# Patient Record
Sex: FEMALE | Race: WHITE | NOT HISPANIC OR LATINO | Employment: FULL TIME | RURAL
[De-identification: names, ages, dates, MRNs, and addresses within clinical notes are randomized per-mention and may not be internally consistent; named-entity substitution may affect disease eponyms.]

---

## 2020-02-24 LAB — BCS RECOMMENDATION EXT: NORMAL

## 2020-10-09 ENCOUNTER — HISTORICAL (OUTPATIENT)
Dept: ADMINISTRATIVE | Facility: HOSPITAL | Age: 55
End: 2020-10-09

## 2020-10-09 LAB — SARS-COV+SARS-COV-2 AG RESP QL IA.RAPID: NEGATIVE

## 2020-10-12 ENCOUNTER — HISTORICAL (OUTPATIENT)
Dept: ADMINISTRATIVE | Facility: HOSPITAL | Age: 55
End: 2020-10-12

## 2020-10-12 LAB — SARS-COV+SARS-COV-2 AG RESP QL IA.RAPID: NEGATIVE

## 2021-03-31 ENCOUNTER — ANESTHESIA EVENT (OUTPATIENT)
Dept: SURGERY | Facility: HOSPITAL | Age: 56
DRG: 414 | End: 2021-03-31
Payer: COMMERCIAL

## 2021-03-31 ENCOUNTER — HOSPITAL ENCOUNTER (INPATIENT)
Facility: HOSPITAL | Age: 56
LOS: 5 days | Discharge: HOME | DRG: 414 | End: 2021-04-05
Attending: SURGERY | Admitting: SURGERY
Payer: COMMERCIAL

## 2021-03-31 ENCOUNTER — HISTORICAL (OUTPATIENT)
Dept: ADMINISTRATIVE | Facility: HOSPITAL | Age: 56
End: 2021-03-31

## 2021-03-31 ENCOUNTER — ANESTHESIA (OUTPATIENT)
Dept: SURGERY | Facility: HOSPITAL | Age: 56
DRG: 414 | End: 2021-03-31
Payer: COMMERCIAL

## 2021-03-31 DIAGNOSIS — K81.0 ACUTE CHOLECYSTITIS: ICD-10-CM

## 2021-03-31 DIAGNOSIS — K82.A1 GANGRENE OF GALLBLADDER IN CHOLECYSTITIS: Primary | ICD-10-CM

## 2021-03-31 PROCEDURE — 25000003 PHARM REV CODE 250: Performed by: SURGERY

## 2021-03-31 PROCEDURE — 36000708 HC OR TIME LEV III 1ST 15 MIN: Performed by: SURGERY

## 2021-03-31 PROCEDURE — 27202716 HC PROBE, NASAL TEMP MONITORING: Performed by: ANESTHESIOLOGY

## 2021-03-31 PROCEDURE — 36000709 HC OR TIME LEV III EA ADD 15 MIN: Performed by: SURGERY

## 2021-03-31 PROCEDURE — 27000165 HC TUBE, ETT CUFFED: Performed by: ANESTHESIOLOGY

## 2021-03-31 PROCEDURE — 11000001 HC ACUTE MED/SURG PRIVATE ROOM

## 2021-03-31 PROCEDURE — 87186 SC STD MICRODIL/AGAR DIL: CPT

## 2021-03-31 PROCEDURE — 87077 CULTURE AEROBIC IDENTIFY: CPT

## 2021-03-31 PROCEDURE — 87070 CULTURE OTHR SPECIMN AEROBIC: CPT

## 2021-03-31 PROCEDURE — D9220A PRA ANESTHESIA: ICD-10-PCS | Mod: ,,, | Performed by: ANESTHESIOLOGY

## 2021-03-31 PROCEDURE — 27201423 OPTIME MED/SURG SUP & DEVICES STERILE SUPPLY: Performed by: SURGERY

## 2021-03-31 PROCEDURE — S0030 INJECTION, METRONIDAZOLE: HCPCS | Performed by: NURSE ANESTHETIST, CERTIFIED REGISTERED

## 2021-03-31 PROCEDURE — 37000008 HC ANESTHESIA 1ST 15 MINUTES: Performed by: SURGERY

## 2021-03-31 PROCEDURE — 25000003 PHARM REV CODE 250: Performed by: NURSE ANESTHETIST, CERTIFIED REGISTERED

## 2021-03-31 PROCEDURE — 47600 CHOLECYSTECTOMY: CPT | Mod: ,,, | Performed by: SURGERY

## 2021-03-31 PROCEDURE — 88304 TISSUE EXAM BY PATHOLOGIST: CPT | Performed by: PATHOLOGY

## 2021-03-31 PROCEDURE — 88304 TISSUE EXAM BY PATHOLOGIST: CPT | Mod: 26,,, | Performed by: PATHOLOGY

## 2021-03-31 PROCEDURE — 47600 PR REMOVAL GALLBLADDER: ICD-10-PCS | Mod: ,,, | Performed by: SURGERY

## 2021-03-31 PROCEDURE — 87075 CULTR BACTERIA EXCEPT BLOOD: CPT

## 2021-03-31 PROCEDURE — C1729 CATH, DRAINAGE: HCPCS | Performed by: SURGERY

## 2021-03-31 PROCEDURE — D9220A PRA ANESTHESIA: Mod: ,,, | Performed by: ANESTHESIOLOGY

## 2021-03-31 PROCEDURE — 27200700 HC TUBE, ENDOTRACHEAL NASAL RAE: Performed by: ANESTHESIOLOGY

## 2021-03-31 PROCEDURE — 63600175 PHARM REV CODE 636 W HCPCS: Performed by: NURSE ANESTHETIST, CERTIFIED REGISTERED

## 2021-03-31 PROCEDURE — 37000009 HC ANESTHESIA EA ADD 15 MINS: Performed by: SURGERY

## 2021-03-31 PROCEDURE — 27000716 HC OXISENSOR PROBE, ANY SIZE: Performed by: ANESTHESIOLOGY

## 2021-03-31 PROCEDURE — 27000655: Performed by: ANESTHESIOLOGY

## 2021-03-31 PROCEDURE — 88304 PR  SURG PATH,LEVEL III: ICD-10-PCS | Mod: 26,,, | Performed by: PATHOLOGY

## 2021-03-31 PROCEDURE — 27000510 HC BLANKET BAIR HUGGER ANY SIZE: Performed by: ANESTHESIOLOGY

## 2021-03-31 PROCEDURE — 71000033 HC RECOVERY, INTIAL HOUR: Performed by: SURGERY

## 2021-03-31 RX ORDER — MIDAZOLAM HYDROCHLORIDE 1 MG/ML
INJECTION, SOLUTION INTRAMUSCULAR; INTRAVENOUS
Status: DISCONTINUED | OUTPATIENT
Start: 2021-03-31 | End: 2021-04-01

## 2021-03-31 RX ORDER — ONDANSETRON 2 MG/ML
INJECTION INTRAMUSCULAR; INTRAVENOUS
Status: DISCONTINUED | OUTPATIENT
Start: 2021-03-31 | End: 2021-04-01

## 2021-03-31 RX ORDER — METRONIDAZOLE 500 MG/100ML
INJECTION, SOLUTION INTRAVENOUS
Status: DISCONTINUED | OUTPATIENT
Start: 2021-03-31 | End: 2021-04-01

## 2021-03-31 RX ORDER — PROPOFOL 10 MG/ML
VIAL (ML) INTRAVENOUS
Status: DISCONTINUED | OUTPATIENT
Start: 2021-03-31 | End: 2021-04-01

## 2021-03-31 RX ORDER — MORPHINE SULFATE 4 MG/ML
4 INJECTION, SOLUTION INTRAMUSCULAR; INTRAVENOUS EVERY 4 HOURS PRN
Status: DISCONTINUED | OUTPATIENT
Start: 2021-03-31 | End: 2021-04-01

## 2021-03-31 RX ORDER — DEXAMETHASONE SODIUM PHOSPHATE 4 MG/ML
INJECTION, SOLUTION INTRA-ARTICULAR; INTRALESIONAL; INTRAMUSCULAR; INTRAVENOUS; SOFT TISSUE
Status: DISCONTINUED | OUTPATIENT
Start: 2021-03-31 | End: 2021-04-01

## 2021-03-31 RX ORDER — GABAPENTIN 300 MG/1
300 CAPSULE ORAL NIGHTLY PRN
Status: DISCONTINUED | OUTPATIENT
Start: 2021-03-31 | End: 2021-04-05 | Stop reason: HOSPADM

## 2021-03-31 RX ORDER — SODIUM CHLORIDE 0.9 % (FLUSH) 0.9 %
10 SYRINGE (ML) INJECTION
Status: DISCONTINUED | OUTPATIENT
Start: 2021-03-31 | End: 2021-04-05 | Stop reason: HOSPADM

## 2021-03-31 RX ORDER — ROCURONIUM BROMIDE 10 MG/ML
INJECTION, SOLUTION INTRAVENOUS
Status: DISCONTINUED | OUTPATIENT
Start: 2021-03-31 | End: 2021-04-01

## 2021-03-31 RX ORDER — GABAPENTIN 300 MG/1
300 CAPSULE ORAL NIGHTLY PRN
COMMUNITY
End: 2021-09-30 | Stop reason: SDUPTHER

## 2021-03-31 RX ORDER — HYDROCODONE BITARTRATE AND ACETAMINOPHEN 5; 325 MG/1; MG/1
1 TABLET ORAL EVERY 4 HOURS PRN
Status: DISCONTINUED | OUTPATIENT
Start: 2021-03-31 | End: 2021-04-05 | Stop reason: HOSPADM

## 2021-03-31 RX ORDER — LIDOCAINE HYDROCHLORIDE 20 MG/ML
INJECTION INTRAVENOUS
Status: DISCONTINUED | OUTPATIENT
Start: 2021-03-31 | End: 2021-04-01

## 2021-03-31 RX ORDER — ACETAMINOPHEN 325 MG/1
650 TABLET ORAL EVERY 8 HOURS PRN
Status: DISCONTINUED | OUTPATIENT
Start: 2021-03-31 | End: 2021-04-05 | Stop reason: HOSPADM

## 2021-03-31 RX ORDER — ONDANSETRON 2 MG/ML
4 INJECTION INTRAMUSCULAR; INTRAVENOUS EVERY 12 HOURS PRN
Status: DISCONTINUED | OUTPATIENT
Start: 2021-03-31 | End: 2021-04-05 | Stop reason: HOSPADM

## 2021-03-31 RX ORDER — SODIUM CHLORIDE 9 MG/ML
INJECTION, SOLUTION INTRAVENOUS CONTINUOUS
Status: DISCONTINUED | OUTPATIENT
Start: 2021-03-31 | End: 2021-04-02

## 2021-03-31 RX ORDER — LIDOCAINE HYDROCHLORIDE 10 MG/ML
1 INJECTION, SOLUTION EPIDURAL; INFILTRATION; INTRACAUDAL; PERINEURAL ONCE
Status: DISCONTINUED | OUTPATIENT
Start: 2021-03-31 | End: 2021-04-05 | Stop reason: HOSPADM

## 2021-03-31 RX ORDER — MELOXICAM 7.5 MG/1
15 TABLET ORAL DAILY
Status: DISCONTINUED | OUTPATIENT
Start: 2021-04-01 | End: 2021-04-05 | Stop reason: HOSPADM

## 2021-03-31 RX ORDER — MELOXICAM 15 MG/1
15 TABLET ORAL DAILY
COMMUNITY
End: 2021-06-16

## 2021-03-31 RX ORDER — ONDANSETRON 4 MG/1
8 TABLET, ORALLY DISINTEGRATING ORAL EVERY 8 HOURS PRN
Status: DISCONTINUED | OUTPATIENT
Start: 2021-03-31 | End: 2021-04-01

## 2021-03-31 RX ORDER — BUPIVACAINE HYDROCHLORIDE 2.5 MG/ML
INJECTION, SOLUTION EPIDURAL; INFILTRATION; INTRACAUDAL
Status: DISCONTINUED | OUTPATIENT
Start: 2021-03-31 | End: 2021-04-01 | Stop reason: HOSPADM

## 2021-03-31 RX ORDER — FENTANYL CITRATE 50 UG/ML
INJECTION, SOLUTION INTRAMUSCULAR; INTRAVENOUS
Status: DISCONTINUED | OUTPATIENT
Start: 2021-03-31 | End: 2021-04-01

## 2021-03-31 RX ORDER — TALC
6 POWDER (GRAM) TOPICAL NIGHTLY PRN
Status: DISCONTINUED | OUTPATIENT
Start: 2021-03-31 | End: 2021-04-05 | Stop reason: HOSPADM

## 2021-03-31 RX ADMIN — PROPOFOL 200 MG: 10 INJECTION, EMULSION INTRAVENOUS at 10:03

## 2021-03-31 RX ADMIN — FENTANYL CITRATE 100 MCG: 50 INJECTION INTRAMUSCULAR; INTRAVENOUS at 10:03

## 2021-03-31 RX ADMIN — MIDAZOLAM HYDROCHLORIDE 2 MG: 1 INJECTION, SOLUTION INTRAMUSCULAR; INTRAVENOUS at 10:03

## 2021-03-31 RX ADMIN — METRONIDAZOLE 500 MG: 500 INJECTION, SOLUTION INTRAVENOUS at 11:03

## 2021-03-31 RX ADMIN — DEXAMETHASONE SODIUM PHOSPHATE 20 MG: 4 INJECTION, SOLUTION INTRA-ARTICULAR; INTRALESIONAL; INTRAMUSCULAR; INTRAVENOUS; SOFT TISSUE at 10:03

## 2021-03-31 RX ADMIN — LIDOCAINE HYDROCHLORIDE 100 MG: 20 INJECTION, SOLUTION INTRAVENOUS at 10:03

## 2021-03-31 RX ADMIN — CEFAZOLIN 1 G: 1 INJECTION, POWDER, FOR SOLUTION INTRAMUSCULAR; INTRAVENOUS; PARENTERAL at 10:03

## 2021-03-31 RX ADMIN — ONDANSETRON 8 MG: 2 INJECTION INTRAMUSCULAR; INTRAVENOUS at 10:03

## 2021-03-31 RX ADMIN — ROCURONIUM BROMIDE 40 MG: 10 INJECTION INTRAVENOUS at 10:03

## 2021-04-01 PROBLEM — K82.A1 GANGRENE OF GALLBLADDER IN CHOLECYSTITIS: Status: ACTIVE | Noted: 2021-04-01

## 2021-04-01 LAB
ALBUMIN SERPL BCP-MCNC: 2.1 G/DL (ref 3.5–5)
ALBUMIN/GLOB SERPL: 0.4 {RATIO}
ALP SERPL-CCNC: 146 U/L (ref 41–108)
ALT SERPL W P-5'-P-CCNC: 90 U/L (ref 13–56)
ANION GAP SERPL CALCULATED.3IONS-SCNC: 14 MMOL/L
AST SERPL W P-5'-P-CCNC: 112 U/L (ref 15–37)
BASOPHILS # BLD AUTO: 0.03 X10E3/UL (ref 0–0.2)
BASOPHILS NFR BLD AUTO: 0.4 % (ref 0–1)
BILIRUB SERPL-MCNC: 0.4 MG/DL (ref 0–1.2)
BUN SERPL-MCNC: 15 MG/DL (ref 7–18)
BUN/CREAT SERPL: 14.3
CALCIUM SERPL-MCNC: 7.8 MG/DL (ref 8.5–10.1)
CHLORIDE SERPL-SCNC: 104 MMOL/L (ref 98–107)
CO2 SERPL-SCNC: 25 MMOL/L (ref 21–32)
CREAT SERPL-MCNC: 1.05 MG/DL (ref 0.55–1.02)
EOSINOPHIL # BLD AUTO: 0.01 X10E3/UL (ref 0–0.5)
EOSINOPHIL NFR BLD AUTO: 0.1 % (ref 1–4)
ERYTHROCYTE [DISTWIDTH] IN BLOOD BY AUTOMATED COUNT: 13.6 % (ref 11.5–14.5)
GLOBULIN SER-MCNC: 4.8 G/DL (ref 2–4)
GLUCOSE SERPL-MCNC: 128 MG/DL (ref 74–106)
HCT VFR BLD AUTO: 30.4 % (ref 38–47)
HGB BLD-MCNC: 9.8 G/DL (ref 12–16)
HYPOCHROMIA BLD QL SMEAR: SLIGHT
IMM GRANULOCYTES # BLD AUTO: 0.04 X10E3/UL (ref 0–0.04)
IMM GRANULOCYTES NFR BLD: 0.6 % (ref 0–0.4)
LYMPHOCYTES # BLD AUTO: 0.63 X10E3/UL (ref 1–4.8)
LYMPHOCYTES NFR BLD AUTO: 8.7 % (ref 27–41)
LYMPHOCYTES NFR BLD MANUAL: 9 % (ref 27–41)
MCH RBC QN AUTO: 28.1 PG (ref 27–31)
MCHC RBC AUTO-ENTMCNC: 32.2 G/DL (ref 32–36)
MCV RBC AUTO: 87.1 FL (ref 80–96)
MONOCYTES # BLD AUTO: 0.42 X10E3/UL (ref 0–0.8)
MONOCYTES NFR BLD AUTO: 5.8 % (ref 2–6)
MONOCYTES NFR BLD MANUAL: 4 % (ref 2–6)
MPC BLD CALC-MCNC: 10.1 FL (ref 9.4–12.4)
NEUTROPHILS # BLD AUTO: 6.1 X10E3/UL (ref 1.8–7.7)
NEUTROPHILS NFR BLD AUTO: 84.4 % (ref 53–65)
NEUTS BAND NFR BLD MANUAL: 17 % (ref 1–5)
NEUTS SEG NFR BLD MANUAL: 70 % (ref 50–62)
NRBC # BLD AUTO: 0 X10E3/UL (ref 0–0)
NRBC, AUTO (.00): 0 /100 (ref 0–0)
PLATELET # BLD AUTO: 250 X10E3/UL (ref 150–400)
PLATELET MORPHOLOGY: ABNORMAL
POTASSIUM SERPL-SCNC: 4 MMOL/L (ref 3.5–5.1)
PROT SERPL-MCNC: 6.9 G/DL (ref 6.4–8.2)
RBC # BLD AUTO: 3.49 X10E6/UL (ref 4.2–5.4)
SODIUM SERPL-SCNC: 139 MMOL/L (ref 136–145)
WBC # BLD AUTO: 7.23 X10E3/UL (ref 4.5–11)

## 2021-04-01 PROCEDURE — 25000003 PHARM REV CODE 250

## 2021-04-01 PROCEDURE — 80053 COMPREHEN METABOLIC PANEL: CPT

## 2021-04-01 PROCEDURE — 25000003 PHARM REV CODE 250: Performed by: SURGERY

## 2021-04-01 PROCEDURE — 63600175 PHARM REV CODE 636 W HCPCS: Performed by: SURGERY

## 2021-04-01 PROCEDURE — 94760 N-INVAS EAR/PLS OXIMETRY 1: CPT

## 2021-04-01 PROCEDURE — 63600175 PHARM REV CODE 636 W HCPCS: Performed by: ANESTHESIOLOGY

## 2021-04-01 PROCEDURE — 25000003 PHARM REV CODE 250: Performed by: NURSE PRACTITIONER

## 2021-04-01 PROCEDURE — 63600175 PHARM REV CODE 636 W HCPCS: Performed by: NURSE PRACTITIONER

## 2021-04-01 PROCEDURE — 85025 COMPLETE CBC W/AUTO DIFF WBC: CPT

## 2021-04-01 PROCEDURE — 94761 N-INVAS EAR/PLS OXIMETRY MLT: CPT

## 2021-04-01 PROCEDURE — 11000001 HC ACUTE MED/SURG PRIVATE ROOM

## 2021-04-01 PROCEDURE — 36415 COLL VENOUS BLD VENIPUNCTURE: CPT

## 2021-04-01 RX ORDER — SODIUM CHLORIDE, SODIUM LACTATE, POTASSIUM CHLORIDE, CALCIUM CHLORIDE 600; 310; 30; 20 MG/100ML; MG/100ML; MG/100ML; MG/100ML
INJECTION, SOLUTION INTRAVENOUS CONTINUOUS
Status: DISCONTINUED | OUTPATIENT
Start: 2021-04-01 | End: 2021-04-03

## 2021-04-01 RX ORDER — MORPHINE SULFATE 10 MG/ML
4 INJECTION INTRAMUSCULAR; INTRAVENOUS; SUBCUTANEOUS EVERY 5 MIN PRN
Status: DISCONTINUED | OUTPATIENT
Start: 2021-04-01 | End: 2021-04-01

## 2021-04-01 RX ORDER — NALOXONE HCL 0.4 MG/ML
0.02 VIAL (ML) INJECTION
Status: DISCONTINUED | OUTPATIENT
Start: 2021-04-01 | End: 2021-04-05 | Stop reason: HOSPADM

## 2021-04-01 RX ORDER — ENOXAPARIN SODIUM 100 MG/ML
40 INJECTION SUBCUTANEOUS EVERY 12 HOURS
Status: DISCONTINUED | OUTPATIENT
Start: 2021-04-01 | End: 2021-04-05 | Stop reason: HOSPADM

## 2021-04-01 RX ORDER — MEPERIDINE HYDROCHLORIDE 25 MG/ML
25 INJECTION INTRAMUSCULAR; INTRAVENOUS; SUBCUTANEOUS EVERY 10 MIN PRN
Status: DISCONTINUED | OUTPATIENT
Start: 2021-04-01 | End: 2021-04-01 | Stop reason: HOSPADM

## 2021-04-01 RX ORDER — DIPHENHYDRAMINE HYDROCHLORIDE 50 MG/ML
25 INJECTION INTRAMUSCULAR; INTRAVENOUS EVERY 6 HOURS PRN
Status: DISCONTINUED | OUTPATIENT
Start: 2021-04-01 | End: 2021-04-01 | Stop reason: HOSPADM

## 2021-04-01 RX ORDER — CLONIDINE HYDROCHLORIDE 0.1 MG/1
0.1 TABLET ORAL EVERY 6 HOURS PRN
Status: DISCONTINUED | OUTPATIENT
Start: 2021-04-01 | End: 2021-04-05 | Stop reason: HOSPADM

## 2021-04-01 RX ORDER — BISACODYL 10 MG
10 SUPPOSITORY, RECTAL RECTAL DAILY PRN
Status: DISCONTINUED | OUTPATIENT
Start: 2021-04-01 | End: 2021-04-05 | Stop reason: HOSPADM

## 2021-04-01 RX ORDER — MORPHINE SULFATE 1 MG/ML
INJECTION INTRAVENOUS CONTINUOUS
Status: DISCONTINUED | OUTPATIENT
Start: 2021-04-01 | End: 2021-04-02

## 2021-04-01 RX ORDER — SIMETHICONE 80 MG
1 TABLET,CHEWABLE ORAL 3 TIMES DAILY PRN
Status: DISCONTINUED | OUTPATIENT
Start: 2021-04-01 | End: 2021-04-05 | Stop reason: HOSPADM

## 2021-04-01 RX ORDER — DIPHENHYDRAMINE HYDROCHLORIDE 50 MG/ML
25 INJECTION INTRAMUSCULAR; INTRAVENOUS EVERY 4 HOURS PRN
Status: DISCONTINUED | OUTPATIENT
Start: 2021-04-01 | End: 2021-04-05 | Stop reason: HOSPADM

## 2021-04-01 RX ORDER — OXYCODONE HYDROCHLORIDE 5 MG/1
5 TABLET ORAL
Status: DISCONTINUED | OUTPATIENT
Start: 2021-04-01 | End: 2021-04-01 | Stop reason: HOSPADM

## 2021-04-01 RX ORDER — HYDROMORPHONE HYDROCHLORIDE 2 MG/ML
0.5 INJECTION, SOLUTION INTRAMUSCULAR; INTRAVENOUS; SUBCUTANEOUS EVERY 5 MIN PRN
Status: DISCONTINUED | OUTPATIENT
Start: 2021-04-01 | End: 2021-04-01 | Stop reason: HOSPADM

## 2021-04-01 RX ORDER — ONDANSETRON 2 MG/ML
4 INJECTION INTRAMUSCULAR; INTRAVENOUS DAILY PRN
Status: DISCONTINUED | OUTPATIENT
Start: 2021-04-01 | End: 2021-04-01 | Stop reason: HOSPADM

## 2021-04-01 RX ORDER — SODIUM CHLORIDE 9 MG/ML
INJECTION, SOLUTION INTRAVENOUS
Status: COMPLETED
Start: 2021-04-01 | End: 2021-04-01

## 2021-04-01 RX ORDER — AMOXICILLIN 250 MG
1 CAPSULE ORAL 2 TIMES DAILY
Status: DISCONTINUED | OUTPATIENT
Start: 2021-04-01 | End: 2021-04-05 | Stop reason: HOSPADM

## 2021-04-01 RX ADMIN — ENOXAPARIN SODIUM 40 MG: 40 INJECTION SUBCUTANEOUS at 09:04

## 2021-04-01 RX ADMIN — SODIUM CHLORIDE, POTASSIUM CHLORIDE, SODIUM LACTATE AND CALCIUM CHLORIDE: 600; 310; 30; 20 INJECTION, SOLUTION INTRAVENOUS at 12:04

## 2021-04-01 RX ADMIN — SENNOSIDES, DOCUSATE SODIUM 1 TABLET: 8.6; 5 TABLET ORAL at 12:04

## 2021-04-01 RX ADMIN — MORPHINE SULFATE 4 MG: 10 INJECTION INTRAVENOUS at 12:04

## 2021-04-01 RX ADMIN — MORPHINE SULFATE: 1 INJECTION INTRAVENOUS at 02:04

## 2021-04-01 RX ADMIN — MELOXICAM 15 MG: 7.5 TABLET ORAL at 09:04

## 2021-04-01 RX ADMIN — ENOXAPARIN SODIUM 40 MG: 40 INJECTION SUBCUTANEOUS at 12:04

## 2021-04-01 RX ADMIN — MORPHINE SULFATE 2 MG: 10 INJECTION INTRAVENOUS at 12:04

## 2021-04-01 RX ADMIN — SODIUM CHLORIDE 1000 ML: 9 INJECTION, SOLUTION INTRAVENOUS at 02:04

## 2021-04-01 RX ADMIN — SENNOSIDES, DOCUSATE SODIUM 1 TABLET: 8.6; 5 TABLET ORAL at 09:04

## 2021-04-01 RX ADMIN — ONDANSETRON 4 MG: 2 INJECTION INTRAMUSCULAR; INTRAVENOUS at 12:04

## 2021-04-01 RX ADMIN — MORPHINE SULFATE: 1 INJECTION INTRAVENOUS at 09:04

## 2021-04-01 RX ADMIN — ONDANSETRON 4 MG: 2 INJECTION INTRAMUSCULAR; INTRAVENOUS at 04:04

## 2021-04-02 LAB
ANION GAP SERPL CALCULATED.3IONS-SCNC: 12 MMOL/L
BASOPHILS # BLD AUTO: 0.04 X10E3/UL (ref 0–0.2)
BASOPHILS NFR BLD AUTO: 0.5 % (ref 0–1)
BUN SERPL-MCNC: 21 MG/DL (ref 7–18)
CALCIUM SERPL-MCNC: 8.5 MG/DL (ref 8.5–10.1)
CHLORIDE SERPL-SCNC: 101 MMOL/L (ref 98–107)
CO2 SERPL-SCNC: 28 MMOL/L (ref 21–32)
CREAT SERPL-MCNC: 0.92 MG/DL (ref 0.55–1.02)
EOSINOPHIL # BLD AUTO: 0.12 X10E3/UL (ref 0–0.5)
EOSINOPHIL NFR BLD AUTO: 1.6 % (ref 1–4)
ERYTHROCYTE [DISTWIDTH] IN BLOOD BY AUTOMATED COUNT: 13.7 % (ref 11.5–14.5)
GLUCOSE SERPL-MCNC: 124 MG/DL (ref 74–106)
HCT VFR BLD AUTO: 31.3 % (ref 38–47)
HGB BLD-MCNC: 9.6 G/DL (ref 12–16)
IMM GRANULOCYTES # BLD AUTO: 0.04 X10E3/UL (ref 0–0.04)
IMM GRANULOCYTES NFR BLD: 0.5 % (ref 0–0.4)
INSULIN SERPL-ACNC: NORMAL U[IU]/ML
LAB AP CLINICAL INFORMATION: NORMAL
LAB AP DIAGNOSIS - HISTORICAL: NORMAL
LAB AP GROSS PATHOLOGY - HISTORICAL: NORMAL
LAB AP SPECIMEN SUBMITTED - HISTORICAL: NORMAL
LYMPHOCYTES # BLD AUTO: 1.2 X10E3/UL (ref 1–4.8)
LYMPHOCYTES NFR BLD AUTO: 15.6 % (ref 27–41)
MCH RBC QN AUTO: 27.2 PG (ref 27–31)
MCHC RBC AUTO-ENTMCNC: 30.7 G/DL (ref 32–36)
MCV RBC AUTO: 88.7 FL (ref 80–96)
MONOCYTES # BLD AUTO: 0.89 X10E3/UL (ref 0–0.8)
MONOCYTES NFR BLD AUTO: 11.6 % (ref 2–6)
MPC BLD CALC-MCNC: 10.6 FL (ref 9.4–12.4)
NEUTROPHILS # BLD AUTO: 5.4 X10E3/UL (ref 1.8–7.7)
NEUTROPHILS NFR BLD AUTO: 70.2 % (ref 53–65)
NRBC # BLD AUTO: 0 X10E3/UL (ref 0–0)
NRBC, AUTO (.00): 0 /100 (ref 0–0)
PLATELET # BLD AUTO: 255 X10E3/UL (ref 150–400)
POTASSIUM SERPL-SCNC: 4.3 MMOL/L (ref 3.5–5.1)
RBC # BLD AUTO: 3.53 X10E6/UL (ref 4.2–5.4)
SODIUM SERPL-SCNC: 137 MMOL/L (ref 136–145)
WBC # BLD AUTO: 7.69 X10E3/UL (ref 4.5–11)

## 2021-04-02 PROCEDURE — 80048 BASIC METABOLIC PNL TOTAL CA: CPT

## 2021-04-02 PROCEDURE — 25000003 PHARM REV CODE 250: Performed by: SURGERY

## 2021-04-02 PROCEDURE — 99900035 HC TECH TIME PER 15 MIN (STAT)

## 2021-04-02 PROCEDURE — 97110 THERAPEUTIC EXERCISES: CPT | Mod: CQ

## 2021-04-02 PROCEDURE — 94761 N-INVAS EAR/PLS OXIMETRY MLT: CPT

## 2021-04-02 PROCEDURE — 85025 COMPLETE CBC W/AUTO DIFF WBC: CPT

## 2021-04-02 PROCEDURE — 11000001 HC ACUTE MED/SURG PRIVATE ROOM

## 2021-04-02 PROCEDURE — 27000221 HC OXYGEN, UP TO 24 HOURS

## 2021-04-02 PROCEDURE — 63600175 PHARM REV CODE 636 W HCPCS: Performed by: SURGERY

## 2021-04-02 PROCEDURE — 36415 COLL VENOUS BLD VENIPUNCTURE: CPT

## 2021-04-02 PROCEDURE — 94799 UNLISTED PULMONARY SVC/PX: CPT

## 2021-04-02 PROCEDURE — 97530 THERAPEUTIC ACTIVITIES: CPT | Mod: CQ

## 2021-04-02 RX ADMIN — ONDANSETRON 4 MG: 2 INJECTION INTRAMUSCULAR; INTRAVENOUS at 08:04

## 2021-04-02 RX ADMIN — DIPHENHYDRAMINE HYDROCHLORIDE 25 MG: 50 INJECTION INTRAMUSCULAR; INTRAVENOUS at 08:04

## 2021-04-02 RX ADMIN — HYDROCODONE BITARTRATE AND ACETAMINOPHEN 1 TABLET: 5; 325 TABLET ORAL at 12:04

## 2021-04-02 RX ADMIN — DIPHENHYDRAMINE HYDROCHLORIDE 25 MG: 50 INJECTION INTRAMUSCULAR; INTRAVENOUS at 03:04

## 2021-04-02 RX ADMIN — HYDROCODONE BITARTRATE AND ACETAMINOPHEN 1 TABLET: 5; 325 TABLET ORAL at 07:04

## 2021-04-02 RX ADMIN — ENOXAPARIN SODIUM 40 MG: 40 INJECTION SUBCUTANEOUS at 08:04

## 2021-04-02 RX ADMIN — GABAPENTIN 300 MG: 300 CAPSULE ORAL at 08:04

## 2021-04-02 RX ADMIN — CEFTRIAXONE SODIUM 2 G: 2 INJECTION, POWDER, FOR SOLUTION INTRAMUSCULAR; INTRAVENOUS at 08:04

## 2021-04-02 RX ADMIN — ENOXAPARIN SODIUM 40 MG: 40 INJECTION SUBCUTANEOUS at 09:04

## 2021-04-02 RX ADMIN — MELATONIN 6 MG: at 08:04

## 2021-04-02 RX ADMIN — MELOXICAM 15 MG: 7.5 TABLET ORAL at 09:04

## 2021-04-02 RX ADMIN — MORPHINE SULFATE: 1 INJECTION INTRAVENOUS at 07:04

## 2021-04-02 RX ADMIN — SODIUM CHLORIDE, POTASSIUM CHLORIDE, SODIUM LACTATE AND CALCIUM CHLORIDE: 600; 310; 30; 20 INJECTION, SOLUTION INTRAVENOUS at 03:04

## 2021-04-03 LAB — REPORT: NORMAL

## 2021-04-03 PROCEDURE — 25000003 PHARM REV CODE 250: Performed by: SURGERY

## 2021-04-03 PROCEDURE — 63600175 PHARM REV CODE 636 W HCPCS: Performed by: SURGERY

## 2021-04-03 PROCEDURE — 63600175 PHARM REV CODE 636 W HCPCS: Performed by: NURSE PRACTITIONER

## 2021-04-03 PROCEDURE — 94761 N-INVAS EAR/PLS OXIMETRY MLT: CPT

## 2021-04-03 PROCEDURE — 94799 UNLISTED PULMONARY SVC/PX: CPT

## 2021-04-03 PROCEDURE — 27000221 HC OXYGEN, UP TO 24 HOURS

## 2021-04-03 PROCEDURE — 11000001 HC ACUTE MED/SURG PRIVATE ROOM

## 2021-04-03 PROCEDURE — 99900035 HC TECH TIME PER 15 MIN (STAT)

## 2021-04-03 PROCEDURE — 25000003 PHARM REV CODE 250: Performed by: NURSE PRACTITIONER

## 2021-04-03 PROCEDURE — 25000003 PHARM REV CODE 250: Performed by: STUDENT IN AN ORGANIZED HEALTH CARE EDUCATION/TRAINING PROGRAM

## 2021-04-03 RX ORDER — SODIUM CHLORIDE, SODIUM LACTATE, POTASSIUM CHLORIDE, CALCIUM CHLORIDE 600; 310; 30; 20 MG/100ML; MG/100ML; MG/100ML; MG/100ML
INJECTION, SOLUTION INTRAVENOUS CONTINUOUS
Status: DISCONTINUED | OUTPATIENT
Start: 2021-04-03 | End: 2021-04-05

## 2021-04-03 RX ORDER — HYDROCODONE BITARTRATE AND ACETAMINOPHEN 10; 325 MG/1; MG/1
1 TABLET ORAL EVERY 6 HOURS PRN
Status: DISCONTINUED | OUTPATIENT
Start: 2021-04-03 | End: 2021-04-05 | Stop reason: HOSPADM

## 2021-04-03 RX ORDER — SUMATRIPTAN 50 MG/1
50 TABLET, FILM COATED ORAL EVERY 6 HOURS PRN
Status: DISCONTINUED | OUTPATIENT
Start: 2021-04-03 | End: 2021-04-05 | Stop reason: HOSPADM

## 2021-04-03 RX ORDER — HYDROMORPHONE HYDROCHLORIDE 2 MG/ML
0.5 INJECTION, SOLUTION INTRAMUSCULAR; INTRAVENOUS; SUBCUTANEOUS
Status: DISCONTINUED | OUTPATIENT
Start: 2021-04-03 | End: 2021-04-05 | Stop reason: HOSPADM

## 2021-04-03 RX ADMIN — HYDROCODONE BITARTRATE AND ACETAMINOPHEN 1 TABLET: 5; 325 TABLET ORAL at 05:04

## 2021-04-03 RX ADMIN — ENOXAPARIN SODIUM 40 MG: 40 INJECTION SUBCUTANEOUS at 08:04

## 2021-04-03 RX ADMIN — ONDANSETRON 4 MG: 2 INJECTION INTRAMUSCULAR; INTRAVENOUS at 03:04

## 2021-04-03 RX ADMIN — SENNOSIDES, DOCUSATE SODIUM 1 TABLET: 8.6; 5 TABLET ORAL at 08:04

## 2021-04-03 RX ADMIN — SENNOSIDES, DOCUSATE SODIUM 1 TABLET: 8.6; 5 TABLET ORAL at 09:04

## 2021-04-03 RX ADMIN — HYDROCODONE BITARTRATE AND ACETAMINOPHEN 1 TABLET: 10; 325 TABLET ORAL at 09:04

## 2021-04-03 RX ADMIN — CEFTRIAXONE SODIUM 2 G: 2 INJECTION, POWDER, FOR SOLUTION INTRAMUSCULAR; INTRAVENOUS at 06:04

## 2021-04-03 RX ADMIN — SODIUM CHLORIDE, POTASSIUM CHLORIDE, SODIUM LACTATE AND CALCIUM CHLORIDE: 600; 310; 30; 20 INJECTION, SOLUTION INTRAVENOUS at 09:04

## 2021-04-03 RX ADMIN — SODIUM CHLORIDE, POTASSIUM CHLORIDE, SODIUM LACTATE AND CALCIUM CHLORIDE: 600; 310; 30; 20 INJECTION, SOLUTION INTRAVENOUS at 12:04

## 2021-04-03 RX ADMIN — MELOXICAM 15 MG: 7.5 TABLET ORAL at 08:04

## 2021-04-03 RX ADMIN — ENOXAPARIN SODIUM 40 MG: 40 INJECTION SUBCUTANEOUS at 09:04

## 2021-04-03 RX ADMIN — SODIUM CHLORIDE, POTASSIUM CHLORIDE, SODIUM LACTATE AND CALCIUM CHLORIDE: 600; 310; 30; 20 INJECTION, SOLUTION INTRAVENOUS at 08:04

## 2021-04-03 RX ADMIN — ONDANSETRON 4 MG: 2 INJECTION INTRAMUSCULAR; INTRAVENOUS at 09:04

## 2021-04-03 RX ADMIN — HYDROCODONE BITARTRATE AND ACETAMINOPHEN 1 TABLET: 5; 325 TABLET ORAL at 12:04

## 2021-04-03 RX ADMIN — HYDROCODONE BITARTRATE AND ACETAMINOPHEN 1 TABLET: 10; 325 TABLET ORAL at 08:04

## 2021-04-03 RX ADMIN — SUMATRIPTAN SUCCINATE 50 MG: 50 TABLET ORAL at 03:04

## 2021-04-04 LAB — REPORT: NORMAL

## 2021-04-04 PROCEDURE — 25000003 PHARM REV CODE 250: Performed by: SURGERY

## 2021-04-04 PROCEDURE — 94761 N-INVAS EAR/PLS OXIMETRY MLT: CPT

## 2021-04-04 PROCEDURE — 94799 UNLISTED PULMONARY SVC/PX: CPT

## 2021-04-04 PROCEDURE — 11000001 HC ACUTE MED/SURG PRIVATE ROOM

## 2021-04-04 PROCEDURE — 63600175 PHARM REV CODE 636 W HCPCS: Performed by: NURSE PRACTITIONER

## 2021-04-04 PROCEDURE — 25000003 PHARM REV CODE 250: Performed by: NURSE PRACTITIONER

## 2021-04-04 PROCEDURE — 99900035 HC TECH TIME PER 15 MIN (STAT)

## 2021-04-04 PROCEDURE — 27000221 HC OXYGEN, UP TO 24 HOURS

## 2021-04-04 PROCEDURE — 63600175 PHARM REV CODE 636 W HCPCS: Performed by: SURGERY

## 2021-04-04 RX ADMIN — CEFTRIAXONE SODIUM 2 G: 2 INJECTION, POWDER, FOR SOLUTION INTRAMUSCULAR; INTRAVENOUS at 05:04

## 2021-04-04 RX ADMIN — SODIUM CHLORIDE, POTASSIUM CHLORIDE, SODIUM LACTATE AND CALCIUM CHLORIDE: 600; 310; 30; 20 INJECTION, SOLUTION INTRAVENOUS at 08:04

## 2021-04-04 RX ADMIN — SENNOSIDES, DOCUSATE SODIUM 1 TABLET: 8.6; 5 TABLET ORAL at 09:04

## 2021-04-04 RX ADMIN — ENOXAPARIN SODIUM 40 MG: 40 INJECTION SUBCUTANEOUS at 08:04

## 2021-04-04 RX ADMIN — GABAPENTIN 300 MG: 300 CAPSULE ORAL at 08:04

## 2021-04-04 RX ADMIN — HYDROCODONE BITARTRATE AND ACETAMINOPHEN 1 TABLET: 10; 325 TABLET ORAL at 10:04

## 2021-04-04 RX ADMIN — MELOXICAM 15 MG: 7.5 TABLET ORAL at 09:04

## 2021-04-04 RX ADMIN — ENOXAPARIN SODIUM 40 MG: 40 INJECTION SUBCUTANEOUS at 09:04

## 2021-04-04 RX ADMIN — HYDROCODONE BITARTRATE AND ACETAMINOPHEN 1 TABLET: 10; 325 TABLET ORAL at 01:04

## 2021-04-04 RX ADMIN — HYDROCODONE BITARTRATE AND ACETAMINOPHEN 1 TABLET: 10; 325 TABLET ORAL at 05:04

## 2021-04-04 RX ADMIN — SENNOSIDES, DOCUSATE SODIUM 1 TABLET: 8.6; 5 TABLET ORAL at 08:04

## 2021-04-05 VITALS
SYSTOLIC BLOOD PRESSURE: 109 MMHG | BODY MASS INDEX: 43.82 KG/M2 | RESPIRATION RATE: 18 BRPM | TEMPERATURE: 99 F | DIASTOLIC BLOOD PRESSURE: 59 MMHG | WEIGHT: 272.69 LBS | OXYGEN SATURATION: 94 % | HEIGHT: 66 IN | HEART RATE: 76 BPM

## 2021-04-05 PROCEDURE — 97110 THERAPEUTIC EXERCISES: CPT

## 2021-04-05 PROCEDURE — 25000003 PHARM REV CODE 250: Performed by: NURSE PRACTITIONER

## 2021-04-05 PROCEDURE — 99900035 HC TECH TIME PER 15 MIN (STAT)

## 2021-04-05 PROCEDURE — 63600175 PHARM REV CODE 636 W HCPCS: Performed by: SURGERY

## 2021-04-05 PROCEDURE — 27000221 HC OXYGEN, UP TO 24 HOURS

## 2021-04-05 PROCEDURE — 25000003 PHARM REV CODE 250: Performed by: SURGERY

## 2021-04-05 PROCEDURE — 97116 GAIT TRAINING THERAPY: CPT

## 2021-04-05 PROCEDURE — 94761 N-INVAS EAR/PLS OXIMETRY MLT: CPT

## 2021-04-05 RX ORDER — HYDROCODONE BITARTRATE AND ACETAMINOPHEN 5; 325 MG/1; MG/1
1 TABLET ORAL EVERY 4 HOURS PRN
Qty: 15 TABLET | Refills: 0 | Status: SHIPPED | OUTPATIENT
Start: 2021-04-05 | End: 2021-08-18

## 2021-04-05 RX ADMIN — HYDROCODONE BITARTRATE AND ACETAMINOPHEN 1 TABLET: 10; 325 TABLET ORAL at 06:04

## 2021-04-05 RX ADMIN — ENOXAPARIN SODIUM 40 MG: 40 INJECTION SUBCUTANEOUS at 09:04

## 2021-04-05 RX ADMIN — HYDROCODONE BITARTRATE AND ACETAMINOPHEN 1 TABLET: 10; 325 TABLET ORAL at 12:04

## 2021-04-05 RX ADMIN — MELOXICAM 15 MG: 7.5 TABLET ORAL at 09:04

## 2021-04-05 RX ADMIN — SENNOSIDES, DOCUSATE SODIUM 1 TABLET: 8.6; 5 TABLET ORAL at 09:04

## 2021-04-12 ENCOUNTER — OFFICE VISIT (OUTPATIENT)
Dept: SURGERY | Facility: CLINIC | Age: 56
End: 2021-04-12
Payer: COMMERCIAL

## 2021-04-12 VITALS — HEIGHT: 66 IN | OXYGEN SATURATION: 99 % | BODY MASS INDEX: 40.18 KG/M2 | WEIGHT: 250 LBS | HEART RATE: 97 BPM

## 2021-04-12 DIAGNOSIS — Z09 POSTOP CHECK: Primary | ICD-10-CM

## 2021-04-12 PROCEDURE — 99024 POSTOP FOLLOW-UP VISIT: CPT | Mod: ,,, | Performed by: SURGERY

## 2021-04-12 PROCEDURE — 99213 OFFICE O/P EST LOW 20 MIN: CPT | Mod: PBBFAC | Performed by: SURGERY

## 2021-04-12 PROCEDURE — 3008F BODY MASS INDEX DOCD: CPT | Mod: ,,, | Performed by: SURGERY

## 2021-04-12 PROCEDURE — 99999 PR PBB SHADOW E&M-EST. PATIENT-LVL III: ICD-10-PCS | Mod: PBBFAC,,, | Performed by: SURGERY

## 2021-04-12 PROCEDURE — 99999 PR PBB SHADOW E&M-EST. PATIENT-LVL III: CPT | Mod: PBBFAC,,, | Performed by: SURGERY

## 2021-04-12 PROCEDURE — 3008F PR BODY MASS INDEX (BMI) DOCUMENTED: ICD-10-PCS | Mod: ,,, | Performed by: SURGERY

## 2021-04-12 PROCEDURE — 99024 PR POST-OP FOLLOW-UP VISIT: ICD-10-PCS | Mod: ,,, | Performed by: SURGERY

## 2021-04-12 RX ORDER — METFORMIN HYDROCHLORIDE 500 MG/1
TABLET ORAL
COMMUNITY
Start: 2021-03-10 | End: 2021-06-22

## 2021-04-13 PROBLEM — K82.A1 GANGRENE OF GALLBLADDER IN CHOLECYSTITIS: Status: RESOLVED | Noted: 2021-04-01 | Resolved: 2021-04-13

## 2021-04-13 PROBLEM — K81.0 EMPYEMA OF GALLBLADDER: Status: RESOLVED | Noted: 2021-03-31 | Resolved: 2021-04-13

## 2021-05-08 ENCOUNTER — HOSPITAL ENCOUNTER (EMERGENCY)
Facility: HOSPITAL | Age: 56
Discharge: HOME | End: 2021-05-08
Attending: FAMILY MEDICINE
Payer: COMMERCIAL

## 2021-05-08 VITALS
SYSTOLIC BLOOD PRESSURE: 140 MMHG | DIASTOLIC BLOOD PRESSURE: 85 MMHG | TEMPERATURE: 99 F | RESPIRATION RATE: 20 BRPM | HEIGHT: 66 IN | BODY MASS INDEX: 41.06 KG/M2 | HEART RATE: 86 BPM | WEIGHT: 255.5 LBS | OXYGEN SATURATION: 97 %

## 2021-05-08 DIAGNOSIS — R60.9 SWELLING: ICD-10-CM

## 2021-05-08 DIAGNOSIS — I82.412 ACUTE DEEP VEIN THROMBOSIS (DVT) OF FEMORAL VEIN OF LEFT LOWER EXTREMITY: Primary | ICD-10-CM

## 2021-05-08 LAB
ALBUMIN SERPL BCP-MCNC: 3.5 G/DL (ref 3.5–5)
ALBUMIN/GLOB SERPL: 0.8 {RATIO}
ALP SERPL-CCNC: 144 U/L (ref 46–118)
ALT SERPL W P-5'-P-CCNC: 32 U/L (ref 13–56)
ANION GAP SERPL CALCULATED.3IONS-SCNC: 12 MMOL/L (ref 7–16)
AST SERPL W P-5'-P-CCNC: 21 U/L (ref 15–37)
BASOPHILS # BLD AUTO: 0.08 K/UL (ref 0–0.2)
BASOPHILS NFR BLD AUTO: 1.2 % (ref 0–1)
BILIRUB SERPL-MCNC: 0.4 MG/DL (ref 0–1.2)
BUN SERPL-MCNC: 23 MG/DL (ref 7–18)
BUN/CREAT SERPL: 20 (ref 6–20)
CALCIUM SERPL-MCNC: 8.9 MG/DL (ref 8.5–10.1)
CHLORIDE SERPL-SCNC: 107 MMOL/L (ref 98–107)
CO2 SERPL-SCNC: 28 MMOL/L (ref 21–32)
CREAT SERPL-MCNC: 1.15 MG/DL (ref 0.55–1.02)
DIFFERENTIAL METHOD BLD: ABNORMAL
EOSINOPHIL # BLD AUTO: 0.54 K/UL (ref 0–0.5)
EOSINOPHIL NFR BLD AUTO: 7.9 % (ref 1–4)
ERYTHROCYTE [DISTWIDTH] IN BLOOD BY AUTOMATED COUNT: 15.1 % (ref 11.5–14.5)
GLOBULIN SER-MCNC: 4.6 G/DL (ref 2–4)
GLUCOSE SERPL-MCNC: 104 MG/DL (ref 74–106)
HCT VFR BLD AUTO: 36.8 % (ref 38–47)
HGB BLD-MCNC: 11.5 G/DL (ref 12–16)
IMM GRANULOCYTES # BLD AUTO: 0.02 K/UL (ref 0–0.04)
IMM GRANULOCYTES NFR BLD: 0.3 % (ref 0–0.4)
INR BLD: 0.97 (ref 0.9–1.1)
LYMPHOCYTES # BLD AUTO: 2.37 K/UL (ref 1–4.8)
LYMPHOCYTES NFR BLD AUTO: 34.8 % (ref 27–41)
MCH RBC QN AUTO: 27.8 PG (ref 27–31)
MCHC RBC AUTO-ENTMCNC: 31.3 G/DL (ref 32–36)
MCV RBC AUTO: 89.1 FL (ref 80–96)
MONOCYTES # BLD AUTO: 0.46 K/UL (ref 0–0.8)
MONOCYTES NFR BLD AUTO: 6.7 % (ref 2–6)
MPC BLD CALC-MCNC: 10.2 FL (ref 9.4–12.4)
NEUTROPHILS # BLD AUTO: 3.35 K/UL (ref 1.8–7.7)
NEUTROPHILS NFR BLD AUTO: 49.1 % (ref 53–65)
NRBC # BLD AUTO: 0 X10E3/UL
NRBC, AUTO (.00): 0 %
PLATELET # BLD AUTO: 236 K/UL (ref 150–400)
POTASSIUM SERPL-SCNC: 3.9 MMOL/L (ref 3.5–5.1)
PROT SERPL-MCNC: 8.1 G/DL (ref 6.4–8.2)
PROTHROMBIN TIME: 12.4 SECONDS (ref 11.7–14.7)
RBC # BLD AUTO: 4.13 M/UL (ref 4.2–5.4)
SODIUM SERPL-SCNC: 143 MMOL/L (ref 136–145)
WBC # BLD AUTO: 6.82 K/UL (ref 4.5–11)

## 2021-05-08 PROCEDURE — 80053 COMPREHEN METABOLIC PANEL: CPT | Performed by: FAMILY MEDICINE

## 2021-05-08 PROCEDURE — 96372 THER/PROPH/DIAG INJ SC/IM: CPT

## 2021-05-08 PROCEDURE — 85025 COMPLETE CBC W/AUTO DIFF WBC: CPT | Performed by: FAMILY MEDICINE

## 2021-05-08 PROCEDURE — 85610 PROTHROMBIN TIME: CPT | Performed by: FAMILY MEDICINE

## 2021-05-08 PROCEDURE — 36415 COLL VENOUS BLD VENIPUNCTURE: CPT | Performed by: FAMILY MEDICINE

## 2021-05-08 PROCEDURE — 99283 EMERGENCY DEPT VISIT LOW MDM: CPT | Mod: ,,, | Performed by: FAMILY MEDICINE

## 2021-05-08 PROCEDURE — 63600175 PHARM REV CODE 636 W HCPCS: Performed by: FAMILY MEDICINE

## 2021-05-08 PROCEDURE — 99284 EMERGENCY DEPT VISIT MOD MDM: CPT | Mod: 25

## 2021-05-08 PROCEDURE — 99283 PR EMERGENCY DEPT VISIT,LEVEL III: ICD-10-PCS | Mod: ,,, | Performed by: FAMILY MEDICINE

## 2021-05-08 RX ORDER — HYDROXYZINE HYDROCHLORIDE 25 MG/1
25 TABLET, FILM COATED ORAL DAILY
COMMUNITY
End: 2021-06-22

## 2021-05-08 RX ORDER — ENOXAPARIN SODIUM 100 MG/ML
120 INJECTION SUBCUTANEOUS
Status: COMPLETED | OUTPATIENT
Start: 2021-05-08 | End: 2021-05-08

## 2021-05-08 RX ADMIN — ENOXAPARIN SODIUM 100 MG: 100 INJECTION SUBCUTANEOUS at 09:05

## 2021-05-26 DIAGNOSIS — I82.412 DEEP VEIN THROMBOSIS (DVT) OF FEMORAL VEIN OF LEFT LOWER EXTREMITY, UNSPECIFIED CHRONICITY: Primary | ICD-10-CM

## 2021-05-27 ENCOUNTER — TELEPHONE (OUTPATIENT)
Dept: FAMILY MEDICINE | Facility: CLINIC | Age: 56
End: 2021-05-27

## 2021-06-01 ENCOUNTER — TELEPHONE (OUTPATIENT)
Dept: FAMILY MEDICINE | Facility: CLINIC | Age: 56
End: 2021-06-01

## 2021-06-08 ENCOUNTER — TELEPHONE (OUTPATIENT)
Dept: FAMILY MEDICINE | Facility: CLINIC | Age: 56
End: 2021-06-08

## 2021-06-08 DIAGNOSIS — D72.19 EOSINOPHILIC LEUKOCYTOSIS, UNSPECIFIED TYPE: Primary | ICD-10-CM

## 2021-06-16 RX ORDER — MELOXICAM 15 MG/1
TABLET ORAL
Qty: 30 TABLET | Refills: 0 | Status: SHIPPED | OUTPATIENT
Start: 2021-06-16 | End: 2021-09-10

## 2021-06-22 ENCOUNTER — OFFICE VISIT (OUTPATIENT)
Dept: FAMILY MEDICINE | Facility: CLINIC | Age: 56
End: 2021-06-22
Payer: COMMERCIAL

## 2021-06-22 VITALS
SYSTOLIC BLOOD PRESSURE: 132 MMHG | HEART RATE: 106 BPM | OXYGEN SATURATION: 95 % | BODY MASS INDEX: 41.68 KG/M2 | HEIGHT: 66 IN | TEMPERATURE: 98 F | DIASTOLIC BLOOD PRESSURE: 84 MMHG | WEIGHT: 259.38 LBS | RESPIRATION RATE: 20 BRPM

## 2021-06-22 DIAGNOSIS — J30.1 ALLERGIC RHINITIS DUE TO POLLEN, UNSPECIFIED SEASONALITY: Primary | ICD-10-CM

## 2021-06-22 PROCEDURE — 99213 PR OFFICE/OUTPT VISIT, EST, LEVL III, 20-29 MIN: ICD-10-PCS | Mod: 25,,, | Performed by: FAMILY MEDICINE

## 2021-06-22 PROCEDURE — 3008F BODY MASS INDEX DOCD: CPT | Mod: ,,, | Performed by: FAMILY MEDICINE

## 2021-06-22 PROCEDURE — 1126F AMNT PAIN NOTED NONE PRSNT: CPT | Mod: ,,, | Performed by: FAMILY MEDICINE

## 2021-06-22 PROCEDURE — 3008F PR BODY MASS INDEX (BMI) DOCUMENTED: ICD-10-PCS | Mod: ,,, | Performed by: FAMILY MEDICINE

## 2021-06-22 PROCEDURE — 99213 OFFICE O/P EST LOW 20 MIN: CPT | Mod: 25,,, | Performed by: FAMILY MEDICINE

## 2021-06-22 PROCEDURE — 1126F PR PAIN SEVERITY QUANTIFIED, NO PAIN PRESENT: ICD-10-PCS | Mod: ,,, | Performed by: FAMILY MEDICINE

## 2021-06-22 PROCEDURE — 96372 PR INJECTION,THERAP/PROPH/DIAG2ST, IM OR SUBCUT: ICD-10-PCS | Mod: ,,, | Performed by: FAMILY MEDICINE

## 2021-06-22 PROCEDURE — 96372 THER/PROPH/DIAG INJ SC/IM: CPT | Mod: ,,, | Performed by: FAMILY MEDICINE

## 2021-06-22 RX ORDER — LORATADINE 10 MG/1
10 TABLET ORAL DAILY
COMMUNITY
End: 2021-08-18

## 2021-06-22 RX ORDER — DEXAMETHASONE SODIUM PHOSPHATE 4 MG/ML
4 INJECTION, SOLUTION INTRA-ARTICULAR; INTRALESIONAL; INTRAMUSCULAR; INTRAVENOUS; SOFT TISSUE
Status: COMPLETED | OUTPATIENT
Start: 2021-06-22 | End: 2021-06-22

## 2021-06-22 RX ADMIN — DEXAMETHASONE SODIUM PHOSPHATE 4 MG: 4 INJECTION, SOLUTION INTRA-ARTICULAR; INTRALESIONAL; INTRAMUSCULAR; INTRAVENOUS; SOFT TISSUE at 05:06

## 2021-07-13 ENCOUNTER — TELEPHONE (OUTPATIENT)
Dept: FAMILY MEDICINE | Facility: CLINIC | Age: 56
End: 2021-07-13

## 2021-07-13 DIAGNOSIS — R93.3 ABNORMAL COLONOSCOPY: Primary | ICD-10-CM

## 2021-07-14 ENCOUNTER — TELEPHONE (OUTPATIENT)
Dept: FAMILY MEDICINE | Facility: CLINIC | Age: 56
End: 2021-07-14

## 2021-07-27 ENCOUNTER — OFFICE VISIT (OUTPATIENT)
Dept: GASTROENTEROLOGY | Facility: CLINIC | Age: 56
End: 2021-07-27
Payer: COMMERCIAL

## 2021-07-27 VITALS
SYSTOLIC BLOOD PRESSURE: 175 MMHG | RESPIRATION RATE: 16 BRPM | OXYGEN SATURATION: 18 % | DIASTOLIC BLOOD PRESSURE: 86 MMHG | HEART RATE: 72 BPM

## 2021-07-27 DIAGNOSIS — K52.9 IBD (INFLAMMATORY BOWEL DISEASE): Primary | ICD-10-CM

## 2021-07-27 DIAGNOSIS — R93.3 ABNORMAL COLONOSCOPY: ICD-10-CM

## 2021-07-27 DIAGNOSIS — A09 INFECTIOUS COLITIS: Primary | ICD-10-CM

## 2021-07-27 PROCEDURE — 83993 CALPROTECTIN, STOOL: ICD-10-PCS | Mod: 90,,, | Performed by: CLINICAL MEDICAL LABORATORY

## 2021-07-27 PROCEDURE — 99203 PR OFFICE/OUTPT VISIT, NEW, LEVL III, 30-44 MIN: ICD-10-PCS | Mod: ,,, | Performed by: NURSE PRACTITIONER

## 2021-07-27 PROCEDURE — 99203 OFFICE O/P NEW LOW 30 MIN: CPT | Mod: ,,, | Performed by: NURSE PRACTITIONER

## 2021-07-27 PROCEDURE — 83993 ASSAY FOR CALPROTECTIN FECAL: CPT | Mod: 90,,, | Performed by: CLINICAL MEDICAL LABORATORY

## 2021-07-27 RX ORDER — METRONIDAZOLE 500 MG/1
500 TABLET ORAL EVERY 8 HOURS
Qty: 21 TABLET | Refills: 0 | Status: SHIPPED | OUTPATIENT
Start: 2021-07-27 | End: 2021-08-03

## 2021-07-27 RX ORDER — CIPROFLOXACIN 500 MG/1
500 TABLET ORAL EVERY 12 HOURS
Qty: 14 TABLET | Refills: 0 | Status: SHIPPED | OUTPATIENT
Start: 2021-07-27 | End: 2021-08-03

## 2021-07-30 ENCOUNTER — PATIENT MESSAGE (OUTPATIENT)
Dept: GASTROENTEROLOGY | Facility: CLINIC | Age: 56
End: 2021-07-30

## 2021-07-30 LAB — CALPROTECTIN STL-MCNT: 580 MCG/G

## 2021-08-02 ENCOUNTER — PATIENT MESSAGE (OUTPATIENT)
Dept: SURGERY | Facility: CLINIC | Age: 56
End: 2021-08-02

## 2021-08-03 RX ORDER — MESALAMINE 4 G/60ML
4 SUSPENSION RECTAL NIGHTLY
Qty: 5400 ML | Refills: 3 | Status: SHIPPED | OUTPATIENT
Start: 2021-08-03 | End: 2022-01-12

## 2021-08-03 RX ORDER — MESALAMINE 1000 MG/1
500 SUPPOSITORY RECTAL NIGHTLY
Qty: 45 SUPPOSITORY | Refills: 3 | Status: SHIPPED | OUTPATIENT
Start: 2021-08-03 | End: 2022-01-12 | Stop reason: SDUPTHER

## 2021-08-05 ENCOUNTER — OFFICE VISIT (OUTPATIENT)
Dept: SURGERY | Facility: CLINIC | Age: 56
End: 2021-08-05
Attending: SURGERY
Payer: COMMERCIAL

## 2021-08-05 ENCOUNTER — PATIENT MESSAGE (OUTPATIENT)
Dept: SURGERY | Facility: CLINIC | Age: 56
End: 2021-08-05

## 2021-08-05 DIAGNOSIS — L02.91 ABSCESS: ICD-10-CM

## 2021-08-05 DIAGNOSIS — L02.211 ABDOMINAL WALL ABSCESS: Primary | ICD-10-CM

## 2021-08-05 PROCEDURE — 87077 CULTURE, WOUND: ICD-10-PCS | Mod: ,,, | Performed by: CLINICAL MEDICAL LABORATORY

## 2021-08-05 PROCEDURE — 10061 PR DRAIN SKIN ABSCESS COMPLIC: ICD-10-PCS | Mod: S$PBB,,, | Performed by: SURGERY

## 2021-08-05 PROCEDURE — 87077 CULTURE AEROBIC IDENTIFY: CPT | Mod: ,,, | Performed by: CLINICAL MEDICAL LABORATORY

## 2021-08-05 PROCEDURE — 10061 I&D ABSCESS COMP/MULTIPLE: CPT | Mod: S$PBB,,, | Performed by: SURGERY

## 2021-08-05 PROCEDURE — 10061 I&D ABSCESS COMP/MULTIPLE: CPT | Mod: PBBFAC | Performed by: SURGERY

## 2021-08-05 PROCEDURE — 10060 I&D ABSCESS SIMPLE/SINGLE: CPT | Mod: PBBFAC | Performed by: SURGERY

## 2021-08-05 PROCEDURE — 87653 STREP B DNA AMP PROBE: CPT | Mod: ,,, | Performed by: CLINICAL MEDICAL LABORATORY

## 2021-08-05 PROCEDURE — 99213 OFFICE O/P EST LOW 20 MIN: CPT | Mod: PBBFAC,25 | Performed by: SURGERY

## 2021-08-05 PROCEDURE — 87653 CULTURE, WOUND: ICD-10-PCS | Mod: ,,, | Performed by: CLINICAL MEDICAL LABORATORY

## 2021-08-05 PROCEDURE — 87186 SC STD MICRODIL/AGAR DIL: CPT | Mod: ,,, | Performed by: CLINICAL MEDICAL LABORATORY

## 2021-08-05 PROCEDURE — 87186 CULTURE, WOUND: ICD-10-PCS | Mod: ,,, | Performed by: CLINICAL MEDICAL LABORATORY

## 2021-08-05 RX ORDER — HYDROCODONE BITARTRATE AND ACETAMINOPHEN 7.5; 325 MG/1; MG/1
1 TABLET ORAL EVERY 6 HOURS PRN
Qty: 12 TABLET | Refills: 0 | Status: SHIPPED | OUTPATIENT
Start: 2021-08-05 | End: 2022-02-23

## 2021-08-05 RX ORDER — AMOXICILLIN AND CLAVULANATE POTASSIUM 875; 125 MG/1; MG/1
1 TABLET, FILM COATED ORAL EVERY 12 HOURS
Qty: 14 TABLET | Refills: 0 | Status: SHIPPED | OUTPATIENT
Start: 2021-08-05 | End: 2022-02-23

## 2021-08-07 LAB — MICROORGANISM SPEC CULT: ABNORMAL

## 2021-08-08 ENCOUNTER — PATIENT MESSAGE (OUTPATIENT)
Dept: GASTROENTEROLOGY | Facility: CLINIC | Age: 56
End: 2021-08-08

## 2021-08-09 PROBLEM — L02.91 ABSCESS: Status: ACTIVE | Noted: 2021-08-09

## 2021-08-13 ENCOUNTER — PATIENT MESSAGE (OUTPATIENT)
Dept: GASTROENTEROLOGY | Facility: CLINIC | Age: 56
End: 2021-08-13

## 2021-08-13 ENCOUNTER — OFFICE VISIT (OUTPATIENT)
Dept: SURGERY | Facility: CLINIC | Age: 56
End: 2021-08-13
Attending: SURGERY
Payer: COMMERCIAL

## 2021-08-13 DIAGNOSIS — R19.7 DIARRHEA, UNSPECIFIED TYPE: Primary | ICD-10-CM

## 2021-08-13 DIAGNOSIS — Z09 POSTOP CHECK: Primary | ICD-10-CM

## 2021-08-13 PROCEDURE — 1159F PR MEDICATION LIST DOCUMENTED IN MEDICAL RECORD: ICD-10-PCS | Mod: ,,, | Performed by: SURGERY

## 2021-08-13 PROCEDURE — 1160F RVW MEDS BY RX/DR IN RCRD: CPT | Mod: ,,, | Performed by: SURGERY

## 2021-08-13 PROCEDURE — 99212 OFFICE O/P EST SF 10 MIN: CPT | Mod: PBBFAC | Performed by: SURGERY

## 2021-08-13 PROCEDURE — 1159F MED LIST DOCD IN RCRD: CPT | Mod: ,,, | Performed by: SURGERY

## 2021-08-13 PROCEDURE — 1160F PR REVIEW ALL MEDS BY PRESCRIBER/CLIN PHARMACIST DOCUMENTED: ICD-10-PCS | Mod: ,,, | Performed by: SURGERY

## 2021-08-13 PROCEDURE — 99024 POSTOP FOLLOW-UP VISIT: CPT | Mod: ,,, | Performed by: SURGERY

## 2021-08-13 PROCEDURE — 99024 PR POST-OP FOLLOW-UP VISIT: ICD-10-PCS | Mod: ,,, | Performed by: SURGERY

## 2021-08-16 ENCOUNTER — TELEPHONE (OUTPATIENT)
Dept: GASTROENTEROLOGY | Facility: CLINIC | Age: 56
End: 2021-08-16

## 2021-09-07 ENCOUNTER — OFFICE VISIT (OUTPATIENT)
Dept: GASTROENTEROLOGY | Facility: CLINIC | Age: 56
End: 2021-09-07
Payer: COMMERCIAL

## 2021-09-07 VITALS
SYSTOLIC BLOOD PRESSURE: 159 MMHG | DIASTOLIC BLOOD PRESSURE: 100 MMHG | HEIGHT: 66 IN | BODY MASS INDEX: 41.14 KG/M2 | HEART RATE: 103 BPM | WEIGHT: 256 LBS | OXYGEN SATURATION: 100 % | RESPIRATION RATE: 103 BRPM

## 2021-09-07 DIAGNOSIS — K52.9 IBD (INFLAMMATORY BOWEL DISEASE): Primary | ICD-10-CM

## 2021-09-07 PROCEDURE — 3008F PR BODY MASS INDEX (BMI) DOCUMENTED: ICD-10-PCS | Mod: ,,, | Performed by: NURSE PRACTITIONER

## 2021-09-07 PROCEDURE — 3077F PR MOST RECENT SYSTOLIC BLOOD PRESSURE >= 140 MM HG: ICD-10-PCS | Mod: ,,, | Performed by: NURSE PRACTITIONER

## 2021-09-07 PROCEDURE — 3077F SYST BP >= 140 MM HG: CPT | Mod: ,,, | Performed by: NURSE PRACTITIONER

## 2021-09-07 PROCEDURE — 3080F PR MOST RECENT DIASTOLIC BLOOD PRESSURE >= 90 MM HG: ICD-10-PCS | Mod: ,,, | Performed by: NURSE PRACTITIONER

## 2021-09-07 PROCEDURE — 99213 PR OFFICE/OUTPT VISIT, EST, LEVL III, 20-29 MIN: ICD-10-PCS | Mod: ,,, | Performed by: NURSE PRACTITIONER

## 2021-09-07 PROCEDURE — 3008F BODY MASS INDEX DOCD: CPT | Mod: ,,, | Performed by: NURSE PRACTITIONER

## 2021-09-07 PROCEDURE — 3080F DIAST BP >= 90 MM HG: CPT | Mod: ,,, | Performed by: NURSE PRACTITIONER

## 2021-09-07 PROCEDURE — 99213 OFFICE O/P EST LOW 20 MIN: CPT | Mod: ,,, | Performed by: NURSE PRACTITIONER

## 2021-09-09 ENCOUNTER — PATIENT MESSAGE (OUTPATIENT)
Dept: SURGERY | Facility: CLINIC | Age: 56
End: 2021-09-09

## 2021-09-10 RX ORDER — MELOXICAM 15 MG/1
TABLET ORAL
Qty: 30 TABLET | Refills: 0 | Status: SHIPPED | OUTPATIENT
Start: 2021-09-10 | End: 2021-09-30 | Stop reason: SDUPTHER

## 2021-09-21 ENCOUNTER — PATIENT MESSAGE (OUTPATIENT)
Dept: SURGERY | Facility: CLINIC | Age: 56
End: 2021-09-21

## 2021-09-23 ENCOUNTER — LAB VISIT (OUTPATIENT)
Dept: LAB | Facility: CLINIC | Age: 56
End: 2021-09-23
Payer: COMMERCIAL

## 2021-09-23 DIAGNOSIS — R73.09 ELEVATED GLUCOSE: Primary | ICD-10-CM

## 2021-09-23 DIAGNOSIS — Z79.899 OTHER LONG TERM (CURRENT) DRUG THERAPY: ICD-10-CM

## 2021-09-23 DIAGNOSIS — Z13.1 SCREENING FOR DIABETES MELLITUS: ICD-10-CM

## 2021-09-23 DIAGNOSIS — Z13.220 SCREENING FOR LIPOID DISORDERS: ICD-10-CM

## 2021-09-23 LAB
ALBUMIN SERPL BCP-MCNC: 3.6 G/DL (ref 3.5–5)
ALBUMIN/GLOB SERPL: 0.8 {RATIO}
ALP SERPL-CCNC: 143 U/L (ref 46–118)
ALT SERPL W P-5'-P-CCNC: 33 U/L (ref 13–56)
ANION GAP SERPL CALCULATED.3IONS-SCNC: 10 MMOL/L (ref 7–16)
AST SERPL W P-5'-P-CCNC: 22 U/L (ref 15–37)
BILIRUB SERPL-MCNC: 0.4 MG/DL (ref 0–1.2)
BUN SERPL-MCNC: 18 MG/DL (ref 7–18)
BUN/CREAT SERPL: 21 (ref 6–20)
CALCIUM SERPL-MCNC: 9.4 MG/DL (ref 8.5–10.1)
CHLORIDE SERPL-SCNC: 110 MMOL/L (ref 98–107)
CHOLEST SERPL-MCNC: 172 MG/DL (ref 0–200)
CHOLEST/HDLC SERPL: 2.4 {RATIO}
CO2 SERPL-SCNC: 26 MMOL/L (ref 21–32)
CREAT SERPL-MCNC: 0.86 MG/DL (ref 0.55–1.02)
EST. AVERAGE GLUCOSE BLD GHB EST-MCNC: 107 MG/DL
GLOBULIN SER-MCNC: 4.3 G/DL (ref 2–4)
GLUCOSE SERPL-MCNC: 108 MG/DL (ref 74–106)
HBA1C MFR BLD HPLC: 5.8 % (ref 4.5–6.6)
HDLC SERPL-MCNC: 73 MG/DL (ref 40–60)
LDLC SERPL CALC-MCNC: 81 MG/DL
LDLC/HDLC SERPL: 1.1 {RATIO}
NONHDLC SERPL-MCNC: 99 MG/DL
POTASSIUM SERPL-SCNC: 3.8 MMOL/L (ref 3.5–5.1)
PROT SERPL-MCNC: 7.9 G/DL (ref 6.4–8.2)
SODIUM SERPL-SCNC: 142 MMOL/L (ref 136–145)
TRIGL SERPL-MCNC: 91 MG/DL (ref 35–150)
VLDLC SERPL-MCNC: 18 MG/DL

## 2021-09-23 PROCEDURE — 83036 HEMOGLOBIN GLYCOSYLATED A1C: CPT | Mod: ,,, | Performed by: CLINICAL MEDICAL LABORATORY

## 2021-09-23 PROCEDURE — 80053 COMPREHENSIVE METABOLIC PANEL: ICD-10-PCS | Mod: ,,, | Performed by: CLINICAL MEDICAL LABORATORY

## 2021-09-23 PROCEDURE — 83036 HEMOGLOBIN A1C: ICD-10-PCS | Mod: ,,, | Performed by: CLINICAL MEDICAL LABORATORY

## 2021-09-23 PROCEDURE — 80061 LIPID PANEL: CPT | Mod: ,,, | Performed by: CLINICAL MEDICAL LABORATORY

## 2021-09-23 PROCEDURE — 80053 COMPREHEN METABOLIC PANEL: CPT | Mod: ,,, | Performed by: CLINICAL MEDICAL LABORATORY

## 2021-09-23 PROCEDURE — 80061 LIPID PANEL: ICD-10-PCS | Mod: ,,, | Performed by: CLINICAL MEDICAL LABORATORY

## 2021-09-30 ENCOUNTER — OFFICE VISIT (OUTPATIENT)
Dept: FAMILY MEDICINE | Facility: CLINIC | Age: 56
End: 2021-09-30
Payer: COMMERCIAL

## 2021-09-30 VITALS
SYSTOLIC BLOOD PRESSURE: 120 MMHG | OXYGEN SATURATION: 96 % | BODY MASS INDEX: 42.59 KG/M2 | WEIGHT: 265 LBS | TEMPERATURE: 98 F | HEIGHT: 66 IN | RESPIRATION RATE: 18 BRPM | DIASTOLIC BLOOD PRESSURE: 88 MMHG | HEART RATE: 100 BPM

## 2021-09-30 DIAGNOSIS — R40.0 DAYTIME SLEEPINESS: Primary | ICD-10-CM

## 2021-09-30 DIAGNOSIS — Z00.00 WELL ADULT EXAM: ICD-10-CM

## 2021-09-30 DIAGNOSIS — D64.9 ANEMIA, UNSPECIFIED TYPE: ICD-10-CM

## 2021-09-30 DIAGNOSIS — G25.81 RESTLESS LEGS: ICD-10-CM

## 2021-09-30 PROBLEM — Z13.220 SCREENING FOR LIPOID DISORDERS: Status: ACTIVE | Noted: 2021-09-30

## 2021-09-30 PROBLEM — R73.09 ELEVATED GLUCOSE: Status: ACTIVE | Noted: 2021-09-30

## 2021-09-30 PROBLEM — Z13.1 SCREENING FOR DIABETES MELLITUS: Status: ACTIVE | Noted: 2021-09-30

## 2021-09-30 PROBLEM — Z79.899 OTHER LONG TERM (CURRENT) DRUG THERAPY: Status: ACTIVE | Noted: 2021-09-30

## 2021-09-30 PROCEDURE — 3044F HG A1C LEVEL LT 7.0%: CPT | Mod: ,,, | Performed by: FAMILY MEDICINE

## 2021-09-30 PROCEDURE — 3044F PR MOST RECENT HEMOGLOBIN A1C LEVEL <7.0%: ICD-10-PCS | Mod: ,,, | Performed by: FAMILY MEDICINE

## 2021-09-30 PROCEDURE — 1159F MED LIST DOCD IN RCRD: CPT | Mod: ,,, | Performed by: FAMILY MEDICINE

## 2021-09-30 PROCEDURE — 99396 PR PREVENTIVE VISIT,EST,40-64: ICD-10-PCS | Mod: ,,, | Performed by: FAMILY MEDICINE

## 2021-09-30 PROCEDURE — 3008F PR BODY MASS INDEX (BMI) DOCUMENTED: ICD-10-PCS | Mod: ,,, | Performed by: FAMILY MEDICINE

## 2021-09-30 PROCEDURE — 3079F DIAST BP 80-89 MM HG: CPT | Mod: ,,, | Performed by: FAMILY MEDICINE

## 2021-09-30 PROCEDURE — 3074F PR MOST RECENT SYSTOLIC BLOOD PRESSURE < 130 MM HG: ICD-10-PCS | Mod: ,,, | Performed by: FAMILY MEDICINE

## 2021-09-30 PROCEDURE — 3008F BODY MASS INDEX DOCD: CPT | Mod: ,,, | Performed by: FAMILY MEDICINE

## 2021-09-30 PROCEDURE — 1159F PR MEDICATION LIST DOCUMENTED IN MEDICAL RECORD: ICD-10-PCS | Mod: ,,, | Performed by: FAMILY MEDICINE

## 2021-09-30 PROCEDURE — 3074F SYST BP LT 130 MM HG: CPT | Mod: ,,, | Performed by: FAMILY MEDICINE

## 2021-09-30 PROCEDURE — 3079F PR MOST RECENT DIASTOLIC BLOOD PRESSURE 80-89 MM HG: ICD-10-PCS | Mod: ,,, | Performed by: FAMILY MEDICINE

## 2021-09-30 PROCEDURE — 99396 PREV VISIT EST AGE 40-64: CPT | Mod: ,,, | Performed by: FAMILY MEDICINE

## 2021-09-30 RX ORDER — MELOXICAM 15 MG/1
15 TABLET ORAL DAILY PRN
Qty: 90 TABLET | Refills: 0 | Status: SHIPPED | OUTPATIENT
Start: 2021-09-30 | End: 2022-01-25 | Stop reason: SDUPTHER

## 2021-09-30 RX ORDER — GABAPENTIN 300 MG/1
300 CAPSULE ORAL NIGHTLY PRN
Qty: 90 CAPSULE | Refills: 1 | Status: SHIPPED | OUTPATIENT
Start: 2021-09-30 | End: 2021-11-18

## 2021-09-30 RX ORDER — SEMAGLUTIDE 1.34 MG/ML
1 INJECTION, SOLUTION SUBCUTANEOUS
COMMUNITY
Start: 2021-09-29 | End: 2021-12-15 | Stop reason: SDUPTHER

## 2021-09-30 RX ORDER — FAMOTIDINE 40 MG/1
TABLET, FILM COATED ORAL
COMMUNITY
Start: 2021-07-27 | End: 2023-01-26 | Stop reason: SDUPTHER

## 2021-10-01 DIAGNOSIS — D64.9 ANEMIA, UNSPECIFIED TYPE: Primary | ICD-10-CM

## 2021-10-01 DIAGNOSIS — G25.81 RESTLESS LEGS: ICD-10-CM

## 2021-10-01 LAB
FERRITIN SERPL-MCNC: 11 NG/ML (ref 8–252)
IRON SATN MFR SERPL: 8 % (ref 14–50)
IRON SERPL-MCNC: 33 ΜG/DL (ref 50–170)
TIBC SERPL-MCNC: 429 ΜG/DL (ref 250–450)

## 2021-10-01 PROCEDURE — 82728 ASSAY OF FERRITIN: CPT | Mod: ,,, | Performed by: CLINICAL MEDICAL LABORATORY

## 2021-10-01 PROCEDURE — 82728 FERRITIN: ICD-10-PCS | Mod: ,,, | Performed by: CLINICAL MEDICAL LABORATORY

## 2021-10-01 PROCEDURE — 83540 ASSAY OF IRON: CPT | Mod: ,,, | Performed by: CLINICAL MEDICAL LABORATORY

## 2021-10-01 PROCEDURE — 83540 IRON AND TIBC: ICD-10-PCS | Mod: ,,, | Performed by: CLINICAL MEDICAL LABORATORY

## 2021-10-01 PROCEDURE — 83550 IRON BINDING TEST: CPT | Mod: ,,, | Performed by: CLINICAL MEDICAL LABORATORY

## 2021-10-01 PROCEDURE — 83550 IRON AND TIBC: ICD-10-PCS | Mod: ,,, | Performed by: CLINICAL MEDICAL LABORATORY

## 2021-10-05 DIAGNOSIS — E61.1 IRON DEFICIENCY: Primary | ICD-10-CM

## 2021-10-05 RX ORDER — LANOLIN ALCOHOL/MO/W.PET/CERES
1 CREAM (GRAM) TOPICAL
Qty: 30 TABLET | Refills: 2 | Status: SHIPPED | OUTPATIENT
Start: 2021-10-05 | End: 2021-11-04

## 2021-10-07 ENCOUNTER — PATIENT MESSAGE (OUTPATIENT)
Dept: FAMILY MEDICINE | Facility: CLINIC | Age: 56
End: 2021-10-07

## 2021-10-14 ENCOUNTER — OFFICE VISIT (OUTPATIENT)
Dept: FAMILY MEDICINE | Facility: CLINIC | Age: 56
End: 2021-10-14
Payer: COMMERCIAL

## 2021-10-14 VITALS
OXYGEN SATURATION: 97 % | HEIGHT: 66 IN | DIASTOLIC BLOOD PRESSURE: 82 MMHG | TEMPERATURE: 98 F | RESPIRATION RATE: 20 BRPM | BODY MASS INDEX: 42.81 KG/M2 | HEART RATE: 91 BPM | SYSTOLIC BLOOD PRESSURE: 128 MMHG | WEIGHT: 266.38 LBS

## 2021-10-14 DIAGNOSIS — M25.562 CHRONIC PAIN OF BOTH KNEES: Primary | ICD-10-CM

## 2021-10-14 DIAGNOSIS — M25.561 CHRONIC PAIN OF BOTH KNEES: Primary | ICD-10-CM

## 2021-10-14 DIAGNOSIS — M19.90 ARTHRITIS: ICD-10-CM

## 2021-10-14 DIAGNOSIS — G89.29 CHRONIC PAIN OF BOTH KNEES: Primary | ICD-10-CM

## 2021-10-14 DIAGNOSIS — I82.412 DEEP VEIN THROMBOSIS (DVT) OF FEMORAL VEIN OF LEFT LOWER EXTREMITY, UNSPECIFIED CHRONICITY: ICD-10-CM

## 2021-10-14 PROCEDURE — 99213 PR OFFICE/OUTPT VISIT, EST, LEVL III, 20-29 MIN: ICD-10-PCS | Mod: 25,,, | Performed by: FAMILY MEDICINE

## 2021-10-14 PROCEDURE — 20610 DRAIN/INJ JOINT/BURSA W/O US: CPT | Mod: ,,, | Performed by: FAMILY MEDICINE

## 2021-10-14 PROCEDURE — 3079F PR MOST RECENT DIASTOLIC BLOOD PRESSURE 80-89 MM HG: ICD-10-PCS | Mod: ,,, | Performed by: FAMILY MEDICINE

## 2021-10-14 PROCEDURE — 1159F MED LIST DOCD IN RCRD: CPT | Mod: ,,, | Performed by: FAMILY MEDICINE

## 2021-10-14 PROCEDURE — 3074F PR MOST RECENT SYSTOLIC BLOOD PRESSURE < 130 MM HG: ICD-10-PCS | Mod: ,,, | Performed by: FAMILY MEDICINE

## 2021-10-14 PROCEDURE — 3074F SYST BP LT 130 MM HG: CPT | Mod: ,,, | Performed by: FAMILY MEDICINE

## 2021-10-14 PROCEDURE — 99213 OFFICE O/P EST LOW 20 MIN: CPT | Mod: 25,,, | Performed by: FAMILY MEDICINE

## 2021-10-14 PROCEDURE — 1159F PR MEDICATION LIST DOCUMENTED IN MEDICAL RECORD: ICD-10-PCS | Mod: ,,, | Performed by: FAMILY MEDICINE

## 2021-10-14 PROCEDURE — 3079F DIAST BP 80-89 MM HG: CPT | Mod: ,,, | Performed by: FAMILY MEDICINE

## 2021-10-14 PROCEDURE — 3044F PR MOST RECENT HEMOGLOBIN A1C LEVEL <7.0%: ICD-10-PCS | Mod: ,,, | Performed by: FAMILY MEDICINE

## 2021-10-14 PROCEDURE — 20610 LARGE JOINT ASPIRATION/INJECTION: L KNEE: ICD-10-PCS | Mod: ,,, | Performed by: FAMILY MEDICINE

## 2021-10-14 PROCEDURE — 3044F HG A1C LEVEL LT 7.0%: CPT | Mod: ,,, | Performed by: FAMILY MEDICINE

## 2021-10-14 PROCEDURE — 3008F PR BODY MASS INDEX (BMI) DOCUMENTED: ICD-10-PCS | Mod: ,,, | Performed by: FAMILY MEDICINE

## 2021-10-14 PROCEDURE — 3008F BODY MASS INDEX DOCD: CPT | Mod: ,,, | Performed by: FAMILY MEDICINE

## 2021-10-14 RX ORDER — LIDOCAINE HYDROCHLORIDE AND EPINEPHRINE 10; 10 MG/ML; UG/ML
1 INJECTION, SOLUTION INFILTRATION; PERINEURAL
Status: DISCONTINUED | OUTPATIENT
Start: 2021-10-14 | End: 2021-10-20

## 2021-10-14 RX ORDER — CETIRIZINE HYDROCHLORIDE 10 MG/1
10 TABLET ORAL DAILY
COMMUNITY
End: 2021-12-01

## 2021-10-14 RX ORDER — METHYLPREDNISOLONE ACETATE 40 MG/ML
40 INJECTION, SUSPENSION INTRA-ARTICULAR; INTRALESIONAL; INTRAMUSCULAR; SOFT TISSUE
Status: COMPLETED | OUTPATIENT
Start: 2021-10-14 | End: 2021-10-14

## 2021-10-14 RX ADMIN — METHYLPREDNISOLONE ACETATE 40 MG: 40 INJECTION, SUSPENSION INTRA-ARTICULAR; INTRALESIONAL; INTRAMUSCULAR; SOFT TISSUE at 04:10

## 2021-10-14 RX ADMIN — LIDOCAINE HYDROCHLORIDE 10 MG: 10 INJECTION, SOLUTION EPIDURAL; INFILTRATION; INTRACAUDAL; PERINEURAL at 04:10

## 2021-10-20 RX ORDER — METHYLPREDNISOLONE ACETATE 40 MG/ML
40 INJECTION, SUSPENSION INTRA-ARTICULAR; INTRALESIONAL; INTRAMUSCULAR; SOFT TISSUE
Status: DISCONTINUED | OUTPATIENT
Start: 2021-10-14 | End: 2021-10-20 | Stop reason: HOSPADM

## 2021-10-20 RX ORDER — LIDOCAINE HYDROCHLORIDE 10 MG/ML
1 INJECTION, SOLUTION EPIDURAL; INFILTRATION; INTRACAUDAL; PERINEURAL
Status: COMPLETED | OUTPATIENT
Start: 2021-10-20 | End: 2021-10-14

## 2021-10-20 RX ORDER — LIDOCAINE HYDROCHLORIDE 10 MG/ML
1 INJECTION INFILTRATION; PERINEURAL
Status: DISCONTINUED | OUTPATIENT
Start: 2021-10-20 | End: 2021-10-20

## 2021-10-26 PROBLEM — Z00.00 WELL ADULT EXAM: Status: ACTIVE | Noted: 2021-10-26

## 2021-10-26 RX ORDER — HYDROXYZINE HYDROCHLORIDE 25 MG/1
TABLET, FILM COATED ORAL
Qty: 60 TABLET | Refills: 0 | Status: SHIPPED | OUTPATIENT
Start: 2021-10-26 | End: 2021-12-07

## 2021-11-08 PROBLEM — G25.81 RESTLESS LEGS: Chronic | Status: ACTIVE | Noted: 2021-09-30

## 2021-11-08 PROBLEM — R40.0 DAYTIME SLEEPINESS: Chronic | Status: ACTIVE | Noted: 2021-09-30

## 2021-11-09 ENCOUNTER — OFFICE VISIT (OUTPATIENT)
Dept: PAIN MEDICINE | Facility: CLINIC | Age: 56
End: 2021-11-09
Payer: COMMERCIAL

## 2021-11-09 VITALS
DIASTOLIC BLOOD PRESSURE: 73 MMHG | BODY MASS INDEX: 42.94 KG/M2 | SYSTOLIC BLOOD PRESSURE: 145 MMHG | HEIGHT: 66 IN | RESPIRATION RATE: 18 BRPM | WEIGHT: 267.19 LBS | HEART RATE: 96 BPM

## 2021-11-09 DIAGNOSIS — M25.561 CHRONIC PAIN OF BOTH KNEES: Primary | Chronic | ICD-10-CM

## 2021-11-09 DIAGNOSIS — Z79.899 ENCOUNTER FOR LONG-TERM (CURRENT) USE OF OTHER MEDICATIONS: ICD-10-CM

## 2021-11-09 DIAGNOSIS — Z11.59 SPECIAL SCREENING EXAMINATION FOR VIRAL DISEASE: ICD-10-CM

## 2021-11-09 DIAGNOSIS — M17.0 BILATERAL PRIMARY OSTEOARTHRITIS OF KNEE: Chronic | ICD-10-CM

## 2021-11-09 DIAGNOSIS — M25.562 CHRONIC PAIN OF BOTH KNEES: Primary | Chronic | ICD-10-CM

## 2021-11-09 DIAGNOSIS — G89.29 CHRONIC PAIN OF BOTH KNEES: Primary | Chronic | ICD-10-CM

## 2021-11-09 DIAGNOSIS — M47.817 LUMBOSACRAL SPONDYLOSIS WITHOUT MYELOPATHY: Chronic | ICD-10-CM

## 2021-11-09 LAB

## 2021-11-09 PROCEDURE — 80356 HEROIN METABOLITE: CPT | Mod: ,,, | Performed by: CLINICAL MEDICAL LABORATORY

## 2021-11-09 PROCEDURE — 80305 DRUG TEST PRSMV DIR OPT OBS: CPT | Mod: PBBFAC | Performed by: PHYSICIAN ASSISTANT

## 2021-11-09 PROCEDURE — 99214 PR OFFICE/OUTPT VISIT, EST, LEVL IV, 30-39 MIN: ICD-10-PCS | Mod: S$PBB,,, | Performed by: PHYSICIAN ASSISTANT

## 2021-11-09 PROCEDURE — 3008F BODY MASS INDEX DOCD: CPT | Mod: ,,, | Performed by: PHYSICIAN ASSISTANT

## 2021-11-09 PROCEDURE — 80356 DRUG SCREEN DEFINITIVE 14, URINE: ICD-10-PCS | Mod: ,,, | Performed by: CLINICAL MEDICAL LABORATORY

## 2021-11-09 PROCEDURE — 80373 DRUG SCREENING TRAMADOL: CPT | Mod: ,,, | Performed by: CLINICAL MEDICAL LABORATORY

## 2021-11-09 PROCEDURE — 80354 DRUG SCREENING FENTANYL: CPT | Mod: ,,, | Performed by: CLINICAL MEDICAL LABORATORY

## 2021-11-09 PROCEDURE — 80349 CANNABINOIDS NATURAL: CPT | Mod: ,,, | Performed by: CLINICAL MEDICAL LABORATORY

## 2021-11-09 PROCEDURE — 80358 DRUG SCREEN DEFINITIVE 14, URINE: ICD-10-PCS | Mod: ,,, | Performed by: CLINICAL MEDICAL LABORATORY

## 2021-11-09 PROCEDURE — 80373 DRUG SCREEN DEFINITIVE 14, URINE: ICD-10-PCS | Mod: ,,, | Performed by: CLINICAL MEDICAL LABORATORY

## 2021-11-09 PROCEDURE — 80324 DRUG SCREEN AMPHETAMINES 1/2: CPT | Mod: ,,, | Performed by: CLINICAL MEDICAL LABORATORY

## 2021-11-09 PROCEDURE — 80365 DRUG SCREENING OXYCODONE: CPT | Mod: ,,, | Performed by: CLINICAL MEDICAL LABORATORY

## 2021-11-09 PROCEDURE — 80372 DRUG SCREENING TAPENTADOL: CPT | Mod: ,,, | Performed by: CLINICAL MEDICAL LABORATORY

## 2021-11-09 PROCEDURE — 80372 DRUG SCREEN DEFINITIVE 14, URINE: ICD-10-PCS | Mod: ,,, | Performed by: CLINICAL MEDICAL LABORATORY

## 2021-11-09 PROCEDURE — 80361 DRUG SCREEN DEFINITIVE 14, URINE: ICD-10-PCS | Mod: ,,, | Performed by: CLINICAL MEDICAL LABORATORY

## 2021-11-09 PROCEDURE — 80349 DRUG SCREEN DEFINITIVE 14, URINE: ICD-10-PCS | Mod: ,,, | Performed by: CLINICAL MEDICAL LABORATORY

## 2021-11-09 PROCEDURE — 80346 BENZODIAZEPINES1-12: CPT | Mod: ,,, | Performed by: CLINICAL MEDICAL LABORATORY

## 2021-11-09 PROCEDURE — 1159F MED LIST DOCD IN RCRD: CPT | Mod: ,,, | Performed by: PHYSICIAN ASSISTANT

## 2021-11-09 PROCEDURE — 3077F PR MOST RECENT SYSTOLIC BLOOD PRESSURE >= 140 MM HG: ICD-10-PCS | Mod: ,,, | Performed by: PHYSICIAN ASSISTANT

## 2021-11-09 PROCEDURE — 80354 DRUG SCREEN DEFINITIVE 14, URINE: ICD-10-PCS | Mod: ,,, | Performed by: CLINICAL MEDICAL LABORATORY

## 2021-11-09 PROCEDURE — 80324 DRUG SCREEN DEFINITIVE 14, URINE: ICD-10-PCS | Mod: ,,, | Performed by: CLINICAL MEDICAL LABORATORY

## 2021-11-09 PROCEDURE — 80348 DRUG SCREENING BUPRENORPHINE: CPT | Mod: ,,, | Performed by: CLINICAL MEDICAL LABORATORY

## 2021-11-09 PROCEDURE — 1159F PR MEDICATION LIST DOCUMENTED IN MEDICAL RECORD: ICD-10-PCS | Mod: ,,, | Performed by: PHYSICIAN ASSISTANT

## 2021-11-09 PROCEDURE — 3078F PR MOST RECENT DIASTOLIC BLOOD PRESSURE < 80 MM HG: ICD-10-PCS | Mod: ,,, | Performed by: PHYSICIAN ASSISTANT

## 2021-11-09 PROCEDURE — 80358 DRUG SCREENING METHADONE: CPT | Mod: ,,, | Performed by: CLINICAL MEDICAL LABORATORY

## 2021-11-09 PROCEDURE — 3077F SYST BP >= 140 MM HG: CPT | Mod: ,,, | Performed by: PHYSICIAN ASSISTANT

## 2021-11-09 PROCEDURE — 80353 DRUG SCREENING COCAINE: CPT | Mod: ,,, | Performed by: CLINICAL MEDICAL LABORATORY

## 2021-11-09 PROCEDURE — 80346 DRUG SCREEN DEFINITIVE 14, URINE: ICD-10-PCS | Mod: ,,, | Performed by: CLINICAL MEDICAL LABORATORY

## 2021-11-09 PROCEDURE — 3044F HG A1C LEVEL LT 7.0%: CPT | Mod: ,,, | Performed by: PHYSICIAN ASSISTANT

## 2021-11-09 PROCEDURE — 99214 OFFICE O/P EST MOD 30 MIN: CPT | Mod: S$PBB,,, | Performed by: PHYSICIAN ASSISTANT

## 2021-11-09 PROCEDURE — 80353 DRUG SCREEN DEFINITIVE 14, URINE: ICD-10-PCS | Mod: ,,, | Performed by: CLINICAL MEDICAL LABORATORY

## 2021-11-09 PROCEDURE — 80361 OPIATES 1 OR MORE: CPT | Mod: ,,, | Performed by: CLINICAL MEDICAL LABORATORY

## 2021-11-09 PROCEDURE — 3078F DIAST BP <80 MM HG: CPT | Mod: ,,, | Performed by: PHYSICIAN ASSISTANT

## 2021-11-09 PROCEDURE — 80348 DRUG SCREEN DEFINITIVE 14, URINE: ICD-10-PCS | Mod: ,,, | Performed by: CLINICAL MEDICAL LABORATORY

## 2021-11-09 PROCEDURE — 3044F PR MOST RECENT HEMOGLOBIN A1C LEVEL <7.0%: ICD-10-PCS | Mod: ,,, | Performed by: PHYSICIAN ASSISTANT

## 2021-11-09 PROCEDURE — 3008F PR BODY MASS INDEX (BMI) DOCUMENTED: ICD-10-PCS | Mod: ,,, | Performed by: PHYSICIAN ASSISTANT

## 2021-11-09 PROCEDURE — 80365 DRUG SCREEN DEFINITIVE 14, URINE: ICD-10-PCS | Mod: ,,, | Performed by: CLINICAL MEDICAL LABORATORY

## 2021-11-09 PROCEDURE — 99215 OFFICE O/P EST HI 40 MIN: CPT | Mod: PBBFAC | Performed by: PHYSICIAN ASSISTANT

## 2021-11-09 RX ORDER — FERROUS SULFATE 325(65) MG
325 TABLET ORAL DAILY
COMMUNITY
End: 2022-04-13

## 2021-11-09 RX ORDER — TRAMADOL HYDROCHLORIDE 50 MG/1
50 TABLET ORAL EVERY 12 HOURS PRN
Qty: 60 TABLET | Refills: 0 | Status: SHIPPED | OUTPATIENT
Start: 2021-11-09 | End: 2021-12-15

## 2021-11-11 LAB
6-ACETYLMORPHINE, URINE (RUSH): NEGATIVE 10 NG/ML
7-AMINOCLONAZEPAM, URINE (RUSH): NEGATIVE 25 NG/ML
A-HYDROXYALPRAZOLAM, URINE (RUSH): NEGATIVE 25 NG/ML
ACETYL FENTANYL, URINE (RUSH): NEGATIVE 2.5 NG/ML
ACETYL NORFENTANYL OXALATE, URINE (RUSH): NEGATIVE 5 NG/ML
AMPHET UR QL SCN: NEGATIVE 100 NG/ML
BENZOYLECGONINE, URINE (RUSH): NEGATIVE 100 NG/ML
BUPRENORPHINE UR QL SCN: NEGATIVE 25 NG/ML
CODEINE, URINE (RUSH): NEGATIVE 25 NG/ML
CREAT UR-MCNC: 105 MG/DL (ref 28–219)
EDDP, URINE (RUSH): NEGATIVE 25 NG/ML
FENTANYL, URINE (RUSH): NEGATIVE 2.5 NG/ML
HYDROCODONE, URINE (RUSH): NEGATIVE 25 NG/ML
HYDROMORPHONE, URINE (RUSH): NEGATIVE 25 NG/ML
LORAZEPAM, URINE (RUSH): NEGATIVE 25 NG/ML
METHADONE UR QL SCN: NEGATIVE 25 NG/ML
METHAMPHET UR QL SCN: NEGATIVE 100 NG/ML
MORPHINE, URINE (RUSH): NEGATIVE 25 NG/ML
NORBUPRENORPHINE, URINE (RUSH): NEGATIVE 25 NG/ML
NORDIAZEPAM, URINE (RUSH): NEGATIVE 25 NG/ML
NORFENTANYL OXALATE, URINE (RUSH): NEGATIVE 5 NG/ML
NORHYDROCODONE, URINE (RUSH): NEGATIVE 50 NG/ML
NOROXYCODONE HCL, URINE (RUSH): NEGATIVE 50 NG/ML
OXAZEPAM, URINE (RUSH): NEGATIVE 25 NG/ML
OXYCODONE UR QL SCN: NEGATIVE 25 NG/ML
OXYMORPHONE, URINE (RUSH): NEGATIVE 25 NG/ML
PH UR STRIP: 6 PH UNITS
SP GR UR STRIP: 1.02
TAPENTADOL, URINE (RUSH): NEGATIVE 25 NG/ML
TEMAZEPAM, URINE (RUSH): NEGATIVE 25 NG/ML
THC-COOH, URINE (RUSH): NEGATIVE 25 NG/ML
TRAMADOL, URINE (RUSH): NEGATIVE 100 NG/ML

## 2021-11-18 ENCOUNTER — TELEPHONE (OUTPATIENT)
Dept: PAIN MEDICINE | Facility: CLINIC | Age: 56
End: 2021-11-18
Payer: COMMERCIAL

## 2021-11-18 RX ORDER — GABAPENTIN 300 MG/1
CAPSULE ORAL
Qty: 30 CAPSULE | Refills: 3 | Status: SHIPPED | OUTPATIENT
Start: 2021-11-18 | End: 2022-01-25 | Stop reason: SDUPTHER

## 2021-12-01 ENCOUNTER — OFFICE VISIT (OUTPATIENT)
Dept: SLEEP MEDICINE | Facility: CLINIC | Age: 56
End: 2021-12-01
Attending: FAMILY MEDICINE
Payer: COMMERCIAL

## 2021-12-01 VITALS
BODY MASS INDEX: 43.13 KG/M2 | HEART RATE: 86 BPM | DIASTOLIC BLOOD PRESSURE: 93 MMHG | OXYGEN SATURATION: 94 % | SYSTOLIC BLOOD PRESSURE: 138 MMHG | HEIGHT: 66 IN | WEIGHT: 268.38 LBS

## 2021-12-01 DIAGNOSIS — G47.8 OTHER SLEEP DISORDERS: ICD-10-CM

## 2021-12-01 DIAGNOSIS — G47.30 SLEEP APNEA, UNSPECIFIED TYPE: Primary | ICD-10-CM

## 2021-12-01 DIAGNOSIS — G25.81 RLS (RESTLESS LEGS SYNDROME): ICD-10-CM

## 2021-12-01 DIAGNOSIS — G47.61 PLMD (PERIODIC LIMB MOVEMENT DISORDER): ICD-10-CM

## 2021-12-01 DIAGNOSIS — G47.19 OTHER HYPERSOMNIA: ICD-10-CM

## 2021-12-01 PROCEDURE — 99203 PR OFFICE/OUTPT VISIT, NEW, LEVL III, 30-44 MIN: ICD-10-PCS | Mod: S$PBB,,, | Performed by: FAMILY MEDICINE

## 2021-12-01 PROCEDURE — 99213 OFFICE O/P EST LOW 20 MIN: CPT | Mod: PBBFAC,PN | Performed by: FAMILY MEDICINE

## 2021-12-01 PROCEDURE — 99203 OFFICE O/P NEW LOW 30 MIN: CPT | Mod: S$PBB,,, | Performed by: FAMILY MEDICINE

## 2021-12-07 RX ORDER — HYDROXYZINE HYDROCHLORIDE 25 MG/1
TABLET, FILM COATED ORAL
Qty: 60 TABLET | Refills: 0 | Status: SHIPPED | OUTPATIENT
Start: 2021-12-07 | End: 2022-04-13

## 2021-12-15 ENCOUNTER — OFFICE VISIT (OUTPATIENT)
Dept: PAIN MEDICINE | Facility: CLINIC | Age: 56
End: 2021-12-15
Payer: COMMERCIAL

## 2021-12-15 ENCOUNTER — PROCEDURE VISIT (OUTPATIENT)
Dept: SLEEP MEDICINE | Facility: HOSPITAL | Age: 56
End: 2021-12-15
Payer: COMMERCIAL

## 2021-12-15 VITALS
BODY MASS INDEX: 42.27 KG/M2 | RESPIRATION RATE: 18 BRPM | HEIGHT: 66 IN | WEIGHT: 263 LBS | HEART RATE: 78 BPM | DIASTOLIC BLOOD PRESSURE: 84 MMHG | SYSTOLIC BLOOD PRESSURE: 134 MMHG

## 2021-12-15 DIAGNOSIS — R11.0 NAUSEA: ICD-10-CM

## 2021-12-15 DIAGNOSIS — M25.562 CHRONIC PAIN OF BOTH KNEES: Primary | Chronic | ICD-10-CM

## 2021-12-15 DIAGNOSIS — G47.8 OTHER SLEEP DISORDERS: ICD-10-CM

## 2021-12-15 DIAGNOSIS — M47.817 LUMBOSACRAL SPONDYLOSIS WITHOUT MYELOPATHY: Chronic | ICD-10-CM

## 2021-12-15 DIAGNOSIS — G47.30 SLEEP APNEA, UNSPECIFIED TYPE: ICD-10-CM

## 2021-12-15 DIAGNOSIS — R11.0 NAUSEA: Primary | ICD-10-CM

## 2021-12-15 DIAGNOSIS — M25.561 CHRONIC PAIN OF BOTH KNEES: Primary | Chronic | ICD-10-CM

## 2021-12-15 DIAGNOSIS — G47.19 OTHER HYPERSOMNIA: ICD-10-CM

## 2021-12-15 DIAGNOSIS — G89.29 CHRONIC PAIN OF BOTH KNEES: Primary | Chronic | ICD-10-CM

## 2021-12-15 DIAGNOSIS — E66.9 OBESITY, UNSPECIFIED CLASSIFICATION, UNSPECIFIED OBESITY TYPE, UNSPECIFIED WHETHER SERIOUS COMORBIDITY PRESENT: Primary | ICD-10-CM

## 2021-12-15 PROCEDURE — 95806 PR SLEEP STUDY, UNATTENDED, SIMUL RECORD HR/O2 SAT/RESP FLOW/RESP EFFT: ICD-10-PCS | Mod: 26,,, | Performed by: FAMILY MEDICINE

## 2021-12-15 PROCEDURE — 99214 OFFICE O/P EST MOD 30 MIN: CPT | Mod: S$PBB,,, | Performed by: PHYSICIAN ASSISTANT

## 2021-12-15 PROCEDURE — 95806 SLEEP STUDY UNATT&RESP EFFT: CPT | Mod: 26,,, | Performed by: FAMILY MEDICINE

## 2021-12-15 PROCEDURE — 99215 OFFICE O/P EST HI 40 MIN: CPT | Mod: PBBFAC | Performed by: PHYSICIAN ASSISTANT

## 2021-12-15 PROCEDURE — G0399 HOME SLEEP TEST/TYPE 3 PORTA: HCPCS

## 2021-12-15 PROCEDURE — 99214 PR OFFICE/OUTPT VISIT, EST, LEVL IV, 30-39 MIN: ICD-10-PCS | Mod: S$PBB,,, | Performed by: PHYSICIAN ASSISTANT

## 2021-12-15 RX ORDER — ONDANSETRON 4 MG/1
4 TABLET, FILM COATED ORAL EVERY 8 HOURS PRN
Qty: 30 TABLET | Refills: 0 | Status: CANCELLED | OUTPATIENT
Start: 2021-12-15

## 2021-12-15 RX ORDER — ACETAMINOPHEN AND CODEINE PHOSPHATE 300; 30 MG/1; MG/1
1 TABLET ORAL EVERY 12 HOURS
Qty: 60 TABLET | Refills: 0 | Status: SHIPPED | OUTPATIENT
Start: 2021-12-15 | End: 2022-01-14

## 2021-12-15 RX ORDER — ONDANSETRON 4 MG/1
4 TABLET, FILM COATED ORAL EVERY 8 HOURS PRN
Qty: 9 TABLET | Refills: 0 | Status: SHIPPED | OUTPATIENT
Start: 2021-12-15 | End: 2021-12-18

## 2021-12-15 RX ORDER — ONDANSETRON 4 MG/1
4 TABLET, FILM COATED ORAL EVERY 8 HOURS PRN
COMMUNITY
End: 2021-12-15 | Stop reason: SDUPTHER

## 2021-12-15 RX ORDER — SEMAGLUTIDE 1.34 MG/ML
1 INJECTION, SOLUTION SUBCUTANEOUS
Qty: 3 PEN | Refills: 0 | Status: CANCELLED | OUTPATIENT
Start: 2021-12-15

## 2021-12-15 RX ORDER — SEMAGLUTIDE 1.34 MG/ML
1 INJECTION, SOLUTION SUBCUTANEOUS
Qty: 1 PEN | Refills: 6 | Status: SHIPPED | OUTPATIENT
Start: 2021-12-15 | End: 2022-01-14

## 2022-01-03 PROBLEM — Z13.220 SCREENING FOR LIPOID DISORDERS: Status: RESOLVED | Noted: 2021-09-30 | Resolved: 2022-01-03

## 2022-01-03 PROBLEM — Z13.1 SCREENING FOR DIABETES MELLITUS: Status: RESOLVED | Noted: 2021-09-30 | Resolved: 2022-01-03

## 2022-01-04 ENCOUNTER — PATIENT MESSAGE (OUTPATIENT)
Dept: GASTROENTEROLOGY | Facility: CLINIC | Age: 57
End: 2022-01-04
Payer: COMMERCIAL

## 2022-01-12 ENCOUNTER — OFFICE VISIT (OUTPATIENT)
Dept: PAIN MEDICINE | Facility: CLINIC | Age: 57
End: 2022-01-12
Payer: COMMERCIAL

## 2022-01-12 ENCOUNTER — OFFICE VISIT (OUTPATIENT)
Dept: GASTROENTEROLOGY | Facility: CLINIC | Age: 57
End: 2022-01-12
Payer: COMMERCIAL

## 2022-01-12 VITALS
HEART RATE: 72 BPM | DIASTOLIC BLOOD PRESSURE: 90 MMHG | SYSTOLIC BLOOD PRESSURE: 156 MMHG | RESPIRATION RATE: 14 BRPM | WEIGHT: 263 LBS | BODY MASS INDEX: 42.27 KG/M2 | OXYGEN SATURATION: 99 % | HEIGHT: 66 IN

## 2022-01-12 VITALS
HEART RATE: 96 BPM | SYSTOLIC BLOOD PRESSURE: 159 MMHG | DIASTOLIC BLOOD PRESSURE: 89 MMHG | RESPIRATION RATE: 18 BRPM | BODY MASS INDEX: 42.43 KG/M2 | WEIGHT: 264 LBS | HEIGHT: 66 IN

## 2022-01-12 DIAGNOSIS — M25.561 CHRONIC PAIN OF BOTH KNEES: ICD-10-CM

## 2022-01-12 DIAGNOSIS — M17.0 BILATERAL PRIMARY OSTEOARTHRITIS OF KNEE: Primary | ICD-10-CM

## 2022-01-12 DIAGNOSIS — G89.29 CHRONIC PAIN OF BOTH KNEES: ICD-10-CM

## 2022-01-12 DIAGNOSIS — M25.562 CHRONIC PAIN OF BOTH KNEES: ICD-10-CM

## 2022-01-12 DIAGNOSIS — K51.211 ULCERATIVE PROCTITIS WITH RECTAL BLEEDING: Primary | ICD-10-CM

## 2022-01-12 PROCEDURE — 3008F PR BODY MASS INDEX (BMI) DOCUMENTED: ICD-10-PCS | Mod: ,,, | Performed by: NURSE PRACTITIONER

## 2022-01-12 PROCEDURE — 3077F PR MOST RECENT SYSTOLIC BLOOD PRESSURE >= 140 MM HG: ICD-10-PCS | Mod: ,,, | Performed by: PAIN MEDICINE

## 2022-01-12 PROCEDURE — 3080F PR MOST RECENT DIASTOLIC BLOOD PRESSURE >= 90 MM HG: ICD-10-PCS | Mod: ,,, | Performed by: NURSE PRACTITIONER

## 2022-01-12 PROCEDURE — 3077F SYST BP >= 140 MM HG: CPT | Mod: ,,, | Performed by: PAIN MEDICINE

## 2022-01-12 PROCEDURE — 3008F BODY MASS INDEX DOCD: CPT | Mod: ,,, | Performed by: PAIN MEDICINE

## 2022-01-12 PROCEDURE — 3077F PR MOST RECENT SYSTOLIC BLOOD PRESSURE >= 140 MM HG: ICD-10-PCS | Mod: ,,, | Performed by: NURSE PRACTITIONER

## 2022-01-12 PROCEDURE — 3008F PR BODY MASS INDEX (BMI) DOCUMENTED: ICD-10-PCS | Mod: ,,, | Performed by: PAIN MEDICINE

## 2022-01-12 PROCEDURE — 99213 OFFICE O/P EST LOW 20 MIN: CPT | Mod: ,,, | Performed by: NURSE PRACTITIONER

## 2022-01-12 PROCEDURE — 3008F BODY MASS INDEX DOCD: CPT | Mod: ,,, | Performed by: NURSE PRACTITIONER

## 2022-01-12 PROCEDURE — 1159F MED LIST DOCD IN RCRD: CPT | Mod: ,,, | Performed by: PAIN MEDICINE

## 2022-01-12 PROCEDURE — 3079F PR MOST RECENT DIASTOLIC BLOOD PRESSURE 80-89 MM HG: ICD-10-PCS | Mod: ,,, | Performed by: PAIN MEDICINE

## 2022-01-12 PROCEDURE — 99214 OFFICE O/P EST MOD 30 MIN: CPT | Mod: PBBFAC | Performed by: PAIN MEDICINE

## 2022-01-12 PROCEDURE — 3079F DIAST BP 80-89 MM HG: CPT | Mod: ,,, | Performed by: PAIN MEDICINE

## 2022-01-12 PROCEDURE — 3077F SYST BP >= 140 MM HG: CPT | Mod: ,,, | Performed by: NURSE PRACTITIONER

## 2022-01-12 PROCEDURE — 3080F DIAST BP >= 90 MM HG: CPT | Mod: ,,, | Performed by: NURSE PRACTITIONER

## 2022-01-12 PROCEDURE — 1159F PR MEDICATION LIST DOCUMENTED IN MEDICAL RECORD: ICD-10-PCS | Mod: ,,, | Performed by: PAIN MEDICINE

## 2022-01-12 PROCEDURE — 99213 PR OFFICE/OUTPT VISIT, EST, LEVL III, 20-29 MIN: ICD-10-PCS | Mod: S$PBB,,, | Performed by: PAIN MEDICINE

## 2022-01-12 PROCEDURE — 99213 PR OFFICE/OUTPT VISIT, EST, LEVL III, 20-29 MIN: ICD-10-PCS | Mod: ,,, | Performed by: NURSE PRACTITIONER

## 2022-01-12 PROCEDURE — 99213 OFFICE O/P EST LOW 20 MIN: CPT | Mod: S$PBB,,, | Performed by: PAIN MEDICINE

## 2022-01-12 RX ORDER — ASPIRIN 81 MG/1
81 TABLET ORAL DAILY
COMMUNITY

## 2022-01-12 RX ORDER — MESALAMINE 1000 MG/1
500 SUPPOSITORY RECTAL NIGHTLY
Qty: 45 SUPPOSITORY | Refills: 3 | Status: SHIPPED | OUTPATIENT
Start: 2022-01-12 | End: 2022-02-23

## 2022-01-12 RX ORDER — HYDROCODONE BITARTRATE AND ACETAMINOPHEN 7.5; 325 MG/1; MG/1
1 TABLET ORAL
Qty: 30 TABLET | Refills: 0 | Status: SHIPPED | OUTPATIENT
Start: 2022-01-12 | End: 2022-02-11

## 2022-01-12 NOTE — LETTER
"Tia "Tia" Albert was seen and treated in our clinic today on 1/12/2022 by Dr Goetz  She may return to work on 1-  If you have any questions or concerns, please don't hesitate to call [phone]        "

## 2022-01-12 NOTE — PROGRESS NOTES
She Disclaimer: This note has been generated using voice-recognition software. There may be typographical errors that have been missed during proof-reading        Patient ID: Vandana Price is a 56 y.o. female.      Chief Complaint: Knee Pain (Bilateral)      56-year-old female presents for re-evaluation of bilateral knee pain.  She was evaluated by Dr. JESSICA Ricardo and prescribed Tylenol and tramadol without relief.  She was previously seen by Dr. Arias several years ago and  physical therapy and genicular nerve blocks were recommended.  She failed to receive  the procedures secondary to insurance coverage.  She is status post bilateral knee arthroscopies for osteoarthritis by Dr. Valladares in 2010 and 2011. JESSICA Ricardo scheduled patient for bilateral genicular knee procedures,  but she failed to show due to her inability to miss days from work..  She has not received physical therapy and returns today requesting a change in medications due to poorly controlled pain and difficulty with ambulation and standing.                Pain Assessment  Pain Assessment: 0-10  Pain Score:   8  Pain Location: Knee  Pain Orientation: Right,Left  Pain Descriptors: Burning,Constant,Stabbing,Throbbing,Aching  Pain Frequency: Constant/continuous  Aggravating Factors: Standing,Walking  Pain Intervention(s): Other (Comment) (no relief)      A's of Opioid Risk Assessment  Activity:Patient can partially perform ADL.   Analgesia:Patients pain is not controlled by current medication.   Adverse Effects: Patient denies constipation or sedation.  Aberrant Behavior:  reviewed with no aberrant drug seeking/taking behavior.      Patient denies any suicidal or homicidal ideations    Physical Therapy/Home Exercise: yes      US Lower Extremity Veins Left  Narrative: EXAMINATION:  US LOWER EXTREMITY VEINS LEFT    CLINICAL HISTORY:  Edema, unspecified    TECHNIQUE:  Duplex and color flow Doppler and dynamic compression was performed of the bilateral lower  extremity veins was performed.    COMPARISON:  None.    FINDINGS:  Nonocclusive thrombus seen in the left superficial femoral vein and popliteal vein.  No other evidence of echogenic, non-compressible thrombus seen in the remaining veins of the extremities.  Color Doppler venous waveform pattern is within normal limits.  Impression: Venous thrombosis as described above.    Ultrasound images stored and captured.    Electronically signed by: Naseem Lopez  Date:    2021  Time:    08:42      Review of Systems   Constitutional: Negative.    HENT: Negative.    Eyes: Negative.    Respiratory: Negative.    Cardiovascular: Negative.    Gastrointestinal: Negative.    Endocrine: Negative.    Genitourinary: Negative.    Musculoskeletal: Positive for arthralgias (bilateral knee).   Integumentary:  Negative.   Neurological: Negative.    Hematological: Negative.    Psychiatric/Behavioral: Negative.              Past Medical History:   Diagnosis Date    Allergy     Bilateral primary osteoarthritis of knee     Chronic pain of both knees 10/14/2021    Eosinophilia     Gangrene of gallbladder in cholecystitis 2021    Hematochezia     Hyperlipidemia     Hypertension      Past Surgical History:   Procedure Laterality Date    Bilateral IA knee injection Bilateral 2021    D Shows    bladder tact       SECTION      CHOLECYSTECTOMY      FRACTURE SURGERY      jaw 1972    knee scope Bilateral     KNEE SURGERY      LAPAROSCOPIC CHOLECYSTECTOMY N/A 3/31/2021    Procedure: LAPAROSCOPIC CHOLECYSTECTOMY CONVERTED TO OPEN;  Surgeon: Darnell Mcgraw MD;  Location: [facility];  Service: General;  Laterality: N/A;  CONVERTED TO OPEN    TUBAL LIGATION       Social History     Socioeconomic History    Marital status: Single   Occupational History    Occupation:  at [employer]   Tobacco Use    Smoking status: Former Smoker     Packs/day: 1.00     Years: 12.00     Pack years: 12.00     Types: Cigarettes     Smokeless tobacco: Never Used   Substance and Sexual Activity    Alcohol use: Never    Drug use: Never    Sexual activity: Not Currently     Family History   Problem Relation Age of Onset    Hypertension Father     Heart disease Father     Alcohol abuse Mother     Migraines Daughter     Lung cancer Maternal Aunt      Review of patient's allergies indicates:  No Known Allergies  has a current medication list which includes the following prescription(s): acetaminophen-codeine 300-30mg, aspirin, famotidine, ferrous sulfate, gabapentin, meloxicam, mesalamine, ozempic, amoxicillin-clavulanate 875-125mg, apixaban, hydrocodone-acetaminophen, and hydroxyzine hcl.      Objective:  Vitals:    01/12/22 1421   BP: (!) 159/89   Pulse: 96   Resp: 18        Physical Exam  Vitals and nursing note reviewed.   Constitutional:       General: She is not in acute distress.     Appearance: Normal appearance. She is not ill-appearing, toxic-appearing or diaphoretic.   HENT:      Head: Normocephalic and atraumatic.      Nose: Nose normal.      Mouth/Throat:      Mouth: Mucous membranes are moist.   Eyes:      Extraocular Movements: Extraocular movements intact.      Pupils: Pupils are equal, round, and reactive to light.   Cardiovascular:      Rate and Rhythm: Normal rate and regular rhythm.      Heart sounds: Normal heart sounds.   Pulmonary:      Effort: Pulmonary effort is normal. No respiratory distress.      Breath sounds: Normal breath sounds. No stridor. No wheezing or rhonchi.   Abdominal:      General: Bowel sounds are normal.      Palpations: Abdomen is soft.   Musculoskeletal:         General: No swelling or deformity.      Cervical back: Normal and normal range of motion. No spasms or tenderness. No pain with movement. Normal range of motion.      Thoracic back: Normal.      Lumbar back: No spasms, tenderness or bony tenderness. Normal range of motion. Negative right straight leg raise test and negative left straight  leg raise test. No scoliosis.      Right knee: Bony tenderness present. Decreased range of motion. Tenderness present.      Left knee: Bony tenderness present. Decreased range of motion.      Right lower leg: No edema.      Left lower leg: No edema.   Skin:     General: Skin is warm.   Neurological:      General: No focal deficit present.      Mental Status: She is alert and oriented to person, place, and time. Mental status is at baseline.      Cranial Nerves: Cranial nerves are intact. No cranial nerve deficit.      Sensory: Sensation is intact. No sensory deficit.      Motor: No weakness.      Coordination: Coordination normal.      Gait: Gait normal.      Deep Tendon Reflexes: Reflexes are normal and symmetric.   Psychiatric:         Mood and Affect: Mood normal.         Behavior: Behavior normal.           Assessment:      1. Bilateral primary osteoarthritis of knee    2. Chronic pain of both knees          Plan:  1. reviewed  2.Addiction, Dependency, Tolerance, Opioid abuse-misuse, Death, Diversion Discussed. Overdose reversal drug Naloxone discussed.  3.Refill/Continue medications for pain control and function       Requested Prescriptions     Pending Prescriptions Disp Refills    HYDROcodone-acetaminophen (NORCO) 7.5-325 mg per tablet 30 tablet 0     Sig: Take 1 tablet by mouth every 24 hours as needed for Pain.     4.Return in 1 months with  for re-evaluation and medication refill  5. Both physical therapy and genicular nerve blocks were again discussed and recommended to patient  6. Avoid aggressive escalation of opioids       report:  Reviewed and consistent with medication use as prescribed.      The total time spent for evaluation and management on 01/12/2022 including reviewing separately obtained history, performing a medically appropriate exam and evaluation, documenting clinical information in the health record, independently interpreting results and communicating them to the  patient/family/caregiver, and ordering medications/tests/procedures was between 15-29 minutes.    The above plan and management options were discussed at length with patient. Patient is in agreement with the above and verbalized understanding. It will be communicated with the referring physician via electronic record, fax, or mail.

## 2022-01-12 NOTE — PROGRESS NOTES
Pt here for f/u. Hx UC on c-scope 2021 by Dr. Rondon. Had inflammation localized to the rectum. We started mesalamine suppositories and tapering dose prednisone  - quit after 3 weeks due to having headaches. Pt has alternating constipation, diarrhea and scant BRBPR. Minimal lower abd pain.    Past Medical History:   Diagnosis Date    Allergy     Bilateral primary osteoarthritis of knee     Chronic pain of both knees 10/14/2021    Eosinophilia     Gangrene of gallbladder in cholecystitis 4/1/2021    Hematochezia     Hyperlipidemia     Hypertension      Review of Systems   Constitutional: Negative for chills and fever.   Respiratory: Negative for shortness of breath.    Cardiovascular: Negative for chest pain.   Gastrointestinal: Positive for abdominal pain, blood in stool, constipation and diarrhea. Negative for melena, nausea and vomiting.   Skin: Negative for rash.   Neurological: Negative for weakness.     Physical Exam  Vitals reviewed. Exam conducted with a chaperone present.   Constitutional:       General: She is not in acute distress.     Appearance: Normal appearance.   HENT:      Head: Normocephalic.   Eyes:      General: No scleral icterus.     Pupils: Pupils are equal, round, and reactive to light.   Cardiovascular:      Rate and Rhythm: Normal rate.   Pulmonary:      Effort: Pulmonary effort is normal.   Abdominal:      General: There is no distension.      Palpations: Abdomen is soft.      Tenderness: There is no abdominal tenderness. There is no guarding or rebound.   Skin:     General: Skin is warm and dry.   Neurological:      General: No focal deficit present.      Mental Status: She is alert and oriented to person, place, and time. Mental status is at baseline.   Psychiatric:         Mood and Affect: Mood normal.         Behavior: Behavior normal.         Thought Content: Thought content normal.         Judgment: Judgment normal.       Plan  -ESR, CRP, calprotectin today  -continue  mesalamine suppositories for now, will change if needed based on labs & pt symptoms  -RTC 3 months, sooner PRN

## 2022-01-14 PROCEDURE — 83993 CALPROTECTIN, STOOL: ICD-10-PCS | Mod: 90,,, | Performed by: CLINICAL MEDICAL LABORATORY

## 2022-01-14 PROCEDURE — 83993 ASSAY FOR CALPROTECTIN FECAL: CPT | Mod: 90,,, | Performed by: CLINICAL MEDICAL LABORATORY

## 2022-01-16 ENCOUNTER — PATIENT MESSAGE (OUTPATIENT)
Dept: GASTROENTEROLOGY | Facility: CLINIC | Age: 57
End: 2022-01-16
Payer: COMMERCIAL

## 2022-01-17 ENCOUNTER — PATIENT MESSAGE (OUTPATIENT)
Dept: GASTROENTEROLOGY | Facility: CLINIC | Age: 57
End: 2022-01-17
Payer: COMMERCIAL

## 2022-01-19 LAB — CALPROTECTIN STL-MCNT: >3000 MCG/G

## 2022-01-20 ENCOUNTER — TELEPHONE (OUTPATIENT)
Dept: GASTROENTEROLOGY | Facility: CLINIC | Age: 57
End: 2022-01-20
Payer: COMMERCIAL

## 2022-01-20 NOTE — TELEPHONE ENCOUNTER
Pt notified of calprotectin, states she is doing a whole mesalamine suppository every other day. Pt states she has mucous in her stool along with stomach cramping when eating certain foods but denies diarrhea. Pt states she would rather not have any scopes bc she can not handle the prep. Told pt we would talk more about it at follow up.

## 2022-01-20 NOTE — TELEPHONE ENCOUNTER
----- Message from KIMBERLEE Arthur sent at 1/20/2022  9:53 AM CST -----  Calprotectin is very high; Ask if shes using mesalamine suppositories and if she's better; encourage her to use the whole suppository not half; if no better at f/u, redo colonoscopies and also EGD; also recheck a c diff and enteric pathogen panel now if pt is having diarrhea still.

## 2022-01-25 DIAGNOSIS — M19.90 ARTHRITIS: ICD-10-CM

## 2022-01-25 DIAGNOSIS — Z79.899 OTHER LONG TERM (CURRENT) DRUG THERAPY: ICD-10-CM

## 2022-01-26 RX ORDER — MELOXICAM 15 MG/1
15 TABLET ORAL DAILY PRN
Qty: 90 TABLET | Refills: 0 | Status: SHIPPED | OUTPATIENT
Start: 2022-01-26 | End: 2022-04-13

## 2022-01-26 RX ORDER — GABAPENTIN 300 MG/1
300 CAPSULE ORAL NIGHTLY
Qty: 90 CAPSULE | Refills: 0 | Status: SHIPPED | OUTPATIENT
Start: 2022-01-26 | End: 2022-06-21 | Stop reason: SDUPTHER

## 2022-01-31 PROBLEM — Z00.00 WELL ADULT EXAM: Status: RESOLVED | Noted: 2021-10-26 | Resolved: 2022-01-31

## 2022-02-09 ENCOUNTER — HOSPITAL ENCOUNTER (OUTPATIENT)
Dept: RADIOLOGY | Facility: HOSPITAL | Age: 57
Discharge: HOME | End: 2022-02-09
Attending: PAIN MEDICINE
Payer: COMMERCIAL

## 2022-02-09 ENCOUNTER — OFFICE VISIT (OUTPATIENT)
Dept: PAIN MEDICINE | Facility: CLINIC | Age: 57
End: 2022-02-09
Payer: COMMERCIAL

## 2022-02-09 VITALS
SYSTOLIC BLOOD PRESSURE: 150 MMHG | HEIGHT: 66 IN | HEART RATE: 87 BPM | DIASTOLIC BLOOD PRESSURE: 85 MMHG | RESPIRATION RATE: 20 BRPM | BODY MASS INDEX: 42.91 KG/M2 | WEIGHT: 267 LBS

## 2022-02-09 DIAGNOSIS — M25.562 CHRONIC PAIN OF BOTH KNEES: Chronic | ICD-10-CM

## 2022-02-09 DIAGNOSIS — G89.29 CHRONIC PAIN OF BOTH KNEES: Chronic | ICD-10-CM

## 2022-02-09 DIAGNOSIS — M17.0 BILATERAL PRIMARY OSTEOARTHRITIS OF KNEE: ICD-10-CM

## 2022-02-09 DIAGNOSIS — M17.0 BILATERAL PRIMARY OSTEOARTHRITIS OF KNEE: Chronic | ICD-10-CM

## 2022-02-09 DIAGNOSIS — M79.671 INTRACTABLE RIGHT HEEL PAIN: Chronic | ICD-10-CM

## 2022-02-09 DIAGNOSIS — Z79.899 ENCOUNTER FOR LONG-TERM (CURRENT) USE OF OTHER MEDICATIONS: Primary | ICD-10-CM

## 2022-02-09 DIAGNOSIS — M25.561 CHRONIC PAIN OF BOTH KNEES: Chronic | ICD-10-CM

## 2022-02-09 LAB
CTP QC/QA: YES
POC (AMP) AMPHETAMINE: NEGATIVE
POC (BAR) BARBITURATES: NEGATIVE
POC (BUP) BUPRENORPHINE: NEGATIVE
POC (BZO) BENZODIAZEPINES: NEGATIVE
POC (COC) COCAINE: NEGATIVE
POC (MDMA) METHYLENEDIOXYMETHAMPHETAMINE 3,4: NEGATIVE
POC (MET) METHAMPHETAMINE: NEGATIVE
POC (MOP) OPIATES: ABNORMAL
POC (MTD) METHADONE: NEGATIVE
POC (OXY) OXYCODONE: NEGATIVE
POC (PCP) PHENCYCLIDINE: NEGATIVE
POC (TCA) TRICYCLIC ANTIDEPRESSANTS: NEGATIVE
POC TEMPERATURE (URINE): 94
POC THC: NEGATIVE

## 2022-02-09 PROCEDURE — 99214 PR OFFICE/OUTPT VISIT, EST, LEVL IV, 30-39 MIN: ICD-10-PCS | Mod: S$PBB,,, | Performed by: PAIN MEDICINE

## 2022-02-09 PROCEDURE — 73650 XR CALCANEUS 2 VIEW RIGHT: ICD-10-PCS | Mod: 26,RT,, | Performed by: RADIOLOGY

## 2022-02-09 PROCEDURE — 80305 DRUG TEST PRSMV DIR OPT OBS: CPT | Mod: PBBFAC | Performed by: PAIN MEDICINE

## 2022-02-09 PROCEDURE — 3077F SYST BP >= 140 MM HG: CPT | Mod: ,,, | Performed by: PAIN MEDICINE

## 2022-02-09 PROCEDURE — 3008F BODY MASS INDEX DOCD: CPT | Mod: ,,, | Performed by: PAIN MEDICINE

## 2022-02-09 PROCEDURE — 99214 OFFICE O/P EST MOD 30 MIN: CPT | Mod: S$PBB,,, | Performed by: PAIN MEDICINE

## 2022-02-09 PROCEDURE — 3079F DIAST BP 80-89 MM HG: CPT | Mod: ,,, | Performed by: PAIN MEDICINE

## 2022-02-09 PROCEDURE — 73650 X-RAY EXAM OF HEEL: CPT | Mod: 26,RT,, | Performed by: RADIOLOGY

## 2022-02-09 PROCEDURE — 3008F PR BODY MASS INDEX (BMI) DOCUMENTED: ICD-10-PCS | Mod: ,,, | Performed by: PAIN MEDICINE

## 2022-02-09 PROCEDURE — 99214 OFFICE O/P EST MOD 30 MIN: CPT | Mod: PBBFAC | Performed by: PAIN MEDICINE

## 2022-02-09 PROCEDURE — 1159F PR MEDICATION LIST DOCUMENTED IN MEDICAL RECORD: ICD-10-PCS | Mod: ,,, | Performed by: PAIN MEDICINE

## 2022-02-09 PROCEDURE — 3077F PR MOST RECENT SYSTOLIC BLOOD PRESSURE >= 140 MM HG: ICD-10-PCS | Mod: ,,, | Performed by: PAIN MEDICINE

## 2022-02-09 PROCEDURE — 73650 X-RAY EXAM OF HEEL: CPT | Mod: TC,RT

## 2022-02-09 PROCEDURE — 1159F MED LIST DOCD IN RCRD: CPT | Mod: ,,, | Performed by: PAIN MEDICINE

## 2022-02-09 PROCEDURE — 3079F PR MOST RECENT DIASTOLIC BLOOD PRESSURE 80-89 MM HG: ICD-10-PCS | Mod: ,,, | Performed by: PAIN MEDICINE

## 2022-02-09 RX ORDER — HYDROCODONE BITARTRATE AND ACETAMINOPHEN 10; 325 MG/1; MG/1
1 TABLET ORAL
Qty: 30 TABLET | Refills: 0 | Status: SHIPPED | OUTPATIENT
Start: 2022-02-09 | End: 2022-03-11

## 2022-02-09 RX ORDER — HYDROCODONE BITARTRATE AND ACETAMINOPHEN 10; 325 MG/1; MG/1
1 TABLET ORAL
Qty: 30 TABLET | Refills: 0 | Status: SHIPPED | OUTPATIENT
Start: 2022-03-11 | End: 2022-02-23

## 2022-02-09 NOTE — PROGRESS NOTES
She Disclaimer: This note has been generated using voice-recognition software. There may be typographical errors that have been missed during proof-reading        Patient ID: Vandana Price is a 56 y.o. female.      Chief Complaint: Knee Pain (Right heel pain)      56-year-old female returns today for re-evaluation of bilateral knee and right heel pain.  She failed to benefit from Tylenol No.  3 and tramadol.  Genicular nerve blocks were recommended by Dr. Arias,  but patient failed to schedule  procedures secondary to insurance.  She is status post knee arthroscopies, greater than 10 years,  ago by Dr. Valladares.  She has not received recent physical therapy and remains on Norco for pain control.  She returns today requesting an increase in opioid dosage.  She complains of a  1 week history of right heel pain,  but has not received x-rays for possible heel spur formation.  I cannot aggressively increased opiates based on symptoms alone and  without additional treatments or surgical re-evaluation.              Pain Assessment  Pain Assessment: 0-10  Pain Score:   8  Pain Location: Knee  Pain Orientation: Right,Left,Other (Comment) (right heel pain)  Pain Descriptors: Aching,Burning,Stabbing,Nagging,Constant  Pain Frequency: Continuous  Pain Onset: Awakened from sleep  Clinical Progression: Gradually worsening  Aggravating Factors: Bending,Straightening,Stretching,Exercise,Kneeling,Squatting,Standing,Walking,Stairs  Pain Intervention(s): Medication (See eMAR)      A's of Opioid Risk Assessment  Activity:Patient can perform ADL.   Analgesia:Patients pain is not controlled by current medication.   Adverse Effects: Patient denies constipation or sedation.  Aberrant Behavior:  reviewed with no aberrant drug seeking/taking behavior.      Patient denies any suicidal or homicidal ideations    Physical Therapy/Home Exercise: yes          Review of Systems   Constitutional: Negative.    HENT: Negative.    Eyes: Negative.     Respiratory: Negative.    Cardiovascular: Negative.    Gastrointestinal: Negative.    Endocrine: Negative.    Genitourinary: Negative.    Musculoskeletal: Positive for arthralgias (Bilateral knees and right heel).   Integumentary:  Negative.   Neurological: Negative.    Hematological: Negative.    Psychiatric/Behavioral: Negative.              Past Medical History:   Diagnosis Date    Allergy     Bilateral primary osteoarthritis of knee     Chronic pain of both knees 10/14/2021    Eosinophilia     Gangrene of gallbladder in cholecystitis 2021    Hematochezia     Hyperlipidemia     Hypertension      Past Surgical History:   Procedure Laterality Date    Bilateral IA knee injection Bilateral 2021    D Shows    bladder tact       SECTION      CHOLECYSTECTOMY      FRACTURE SURGERY      jaw 1972    knee scope Bilateral     KNEE SURGERY      LAPAROSCOPIC CHOLECYSTECTOMY N/A 3/31/2021    Procedure: LAPAROSCOPIC CHOLECYSTECTOMY CONVERTED TO OPEN;  Surgeon: Darnell Mcgraw MD;  Location: [facility];  Service: General;  Laterality: N/A;  CONVERTED TO OPEN    TUBAL LIGATION       Social History     Socioeconomic History    Marital status: Single   Occupational History    Occupation:  at [employer]   Tobacco Use    Smoking status: Former Smoker     Packs/day: 1.00     Years: 12.00     Pack years: 12.00     Types: Cigarettes    Smokeless tobacco: Never Used   Substance and Sexual Activity    Alcohol use: Never    Drug use: Never    Sexual activity: Not Currently     Family History   Problem Relation Age of Onset    Hypertension Father     Heart disease Father     Alcohol abuse Mother     Migraines Daughter     Lung cancer Maternal Aunt      Review of patient's allergies indicates:  No Known Allergies  has a current medication list which includes the following prescription(s): amoxicillin-clavulanate 875-125mg, apixaban, aspirin, famotidine, ferrous sulfate, gabapentin,  hydrocodone-acetaminophen, hydrocodone-acetaminophen, hydroxyzine hcl, meloxicam, mesalamine, hydrocodone-acetaminophen, and [START ON 3/11/2022] hydrocodone-acetaminophen.      Objective:  Vitals:    02/09/22 1420   BP: (!) 150/85   Pulse: 87   Resp: 20        Physical Exam  Vitals and nursing note reviewed.   Constitutional:       General: She is not in acute distress.     Appearance: Normal appearance. She is obese. She is not ill-appearing, toxic-appearing or diaphoretic.   HENT:      Head: Normocephalic and atraumatic.      Nose: Nose normal.      Mouth/Throat:      Mouth: Mucous membranes are moist.   Eyes:      Extraocular Movements: Extraocular movements intact.      Pupils: Pupils are equal, round, and reactive to light.   Cardiovascular:      Rate and Rhythm: Normal rate and regular rhythm.      Heart sounds: Normal heart sounds.   Pulmonary:      Effort: Pulmonary effort is normal. No respiratory distress.      Breath sounds: Normal breath sounds. No stridor. No wheezing or rhonchi.   Abdominal:      General: Bowel sounds are normal.      Palpations: Abdomen is soft.   Musculoskeletal:         General: No swelling or deformity.      Cervical back: Normal and normal range of motion. No spasms or tenderness. No pain with movement. Normal range of motion.      Thoracic back: Normal.      Lumbar back: No spasms, tenderness or bony tenderness. Normal range of motion. Negative right straight leg raise test and negative left straight leg raise test. No scoliosis.      Right knee: Bony tenderness present. Decreased range of motion. Tenderness present.      Left knee: Bony tenderness present. Decreased range of motion. Tenderness present.      Right lower leg: No edema.      Left lower leg: No edema.   Skin:     General: Skin is warm.   Neurological:      General: No focal deficit present.      Mental Status: She is alert and oriented to person, place, and time. Mental status is at baseline.      Cranial Nerves:  Cranial nerves are intact. No cranial nerve deficit.      Sensory: Sensation is intact. No sensory deficit.      Motor: No weakness.      Coordination: Coordination normal.      Gait: Gait normal.      Deep Tendon Reflexes: Reflexes are normal and symmetric.   Psychiatric:         Mood and Affect: Mood normal.         Behavior: Behavior normal.           Assessment:      1. Encounter for long-term (current) use of other medications    2. Intractable right heel pain    3. Bilateral primary osteoarthritis of knee    4. Chronic pain of both knees          Plan:  1. reviewed  2.Addiction, Dependency, Tolerance, Opioid abuse-misuse, Death, Diversion Discussed. Overdose reversal drug Naloxone discussed.  3.Refill/Continue medications for pain control and function.  Increase Norco to 10 mg a day for pain       Requested Prescriptions     Signed Prescriptions Disp Refills    HYDROcodone-acetaminophen (NORCO)  mg per tablet 30 tablet 0     Sig: Take 1 tablet by mouth every 24 hours as needed for Pain.    HYDROcodone-acetaminophen (NORCO)  mg per tablet 30 tablet 0     Sig: Take 1 tablet by mouth every 24 hours as needed for Pain.     4. Obtain x-ray of the right heel for right heel pain  5. Urine drug screen point of care obtained and consistent with prescribed medications and medication refill date    Orders Placed This Encounter   Procedures    X-Ray Calcaneus 2 View Right     Standing Status:   Future     Standing Expiration Date:   2/9/2023     Order Specific Question:   May the Radiologist modify the order per protocol to meet the clinical needs of the patient?     Answer:   Yes     Order Specific Question:   Release to patient     Answer:   Immediate    POCT Urine Drug Screen Presump     Interpretive Information:     Negative:  No drug detected at the cut off level.   Positive:  This result represents presumptive positive for the   tested drug, other substances may yield a positive response other    than the analyte of interest. This result should be utilized for   diagnostic purpose only. Confirmation testing will be performed upon physician request only.         6. Consider genicular nerve blocks for bilateral knee pain and osteoarthritis  7. Podiatry consult recommended for right hip pain pending x-ray results  8. Return in 2 months with me  for re-evaluation and medication refill       report:  Reviewed and consistent with medication use as prescribed.      The total time spent for evaluation and management on 02/09/2022 including reviewing separately obtained history, performing a medically appropriate exam and evaluation, documenting clinical information in the health record, independently interpreting results and communicating them to the patient/family/caregiver, and ordering medications/tests/procedures was between 15-29 minutes.    The above plan and management options were discussed at length with patient. Patient is in agreement with the above and verbalized understanding. It will be communicated with the referring physician via electronic record, fax, or mail.

## 2022-02-09 NOTE — LETTER
"Tia "Tia" Albert was seen and treated in our clinic today by Dr Goetz for followup visit on 2/9/2022.  She may return to work on 2-.  If you have any questions or concerns, please don't hesitate to call [phone].        "

## 2022-02-15 ENCOUNTER — PATIENT MESSAGE (OUTPATIENT)
Dept: GASTROENTEROLOGY | Facility: CLINIC | Age: 57
End: 2022-02-15
Payer: COMMERCIAL

## 2022-02-21 ENCOUNTER — TELEPHONE (OUTPATIENT)
Dept: PAIN MEDICINE | Facility: CLINIC | Age: 57
End: 2022-02-21
Payer: COMMERCIAL

## 2022-02-21 NOTE — TELEPHONE ENCOUNTER
----- Message from Naz Lazo sent at 2/21/2022  3:40 PM CST -----  Regarding: xray results  Patient would like results of xray, and what can be done for the pain in her foot.  Please call [phone].    Pt states she saw her results on portal and was asking for suggestions for her heel pain.  Pt thinks she may have plantar fasciitis being it hurts worse in am.  Pt states she walks barefoot all the time.  Pt informed of some exercises to do with her foot as well as encouraged to try to not go barefooted at anytime and to wear proper tennis shoes, no flip flops.  Pt also can see an podiatrist if she chooses, voiced understanding.

## 2022-02-23 ENCOUNTER — OFFICE VISIT (OUTPATIENT)
Dept: GASTROENTEROLOGY | Facility: CLINIC | Age: 57
End: 2022-02-23
Payer: COMMERCIAL

## 2022-02-23 VITALS
SYSTOLIC BLOOD PRESSURE: 117 MMHG | BODY MASS INDEX: 41.14 KG/M2 | WEIGHT: 256 LBS | OXYGEN SATURATION: 100 % | HEIGHT: 66 IN | RESPIRATION RATE: 14 BRPM | DIASTOLIC BLOOD PRESSURE: 100 MMHG | HEART RATE: 93 BPM

## 2022-02-23 DIAGNOSIS — K52.9 IBD (INFLAMMATORY BOWEL DISEASE): Primary | ICD-10-CM

## 2022-02-23 DIAGNOSIS — K62.89 PROCTITIS: ICD-10-CM

## 2022-02-23 PROCEDURE — 3080F PR MOST RECENT DIASTOLIC BLOOD PRESSURE >= 90 MM HG: ICD-10-PCS | Mod: ,,, | Performed by: NURSE PRACTITIONER

## 2022-02-23 PROCEDURE — 3074F PR MOST RECENT SYSTOLIC BLOOD PRESSURE < 130 MM HG: ICD-10-PCS | Mod: ,,, | Performed by: NURSE PRACTITIONER

## 2022-02-23 PROCEDURE — 99214 PR OFFICE/OUTPT VISIT, EST, LEVL IV, 30-39 MIN: ICD-10-PCS | Mod: ,,, | Performed by: NURSE PRACTITIONER

## 2022-02-23 PROCEDURE — 3008F PR BODY MASS INDEX (BMI) DOCUMENTED: ICD-10-PCS | Mod: ,,, | Performed by: NURSE PRACTITIONER

## 2022-02-23 PROCEDURE — 3008F BODY MASS INDEX DOCD: CPT | Mod: ,,, | Performed by: NURSE PRACTITIONER

## 2022-02-23 PROCEDURE — 99214 OFFICE O/P EST MOD 30 MIN: CPT | Mod: ,,, | Performed by: NURSE PRACTITIONER

## 2022-02-23 PROCEDURE — 3074F SYST BP LT 130 MM HG: CPT | Mod: ,,, | Performed by: NURSE PRACTITIONER

## 2022-02-23 PROCEDURE — 3080F DIAST BP >= 90 MM HG: CPT | Mod: ,,, | Performed by: NURSE PRACTITIONER

## 2022-02-23 RX ORDER — MESALAMINE 1.2 G/1
2.4 TABLET, DELAYED RELEASE ORAL
Qty: 60 TABLET | Refills: 11 | Status: SHIPPED | OUTPATIENT
Start: 2022-02-23 | End: 2022-06-17

## 2022-02-23 NOTE — PROGRESS NOTES
Pt here for UC f/u. On mesalamine suppositories 1000 mg daily/1 whole supp. Could not afford enemas due to insurance/cost. Was on high dose steroids in past which resolved symptoms but pt did not finish course 2/2 to h/a SE. C/o urgency - bothersome, sometimes cannot make it to bathroom - despite daily Canasa suppositories.. No abd pain, no significant diarrhea, mild bleeding/mucus. Pt wants to do biologics for symptoms now because she is miserable.  We discussed all tx options. Will try PO mesalamine 1st.    CRP 1, ESR 16, calprotectin >3000 last month    Past Medical History:   Diagnosis Date    Allergy     Bilateral primary osteoarthritis of knee     Chronic pain of both knees 10/14/2021    Eosinophilia     Gangrene of gallbladder in cholecystitis 4/1/2021    Hematochezia     Hyperlipidemia     Hypertension      Review of Systems   Constitutional: Negative for chills and fever.   Respiratory: Negative for shortness of breath.    Cardiovascular: Negative for chest pain.   Gastrointestinal: Negative for abdominal pain, blood in stool, constipation, diarrhea, melena, nausea and vomiting.   Skin: Negative for rash.   Neurological: Negative for weakness.     Physical Exam  Vitals reviewed. Exam conducted with a chaperone present.   Constitutional:       General: She is not in acute distress.     Appearance: Normal appearance.   HENT:      Head: Normocephalic.   Eyes:      General: No scleral icterus.     Pupils: Pupils are equal, round, and reactive to light.   Cardiovascular:      Rate and Rhythm: Normal rate.   Pulmonary:      Effort: Pulmonary effort is normal.   Abdominal:      General: There is no distension.      Palpations: Abdomen is soft.      Tenderness: There is no abdominal tenderness. There is no guarding or rebound.   Skin:     General: Skin is warm and dry.   Neurological:      General: No focal deficit present.      Mental Status: She is alert and oriented to person, place, and time. Mental  status is at baseline.   Psychiatric:         Mood and Affect: Mood normal.         Behavior: Behavior normal.         Thought Content: Thought content normal.         Judgment: Judgment normal.       Plan  -change mesalamine IN to PO  -d/w pt at length regarding biologics if needed if failed mesalamine - risk vs benefits including risks of infections, cancer, need for routine screens, close f/u and monitoring, and skin CA yearly screening  -would stop Mobic - NSAIDs can flare UC  -RTC 6 weeks, sooner PRN

## 2022-04-12 NOTE — PROGRESS NOTES
She Disclaimer: This note has been generated using voice-recognition software. There may be typographical errors that have been missed during proof-reading        Patient ID: Vandana Price is a 56 y.o. female.      Chief Complaint: Knee Pain      56-year-old female returns for re-evaluation of bilateral knee and right heel.  X-ray of the heel was negative for findings.  She was evaluated by a podiatrist and fitted with orthotic shoes but without significant improvement.  She defers genicular nerve blocks.  She will consider  surgery once she accumulates vacation days at her job. Medications are providing adequate pain relief and she denies any side effects. She returns today for medication refills.            Pain Assessment  Pain Score:   7  Pain Location: Knee  Pain Orientation: Right, Left  Pain Radiating Towards: radiates to both calfs  Pain Descriptors: Aching, Burning, Constant, Dull, Stabbing, Tingling  Pain Frequency: Continuous  Pain Onset: Awakened from sleep  Clinical Progression: Not changed  Aggravating Factors: Standing, Walking, Other (Comment) (lying and sitting)  Pain Intervention(s): Other (Comment) (no relief)      A's of Opioid Risk Assessment  Activity:Patient can perform ADL.   Analgesia:Patients pain is  controlled by current medication.   Adverse Effects: Patient denies constipation or sedation.  Aberrant Behavior:  reviewed with no aberrant drug seeking/taking behavior.      Patient denies any suicidal or homicidal ideations    Physical Therapy/Home Exercise: yes      X-Ray Calcaneus 2 View Right  Narrative: EXAMINATION:  XR CALCANEUS 2 VIEW RIGHT    CLINICAL HISTORY:  Bilateral primary osteoarthritis of knee    COMPARISON:  None available    FINDINGS:  No evidence of fracture seen.  The alignment of the joints appears normal.  No degenerative change is present.  No soft tissue abnormality is seen.  Impression: No evidence of abnormality demonstrated    Electronically signed by: Naseem  Keyshacheyenne  Date:    2022  Time:    15:45      Review of Systems   Constitutional: Negative.    HENT: Negative.    Eyes: Negative.    Respiratory: Negative.    Cardiovascular: Negative.    Gastrointestinal: Negative.    Endocrine: Negative.    Genitourinary: Negative.    Musculoskeletal: Positive for arthralgias (BIlateral Knees).   Integumentary:  Negative.   Neurological: Negative.    Hematological: Negative.    Psychiatric/Behavioral: Negative.              Past Medical History:   Diagnosis Date    Allergy     Bilateral primary osteoarthritis of knee     Chronic pain of both knees 10/14/2021    Eosinophilia     Gangrene of gallbladder in cholecystitis 2021    Hematochezia     Hyperlipidemia     Hypertension      Past Surgical History:   Procedure Laterality Date    Bilateral IA knee injection Bilateral 2021    D Shows    bladder tact       SECTION      CHOLECYSTECTOMY      FRACTURE SURGERY      jaw 1972    knee scope Bilateral     KNEE SURGERY      LAPAROSCOPIC CHOLECYSTECTOMY N/A 3/31/2021    Procedure: LAPAROSCOPIC CHOLECYSTECTOMY CONVERTED TO OPEN;  Surgeon: Darnell Mcgraw MD;  Location: [facility], [place];  Service: General;  Laterality: N/A;  CONVERTED TO OPEN    TUBAL LIGATION       Social History     Socioeconomic History    Marital status: Single   Occupational History    Occupation:  at [employer]   Tobacco Use    Smoking status: Former Smoker     Packs/day: 1.00     Years: 12.00     Pack years: 12.00     Types: Cigarettes    Smokeless tobacco: Never Used   Substance and Sexual Activity    Alcohol use: Never    Drug use: Never    Sexual activity: Not Currently     Family History   Problem Relation Age of Onset    Hypertension Father     Heart disease Father     Alcohol abuse Mother     Migraines Daughter     Lung cancer Maternal Aunt      Review of patient's allergies indicates:  No Known Allergies  has a current medication list which includes the following  prescription(s): aspirin, famotidine, gabapentin, hydrocodone-acetaminophen, mesalamine, hydrocodone-acetaminophen, [START ON 5/13/2022] hydrocodone-acetaminophen, and [START ON 6/13/2022] hydrocodone-acetaminophen.      Objective:  Vitals:    04/13/22 1106   BP: (!) 157/73   Pulse: 71        Physical Exam  Vitals and nursing note reviewed.   Constitutional:       General: She is not in acute distress.     Appearance: Normal appearance. She is obese. She is not ill-appearing, toxic-appearing or diaphoretic.   HENT:      Head: Normocephalic and atraumatic.      Nose: Nose normal.      Mouth/Throat:      Mouth: Mucous membranes are moist.   Eyes:      Extraocular Movements: Extraocular movements intact.      Pupils: Pupils are equal, round, and reactive to light.   Cardiovascular:      Rate and Rhythm: Normal rate and regular rhythm.      Heart sounds: Normal heart sounds.   Pulmonary:      Effort: Pulmonary effort is normal. No respiratory distress.      Breath sounds: Normal breath sounds. No stridor. No wheezing or rhonchi.   Abdominal:      General: Bowel sounds are normal.      Palpations: Abdomen is soft.   Musculoskeletal:         General: No swelling or deformity.      Cervical back: Normal and normal range of motion. No spasms or tenderness. No pain with movement. Normal range of motion.      Thoracic back: Normal.      Lumbar back: No spasms, tenderness or bony tenderness. Normal range of motion. Negative right straight leg raise test and negative left straight leg raise test. No scoliosis.      Right knee: Crepitus present. Decreased range of motion. Tenderness present over the medial joint line.      Left knee: Crepitus present. Decreased range of motion.      Right lower leg: No edema.      Left lower leg: No edema.   Skin:     General: Skin is warm.   Neurological:      General: No focal deficit present.      Mental Status: She is alert and oriented to person, place, and time. Mental status is at  baseline.      Cranial Nerves: Cranial nerves are intact. No cranial nerve deficit.      Sensory: Sensation is intact. No sensory deficit.      Motor: No weakness.      Coordination: Coordination normal.      Gait: Gait normal.      Deep Tendon Reflexes: Reflexes are normal and symmetric.   Psychiatric:         Mood and Affect: Mood normal.         Behavior: Behavior normal.           Assessment:      1. Intractable right heel pain    2. Bilateral primary osteoarthritis of knee    3. Chronic pain of both knees    4. Lumbosacral spondylosis without myelopathy          Plan:  1. reviewed  2.Addiction, Dependency, Tolerance, Opioid abuse-misuse, Death, Diversion Discussed. Overdose reversal drug Naloxone discussed.  3.Refill/Continue medications for pain control and function       Requested Prescriptions     Signed Prescriptions Disp Refills    HYDROcodone-acetaminophen (NORCO)  mg per tablet 30 tablet 0     Sig: Take 1 tablet by mouth every 24 hours as needed for Pain.    HYDROcodone-acetaminophen (NORCO)  mg per tablet 30 tablet 0     Sig: Take 1 tablet by mouth every 24 hours as needed for Pain.    HYDROcodone-acetaminophen (NORCO)  mg per tablet 30 tablet 0     Sig: Take 1 tablet by mouth every 24 hours as needed for Pain.     4.Patient defers nerve block injections, physical therapy or surgical consultation     5.Return in 3 months with me  for re-evaluation and medication refill       report:  Reviewed and consistent with medication use as prescribed.      The total time spent for evaluation and management on 04/13/2022 including reviewing separately obtained history, performing a medically appropriate exam and evaluation, documenting clinical information in the health record, independently interpreting results and communicating them to the patient/family/caregiver, and ordering medications/tests/procedures was between 15-29 minutes.    The above plan and management options were  discussed at length with patient. Patient is in agreement with the above and verbalized understanding. It will be communicated with the referring physician via electronic record, fax, or mail.

## 2022-04-13 ENCOUNTER — OFFICE VISIT (OUTPATIENT)
Dept: GASTROENTEROLOGY | Facility: CLINIC | Age: 57
End: 2022-04-13
Payer: COMMERCIAL

## 2022-04-13 ENCOUNTER — OFFICE VISIT (OUTPATIENT)
Dept: PAIN MEDICINE | Facility: CLINIC | Age: 57
End: 2022-04-13
Payer: COMMERCIAL

## 2022-04-13 VITALS
BODY MASS INDEX: 40.82 KG/M2 | RESPIRATION RATE: 14 BRPM | WEIGHT: 254 LBS | OXYGEN SATURATION: 99 % | SYSTOLIC BLOOD PRESSURE: 158 MMHG | HEART RATE: 87 BPM | DIASTOLIC BLOOD PRESSURE: 96 MMHG | HEIGHT: 66 IN

## 2022-04-13 VITALS
SYSTOLIC BLOOD PRESSURE: 157 MMHG | HEIGHT: 67 IN | DIASTOLIC BLOOD PRESSURE: 73 MMHG | BODY MASS INDEX: 40.65 KG/M2 | WEIGHT: 259 LBS | HEART RATE: 71 BPM

## 2022-04-13 DIAGNOSIS — M79.671 INTRACTABLE RIGHT HEEL PAIN: Primary | Chronic | ICD-10-CM

## 2022-04-13 DIAGNOSIS — G89.29 CHRONIC PAIN OF BOTH KNEES: Chronic | ICD-10-CM

## 2022-04-13 DIAGNOSIS — M47.817 LUMBOSACRAL SPONDYLOSIS WITHOUT MYELOPATHY: Chronic | ICD-10-CM

## 2022-04-13 DIAGNOSIS — M25.562 CHRONIC PAIN OF BOTH KNEES: Chronic | ICD-10-CM

## 2022-04-13 DIAGNOSIS — M25.561 CHRONIC PAIN OF BOTH KNEES: Chronic | ICD-10-CM

## 2022-04-13 DIAGNOSIS — M17.0 BILATERAL PRIMARY OSTEOARTHRITIS OF KNEE: Chronic | ICD-10-CM

## 2022-04-13 DIAGNOSIS — K52.9 INFLAMMATORY BOWEL DISEASE: Primary | ICD-10-CM

## 2022-04-13 PROCEDURE — 3008F BODY MASS INDEX DOCD: CPT | Mod: ,,, | Performed by: PAIN MEDICINE

## 2022-04-13 PROCEDURE — 3077F SYST BP >= 140 MM HG: CPT | Mod: ,,, | Performed by: PAIN MEDICINE

## 2022-04-13 PROCEDURE — 3080F DIAST BP >= 90 MM HG: CPT | Mod: ,,, | Performed by: NURSE PRACTITIONER

## 2022-04-13 PROCEDURE — 99213 PR OFFICE/OUTPT VISIT, EST, LEVL III, 20-29 MIN: ICD-10-PCS | Mod: ,,, | Performed by: NURSE PRACTITIONER

## 2022-04-13 PROCEDURE — 99214 OFFICE O/P EST MOD 30 MIN: CPT | Mod: PBBFAC | Performed by: PAIN MEDICINE

## 2022-04-13 PROCEDURE — 3008F PR BODY MASS INDEX (BMI) DOCUMENTED: ICD-10-PCS | Mod: ,,, | Performed by: NURSE PRACTITIONER

## 2022-04-13 PROCEDURE — 3008F PR BODY MASS INDEX (BMI) DOCUMENTED: ICD-10-PCS | Mod: ,,, | Performed by: PAIN MEDICINE

## 2022-04-13 PROCEDURE — 1159F PR MEDICATION LIST DOCUMENTED IN MEDICAL RECORD: ICD-10-PCS | Mod: ,,, | Performed by: PAIN MEDICINE

## 2022-04-13 PROCEDURE — 3077F SYST BP >= 140 MM HG: CPT | Mod: ,,, | Performed by: NURSE PRACTITIONER

## 2022-04-13 PROCEDURE — 3077F PR MOST RECENT SYSTOLIC BLOOD PRESSURE >= 140 MM HG: ICD-10-PCS | Mod: ,,, | Performed by: NURSE PRACTITIONER

## 2022-04-13 PROCEDURE — 99214 PR OFFICE/OUTPT VISIT, EST, LEVL IV, 30-39 MIN: ICD-10-PCS | Mod: S$PBB,,, | Performed by: PAIN MEDICINE

## 2022-04-13 PROCEDURE — 99213 OFFICE O/P EST LOW 20 MIN: CPT | Mod: ,,, | Performed by: NURSE PRACTITIONER

## 2022-04-13 PROCEDURE — 3077F PR MOST RECENT SYSTOLIC BLOOD PRESSURE >= 140 MM HG: ICD-10-PCS | Mod: ,,, | Performed by: PAIN MEDICINE

## 2022-04-13 PROCEDURE — 1159F MED LIST DOCD IN RCRD: CPT | Mod: ,,, | Performed by: PAIN MEDICINE

## 2022-04-13 PROCEDURE — 3078F DIAST BP <80 MM HG: CPT | Mod: ,,, | Performed by: PAIN MEDICINE

## 2022-04-13 PROCEDURE — 3078F PR MOST RECENT DIASTOLIC BLOOD PRESSURE < 80 MM HG: ICD-10-PCS | Mod: ,,, | Performed by: PAIN MEDICINE

## 2022-04-13 PROCEDURE — 3008F BODY MASS INDEX DOCD: CPT | Mod: ,,, | Performed by: NURSE PRACTITIONER

## 2022-04-13 PROCEDURE — 3080F PR MOST RECENT DIASTOLIC BLOOD PRESSURE >= 90 MM HG: ICD-10-PCS | Mod: ,,, | Performed by: NURSE PRACTITIONER

## 2022-04-13 PROCEDURE — 99214 OFFICE O/P EST MOD 30 MIN: CPT | Mod: S$PBB,,, | Performed by: PAIN MEDICINE

## 2022-04-13 RX ORDER — HYDROCODONE BITARTRATE AND ACETAMINOPHEN 10; 325 MG/1; MG/1
1 TABLET ORAL
Qty: 30 TABLET | Refills: 0 | Status: SHIPPED | OUTPATIENT
Start: 2022-05-13 | End: 2022-06-12

## 2022-04-13 RX ORDER — HYDROCODONE BITARTRATE AND ACETAMINOPHEN 10; 325 MG/1; MG/1
1 TABLET ORAL
Qty: 30 TABLET | Refills: 0 | Status: SHIPPED | OUTPATIENT
Start: 2022-04-13 | End: 2022-05-13

## 2022-04-13 RX ORDER — HYDROCODONE BITARTRATE AND ACETAMINOPHEN 10; 325 MG/1; MG/1
1 TABLET ORAL
Qty: 30 TABLET | Refills: 0 | Status: SHIPPED | OUTPATIENT
Start: 2022-06-13 | End: 2022-07-13

## 2022-04-13 RX ORDER — HYDROCODONE BITARTRATE AND ACETAMINOPHEN 10; 325 MG/1; MG/1
1 TABLET ORAL DAILY PRN
COMMUNITY
Start: 2022-03-11 | End: 2022-08-15

## 2022-04-13 NOTE — PROGRESS NOTES
"Pt is a 57 y/o WF here for IBD f/u. Doing well on mesalamine PO (switched last time from WA to PO). No diarrhea, rectal bleeding, or abd pain.  Last calprotectin 2 months ago >3000.  Has cut herself down on mesalamine to 1.2 g instead of prescribed 2.4 g because she "is doing so well and doesn't like taking medicine." D/w pt at length regarding need for correct dosage to control her symptoms long term. She verbalized understanding & states she will start 2.4 g back today.    Past Medical History:   Diagnosis Date    Allergy     Bilateral primary osteoarthritis of knee     Chronic pain of both knees 10/14/2021    Eosinophilia     Gangrene of gallbladder in cholecystitis 4/1/2021    Hematochezia     Hyperlipidemia     Hypertension      Review of Systems   Constitutional: Negative for chills and fever.   Respiratory: Negative for shortness of breath.    Cardiovascular: Negative for chest pain.   Gastrointestinal: Negative for abdominal pain, blood in stool, constipation, diarrhea, melena, nausea and vomiting.   Skin: Negative for rash.   Neurological: Negative for weakness.     Physical Exam  Vitals reviewed. Exam conducted with a chaperone present.   Constitutional:       General: She is not in acute distress.     Appearance: Normal appearance.   HENT:      Head: Normocephalic.   Eyes:      General: No scleral icterus.     Pupils: Pupils are equal, round, and reactive to light.   Cardiovascular:      Rate and Rhythm: Normal rate.   Pulmonary:      Effort: Pulmonary effort is normal.   Abdominal:      General: There is no distension.      Palpations: Abdomen is soft.      Tenderness: There is no abdominal tenderness. There is no guarding or rebound.   Skin:     General: Skin is warm and dry.   Neurological:      General: No focal deficit present.      Mental Status: She is alert and oriented to person, place, and time. Mental status is at baseline.   Psychiatric:         Mood and Affect: Mood normal.         " Behavior: Behavior normal.         Thought Content: Thought content normal.         Judgment: Judgment normal.       Plan  -cont mesalamine 2.4 g daily  -CBC, CMP for annual renal function monitoring bc pt on 5ASA, calprotectin to compare  -repeat colonoscopy 2024, sooner PRN  -RTC 6 months, sooner PRN

## 2022-04-21 PROCEDURE — 83993 CALPROTECTIN, STOOL: ICD-10-PCS | Mod: 90,,, | Performed by: CLINICAL MEDICAL LABORATORY

## 2022-04-21 PROCEDURE — 83993 ASSAY FOR CALPROTECTIN FECAL: CPT | Mod: 90,,, | Performed by: CLINICAL MEDICAL LABORATORY

## 2022-04-26 LAB — CALPROTECTIN STL-MCNT: 80.2 MCG/G

## 2022-05-16 RX ORDER — MELOXICAM 15 MG/1
TABLET ORAL
Qty: 90 TABLET | Refills: 0 | Status: SHIPPED | OUTPATIENT
Start: 2022-05-16 | End: 2022-06-21 | Stop reason: SDUPTHER

## 2022-06-08 ENCOUNTER — PATIENT MESSAGE (OUTPATIENT)
Dept: GASTROENTEROLOGY | Facility: CLINIC | Age: 57
End: 2022-06-08
Payer: COMMERCIAL

## 2022-06-09 ENCOUNTER — PATIENT MESSAGE (OUTPATIENT)
Dept: PAIN MEDICINE | Facility: CLINIC | Age: 57
End: 2022-06-09
Payer: COMMERCIAL

## 2022-06-13 ENCOUNTER — OFFICE VISIT (OUTPATIENT)
Dept: PAIN MEDICINE | Facility: CLINIC | Age: 57
End: 2022-06-13
Payer: COMMERCIAL

## 2022-06-13 VITALS
BODY MASS INDEX: 41.14 KG/M2 | RESPIRATION RATE: 20 BRPM | HEIGHT: 66 IN | HEART RATE: 100 BPM | SYSTOLIC BLOOD PRESSURE: 180 MMHG | DIASTOLIC BLOOD PRESSURE: 90 MMHG | WEIGHT: 256 LBS

## 2022-06-13 DIAGNOSIS — M25.562 CHRONIC PAIN OF BOTH KNEES: Chronic | ICD-10-CM

## 2022-06-13 DIAGNOSIS — M25.561 CHRONIC PAIN OF BOTH KNEES: Chronic | ICD-10-CM

## 2022-06-13 DIAGNOSIS — Z79.899 ENCOUNTER FOR LONG-TERM (CURRENT) USE OF OTHER MEDICATIONS: Primary | ICD-10-CM

## 2022-06-13 DIAGNOSIS — M17.0 BILATERAL PRIMARY OSTEOARTHRITIS OF KNEE: Chronic | ICD-10-CM

## 2022-06-13 DIAGNOSIS — G89.29 CHRONIC PAIN OF BOTH KNEES: Chronic | ICD-10-CM

## 2022-06-13 LAB
CTP QC/QA: YES
POC (AMP) AMPHETAMINE: NEGATIVE
POC (BAR) BARBITURATES: NEGATIVE
POC (BUP) BUPRENORPHINE: NEGATIVE
POC (BZO) BENZODIAZEPINES: NEGATIVE
POC (COC) COCAINE: NEGATIVE
POC (MDMA) METHYLENEDIOXYMETHAMPHETAMINE 3,4: NEGATIVE
POC (MET) METHAMPHETAMINE: NEGATIVE
POC (MOP) OPIATES: ABNORMAL
POC (MTD) METHADONE: NEGATIVE
POC (OXY) OXYCODONE: NEGATIVE
POC (PCP) PHENCYCLIDINE: NEGATIVE
POC (TCA) TRICYCLIC ANTIDEPRESSANTS: NEGATIVE
POC TEMPERATURE (URINE): 92
POC THC: NEGATIVE

## 2022-06-13 PROCEDURE — 3080F PR MOST RECENT DIASTOLIC BLOOD PRESSURE >= 90 MM HG: ICD-10-PCS | Mod: ,,, | Performed by: PAIN MEDICINE

## 2022-06-13 PROCEDURE — 3008F PR BODY MASS INDEX (BMI) DOCUMENTED: ICD-10-PCS | Mod: ,,, | Performed by: PAIN MEDICINE

## 2022-06-13 PROCEDURE — 3008F BODY MASS INDEX DOCD: CPT | Mod: ,,, | Performed by: PAIN MEDICINE

## 2022-06-13 PROCEDURE — 80305 DRUG TEST PRSMV DIR OPT OBS: CPT | Mod: PBBFAC | Performed by: PAIN MEDICINE

## 2022-06-13 PROCEDURE — 3077F SYST BP >= 140 MM HG: CPT | Mod: ,,, | Performed by: PAIN MEDICINE

## 2022-06-13 PROCEDURE — 1159F PR MEDICATION LIST DOCUMENTED IN MEDICAL RECORD: ICD-10-PCS | Mod: ,,, | Performed by: PAIN MEDICINE

## 2022-06-13 PROCEDURE — 99212 OFFICE O/P EST SF 10 MIN: CPT | Mod: S$PBB,,, | Performed by: PAIN MEDICINE

## 2022-06-13 PROCEDURE — 1159F MED LIST DOCD IN RCRD: CPT | Mod: ,,, | Performed by: PAIN MEDICINE

## 2022-06-13 PROCEDURE — 99212 PR OFFICE/OUTPT VISIT, EST, LEVL II, 10-19 MIN: ICD-10-PCS | Mod: S$PBB,,, | Performed by: PAIN MEDICINE

## 2022-06-13 PROCEDURE — 3080F DIAST BP >= 90 MM HG: CPT | Mod: ,,, | Performed by: PAIN MEDICINE

## 2022-06-13 PROCEDURE — 3077F PR MOST RECENT SYSTOLIC BLOOD PRESSURE >= 140 MM HG: ICD-10-PCS | Mod: ,,, | Performed by: PAIN MEDICINE

## 2022-06-13 PROCEDURE — 99215 OFFICE O/P EST HI 40 MIN: CPT | Mod: PBBFAC | Performed by: PAIN MEDICINE

## 2022-06-13 NOTE — LETTER
"Tia "Tia" Albert was seen and treated in our emergency department on 6/13/2022 with Dr Goetz for followup for knee pain.  She may return to work on 6-.  If you have any questions or concerns, please don't hesitate to call [phone].        "

## 2022-06-13 NOTE — PATIENT INSTRUCTIONS
BILATERAL GENICULAR NERVE BLOCK  6-         ALL PATIENTS TO HAVE COVID TESTING TO BE DONE 48-72 HOURS PRIOR TO PROCEDURE IF PT HAS NOT RECEIVED BOTH COVID VACCINATIONS OR HAS BEEN VACCINATED WITHIN THE LAST 2 WEEKS.     IF PATIENT HAD BOTH VACCINES AT GREATER THAN  TWO WEEKS PRIOR TO PROCEDURE , PT DOES NOT HAVE TO HAVE COVID TESTING, VACCINATION CARD MUST BE PROVIDED PT MUST HAVE COVID TESTING IN ORDER TO HAVE PROCEDURE.     IF YOUR  PROCEDURE IS ON A Tuesday, GET COVID TESTING ON THE  Friday BEFORE PROCEDURE.    IF YOUR PROCEDURE IS ON Thursday, HAVE COVID TESTING ON THE Monday OR Tuesday PRIOR TO PROCEDURE    COVID TESTING TO BE DONE AT [facility] IN THE LAB DEPARTMENT OR YOUR PRIMARY CARE DOCTOR MAY ORDER IT FOR YOU.    IF YOUR PRIMARY  CARE DOCTOR ORDERS YOUR COVID TESTING YOU MUST BRING YOUR RESULTS WITH YOU TO YOUR PROCEDURE.    Procedure Instructions:    Nothing to eat or drink for 8 hours or after midnight including gum, candy, mints, or tobacco products.  If you are scheduled for 1:30 or later nothing to eat or drink after 5 a.m. the morning of the procedure, including gum, candy, mints, or tobacco products.  Must have a  at least 18 yrs of age to stay present at all times  No Diabetic medications the morning of procedure, check blood sugar the morning of procedure, if it is greater than 200 call the office at [phone]  If you are started on antibiotics or have been prescribed antibiotics, have a fever, or have any other type of infection call to reschedule procedure.  If you take blood pressure medications you can take it at your regular scheduled time with a small sip of WATER!    HOLD ASPIRIN AND ASPIRIN PRODUCTS  (ASPIRIN, BC POWDER ETC. ) FOR 7 DAYS  PRIOR TO PROCEDURE  HOLD NSAIDS( ibuprofen, mobic, meloxicam, advil, diclofenac, naproxen, relafen, celebrex,  methotrexate, aleve etc....)  FOR 3 DAYS   PRIOR TO PROCEDURE

## 2022-06-14 NOTE — PROGRESS NOTES
She Disclaimer: This note has been generated using voice-recognition software. There may be typographical errors that have been missed during proof-reading        Patient ID: Vandana Price is a 56 y.o. female.      Chief Complaint: Knee Pain and Abdominal Pain        56-year-old female returns for re-evaluation of bilateral knee pain.  She has deferred surgical intervention due to lack of accumulation of vacation days.  She returns to discuss possible genicular nerve blocks for chronic pain and osteoarthritis.  She denies any changes since last office visit.  Norco is providing partial relief.                  Knee Pain     Abdominal Pain  Associated symptoms include arthralgias (BIlateral Knees).           Pain Assessment  Pain Assessment: 0-10  Pain Score:   7  Pain Location: Knee  Pain Orientation: Right, Left, Other (Comment) (abdomen pain, low back pain)  Pain Descriptors: Aching, Nagging, Burning, Stabbing, Throbbing, Constant, Sharp  Pain Frequency: Continuous  Pain Onset: Awakened from sleep  Clinical Progression: Gradually worsening  Aggravating Factors: Bending, Kneeling, Squatting, Standing, Walking, Stairs, Other (Comment) (prolonged sitting)  Pain Intervention(s): Medication (See eMAR), Cold applied (aspercreaam patches)      A's of Opioid Risk Assessment  Activity:Patient can perform ADL.   Analgesia:Patients pain is  controlled by current medication.   Adverse Effects: Patient denies constipation or sedation.  Aberrant Behavior:  reviewed with no aberrant drug seeking/taking behavior.      Patient denies any suicidal or homicidal ideations    Physical Therapy/Home Exercise: yes      X-Ray Calcaneus 2 View Right  Narrative: EXAMINATION:  XR CALCANEUS 2 VIEW RIGHT    CLINICAL HISTORY:  Bilateral primary osteoarthritis of knee    COMPARISON:  None available    FINDINGS:  No evidence of fracture seen.  The alignment of the joints appears normal.  No degenerative change is present.  No soft tissue  abnormality is seen.  Impression: No evidence of abnormality demonstrated    Electronically signed by: Naseem Lopez  Date:    2022  Time:    15:45      Review of Systems   Constitutional: Negative.    HENT: Negative.    Eyes: Negative.    Respiratory: Negative.    Cardiovascular: Negative.    Gastrointestinal: Positive for abdominal pain.   Endocrine: Negative.    Genitourinary: Negative.    Musculoskeletal: Positive for arthralgias (BIlateral Knees).   Integumentary:  Negative.   Neurological: Negative.    Hematological: Negative.    Psychiatric/Behavioral: Negative.              Past Medical History:   Diagnosis Date    Allergy     Bilateral primary osteoarthritis of knee     Chronic pain of both knees 10/14/2021    Eosinophilia     Gangrene of gallbladder in cholecystitis 2021    Hematochezia     Hyperlipidemia     Hypertension      Past Surgical History:   Procedure Laterality Date    Bilateral IA knee injection Bilateral 2021    D Shows    bladder tact       SECTION      CHOLECYSTECTOMY      FRACTURE SURGERY      jaw 1972    knee scope Bilateral     KNEE SURGERY      LAPAROSCOPIC CHOLECYSTECTOMY N/A 3/31/2021    Procedure: LAPAROSCOPIC CHOLECYSTECTOMY CONVERTED TO OPEN;  Surgeon: Darnell Mcgraw MD;  Location: [facility];  Service: General;  Laterality: N/A;  CONVERTED TO OPEN    TUBAL LIGATION       Social History     Socioeconomic History    Marital status: Single   Occupational History    Occupation:  at [employer]   Tobacco Use    Smoking status: Former Smoker     Packs/day: 1.00     Years: 12.00     Pack years: 12.00     Types: Cigarettes    Smokeless tobacco: Never Used   Substance and Sexual Activity    Alcohol use: Never    Drug use: Never    Sexual activity: Not Currently     Family History   Problem Relation Age of Onset    Hypertension Father     Heart disease Father     Alcohol abuse Mother     Migraines Daughter     Lung cancer Maternal Aunt       Review of patient's allergies indicates:  No Known Allergies  has a current medication list which includes the following prescription(s): aspirin, famotidine, gabapentin, hydrocodone-acetaminophen, hydrocodone-acetaminophen, meloxicam, and mesalamine.      Objective:  Vitals:    06/13/22 1348   BP: (!) 180/90   Pulse: 100   Resp: 20        Physical Exam  Vitals and nursing note reviewed.   Constitutional:       General: She is not in acute distress.     Appearance: Normal appearance. She is obese. She is not ill-appearing, toxic-appearing or diaphoretic.   HENT:      Head: Normocephalic and atraumatic.      Nose: Nose normal.      Mouth/Throat:      Mouth: Mucous membranes are moist.   Eyes:      Extraocular Movements: Extraocular movements intact.      Pupils: Pupils are equal, round, and reactive to light.   Cardiovascular:      Rate and Rhythm: Normal rate and regular rhythm.      Heart sounds: Normal heart sounds.   Pulmonary:      Effort: Pulmonary effort is normal. No respiratory distress.      Breath sounds: Normal breath sounds. No stridor. No wheezing or rhonchi.   Abdominal:      General: Bowel sounds are normal.      Palpations: Abdomen is soft.   Musculoskeletal:         General: No swelling or deformity.      Cervical back: Normal and normal range of motion. No spasms or tenderness. No pain with movement. Normal range of motion.      Thoracic back: Normal.      Lumbar back: No spasms, tenderness or bony tenderness. Normal range of motion. Negative right straight leg raise test and negative left straight leg raise test. No scoliosis.      Right knee: Crepitus present. Decreased range of motion. Tenderness present over the medial joint line.      Left knee: Crepitus present. Decreased range of motion.      Right lower leg: No edema.      Left lower leg: No edema.   Skin:     General: Skin is warm.   Neurological:      General: No focal deficit present.      Mental Status: She is alert and oriented to  person, place, and time. Mental status is at baseline.      Cranial Nerves: Cranial nerves are intact. No cranial nerve deficit.      Sensory: Sensation is intact. No sensory deficit.      Motor: No weakness.      Coordination: Coordination normal.      Gait: Gait normal.      Deep Tendon Reflexes: Reflexes are normal and symmetric.   Psychiatric:         Mood and Affect: Mood normal.         Behavior: Behavior normal.           Assessment:      1. Encounter for long-term (current) use of other medications    2. Bilateral primary osteoarthritis of knee    3. Chronic pain of both knees          Plan:  1. reviewed  2. Schedule bilateral genicular nerve blocks for bilateral knee pain and osteoarthritis  3. Continue with Norco as prescribed for chronic pain  4. Consider  total knee replacement in the future for chronic knee pain and osteoarthritis         report:  Reviewed and consistent with medication use as prescribed.      The total time spent for evaluation and management on 06/14/2022 including reviewing separately obtained history, performing a medically appropriate exam and evaluation, documenting clinical information in the health record, independently interpreting results and communicating them to the patient/family/caregiver, and ordering medications/tests/procedures was between 15-29 minutes.    The above plan and management options were discussed at length with patient. Patient is in agreement with the above and verbalized understanding. It will be communicated with the referring physician via electronic record, fax, or mail.

## 2022-06-17 RX ORDER — MESALAMINE 1.2 G/1
4.8 TABLET, DELAYED RELEASE ORAL
Qty: 360 TABLET | Refills: 3 | Status: SHIPPED | OUTPATIENT
Start: 2022-06-17 | End: 2022-09-26

## 2022-06-21 DIAGNOSIS — Z79.899 OTHER LONG TERM (CURRENT) DRUG THERAPY: ICD-10-CM

## 2022-06-21 DIAGNOSIS — M19.90 ARTHRITIS: Primary | ICD-10-CM

## 2022-06-21 RX ORDER — GABAPENTIN 300 MG/1
300 CAPSULE ORAL NIGHTLY
Qty: 90 CAPSULE | Refills: 1 | Status: SHIPPED | OUTPATIENT
Start: 2022-06-21 | End: 2023-01-26

## 2022-06-21 RX ORDER — MELOXICAM 15 MG/1
15 TABLET ORAL DAILY PRN
Qty: 90 TABLET | Refills: 1 | Status: SHIPPED | OUTPATIENT
Start: 2022-06-21 | End: 2022-11-02

## 2022-06-22 DIAGNOSIS — Z11.59 SCREENING FOR VIRAL DISEASE: Primary | ICD-10-CM

## 2022-06-28 ENCOUNTER — HOSPITAL ENCOUNTER (OUTPATIENT)
Facility: HOSPITAL | Age: 57
Discharge: HOME | End: 2022-06-28
Attending: PAIN MEDICINE | Admitting: PAIN MEDICINE
Payer: COMMERCIAL

## 2022-06-28 ENCOUNTER — ANESTHESIA (OUTPATIENT)
Dept: PAIN MEDICINE | Facility: HOSPITAL | Age: 57
End: 2022-06-28
Payer: COMMERCIAL

## 2022-06-28 ENCOUNTER — ANESTHESIA EVENT (OUTPATIENT)
Dept: PAIN MEDICINE | Facility: HOSPITAL | Age: 57
End: 2022-06-28
Payer: COMMERCIAL

## 2022-06-28 VITALS
HEIGHT: 66 IN | BODY MASS INDEX: 42.11 KG/M2 | RESPIRATION RATE: 12 BRPM | DIASTOLIC BLOOD PRESSURE: 85 MMHG | SYSTOLIC BLOOD PRESSURE: 141 MMHG | WEIGHT: 262 LBS | HEART RATE: 78 BPM | OXYGEN SATURATION: 99 % | TEMPERATURE: 97 F

## 2022-06-28 DIAGNOSIS — M25.562 KNEE PAIN, BILATERAL: ICD-10-CM

## 2022-06-28 DIAGNOSIS — M17.0 BILATERAL PRIMARY OSTEOARTHRITIS OF KNEE: Primary | Chronic | ICD-10-CM

## 2022-06-28 DIAGNOSIS — M25.561 KNEE PAIN, BILATERAL: ICD-10-CM

## 2022-06-28 PROCEDURE — 25000003 PHARM REV CODE 250: Performed by: PAIN MEDICINE

## 2022-06-28 PROCEDURE — D9220A PRA ANESTHESIA: ICD-10-PCS | Mod: ,,, | Performed by: NURSE ANESTHETIST, CERTIFIED REGISTERED

## 2022-06-28 PROCEDURE — 63600175 PHARM REV CODE 636 W HCPCS: Performed by: PAIN MEDICINE

## 2022-06-28 PROCEDURE — 64454 NJX AA&/STRD GNCLR NRV BRNCH: CPT | Mod: 50,,, | Performed by: PAIN MEDICINE

## 2022-06-28 PROCEDURE — 25000003 PHARM REV CODE 250: Performed by: NURSE ANESTHETIST, CERTIFIED REGISTERED

## 2022-06-28 PROCEDURE — 37000009 HC ANESTHESIA EA ADD 15 MINS: Performed by: PAIN MEDICINE

## 2022-06-28 PROCEDURE — 64454 PR NERVE BLOCK INJ, ANES/STEROID, GENICULAR NERVE, W/IMG: ICD-10-PCS | Mod: 50,,, | Performed by: PAIN MEDICINE

## 2022-06-28 PROCEDURE — 37000008 HC ANESTHESIA 1ST 15 MINUTES: Performed by: PAIN MEDICINE

## 2022-06-28 PROCEDURE — 64454 NJX AA&/STRD GNCLR NRV BRNCH: CPT | Mod: 50 | Performed by: PAIN MEDICINE

## 2022-06-28 PROCEDURE — D9220A PRA ANESTHESIA: Mod: ,,, | Performed by: NURSE ANESTHETIST, CERTIFIED REGISTERED

## 2022-06-28 PROCEDURE — 63600175 PHARM REV CODE 636 W HCPCS: Performed by: NURSE ANESTHETIST, CERTIFIED REGISTERED

## 2022-06-28 PROCEDURE — 27201423 OPTIME MED/SURG SUP & DEVICES STERILE SUPPLY: Performed by: PAIN MEDICINE

## 2022-06-28 RX ORDER — PROPOFOL 10 MG/ML
VIAL (ML) INTRAVENOUS
Status: DISCONTINUED | OUTPATIENT
Start: 2022-06-28 | End: 2022-06-28

## 2022-06-28 RX ORDER — LIDOCAINE HYDROCHLORIDE 20 MG/ML
INJECTION, SOLUTION EPIDURAL; INFILTRATION; INTRACAUDAL; PERINEURAL
Status: DISCONTINUED | OUTPATIENT
Start: 2022-06-28 | End: 2022-06-28

## 2022-06-28 RX ORDER — TRIAMCINOLONE ACETONIDE 40 MG/ML
INJECTION, SUSPENSION INTRA-ARTICULAR; INTRAMUSCULAR
Status: DISCONTINUED | OUTPATIENT
Start: 2022-06-28 | End: 2022-06-28 | Stop reason: HOSPADM

## 2022-06-28 RX ORDER — BUPIVACAINE HYDROCHLORIDE 2.5 MG/ML
INJECTION, SOLUTION EPIDURAL; INFILTRATION; INTRACAUDAL
Status: DISCONTINUED | OUTPATIENT
Start: 2022-06-28 | End: 2022-06-28 | Stop reason: HOSPADM

## 2022-06-28 RX ORDER — BUPIVACAINE HYDROCHLORIDE 2.5 MG/ML
INJECTION, SOLUTION INFILTRATION; PERINEURAL
Status: DISCONTINUED | OUTPATIENT
Start: 2022-06-28 | End: 2022-06-28 | Stop reason: HOSPADM

## 2022-06-28 RX ORDER — SODIUM CHLORIDE 9 MG/ML
INJECTION, SOLUTION INTRAVENOUS CONTINUOUS
Status: DISCONTINUED | OUTPATIENT
Start: 2022-06-28 | End: 2022-06-28 | Stop reason: HOSPADM

## 2022-06-28 RX ORDER — FENTANYL CITRATE 50 UG/ML
INJECTION, SOLUTION INTRAMUSCULAR; INTRAVENOUS
Status: DISCONTINUED | OUTPATIENT
Start: 2022-06-28 | End: 2022-06-28

## 2022-06-28 RX ADMIN — SODIUM CHLORIDE: 9 INJECTION, SOLUTION INTRAVENOUS at 03:06

## 2022-06-28 RX ADMIN — PROPOFOL 50 MG: 10 INJECTION, EMULSION INTRAVENOUS at 03:06

## 2022-06-28 RX ADMIN — LIDOCAINE HYDROCHLORIDE 100 MG: 20 INJECTION, SOLUTION INTRAVENOUS at 03:06

## 2022-06-28 RX ADMIN — FENTANYL CITRATE 100 MCG: 50 INJECTION INTRAMUSCULAR; INTRAVENOUS at 03:06

## 2022-06-28 NOTE — BRIEF OP NOTE
Discharge Note  Short Stay    Admit Date: 6/28/2022    Discharge Date: 6/28/2022    Attending Physician: Amanda Goetz     Discharge Provider: Amanda Goetz    Diagnoses:Bilateral knee pain and osteoarthritis    Discharged Condition: Good    Final Diagnoses: Bilateral primary osteoarthritis of knee [M17.0]    Disposition: Home or Self Care    Hospital Course: No complications, uneventful    Outcome of Hospitalization, Treatment, Procedure, or Surgery:  Patient was admitted for outpatient interventional pain management procedure. The patient tolerated the procedure well with no complications.    Follow up/Patient Instructions:  Follow up as scheduled in Pain Management office in 3-4 weeks.  Patient has received instructions and follow up date and time.    Medications:  Continue previous medications

## 2022-06-28 NOTE — TRANSFER OF CARE
"Anesthesia Transfer of Care Note    Patient: Vandana Price    Procedure(s) Performed: Procedure(s) (LRB):  GNB   BILATERAL (Bilateral)    Patient location: PACU    Anesthesia Type: general    Transport from OR: Transported from OR on room air with adequate spontaneous ventilation    Post pain: adequate analgesia    Post assessment: no apparent anesthetic complications    Post vital signs: stable    Level of consciousness: responds to stimulation    Nausea/Vomiting: no nausea/vomiting    Complications: none    Transfer of care protocol was followed      Last vitals:   Visit Vitals  /61 (BP Location: Right arm, Patient Position: Lying)   Pulse 93   Temp 36.1 °C (97 °F) (Oral)   Resp (!) 22   Ht 5' 6" (1.676 m)   Wt 118.8 kg (262 lb)   LMP 06/08/2020   SpO2 96%   BMI 42.29 kg/m²     "

## 2022-06-28 NOTE — DISCHARGE INSTRUCTIONS
REFER TO WRITTEN DOCUMENT AND RECOVERY INFORMATION.        INFORMED PATIENT IF UNABLE TO VOID IN 8 HOURS, GO TO ER. NOTIFY MD OF REDNESS OR DRAINAGE FROM INJECTION SITE OR FEVER OVER 3-4 DAY. REST AND DRINK PLENTY OF FLUIDS FOR THE REMAINDER OF THE DAY. NO LIFTING OVER 5 LBS FOR THE REMAINDER OF THE DAY. CONTINUE REGULAR MEDICATIONS AS PRESCRIBED. MAY TAKE PAIN MEDICATION AS PRESCRIBED.

## 2022-06-28 NOTE — ANESTHESIA PREPROCEDURE EVALUATION
06/28/2022  Vandana Price is a 56 y.o., female.      Pre-op Assessment    I have reviewed the Patient Summary Reports.     I have reviewed the Nursing Notes. I have reviewed the NPO Status.   I have reviewed the Medications.     Review of Systems  Anesthesia Hx:  No problems with previous Anesthesia    Social:  Former Smoker    Cardiovascular:   Hypertension    Musculoskeletal:   Arthritis     Endocrine:  Morbid Obesity / BMI > 40         Anesthesia Plan  Type of Anesthesia, risks & benefits discussed:    Anesthesia Type: Gen Natural Airway  Intra-op Monitoring Plan: Standard ASA Monitors  Post Op Pain Control Plan: IV/PO Opioids PRN  Induction:  IV  Informed Consent: Informed consent signed with the Patient and all parties understand the risks and agree with anesthesia plan.  All questions answered. Patient consented to blood products? Yes  ASA Score: 3  Day of Surgery Review of History & Physical: H&P Update referred to the surgeon/provider.I have interviewed and examined the patient. I have reviewed the patient's H&P dated: There are no significant changes.     Ready For Surgery From Anesthesia Perspective.     .

## 2022-06-28 NOTE — DISCHARGE SUMMARY
[facility] - Pain Management  Discharge Note  Short Stay    Procedure(s) (LRB):  GNB   BILATERAL (Bilateral)    OUTCOME: Patient tolerated treatment/procedure well without complication and is now ready for discharge.    DISPOSITION: Home or Self Care    FINAL DIAGNOSIS:  Bilateral knee pain and osteoarthritis    FOLLOWUP: In clinic    DISCHARGE INSTRUCTIONS:  See nurse's notes     TIME SPENT ON DISCHARGE: 5 minutes

## 2022-06-28 NOTE — OP NOTE
"Procedure Note    Pre-operative Diagnosis: Chronic Knee Pain  Post-operative Diagnosis: Chronic Knee Pain  Procedure Date: 06/28/2022  Procedure:  (1) Bilateral Peripheral Nerve Block at Three Locations    a) Superior medial genicular nerve   b) Inferior medial genicular nerve   c) Superior lateral genicular nerve  (2) Procedural Fluoroscopy    Indications: This is a diagnostic and therapeutic intervention to alleviate pain and suffering, and reduce functional impairment associated with chronic knee pain.      The patients history and physical exam were reviewed. The risks, benefits and alternatives to the procedure were discussed, and all questions were answered to the patients satisfaction. The patient agreed to proceed, and written informed consent was verified and signed.    Procedure in Detail: The patient was brought into the procedure room and placed in the supine position on the fluoroscopy table. The area of the knee was prepped with Chloraprep and draped in a sterile manner. In an A-P fluoroscopic view, the target locations were identified and the overlying skin and subcutaneous tissues anesthetized with 1% lidocaine using a 25G, 1.5" needle.  A 22G 3.5" spinal needle was introduced through the anesthetized skin and directed down to the junction of the femoral diaphysis and the medial femoral epicondyle; then another needle to the femoral diaphysis and the lateral femoral epicondyle; and the 3rd needle to the tibial diaphysis and medial tibial condyle.    Needle placement was optimized using lateral fluoroscopic views.  Stylettes were removed.  Following heme-negative aspiration, 3cc from a 20ml mixture of 0.25 % marcaine and 40mg kenalog was injected at each site with minimal resistance.  The needles were removed and a bandage applied to each puncture site.    Disposition: The patient tolerated the procedure well, and there were no apparent complications. Vital signs remained stable throughout the " procedure. The patient was taken to the recovery area where written discharge instructions for the procedure were given.     Follow-up: Patient is to report any changes in pain level of the knee to our office at which time a determination will be made regarding RFA of the genicular nerves.

## 2022-06-28 NOTE — PLAN OF CARE
REFER TO WRITTEN DOCUMENT AND RECOVERY INFORMATION.    D/CD PATIENT VIAA WHEELCHAIR AT 1605.    INFORMED PATIENT IF UNABLE TO VOID IN 8 HOURS, GO TO ER. NOTIFY MD OF REDNESS OR DRAINAGE FROM INJECTION SITE OR FEVER OVER 3-4 DAY. REST AND DRINK PLENTY OF FLUIDS FOR THE REMAINDER OF THE DAY. NO LIFTING OVER 5 LBS FOR THE REMAINDER OF THE DAY. CONTINUE REGULAR MEDICATIONS AS PRESCRIBED. MAY TAKE PAIN MEDICATION AS PRESCRIBED.     PAIN IMPROVED  100%

## 2022-06-28 NOTE — ANESTHESIA POSTPROCEDURE EVALUATION
Anesthesia Post Evaluation    Patient: Tia Albert    Procedure(s) Performed: Procedure(s) (LRB):  GNB   BILATERAL (Bilateral)    Final Anesthesia Type: general      Patient location during evaluation: PACU  Patient participation: Yes- Able to Participate  Level of consciousness: awake and alert  Post-procedure vital signs: reviewed and stable  Pain management: adequate  Airway patency: patent    PONV status at discharge: No PONV  Anesthetic complications: no      Cardiovascular status: blood pressure returned to baseline and hemodynamically stable  Respiratory status: spontaneous ventilation and unassisted  Hydration status: euvolemic  Follow-up not needed.          Vitals Value Taken Time   /61 06/28/22 1517   Temp 97f 06/28/22 1517   Pulse 95 06/28/22 1517   Resp 22 06/28/22 1517   SpO2 97 06/28/22 1517         No case tracking events are documented in the log.      Pain/Dinorah Score: No data recorded

## 2022-06-28 NOTE — INTERVAL H&P NOTE
The patient has been examined and the H&P has been reviewed:    I concur with the findings and no changes have occurred since H&P was written.    Surgery risks, benefits and alternative options discussed and understood by patient/family.              Office Visit    6/13/2022  [facility] - Pain Treatment   Amanda Goetz MD      Pain Medicine  Encounter for long-term (current) use of other medications +2 more      Dx  Knee Pain  Abdominal Pain      Reason for Visit       Progress Notes  Amanda Goetz MD (Physician)   Pain Medicine  Expand AllCollapse All    She Disclaimer: This note has been generated using voice-recognition software. There may be typographical errors that have been missed during proof-reading         Patient ID: Vandana Price is a 56 y.o. female.        Chief Complaint: Knee Pain and Abdominal Pain           56-year-old female returns for re-evaluation of bilateral knee pain.  She has deferred surgical intervention due to lack of accumulation of vacation days.  She returns to discuss possible genicular nerve blocks for chronic pain and osteoarthritis.  She denies any changes since last office visit.  Norco is providing partial relief.                          Knee Pain      Abdominal Pain  Associated symptoms include arthralgias (BIlateral Knees).              Pain Assessment  Pain Assessment: 0-10  Pain Score:   7  Pain Location: Knee  Pain Orientation: Right, Left, Other (Comment) (abdomen pain, low back pain)  Pain Descriptors: Aching, Nagging, Burning, Stabbing, Throbbing, Constant, Sharp  Pain Frequency: Continuous  Pain Onset: Awakened from sleep  Clinical Progression: Gradually worsening  Aggravating Factors: Bending, Kneeling, Squatting, Standing, Walking, Stairs, Other (Comment) (prolonged sitting)  Pain Intervention(s): Medication (See eMAR), Cold applied (aspercreaam patches)       A's of Opioid Risk Assessment  Activity:Patient can perform ADL.   Analgesia:Patients pain is  controlled  by current medication.   Adverse Effects: Patient denies constipation or sedation.  Aberrant Behavior:  reviewed with no aberrant drug seeking/taking behavior.        Patient denies any suicidal or homicidal ideations     Physical Therapy/Home Exercise: yes       X-Ray Calcaneus 2 View Right  Narrative: EXAMINATION:  XR CALCANEUS 2 VIEW RIGHT     CLINICAL HISTORY:  Bilateral primary osteoarthritis of knee     COMPARISON:  None available     FINDINGS:  No evidence of fracture seen.  The alignment of the joints appears normal.  No degenerative change is present.  No soft tissue abnormality is seen.  Impression: No evidence of abnormality demonstrated     Electronically signed by:         Naseem Lopez  Date:                                        2022  Time:                                       15:45        Review of Systems   Constitutional: Negative.    HENT: Negative.    Eyes: Negative.    Respiratory: Negative.    Cardiovascular: Negative.    Gastrointestinal: Positive for abdominal pain.   Endocrine: Negative.    Genitourinary: Negative.    Musculoskeletal: Positive for arthralgias (BIlateral Knees).   Integumentary:  Negative.   Neurological: Negative.    Hematological: Negative.    Psychiatric/Behavioral: Negative.                      Past Medical History:   Diagnosis Date    Allergy      Bilateral primary osteoarthritis of knee      Chronic pain of both knees 10/14/2021    Eosinophilia      Gangrene of gallbladder in cholecystitis 2021    Hematochezia      Hyperlipidemia      Hypertension              Past Surgical History:   Procedure Laterality Date    Bilateral IA knee injection Bilateral 2021     D Shows    bladder tact         SECTION        CHOLECYSTECTOMY        FRACTURE SURGERY         jaw 1972    knee scope Bilateral      KNEE SURGERY        LAPAROSCOPIC CHOLECYSTECTOMY N/A 3/31/2021     Procedure: LAPAROSCOPIC CHOLECYSTECTOMY CONVERTED TO OPEN;  Surgeon:  Darnell Mcgraw MD;  Location: [facility] OR;  Service: General;  Laterality: N/A;  CONVERTED TO OPEN    TUBAL LIGATION   2004      Social History               Socioeconomic History    Marital status: Single   Occupational History    Occupation:  at [employer]   Tobacco Use    Smoking status: Former Smoker       Packs/day: 1.00       Years: 12.00       Pack years: 12.00       Types: Cigarettes    Smokeless tobacco: Never Used   Substance and Sexual Activity    Alcohol use: Never    Drug use: Never    Sexual activity: Not Currently               Family History   Problem Relation Age of Onset    Hypertension Father      Heart disease Father      Alcohol abuse Mother      Migraines Daughter      Lung cancer Maternal Aunt        Review of patient's allergies indicates:  No Known Allergies  has a current medication list which includes the following prescription(s): aspirin, famotidine, gabapentin, hydrocodone-acetaminophen, hydrocodone-acetaminophen, meloxicam, and mesalamine.        Objective:      Vitals:     06/13/22 1348   BP: (!) 180/90   Pulse: 100   Resp: 20      Physical Exam  Vitals and nursing note reviewed.   Constitutional:       General: She is not in acute distress.     Appearance: Normal appearance. She is obese. She is not ill-appearing, toxic-appearing or diaphoretic.   HENT:      Head: Normocephalic and atraumatic.      Nose: Nose normal.      Mouth/Throat:      Mouth: Mucous membranes are moist.   Eyes:      Extraocular Movements: Extraocular movements intact.      Pupils: Pupils are equal, round, and reactive to light.   Cardiovascular:      Rate and Rhythm: Normal rate and regular rhythm.      Heart sounds: Normal heart sounds.   Pulmonary:      Effort: Pulmonary effort is normal. No respiratory distress.      Breath sounds: Normal breath sounds. No stridor. No wheezing or rhonchi.   Abdominal:      General: Bowel sounds are normal.      Palpations: Abdomen is soft.   Musculoskeletal:          General: No swelling or deformity.      Cervical back: Normal and normal range of motion. No spasms or tenderness. No pain with movement. Normal range of motion.      Thoracic back: Normal.      Lumbar back: No spasms, tenderness or bony tenderness. Normal range of motion. Negative right straight leg raise test and negative left straight leg raise test. No scoliosis.      Right knee: Crepitus present. Decreased range of motion. Tenderness present over the medial joint line.      Left knee: Crepitus present. Decreased range of motion.      Right lower leg: No edema.      Left lower leg: No edema.   Skin:     General: Skin is warm.   Neurological:      General: No focal deficit present.      Mental Status: She is alert and oriented to person, place, and time. Mental status is at baseline.      Cranial Nerves: Cranial nerves are intact. No cranial nerve deficit.      Sensory: Sensation is intact. No sensory deficit.      Motor: No weakness.      Coordination: Coordination normal.      Gait: Gait normal.      Deep Tendon Reflexes: Reflexes are normal and symmetric.   Psychiatric:         Mood and Affect: Mood normal.         Behavior: Behavior normal.             Assessment:      1. Encounter for long-term (current) use of other medications    2. Bilateral primary osteoarthritis of knee    3. Chronic pain of both knees           Plan:  1. reviewed  2. Schedule bilateral genicular nerve blocks for bilateral knee pain and osteoarthritis  3. Continue with Norco as prescribed for chronic pain  4. Consider  total knee replacement in the future for chronic knee pain and osteoarthritis            report:  Reviewed and consistent with medication use as prescribed.        The total time spent for evaluation and management on 06/14/2022 including reviewing separately obtained history, performing a medically appropriate exam and evaluation, documenting clinical information in the health record, independently interpreting  results and communicating them to the patient/family/caregiver, and ordering medications/tests/procedures was between 15-29 minutes.     The above plan and management options were discussed at length with patient. Patient is in agreement with the above and verbalized understanding. It will be communicated with the referring physician via electronic record, fax, or mail.               Progress Notes  Amanda Goetz MD (Physician)   Pain Medicine                     Other Notes     All notes        Instructions    BILATERAL GENICULAR NERVE BLOCK  6-            ALL PATIENTS TO HAVE COVID TESTING TO BE DONE 48-72 HOURS PRIOR TO PROCEDURE IF PT HAS NOT RECEIVED BOTH COVID VACCINATIONS OR HAS BEEN VACCINATED WITHIN THE LAST 2 WEEKS.      IF PATIENT HAD BOTH VACCINES AT GREATER THAN  TWO WEEKS PRIOR TO PROCEDURE , PT DOES NOT HAVE TO HAVE COVID TESTING, VACCINATION CARD MUST BE PROVIDED PT MUST HAVE COVID TESTING IN ORDER TO HAVE PROCEDURE.      IF YOUR  PROCEDURE IS ON A Tuesday, GET COVID TESTING ON THE  Friday BEFORE PROCEDURE.    IF YOUR PROCEDURE IS ON Thursday, HAVE COVID TESTING ON THE Monday OR Tuesday PRIOR TO PROCEDURE     COVID TESTING TO BE DONE AT [facility] IN THE LAB DEPARTMENT OR YOUR PRIMARY CARE DOCTOR MAY ORDER IT FOR YOU.     IF YOUR PRIMARY  CARE DOCTOR ORDERS YOUR COVID TESTING YOU MUST BRING YOUR RESULTS WITH YOU TO YOUR PROCEDURE.     Procedure Instructions:     Nothing to eat or drink for 8 hours or after midnight including gum, candy, mints, or tobacco products.  If you are scheduled for 1:30 or later nothing to eat or drink after 5 a.m. the morning of the procedure, including gum, candy, mints, or tobacco products.  Must have a  at least 18 yrs of age to stay present at all times  No Diabetic medications the morning of procedure, check blood sugar the morning of procedure, if it is greater than 200 call the office at [phone]  If you are started on antibiotics or  "have been prescribed antibiotics, have a fever, or have any other type of infection call to reschedule procedure.  If you take blood pressure medications you can take it at your regular scheduled time with a small sip of WATER!     HOLD ASPIRIN AND ASPIRIN PRODUCTS  (ASPIRIN, BC POWDER ETC. ) FOR 7 DAYS  PRIOR TO PROCEDURE  HOLD NSAIDS( ibuprofen, mobic, meloxicam, advil, diclofenac, naproxen, relafen, celebrex,  methotrexate, aleve etc....)  FOR 3 DAYS   PRIOR TO PROCEDURE                      After Visit Summary (Printed 6/13/2022)      Additional Documentation    Vitals:  /90 Important       Pulse 100     Resp 20     Ht 5' 6" (1.676 m)     Wt 116.1 kg (256 lb)     LMP 06/08/2020      BMI 41.32 kg/m²     BSA 2.33 m²     Pain Sc   7            More Vitals     Flowsheets:  Anthropometrics,     Assessment        SmartForms:   [facility] AMB - FALL RISK        Encounter Info:  Billing Info,     History,     Allergies,     Detailed Report          Communications         Letter        Not recorded      All Charges for This Encounter    Code Description Service Date Service Provider Modifiers Qty   81992 [place] OFFICE/OUTPT VISIT, EST, LEVL II, 10-19 MIN 6/13/2022 Amanda Goetz MD S$PBB 1   [id number] [facility] OFFICE/OUTPT VISIT, EST LEVL V, 40-54 MIN 6/13/2022 Amanda Goetz MD PBBFAC 1   09172 [place] DRUG TEST, PRESUMP,ANY NUMBER OF CLASS, DIRECT OPTICAL OBS 6/13/2022 Amanda Goetz MD PBBFAC 1   3080F W[place] MOST RECENT DIASTOLIC BLOOD PRESSURE >= 90 MM HG 6/13/2022 Amanda Goetz MD  1   3077F [place] MOST RECENT SYSTOLIC BLOOD PRESSURE >= 140 MM HG 6/13/2022 Amanda Goetz MD  1   3008F [place] BODY MASS INDEX (BMI) DOCUMENTED 6/13/2022 Amanda Goetz MD  1   1159F [place] MEDICATION LIST DOCUMENTED IN MEDICAL RECORD 6/13/2022 Amanda Goetz MD  1     Level of Service    Level of Service   [place] OFFICE/OUTPT VISIT, EST, LEVL II, 10-19 MIN [99397]   Log History   LOS History     BestPractice Advisories    Click to view BestPractice Advisory " history     AVS Reports    Date/Time Report Action User   6/13/2022  2:53 PM After Visit Summary Printed Abimbola Burch RN     Encounter-Level Documents on 06/13/2022:    After Visit Summary - Document on 6/13/2022 2:53 PM by Abimbola Burch RN: After Visit Summary      Orders Placed         POCT Urine Drug Screen Presump         Case Request Operating Room: GNB        Medication Changes         None       Medication List     Visit Diagnoses         Encounter for long-term (current) use of other medications         Bilateral primary osteoarthritis of knee         Chronic pain of both knees       Problem List

## 2022-07-12 ENCOUNTER — PATIENT MESSAGE (OUTPATIENT)
Dept: GASTROENTEROLOGY | Facility: CLINIC | Age: 57
End: 2022-07-12
Payer: COMMERCIAL

## 2022-07-12 DIAGNOSIS — K52.9 INFLAMMATORY BOWEL DISEASE: Primary | ICD-10-CM

## 2022-07-13 ENCOUNTER — OFFICE VISIT (OUTPATIENT)
Dept: PAIN MEDICINE | Facility: CLINIC | Age: 57
End: 2022-07-13
Payer: COMMERCIAL

## 2022-07-13 VITALS
SYSTOLIC BLOOD PRESSURE: 146 MMHG | WEIGHT: 264.38 LBS | HEART RATE: 82 BPM | HEIGHT: 67 IN | BODY MASS INDEX: 41.49 KG/M2 | DIASTOLIC BLOOD PRESSURE: 91 MMHG

## 2022-07-13 DIAGNOSIS — G89.29 CHRONIC PAIN OF BOTH KNEES: Chronic | ICD-10-CM

## 2022-07-13 DIAGNOSIS — M17.0 BILATERAL PRIMARY OSTEOARTHRITIS OF KNEE: Primary | Chronic | ICD-10-CM

## 2022-07-13 DIAGNOSIS — M25.562 CHRONIC PAIN OF BOTH KNEES: Chronic | ICD-10-CM

## 2022-07-13 DIAGNOSIS — M25.561 CHRONIC PAIN OF BOTH KNEES: Chronic | ICD-10-CM

## 2022-07-13 PROCEDURE — 1159F PR MEDICATION LIST DOCUMENTED IN MEDICAL RECORD: ICD-10-PCS | Mod: ,,, | Performed by: PAIN MEDICINE

## 2022-07-13 PROCEDURE — 3008F PR BODY MASS INDEX (BMI) DOCUMENTED: ICD-10-PCS | Mod: ,,, | Performed by: PAIN MEDICINE

## 2022-07-13 PROCEDURE — 3080F PR MOST RECENT DIASTOLIC BLOOD PRESSURE >= 90 MM HG: ICD-10-PCS | Mod: ,,, | Performed by: PAIN MEDICINE

## 2022-07-13 PROCEDURE — 99212 OFFICE O/P EST SF 10 MIN: CPT | Mod: S$PBB,,, | Performed by: PAIN MEDICINE

## 2022-07-13 PROCEDURE — 3077F SYST BP >= 140 MM HG: CPT | Mod: ,,, | Performed by: PAIN MEDICINE

## 2022-07-13 PROCEDURE — 3080F DIAST BP >= 90 MM HG: CPT | Mod: ,,, | Performed by: PAIN MEDICINE

## 2022-07-13 PROCEDURE — 99212 PR OFFICE/OUTPT VISIT, EST, LEVL II, 10-19 MIN: ICD-10-PCS | Mod: S$PBB,,, | Performed by: PAIN MEDICINE

## 2022-07-13 PROCEDURE — 3008F BODY MASS INDEX DOCD: CPT | Mod: ,,, | Performed by: PAIN MEDICINE

## 2022-07-13 PROCEDURE — 3077F PR MOST RECENT SYSTOLIC BLOOD PRESSURE >= 140 MM HG: ICD-10-PCS | Mod: ,,, | Performed by: PAIN MEDICINE

## 2022-07-13 PROCEDURE — 99215 OFFICE O/P EST HI 40 MIN: CPT | Mod: PBBFAC | Performed by: PAIN MEDICINE

## 2022-07-13 PROCEDURE — 1159F MED LIST DOCD IN RCRD: CPT | Mod: ,,, | Performed by: PAIN MEDICINE

## 2022-07-13 NOTE — PATIENT INSTRUCTIONS
BILATERAL GEN RFTC   BOTH SIDES SAME DAY  7-       ALL PATIENTS TO HAVE COVID TESTING TO BE DONE 48-72 HOURS PRIOR TO PROCEDURE IF PT HAS NOT RECEIVED BOTH COVID VACCINATIONS OR HAS BEEN VACCINATED WITHIN THE LAST 2 WEEKS.     IF PATIENT HAD BOTH VACCINES AT GREATER THAN  TWO WEEKS PRIOR TO PROCEDURE , PT DOES NOT HAVE TO HAVE COVID TESTING, VACCINATION CARD MUST BE PROVIDED PT MUST HAVE COVID TESTING IN ORDER TO HAVE PROCEDURE.     IF YOUR  PROCEDURE IS ON A Tuesday, GET COVID TESTING ON THE  Friday BEFORE PROCEDURE.    IF YOUR PROCEDURE IS ON Thursday, HAVE COVID TESTING ON THE Monday OR Tuesday PRIOR TO PROCEDURE    COVID TESTING TO BE DONE AT [facility] IN THE LAB DEPARTMENT OR YOUR PRIMARY CARE DOCTOR MAY ORDER IT FOR YOU.    IF YOUR PRIMARY  CARE DOCTOR ORDERS YOUR COVID TESTING YOU MUST BRING YOUR RESULTS WITH YOU TO YOUR PROCEDURE.    Procedure Instructions:    Nothing to eat or drink for 8 hours or after midnight including gum, candy, mints, or tobacco products.  If you are scheduled for 1:30 or later nothing to eat or drink after 5 a.m. the morning of the procedure, including gum, candy, mints, or tobacco products.  Must have a  at least 18 yrs of age to stay present at all times  No Diabetic medications the morning of procedure, check blood sugar the morning of procedure, if it is greater than 200 call the office at [phone]  If you are started on antibiotics or have been prescribed antibiotics, have a fever, or have any other type of infection call to reschedule procedure.  If you take blood pressure medications you can take it at your regular scheduled time with a small sip of WATER!    HOLD ASPIRIN AND ASPIRIN PRODUCTS  (ASPIRIN, BC POWDER ETC. ) FOR 7 DAYS  PRIOR TO PROCEDURE  HOLD NSAIDS( ibuprofen, mobic, meloxicam, advil, diclofenac, naproxen, relafen, celebrex,  methotrexate, aleve etc....)  FOR 3 DAYS   PRIOR TO PROCEDURE

## 2022-07-13 NOTE — H&P (VIEW-ONLY)
She Disclaimer: This note has been generated using voice-recognition software. There may be typographical errors that have been missed during proof-reading        Patient ID: Vandana Price is a 56 y.o. female.      Chief Complaint: Knee Pain      Patient returns post bilateral genicular nerve block 06/28/2022.  Patient reports 100% pain relief the duration of the  local anesthetic.  The right knee remains totally pain free today. She experiences about 75% pain relief of the left.  Her pain is only aggravated with prolonged walking and  use of her  exercise bike.  She desires to proceed with the radiofrequency of both knees.  She has decreased her opioid intake since the procedure.              Pain Assessment  Pain Score:   7  Pain Location: Knee  Pain Orientation: Left  Pain Descriptors: Aching, Burning, Dull, Stabbing  Pain Frequency: Intermittent  Pain Onset: Awakened from sleep  Clinical Progression: Rapidly improving  Aggravating Factors: Walking  Pain Intervention(s): Other (Comment) (no relief)      A's of Opioid Risk Assessment  Activity:Patient can partially perform ADL.   Analgesia:Patients pain is partially controlled by current medication.   Adverse Effects: Patient denies constipation or sedation.  Aberrant Behavior:  reviewed with no aberrant drug seeking/taking behavior.      Patient denies any suicidal or homicidal ideations    Physical Therapy/Home Exercise: yes      FL Fluoro for Pain Management  See OP Notes for results.     IMPRESSION: See OP Notes for results.     This procedure was auto-finalized by: Virtual Radiologist      Review of Systems   Constitutional: Negative.    HENT: Negative.    Eyes: Negative.    Respiratory: Negative.    Cardiovascular: Negative.    Gastrointestinal: Negative.    Endocrine: Negative.    Genitourinary: Negative.    Musculoskeletal: Positive for arthralgias (Bilateral knees).   Integumentary:  Negative.   Neurological: Negative.    Hematological: Negative.     Psychiatric/Behavioral: Negative.              Past Medical History:   Diagnosis Date    Allergy     Bilateral primary osteoarthritis of knee     Chronic pain of both knees 10/14/2021    Eosinophilia     Gangrene of gallbladder in cholecystitis 2021    Hematochezia     Hyperlipidemia     Hypertension      Past Surgical History:   Procedure Laterality Date    Bilateral IA knee injection Bilateral 2021    D Shows    bladder tact       SECTION      CHOLECYSTECTOMY      FRACTURE SURGERY      jaw 1972    INJECTION OF ANESTHETIC AGENT AROUND NERVE Bilateral 2022    Procedure: GNB   BILATERAL;  Surgeon: Amanda Goetz MD;  [facility];  Service: Pain Management;  Laterality: Bilateral;  PT HAS NOT HAD VAC   STATES WILL BE TESTED AT [facility] [place]    knee scope Bilateral     KNEE SURGERY      LAPAROSCOPIC CHOLECYSTECTOMY N/A 3/31/2021    Procedure: LAPAROSCOPIC CHOLECYSTECTOMY CONVERTED TO OPEN;  Surgeon: Darnell Mcgraw MD;  Location: [facility] OR;  Service: General;  Laterality: N/A;  CONVERTED TO OPEN    TUBAL LIGATION       Social History     Socioeconomic History    Marital status: Single   Occupational History    Occupation:  at [employer]   Tobacco Use    Smoking status: Former Smoker     Packs/day: 1.00     Years: 12.00     Pack years: 12.00     Types: Cigarettes    Smokeless tobacco: Never Used   Substance and Sexual Activity    Alcohol use: Never    Drug use: Never    Sexual activity: Not Currently     Family History   Problem Relation Age of Onset    Hypertension Father     Heart disease Father     Alcohol abuse Mother     Migraines Daughter     Lung cancer Maternal Aunt      Review of patient's allergies indicates:  No Known Allergies  has a current medication list which includes the following prescription(s): aspirin, famotidine, gabapentin, hydrocodone-acetaminophen, meloxicam, and mesalamine.      Objective:  Vitals:    22  0749   BP: (!) 146/91   Pulse: 82        Physical Exam  Vitals and nursing note reviewed.   Constitutional:       General: She is not in acute distress.     Appearance: Normal appearance. She is not ill-appearing, toxic-appearing or diaphoretic.   HENT:      Head: Normocephalic and atraumatic.      Nose: Nose normal.      Mouth/Throat:      Mouth: Mucous membranes are moist.   Eyes:      Extraocular Movements: Extraocular movements intact.      Pupils: Pupils are equal, round, and reactive to light.   Cardiovascular:      Rate and Rhythm: Normal rate and regular rhythm.      Heart sounds: Normal heart sounds.   Pulmonary:      Effort: Pulmonary effort is normal. No respiratory distress.      Breath sounds: Normal breath sounds. No stridor. No wheezing or rhonchi.   Abdominal:      General: Bowel sounds are normal.      Palpations: Abdomen is soft.   Musculoskeletal:         General: No swelling or deformity. Normal range of motion.      Cervical back: Normal and normal range of motion. No spasms or tenderness. No pain with movement. Normal range of motion.      Thoracic back: Normal.      Lumbar back: No spasms, tenderness or bony tenderness. Normal range of motion. Negative right straight leg raise test and negative left straight leg raise test. No scoliosis.      Right knee: Crepitus present. Tenderness present.      Left knee: Crepitus present. Tenderness present.      Right lower leg: No edema.      Left lower leg: No edema.   Skin:     General: Skin is warm.   Neurological:      General: No focal deficit present.      Mental Status: She is alert and oriented to person, place, and time. Mental status is at baseline.      Cranial Nerves: No cranial nerve deficit.      Sensory: Sensation is intact. No sensory deficit.      Motor: No weakness.      Coordination: Coordination normal.      Gait: Gait normal.      Deep Tendon Reflexes: Reflexes are normal and symmetric.   Psychiatric:         Mood and Affect: Mood  normal.         Behavior: Behavior normal.           Assessment:      1. Bilateral primary osteoarthritis of knee    2. Chronic pain of both knees          Plan:  1. reviewed  2.Addiction, Dependency, Tolerance, Opioid abuse-misuse, Death, Diversion Discussed. Overdose reversal drug Naloxone discussed.  3.Refill/Continue medications for pain control and function       Requested Prescriptions      No prescriptions requested or ordered in this encounter     4..  Patient received 100% pain relief following the bilateral genicular nerve block.  I will scheduled  a radiofrequency of the bilateral genicular nerves under fluoroscopy.  Patient has difficulty transportation, absence from work and financial difficulty,  therefore I will proceed directly  with radiofrequency procedure. She received 100% pain relief lasting for the entire duration of the local anesthetic and several weeks following the procedure.    Orders Placed This Encounter   Procedures    Case Request Operating Room: ABLATION OR CHEMODENERVATION, NERVE, GENICULAR     Order Specific Question:   Medical Necessity:     Answer:   Medically Non-Urgent [100]     Order Specific Question:   CPT Code:     Answer:   [place] DESTRUCT, NEUROLYTIC AGENT, GENICULAR NERVE, W/IMG [21946]     Order Specific Question:   Post-Procedure Disposition:     Answer:   PACU [1]     Order Specific Question:   Estimated Length of Stay:     Answer:   0 midnight     Order Specific Question:   Implant Required:     Answer:   No [1001]     Order Specific Question:   Is an on-site pathologist required for this procedure?     Answer:   N/A      5.Monitored Anesthesia Care medical necessity authorization request:    Monitor anesthesia request is medically indicated for the scheduled nerve block procedure due to:  - needle phobia and anxiety, placing  the patient at risk during the provided service.  -patient has a BMI greater than 40  -patient has an ASA class greater than 3 and requires  constant presence of an anesthesiologist during the procedure:  -patient has severe problems with muscles and muscle spasticity that makes it hard to lie still  -patient suffers from chronic pain and is unable to function due to  diminished ADLs    6.The planned medically necessary  surgical procedure is performed in a hospital outpatient department and not in an ambulatory surgical center due to:     -there is no geographically assessable ambulatory surgery center that has the  necessary equipment and fluoroscopy needed for the procedure     -there is no geographically assessable ambulatory surgical center available at which the physician has privileges     -an ASC's  specific  guideline regarding the individuals weight or health conditions that prevent the use of an ASC         report:  Reviewed and consistent with medication use as prescribed.      The total time spent for evaluation and management on 07/14/2022 including reviewing separately obtained history, performing a medically appropriate exam and evaluation, documenting clinical information in the health record, independently interpreting results and communicating them to the patient/family/caregiver, and ordering medications/tests/procedures was between 15-29 minutes.    The above plan and management options were discussed at length with patient. Patient is in agreement with the above and verbalized understanding. It will be communicated with the referring physician via electronic record, fax, or mail.

## 2022-07-13 NOTE — PROGRESS NOTES
She Disclaimer: This note has been generated using voice-recognition software. There may be typographical errors that have been missed during proof-reading        Patient ID: Vandana Price is a 56 y.o. female.      Chief Complaint: Knee Pain      Patient returns post bilateral genicular nerve block 06/28/2022.  Patient reports 100% pain relief the duration of the  local anesthetic.  The right knee remains totally pain free today. She experiences about 75% pain relief of the left.  Her pain is only aggravated with prolonged walking and  use of her  exercise bike.  She desires to proceed with the radiofrequency of both knees.  She has decreased her opioid intake since the procedure.              Pain Assessment  Pain Score:   7  Pain Location: Knee  Pain Orientation: Left  Pain Descriptors: Aching, Burning, Dull, Stabbing  Pain Frequency: Intermittent  Pain Onset: Awakened from sleep  Clinical Progression: Rapidly improving  Aggravating Factors: Walking  Pain Intervention(s): Other (Comment) (no relief)      A's of Opioid Risk Assessment  Activity:Patient can partially perform ADL.   Analgesia:Patients pain is partially controlled by current medication.   Adverse Effects: Patient denies constipation or sedation.  Aberrant Behavior:  reviewed with no aberrant drug seeking/taking behavior.      Patient denies any suicidal or homicidal ideations    Physical Therapy/Home Exercise: yes      FL Fluoro for Pain Management  See OP Notes for results.     IMPRESSION: See OP Notes for results.     This procedure was auto-finalized by: Virtual Radiologist      Review of Systems   Constitutional: Negative.    HENT: Negative.    Eyes: Negative.    Respiratory: Negative.    Cardiovascular: Negative.    Gastrointestinal: Negative.    Endocrine: Negative.    Genitourinary: Negative.    Musculoskeletal: Positive for arthralgias (Bilateral knees).   Integumentary:  Negative.   Neurological: Negative.    Hematological: Negative.     Psychiatric/Behavioral: Negative.              Past Medical History:   Diagnosis Date    Allergy     Bilateral primary osteoarthritis of knee     Chronic pain of both knees 10/14/2021    Eosinophilia     Gangrene of gallbladder in cholecystitis 2021    Hematochezia     Hyperlipidemia     Hypertension      Past Surgical History:   Procedure Laterality Date    Bilateral IA knee injection Bilateral 2021    D Shows    bladder tact       SECTION      CHOLECYSTECTOMY      FRACTURE SURGERY      jaw 1972    INJECTION OF ANESTHETIC AGENT AROUND NERVE Bilateral 2022    Procedure: GNB   BILATERAL;  Surgeon: Amanda Goetz MD;  [facility];  Service: Pain Management;  Laterality: Bilateral;  PT HAS NOT HAD VAC   STATES WILL BE TESTED AT [facility] [place]    knee scope Bilateral     KNEE SURGERY      LAPAROSCOPIC CHOLECYSTECTOMY N/A 3/31/2021    Procedure: LAPAROSCOPIC CHOLECYSTECTOMY CONVERTED TO OPEN;  Surgeon: Darnell Mcgraw MD;  Location: [facility] OR;  Service: General;  Laterality: N/A;  CONVERTED TO OPEN    TUBAL LIGATION       Social History     Socioeconomic History    Marital status: Single   Occupational History    Occupation:  at [employer]   Tobacco Use    Smoking status: Former Smoker     Packs/day: 1.00     Years: 12.00     Pack years: 12.00     Types: Cigarettes    Smokeless tobacco: Never Used   Substance and Sexual Activity    Alcohol use: Never    Drug use: Never    Sexual activity: Not Currently     Family History   Problem Relation Age of Onset    Hypertension Father     Heart disease Father     Alcohol abuse Mother     Migraines Daughter     Lung cancer Maternal Aunt      Review of patient's allergies indicates:  No Known Allergies  has a current medication list which includes the following prescription(s): aspirin, famotidine, gabapentin, hydrocodone-acetaminophen, meloxicam, and mesalamine.      Objective:  Vitals:    22  0749   BP: (!) 146/91   Pulse: 82        Physical Exam  Vitals and nursing note reviewed.   Constitutional:       General: She is not in acute distress.     Appearance: Normal appearance. She is not ill-appearing, toxic-appearing or diaphoretic.   HENT:      Head: Normocephalic and atraumatic.      Nose: Nose normal.      Mouth/Throat:      Mouth: Mucous membranes are moist.   Eyes:      Extraocular Movements: Extraocular movements intact.      Pupils: Pupils are equal, round, and reactive to light.   Cardiovascular:      Rate and Rhythm: Normal rate and regular rhythm.      Heart sounds: Normal heart sounds.   Pulmonary:      Effort: Pulmonary effort is normal. No respiratory distress.      Breath sounds: Normal breath sounds. No stridor. No wheezing or rhonchi.   Abdominal:      General: Bowel sounds are normal.      Palpations: Abdomen is soft.   Musculoskeletal:         General: No swelling or deformity. Normal range of motion.      Cervical back: Normal and normal range of motion. No spasms or tenderness. No pain with movement. Normal range of motion.      Thoracic back: Normal.      Lumbar back: No spasms, tenderness or bony tenderness. Normal range of motion. Negative right straight leg raise test and negative left straight leg raise test. No scoliosis.      Right knee: Crepitus present. Tenderness present.      Left knee: Crepitus present. Tenderness present.      Right lower leg: No edema.      Left lower leg: No edema.   Skin:     General: Skin is warm.   Neurological:      General: No focal deficit present.      Mental Status: She is alert and oriented to person, place, and time. Mental status is at baseline.      Cranial Nerves: No cranial nerve deficit.      Sensory: Sensation is intact. No sensory deficit.      Motor: No weakness.      Coordination: Coordination normal.      Gait: Gait normal.      Deep Tendon Reflexes: Reflexes are normal and symmetric.   Psychiatric:         Mood and Affect: Mood  normal.         Behavior: Behavior normal.           Assessment:      1. Bilateral primary osteoarthritis of knee    2. Chronic pain of both knees          Plan:  1. reviewed  2.Addiction, Dependency, Tolerance, Opioid abuse-misuse, Death, Diversion Discussed. Overdose reversal drug Naloxone discussed.  3.Refill/Continue medications for pain control and function       Requested Prescriptions      No prescriptions requested or ordered in this encounter     4..  Patient received 100% pain relief following the bilateral genicular nerve block.  I will scheduled  a radiofrequency of the bilateral genicular nerves under fluoroscopy.  Patient has difficulty transportation, absence from work and financial difficulty,  therefore I will proceed directly  with radiofrequency procedure. She received 100% pain relief lasting for the entire duration of the local anesthetic and several weeks following the procedure.    Orders Placed This Encounter   Procedures    Case Request Operating Room: ABLATION OR CHEMODENERVATION, NERVE, GENICULAR     Order Specific Question:   Medical Necessity:     Answer:   Medically Non-Urgent [100]     Order Specific Question:   CPT Code:     Answer:   [place] DESTRUCT, NEUROLYTIC AGENT, GENICULAR NERVE, W/IMG [50542]     Order Specific Question:   Post-Procedure Disposition:     Answer:   PACU [1]     Order Specific Question:   Estimated Length of Stay:     Answer:   0 midnight     Order Specific Question:   Implant Required:     Answer:   No [1001]     Order Specific Question:   Is an on-site pathologist required for this procedure?     Answer:   N/A      5.Monitored Anesthesia Care medical necessity authorization request:    Monitor anesthesia request is medically indicated for the scheduled nerve block procedure due to:  - needle phobia and anxiety, placing  the patient at risk during the provided service.  -patient has a BMI greater than 40  -patient has an ASA class greater than 3 and requires  constant presence of an anesthesiologist during the procedure:  -patient has severe problems with muscles and muscle spasticity that makes it hard to lie still  -patient suffers from chronic pain and is unable to function due to  diminished ADLs    6.The planned medically necessary  surgical procedure is performed in a hospital outpatient department and not in an ambulatory surgical center due to:     -there is no geographically assessable ambulatory surgery center that has the  necessary equipment and fluoroscopy needed for the procedure     -there is no geographically assessable ambulatory surgical center available at which the physician has privileges     -an ASC's  specific  guideline regarding the individuals weight or health conditions that prevent the use of an ASC         report:  Reviewed and consistent with medication use as prescribed.      The total time spent for evaluation and management on 07/14/2022 including reviewing separately obtained history, performing a medically appropriate exam and evaluation, documenting clinical information in the health record, independently interpreting results and communicating them to the patient/family/caregiver, and ordering medications/tests/procedures was between 15-29 minutes.    The above plan and management options were discussed at length with patient. Patient is in agreement with the above and verbalized understanding. It will be communicated with the referring physician via electronic record, fax, or mail.

## 2022-07-13 NOTE — LETTER
"Tia "Tia" Albert was seen and treated in our clinic with Dr. Goetz 7/13/2022 for follow up after procedure.  She may return to work on 7/13/2022.   If you have any questions or concerns, please don't hesitate to call.      "

## 2022-07-25 ENCOUNTER — TELEPHONE (OUTPATIENT)
Dept: PAIN MEDICINE | Facility: CLINIC | Age: 57
End: 2022-07-25
Payer: COMMERCIAL

## 2022-07-25 DIAGNOSIS — Z11.59 SCREENING FOR VIRAL DISEASE: Primary | ICD-10-CM

## 2022-07-25 NOTE — TELEPHONE ENCOUNTER
----- Message from Dinorah Miller sent at 7/25/2022  3:46 PM CDT -----  Regarding: Covid Test  Patient sched for procedure tomorrow. Needs Covid test ordered. Pt. States she is on her way to the lab at [facility] because other lab where she had Covid test made an error.

## 2022-07-26 ENCOUNTER — ANESTHESIA (OUTPATIENT)
Dept: PAIN MEDICINE | Facility: HOSPITAL | Age: 57
End: 2022-07-26
Payer: COMMERCIAL

## 2022-07-26 ENCOUNTER — ANESTHESIA EVENT (OUTPATIENT)
Dept: PAIN MEDICINE | Facility: HOSPITAL | Age: 57
End: 2022-07-26
Payer: COMMERCIAL

## 2022-07-26 ENCOUNTER — HOSPITAL ENCOUNTER (OUTPATIENT)
Facility: HOSPITAL | Age: 57
Discharge: HOME | End: 2022-07-26
Attending: PAIN MEDICINE | Admitting: PAIN MEDICINE
Payer: COMMERCIAL

## 2022-07-26 VITALS
DIASTOLIC BLOOD PRESSURE: 81 MMHG | BODY MASS INDEX: 42.27 KG/M2 | RESPIRATION RATE: 13 BRPM | HEIGHT: 66 IN | OXYGEN SATURATION: 98 % | TEMPERATURE: 98 F | HEART RATE: 73 BPM | WEIGHT: 263 LBS | SYSTOLIC BLOOD PRESSURE: 138 MMHG

## 2022-07-26 DIAGNOSIS — M25.562 KNEE PAIN, BILATERAL: ICD-10-CM

## 2022-07-26 DIAGNOSIS — M17.0 BILATERAL PRIMARY OSTEOARTHRITIS OF KNEE: Primary | Chronic | ICD-10-CM

## 2022-07-26 DIAGNOSIS — M25.561 KNEE PAIN, BILATERAL: ICD-10-CM

## 2022-07-26 PROCEDURE — D9220A PRA ANESTHESIA: ICD-10-PCS | Mod: ,,, | Performed by: NURSE ANESTHETIST, CERTIFIED REGISTERED

## 2022-07-26 PROCEDURE — 25000003 PHARM REV CODE 250: Performed by: NURSE ANESTHETIST, CERTIFIED REGISTERED

## 2022-07-26 PROCEDURE — D9220A PRA ANESTHESIA: Mod: ,,, | Performed by: NURSE ANESTHETIST, CERTIFIED REGISTERED

## 2022-07-26 PROCEDURE — 64624 DSTRJ NULYT AGT GNCLR NRV: CPT | Mod: ,,, | Performed by: PAIN MEDICINE

## 2022-07-26 PROCEDURE — 64624 PR DESTRUCT, NEUROLYTIC AGENT, GENICULAR NERVE, W/IMG: ICD-10-PCS | Mod: 50,,, | Performed by: PAIN MEDICINE

## 2022-07-26 PROCEDURE — 37000009 HC ANESTHESIA EA ADD 15 MINS: Performed by: PAIN MEDICINE

## 2022-07-26 PROCEDURE — 63600175 PHARM REV CODE 636 W HCPCS: Performed by: NURSE ANESTHETIST, CERTIFIED REGISTERED

## 2022-07-26 PROCEDURE — 27201423 OPTIME MED/SURG SUP & DEVICES STERILE SUPPLY: Performed by: PAIN MEDICINE

## 2022-07-26 PROCEDURE — 63600175 PHARM REV CODE 636 W HCPCS: Performed by: PAIN MEDICINE

## 2022-07-26 PROCEDURE — 64624 DSTRJ NULYT AGT GNCLR NRV: CPT | Mod: 50 | Performed by: PAIN MEDICINE

## 2022-07-26 PROCEDURE — 25000003 PHARM REV CODE 250: Performed by: PAIN MEDICINE

## 2022-07-26 PROCEDURE — 37000008 HC ANESTHESIA 1ST 15 MINUTES: Performed by: PAIN MEDICINE

## 2022-07-26 RX ORDER — PROPOFOL 10 MG/ML
VIAL (ML) INTRAVENOUS
Status: DISCONTINUED | OUTPATIENT
Start: 2022-07-26 | End: 2022-07-26

## 2022-07-26 RX ORDER — BUPIVACAINE HYDROCHLORIDE 2.5 MG/ML
INJECTION, SOLUTION INFILTRATION; PERINEURAL
Status: DISCONTINUED | OUTPATIENT
Start: 2022-07-26 | End: 2022-07-26 | Stop reason: HOSPADM

## 2022-07-26 RX ORDER — SODIUM CHLORIDE 9 MG/ML
INJECTION, SOLUTION INTRAVENOUS CONTINUOUS
Status: DISCONTINUED | OUTPATIENT
Start: 2022-07-26 | End: 2022-07-26 | Stop reason: HOSPADM

## 2022-07-26 RX ORDER — TRIAMCINOLONE ACETONIDE 40 MG/ML
INJECTION, SUSPENSION INTRA-ARTICULAR; INTRAMUSCULAR
Status: DISCONTINUED | OUTPATIENT
Start: 2022-07-26 | End: 2022-07-26 | Stop reason: HOSPADM

## 2022-07-26 RX ORDER — LIDOCAINE HYDROCHLORIDE 20 MG/ML
INJECTION INTRAVENOUS
Status: DISCONTINUED | OUTPATIENT
Start: 2022-07-26 | End: 2022-07-26

## 2022-07-26 RX ORDER — FENTANYL CITRATE 50 UG/ML
INJECTION, SOLUTION INTRAMUSCULAR; INTRAVENOUS
Status: DISCONTINUED | OUTPATIENT
Start: 2022-07-26 | End: 2022-07-26

## 2022-07-26 RX ORDER — ORPHENADRINE CITRATE 30 MG/ML
INJECTION INTRAMUSCULAR; INTRAVENOUS
Status: DISCONTINUED | OUTPATIENT
Start: 2022-07-26 | End: 2022-07-26

## 2022-07-26 RX ADMIN — PROPOFOL 30 MG: 10 INJECTION, EMULSION INTRAVENOUS at 04:07

## 2022-07-26 RX ADMIN — PROPOFOL 50 MG: 10 INJECTION, EMULSION INTRAVENOUS at 04:07

## 2022-07-26 RX ADMIN — LIDOCAINE HYDROCHLORIDE 100 MG: 20 INJECTION, SOLUTION INTRAVENOUS at 04:07

## 2022-07-26 RX ADMIN — FENTANYL CITRATE 100 MCG: 50 INJECTION INTRAMUSCULAR; INTRAVENOUS at 04:07

## 2022-07-26 RX ADMIN — SODIUM CHLORIDE: 9 INJECTION, SOLUTION INTRAVENOUS at 04:07

## 2022-07-26 RX ADMIN — ORPHENADRINE CITRATE 60 MG: 30 INJECTION INTRAMUSCULAR; INTRAVENOUS at 04:07

## 2022-07-26 NOTE — ANESTHESIA POSTPROCEDURE EVALUATION
Anesthesia Post Evaluation    Patient: Tia Albert    Procedure(s) Performed: Procedure(s) (LRB):  ABLATION OR CHEMODENERVATION, NERVE, GENICULAR (Bilateral)    Final Anesthesia Type: general      Patient location during evaluation: OPS  Patient participation: Yes- Able to Participate  Level of consciousness: awake and alert  Post-procedure vital signs: reviewed and stable  Pain management: adequate  Airway patency: patent  KYRIE mitigation strategies: Multimodal analgesia  PONV status at discharge: No PONV  Anesthetic complications: no      Cardiovascular status: blood pressure returned to baseline  Respiratory status: spontaneous ventilation and room air  Hydration status: euvolemic  Follow-up not needed.          Vitals Value Taken Time   /67 07/26/22 1656   Temp 36.6 °C (97.9 °F) 07/26/22 1651   Pulse 68 07/26/22 1656   Resp 19 07/26/22 1656   SpO2 95 % 07/26/22 1656   Vitals shown include unvalidated device data.      No case tracking events are documented in the log.      Pain/Dinorah Score: No data recorded

## 2022-07-26 NOTE — ANESTHESIA PREPROCEDURE EVALUATION
07/26/2022  Vandana Price is a 56 y.o., female.      Pre-op Assessment    I have reviewed the Patient Summary Reports.     I have reviewed the Nursing Notes. I have reviewed the NPO Status.   I have reviewed the Medications.     Review of Systems  Anesthesia Hx:  No problems with previous Anesthesia    Social:  Former Smoker    Cardiovascular:   Hypertension hyperlipidemia    Musculoskeletal:   Arthritis     Neurological:  Pain Syndrome  Chronic Pain Syndrome   Endocrine:  Metabolic Disorders, Morbid Obesity / BMI > 40    Past Medical History:   Diagnosis Date    Allergy     Bilateral primary osteoarthritis of knee     Chronic pain of both knees 10/14/2021    Eosinophilia     Gangrene of gallbladder in cholecystitis 4/1/2021    Hematochezia     Hyperlipidemia     Hypertension        Physical Exam  General: Well nourished, Cooperative, Alert and Oriented    Airway:  Mallampati: II       Chest/Lungs:  Clear to auscultation    Heart:  Rate: Normal        Anesthesia Plan  Type of Anesthesia, risks & benefits discussed:    Anesthesia Type: Gen Natural Airway, MAC  Intra-op Monitoring Plan: Standard ASA Monitors  Post Op Pain Control Plan: multimodal analgesia and IV/PO Opioids PRN  Induction:  IV  Informed Consent: Informed consent signed with the Patient and all parties understand the risks and agree with anesthesia plan.  All questions answered. Patient consented to blood products? Yes  ASA Score: 3  Day of Surgery Review of History & Physical: I have interviewed and examined the patient. I have reviewed the patient's H&P dated: There are no significant changes.     Ready For Surgery From Anesthesia Perspective.     .

## 2022-07-26 NOTE — PLAN OF CARE
REFER TO WRITTEN DOCUMENT AND RECOVERY INFORMATION.    D/CD PATIENT VIAA WHEELCHAIR AT 1735.    INFORMED PATIENT IF UNABLE TO VOID IN 8 HOURS, GO TO ER. NOTIFY MD OF REDNESS OR DRAINAGE FROM INJECTION SITE OR FEVER OVER 3-4 DAY. REST AND DRINK PLENTY OF FLUIDS FOR THE REMAINDER OF THE DAY. NO LIFTING OVER 5 LBS FOR THE REMAINDER OF THE DAY. CONTINUE REGULAR MEDICATIONS AS PRESCRIBED. MAY TAKE PAIN MEDICATION AS PRESCRIBED.     PAIN IMPROVED  0%  Right knee, left knee 100%

## 2022-07-26 NOTE — TRANSFER OF CARE
"Anesthesia Transfer of Care Note    Patient: Vandana Price    Procedure(s) Performed: Procedure(s) (LRB):  ABLATION OR CHEMODENERVATION, NERVE, GENICULAR (Bilateral)    Patient location: Other:    Anesthesia Type: general    Transport from OR: Transported from OR on 2-3 L/min O2 by NC with adequate spontaneous ventilation    Post pain: adequate analgesia    Post assessment: no apparent anesthetic complications    Post vital signs: stable    Level of consciousness: awake and alert    Nausea/Vomiting: no nausea/vomiting    Complications: none    Transfer of care protocol was followed      Last vitals:   Visit Vitals  /72   Pulse 71   Temp 36.6 °C (97.9 °F) (Oral)   Resp 19   Ht 5' 6" (1.676 m)   Wt 119.3 kg (263 lb)   LMP 06/08/2020   SpO2 97%   BMI 42.45 kg/m²     "

## 2022-07-27 NOTE — OP NOTE
"Procedure Note    Pre-operative Diagnosis: Chronic Knee Pain  Post-operative Diagnosis: Chronic Knee Pain  Procedure Date: 07/26/2022  Procedure:  (1) Bilateral  Peripheral Nerve Ablation at Three Locations    a) Superior medial genicular nerve   b) Inferior medial genicular nerve   c) Superior lateral genicular nerve  (2) Procedural Fluoroscopy    Anesthesia: MAC  IVF: Per Anesthesia  EBL: Less than 1cc  Complications: None     Indications: This is a diagnostic and therapeutic intervention to alleviate pain and suffering, and reduce functional impairment associated with chronic knee pain.      The patients history and physical exam were reviewed. The risks, benefits and alternatives to the procedure were discussed, and all questions were answered to the patients satisfaction. The patient agreed to proceed, and written informed consent was verified.    Procedure in Detail: The patient was brought into the procedure room and placed in the supine position on the fluoroscopy table. The area of the knee was prepped with Chloraprep and draped in a sterile manner. In an A-P fluoroscopic view the target locations were identified and the overlying skin and subcutaneous tissues anesthetized with 1% lidocaine using a 25G, 1.5" needle.  A 17G introducer with trocar was introduced through the anesthetized skin and down to the junction of the femoral diaphysis and the medial femoral epicondyle; then another needle to the femoral diaphysis and the lateral femoral epicondyle; and the 3rd needle to the tibial diaphysis and medial tibial condyle. Confirmation of needle placement was performed with lateral fluoroscopic views demonstrating needle depth of 50% of the corresponding osseoss stucture. The trocars were removed and Lidocaine 2%  2 mL per site was administered to anesthetize deep tissues.  Ablation was performed at 60 degrees Celsius for 2 minutes, 30 seconds using the RF probe at each site.  Prior to removal of the " introducers, 1 mL from a  1:1 mixture of Bupivacaine and Lidocaine 1% with Kenalog 40 mg  was administered at each site.    A bandage applied to each puncture site.    Findings: Fluoroscopic images of needle placement were consistent with a successful ablation of the genicular nerves.    Disposition: The patient tolerated the procedure well, and there were no apparent complications. Vital signs remained stable throughout the procedure. The patient was taken to the recovery area where written discharge instructions for the procedure were given.     Follow-up: RTC 3-4 weeks for follow up evaluation.    TATI Goetz MD  Interventional Pain Medicine / Anesthesiology

## 2022-07-27 NOTE — BRIEF OP NOTE
Discharge Note  Short Stay    Admit Date: 7/26/2022    Discharge Date: 7/26/2022    Attending Physician: Amanda Goetz     Discharge Provider: Amanda Goetz    Diagnoses: Bilateral knee pain and osteoarthritis    Discharged Condition: Good    Final Diagnoses: Chronic pain of both knees [M25.561, M25.562, G89.29]    Disposition: Home or Self Care    Hospital Course: No complications, uneventful    Outcome of Hospitalization, Treatment, Procedure, or Surgery:  Patient was admitted for outpatient interventional pain management procedure. The patient tolerated the procedure well with no complications.    Follow up/Patient Instructions:  Follow up as scheduled in Pain Management office in 3-4 weeks.  Patient has received instructions and follow up date and time.    Medications:  Continue previous medications

## 2022-07-27 NOTE — DISCHARGE SUMMARY
[facility] - Pain Management  Discharge Note  Short Stay    Procedure(s) (LRB):  ABLATION OR CHEMODENERVATION, NERVE, GENICULAR (Bilateral)    OUTCOME: Patient tolerated treatment/procedure well without complication and is now ready for discharge.    DISPOSITION: Home or Self Care    FINAL DIAGNOSIS:  Bilateral knee pain and osteoarthritis    FOLLOWUP: In clinic    DISCHARGE INSTRUCTIONS:  See nurse's notes     TIME SPENT ON DISCHARGE: 5 minutes

## 2022-08-03 ENCOUNTER — PATIENT MESSAGE (OUTPATIENT)
Dept: GASTROENTEROLOGY | Facility: CLINIC | Age: 57
End: 2022-08-03
Payer: COMMERCIAL

## 2022-08-09 NOTE — PROGRESS NOTES
She Disclaimer: This note has been generated using voice-recognition software. There may be typographical errors that have been missed during proof-reading        Patient ID: Vandana Price is a 56 y.o. female.      Chief Complaint: Knee Pain      Patient is status post bilateral genicular nerve radiofrequency procedure July 13, 2022. She experienced 100% relief of the right knee pain and  80% relief of the left knee pain.  Her pain is tolerable and she is no longer requiring opiates.  She denies any side effects to the procedure.  She returns today for re-evaluation.          Pain Assessment  Pain Score:   4  Pain Location: Knee  Pain Orientation: Right, Left  Pain Descriptors: Aching, Dull, Constant  Pain Frequency: Continuous  Pain Onset: Awakened from sleep  Clinical Progression: Gradually improving  Aggravating Factors: Bending, Standing, Walking  Pain Intervention(s): Medication (See eMAR), Rest      A's of Opioid Risk Assessment  Activity:Patient can perform ADL.   Analgesia:Patients pain is    controlled by current medication.   Adverse Effects: Patient denies constipation or sedation.  Aberrant Behavior:  reviewed with no aberrant drug seeking/taking behavior.      Patient denies any suicidal or homicidal ideations    Physical Therapy/Home Exercise: yes      [place] Fluoro for Pain Management  See OP Notes for results.     IMPRESSION: See OP Notes for results.     This procedure was auto-finalized by: Virtual Radiologist      Review of Systems   Constitutional: Negative.    HENT: Negative.    Eyes: Negative.    Respiratory: Negative.    Cardiovascular: Negative.    Gastrointestinal: Negative.    Endocrine: Negative.    Genitourinary: Negative.    Musculoskeletal: Positive for arthralgias (Left knee).   Integumentary:  Negative.   Neurological: Negative.    Hematological: Negative.    Psychiatric/Behavioral: Negative.              Past Medical History:   Diagnosis Date    Allergy     Bilateral primary  osteoarthritis of knee     Chronic pain of both knees 10/14/2021    Eosinophilia     Gangrene of gallbladder in cholecystitis 2021    Hematochezia     Hyperlipidemia     Hypertension      Past Surgical History:   Procedure Laterality Date    Bilateral IA knee injection Bilateral 2021    D Shows    bladder tact       SECTION      CHOLECYSTECTOMY      FRACTURE SURGERY      jaw 1972    INJECTION OF ANESTHETIC AGENT AROUND NERVE Bilateral 2022    Procedure: GNB   BILATERAL;  Surgeon: Amanda Goetz MD;  Location: [facility]IN [facility];  Service: Pain Management;  Laterality: Bilateral;  PT HAS NOT HAD VAC   STATES WILL BE TESTED AT [facility] [place]    knee scope Bilateral     KNEE SURGERY      LAPAROSCOPIC CHOLECYSTECTOMY N/A 3/31/2021    Procedure: LAPAROSCOPIC CHOLECYSTECTOMY CONVERTED TO OPEN;  Surgeon: Darnell Mcgraw MD;  Location: [facility] OR;  Service: General;  Laterality: N/A;  CONVERTED TO OPEN    TUBAL LIGATION       Social History     Socioeconomic History    Marital status: Single   Occupational History    Occupation:  at [employer]   Tobacco Use    Smoking status: Former Smoker     Packs/day: 1.00     Years: 12.00     Pack years: 12.00     Types: Cigarettes    Smokeless tobacco: Never Used   Substance and Sexual Activity    Alcohol use: Never    Drug use: Never    Sexual activity: Not Currently     Family History   Problem Relation Age of Onset    Hypertension Father     Heart disease Father     Alcohol abuse Mother     Migraines Daughter     Lung cancer Maternal Aunt      Review of patient's allergies indicates:  No Known Allergies  has a current medication list which includes the following prescription(s): aspirin, famotidine, gabapentin, hydrocodone-acetaminophen, meloxicam, and mesalamine.      Objective:  Vitals:    08/10/22 0839   BP: (!) 135/96   Pulse: 107        Physical Exam  Vitals and nursing note reviewed.   Constitutional:       General:  She is not in acute distress.     Appearance: Normal appearance. She is not ill-appearing, toxic-appearing or diaphoretic.   HENT:      Head: Normocephalic and atraumatic.      Nose: Nose normal.      Mouth/Throat:      Mouth: Mucous membranes are moist.   Eyes:      Extraocular Movements: Extraocular movements intact.      Pupils: Pupils are equal, round, and reactive to light.   Cardiovascular:      Rate and Rhythm: Normal rate and regular rhythm.      Heart sounds: Normal heart sounds.   Pulmonary:      Effort: Pulmonary effort is normal. No respiratory distress.      Breath sounds: Normal breath sounds. No stridor. No wheezing or rhonchi.   Abdominal:      General: Bowel sounds are normal.      Palpations: Abdomen is soft.   Musculoskeletal:         General: No swelling or deformity. Normal range of motion.      Cervical back: Normal and normal range of motion. No spasms or tenderness. No pain with movement. Normal range of motion.      Thoracic back: Normal.      Lumbar back: No spasms, tenderness or bony tenderness. Normal range of motion. Negative right straight leg raise test and negative left straight leg raise test. No scoliosis.      Right knee: Crepitus present.      Left knee: Crepitus present. Tenderness present.      Right lower leg: No edema.      Left lower leg: No edema.   Skin:     General: Skin is warm.   Neurological:      General: No focal deficit present.      Mental Status: She is alert and oriented to person, place, and time. Mental status is at baseline.      Cranial Nerves: No cranial nerve deficit.      Sensory: Sensation is intact. No sensory deficit.      Motor: No weakness.      Coordination: Coordination normal.      Gait: Gait normal.      Deep Tendon Reflexes: Reflexes are normal and symmetric.   Psychiatric:         Mood and Affect: Mood normal.         Behavior: Behavior normal.           Assessment:      1. Bilateral primary osteoarthritis of knee    2. Chronic pain of left knee           Plan:  1. Patient will return as needed for re-evaluation     report:  Reviewed and consistent with medication use as prescribed.      The total time spent for evaluation and management on 08/10/2022 including reviewing separately obtained history, performing a medically appropriate exam and evaluation, documenting clinical information in the health record, independently interpreting results and communicating them to the patient/family/caregiver, and ordering medications/tests/procedures was between 15-29 minutes.    The above plan and management options were discussed at length with patient. Patient is in agreement with the above and verbalized understanding. It will be communicated with the referring physician via electronic record, fax, or mail.

## 2022-08-10 ENCOUNTER — OFFICE VISIT (OUTPATIENT)
Dept: PAIN MEDICINE | Facility: CLINIC | Age: 57
End: 2022-08-10
Payer: COMMERCIAL

## 2022-08-10 VITALS
DIASTOLIC BLOOD PRESSURE: 96 MMHG | HEART RATE: 107 BPM | WEIGHT: 262 LBS | SYSTOLIC BLOOD PRESSURE: 135 MMHG | HEIGHT: 66 IN | BODY MASS INDEX: 42.11 KG/M2

## 2022-08-10 DIAGNOSIS — G89.29 CHRONIC PAIN OF LEFT KNEE: Chronic | ICD-10-CM

## 2022-08-10 DIAGNOSIS — M25.562 CHRONIC PAIN OF LEFT KNEE: Chronic | ICD-10-CM

## 2022-08-10 DIAGNOSIS — M17.0 BILATERAL PRIMARY OSTEOARTHRITIS OF KNEE: Primary | Chronic | ICD-10-CM

## 2022-08-10 PROCEDURE — 1159F MED LIST DOCD IN RCRD: CPT | Mod: ,,, | Performed by: PAIN MEDICINE

## 2022-08-10 PROCEDURE — 3008F PR BODY MASS INDEX (BMI) DOCUMENTED: ICD-10-PCS | Mod: ,,, | Performed by: PAIN MEDICINE

## 2022-08-10 PROCEDURE — 99213 OFFICE O/P EST LOW 20 MIN: CPT | Mod: PBBFAC | Performed by: PAIN MEDICINE

## 2022-08-10 PROCEDURE — 99212 PR OFFICE/OUTPT VISIT, EST, LEVL II, 10-19 MIN: ICD-10-PCS | Mod: S$PBB,,, | Performed by: PAIN MEDICINE

## 2022-08-10 PROCEDURE — 3080F PR MOST RECENT DIASTOLIC BLOOD PRESSURE >= 90 MM HG: ICD-10-PCS | Mod: ,,, | Performed by: PAIN MEDICINE

## 2022-08-10 PROCEDURE — 99212 OFFICE O/P EST SF 10 MIN: CPT | Mod: S$PBB,,, | Performed by: PAIN MEDICINE

## 2022-08-10 PROCEDURE — 3008F BODY MASS INDEX DOCD: CPT | Mod: ,,, | Performed by: PAIN MEDICINE

## 2022-08-10 PROCEDURE — 3075F PR MOST RECENT SYSTOLIC BLOOD PRESS GE 130-139MM HG: ICD-10-PCS | Mod: ,,, | Performed by: PAIN MEDICINE

## 2022-08-10 PROCEDURE — 1159F PR MEDICATION LIST DOCUMENTED IN MEDICAL RECORD: ICD-10-PCS | Mod: ,,, | Performed by: PAIN MEDICINE

## 2022-08-10 PROCEDURE — 3080F DIAST BP >= 90 MM HG: CPT | Mod: ,,, | Performed by: PAIN MEDICINE

## 2022-08-10 PROCEDURE — 3075F SYST BP GE 130 - 139MM HG: CPT | Mod: ,,, | Performed by: PAIN MEDICINE

## 2022-08-10 NOTE — LETTER
"Tia "Tia" Albert was seen and treated in our clinic by Dr Goetz  8/10/2022 for followup from procedure.  She may return to work on 8-.  If you have any questions or concerns, please don't hesitate to call [phone].      "

## 2022-08-12 DIAGNOSIS — Z11.59 SPECIAL SCREENING EXAMINATION FOR VIRAL DISEASE: Primary | ICD-10-CM

## 2022-08-15 ENCOUNTER — HOSPITAL ENCOUNTER (OUTPATIENT)
Dept: GASTROENTEROLOGY | Facility: HOSPITAL | Age: 57
Discharge: HOME | End: 2022-08-15
Attending: NURSE PRACTITIONER
Payer: COMMERCIAL

## 2022-08-15 ENCOUNTER — ANESTHESIA EVENT (OUTPATIENT)
Dept: GASTROENTEROLOGY | Facility: HOSPITAL | Age: 57
End: 2022-08-15
Payer: COMMERCIAL

## 2022-08-15 ENCOUNTER — ANESTHESIA (OUTPATIENT)
Dept: GASTROENTEROLOGY | Facility: HOSPITAL | Age: 57
End: 2022-08-15
Payer: COMMERCIAL

## 2022-08-15 VITALS
TEMPERATURE: 98 F | OXYGEN SATURATION: 94 % | BODY MASS INDEX: 41.64 KG/M2 | RESPIRATION RATE: 24 BRPM | DIASTOLIC BLOOD PRESSURE: 61 MMHG | SYSTOLIC BLOOD PRESSURE: 95 MMHG | WEIGHT: 258 LBS | HEART RATE: 93 BPM

## 2022-08-15 DIAGNOSIS — K52.9 INFLAMMATORY BOWEL DISEASE: ICD-10-CM

## 2022-08-15 PROCEDURE — 45380 COLONOSCOPY AND BIOPSY: CPT

## 2022-08-15 PROCEDURE — 37000008 HC ANESTHESIA 1ST 15 MINUTES

## 2022-08-15 PROCEDURE — 37000009 HC ANESTHESIA EA ADD 15 MINS

## 2022-08-15 PROCEDURE — 45380 PR COLONOSCOPY,BIOPSY: ICD-10-PCS | Mod: 59,,, | Performed by: STUDENT IN AN ORGANIZED HEALTH CARE EDUCATION/TRAINING PROGRAM

## 2022-08-15 PROCEDURE — D9220A PRA ANESTHESIA: Mod: ,,,

## 2022-08-15 PROCEDURE — 25000003 PHARM REV CODE 250: Performed by: STUDENT IN AN ORGANIZED HEALTH CARE EDUCATION/TRAINING PROGRAM

## 2022-08-15 PROCEDURE — 45380 COLONOSCOPY AND BIOPSY: CPT | Mod: 59,,, | Performed by: STUDENT IN AN ORGANIZED HEALTH CARE EDUCATION/TRAINING PROGRAM

## 2022-08-15 PROCEDURE — 27000716 HC OXISENSOR PROBE, ANY SIZE

## 2022-08-15 PROCEDURE — 45385 COLONOSCOPY W/LESION REMOVAL: CPT | Mod: ,,, | Performed by: STUDENT IN AN ORGANIZED HEALTH CARE EDUCATION/TRAINING PROGRAM

## 2022-08-15 PROCEDURE — 63600175 PHARM REV CODE 636 W HCPCS

## 2022-08-15 PROCEDURE — 27201423 OPTIME MED/SURG SUP & DEVICES STERILE SUPPLY

## 2022-08-15 PROCEDURE — 27000284 HC CANNULA NASAL

## 2022-08-15 PROCEDURE — 45385 PR COLONOSCOPY,REMV LESN,SNARE: ICD-10-PCS | Mod: ,,, | Performed by: STUDENT IN AN ORGANIZED HEALTH CARE EDUCATION/TRAINING PROGRAM

## 2022-08-15 PROCEDURE — D9220A PRA ANESTHESIA: ICD-10-PCS | Mod: ,,,

## 2022-08-15 PROCEDURE — 25000003 PHARM REV CODE 250

## 2022-08-15 RX ORDER — SODIUM CHLORIDE 9 MG/ML
INJECTION, SOLUTION INTRAVENOUS CONTINUOUS
Status: DISCONTINUED | OUTPATIENT
Start: 2022-08-15 | End: 2022-08-16 | Stop reason: HOSPADM

## 2022-08-15 RX ORDER — LIDOCAINE HYDROCHLORIDE 20 MG/ML
INJECTION INTRAVENOUS
Status: DISCONTINUED | OUTPATIENT
Start: 2022-08-15 | End: 2022-08-15

## 2022-08-15 RX ORDER — PROPOFOL 10 MG/ML
VIAL (ML) INTRAVENOUS
Status: DISCONTINUED | OUTPATIENT
Start: 2022-08-15 | End: 2022-08-15

## 2022-08-15 RX ORDER — PROCHLORPERAZINE EDISYLATE 5 MG/ML
5 INJECTION INTRAMUSCULAR; INTRAVENOUS ONCE AS NEEDED
Status: DISCONTINUED | OUTPATIENT
Start: 2022-08-15 | End: 2022-08-16 | Stop reason: HOSPADM

## 2022-08-15 RX ORDER — SODIUM CHLORIDE 0.9 % (FLUSH) 0.9 %
10 SYRINGE (ML) INJECTION
Status: DISCONTINUED | OUTPATIENT
Start: 2022-08-15 | End: 2022-08-16 | Stop reason: HOSPADM

## 2022-08-15 RX ORDER — ONDANSETRON 2 MG/ML
4 INJECTION INTRAMUSCULAR; INTRAVENOUS ONCE AS NEEDED
Status: DISCONTINUED | OUTPATIENT
Start: 2022-08-15 | End: 2022-08-16 | Stop reason: HOSPADM

## 2022-08-15 RX ADMIN — PROPOFOL 50 MG: 10 INJECTION, EMULSION INTRAVENOUS at 10:08

## 2022-08-15 RX ADMIN — PROPOFOL 50 MG: 10 INJECTION, EMULSION INTRAVENOUS at 09:08

## 2022-08-15 RX ADMIN — PROPOFOL 20 MG: 10 INJECTION, EMULSION INTRAVENOUS at 09:08

## 2022-08-15 RX ADMIN — PROPOFOL 80 MG: 10 INJECTION, EMULSION INTRAVENOUS at 09:08

## 2022-08-15 RX ADMIN — LIDOCAINE HYDROCHLORIDE 100 MG: 20 INJECTION, SOLUTION INTRAVENOUS at 09:08

## 2022-08-15 RX ADMIN — SODIUM CHLORIDE: 9 INJECTION, SOLUTION INTRAVENOUS at 09:08

## 2022-08-15 NOTE — DISCHARGE INSTRUCTIONS
Procedure Date  8/15/22     Impression  Overall Impression: Moderate erosions, erythema, and loss of vascularity in the distal 20cm of the rectum, compatible with Torres 2 disease. 3 small polyps removed from the cecum. Random TI and colon biopsies obtained.     Recommendation  Await pathology results  Repeat colonoscopy in 1 year  Followup in GI clinic to address ongoing disease.     Indication  Inflammatory bowel disease

## 2022-08-15 NOTE — TRANSFER OF CARE
Anesthesia Transfer of Care Note    Patient: Vandana Albert    Procedure(s) Performed: * Colonoscopy     Patient location: GI    Anesthesia Type: general    Transport from OR: Transported from OR on room air with adequate spontaneous ventilation. Continuous ECG monitoring in transport. Continuous SpO2 monitoring in transport    Post pain: adequate analgesia    Post assessment: no apparent anesthetic complications    Post vital signs: stable    Level of consciousness: sedated and responds to stimulation    Nausea/Vomiting: no nausea/vomiting    Complications: none    Transfer of care protocol was followedComments: Good SV continue, NAD, VSS, RTRN      Last vitals:   Visit Vitals  /67 (BP Location: Left arm, Patient Position: Lying)   Pulse 99   Temp 36.7 °C (98 °F) (Oral)   Resp 20   Wt 117 kg (258 lb)   LMP 06/08/2020   SpO2 95%   Breastfeeding No   BMI 41.64 kg/m²

## 2022-08-15 NOTE — ANESTHESIA POSTPROCEDURE EVALUATION
Anesthesia Post Evaluation    Patient: Vandana Albert    Procedure(s) Performed: * Colonoscopy     Final Anesthesia Type: general      Patient location during evaluation: GI PACU  Patient participation: Yes- Able to Participate  Level of consciousness: awake and alert  Post-procedure vital signs: reviewed and stable  Pain management: adequate  Airway patency: patent    PONV status at discharge: No PONV  Anesthetic complications: no      Cardiovascular status: blood pressure returned to baseline and hemodynamically stable  Respiratory status: spontaneous ventilation  Hydration status: euvolemic  Follow-up not needed.  Comments: Pt voices appreciation for care          Vitals Value Taken Time   /68 08/15/22 1043   Temp 36.7 °C (98 °F) 08/15/22 1013   Pulse 82 08/15/22 1043   Resp 21 08/15/22 1043   SpO2 96 % 08/15/22 1043   Vitals shown include unvalidated device data.      No case tracking events are documented in the log.      Pain/Dinorah Score: Dinorah Score: 8 (8/15/2022 10:19 AM)

## 2022-08-15 NOTE — H&P
History of Present Illness    Vandana Price is a 56 y.o. female that  has a past medical history of Allergy, Bilateral primary osteoarthritis of knee, Chronic pain of both knees (10/14/2021), Eosinophilia, Gangrene of gallbladder in cholecystitis (2021), Hematochezia, Hyperlipidemia, and Hypertension.     Follows in GI clinic for abdominal pain, bloody mucus and gas.   No mesalamine, Norco, Pepcid, ASA 81mg.     Previously doing well on mesalamine 2.4g and cut herself down to 1.2g.    Patient otherwise denies any:  - black stools  - bloody stools  - nausea  - vomiting  - diarrhea  - constipation  - abdominal pain  - family history of GI related malignancies    ROS  - 12 point review of systems is negative except as otherwise stated in HPI.    Past Medical History:   Diagnosis Date    Allergy     Bilateral primary osteoarthritis of knee     Chronic pain of both knees 10/14/2021    Eosinophilia     Gangrene of gallbladder in cholecystitis 2021    Hematochezia     Hyperlipidemia     Hypertension        Past Surgical History:   Procedure Laterality Date    Bilateral IA knee injection Bilateral 2021    D Shows    bladder tact       SECTION      CHOLECYSTECTOMY      FRACTURE SURGERY      jaw 1972    INJECTION OF ANESTHETIC AGENT AROUND NERVE Bilateral 2022    Procedure: GNB   BILATERAL;  Surgeon: Amanda Goetz MD;  Location: [facility];  Service: Pain Management;  Laterality: Bilateral;  PT HAS NOT HAD VAC   STATES WILL BE TESTED AT [facility] IN [place]    knee scope Bilateral     KNEE SURGERY      LAPAROSCOPIC CHOLECYSTECTOMY N/A 3/31/2021    Procedure: LAPAROSCOPIC CHOLECYSTECTOMY CONVERTED TO OPEN;  Surgeon: Darnell Mcgraw MD;  Location: [facility] OR;  Service: General;  Laterality: N/A;  CONVERTED TO OPEN    TUBAL LIGATION         Family History   Problem Relation Age of Onset    Hypertension Father     Heart disease Father     Alcohol abuse Mother      Migraines Daughter     Lung cancer Maternal Aunt        Social History     Socioeconomic History    Marital status: Single   Occupational History    Occupation:  at [employer]   Tobacco Use    Smoking status: Former Smoker     Packs/day: 1.00     Years: 12.00     Pack years: 12.00     Types: Cigarettes    Smokeless tobacco: Never Used   Substance and Sexual Activity    Alcohol use: Never    Drug use: Never    Sexual activity: Not Currently       Current Outpatient Medications   Medication Sig Dispense Refill    aspirin (ECOTRIN) 81 MG EC tablet Take 81 mg by mouth once daily.      famotidine (PEPCID) 40 MG tablet       gabapentin (NEURONTIN) 300 MG capsule Take 1 capsule (300 mg total) by mouth nightly. 90 capsule 1    HYDROcodone-acetaminophen (NORCO)  mg per tablet Take 1 tablet by mouth daily as needed.      meloxicam (MOBIC) 15 MG tablet Take 1 tablet (15 mg total) by mouth daily as needed. 90 tablet 1    mesalamine (LIALDA) 1.2 gram TbEC Take 4 tablets (4.8 g total) by mouth daily with breakfast. 360 tablet 3     No current facility-administered medications for this encounter.       Review of patient's allergies indicates:  No Known Allergies    Objective:  There were no vitals filed for this visit.     Constitutional:       General: no acute distress.     Appearance: Normal appearance. Non toxic-appearing.   HENT:      Head: Normocephalic and atraumatic.      Mouth/Throat:      Pharynx: Oropharynx is clear. No posterior oropharyngeal erythema.   Eyes:      General: No scleral icterus.     Conjunctiva/sclera: Conjunctivae normal.   Cardiovascular:      Rate and Rhythm: Normal rate and regular rhythm.      Heart sounds: Normal heart sounds.   Pulmonary:      Effort: Pulmonary effort is normal.      Breath sounds: Normal breath sounds.   Abdominal:      General: Abdomen is flat. There is no distension.      Palpations: Abdomen is soft. There is no mass.      Tenderness: There is no abdominal  tenderness. There is no guarding.   Musculoskeletal:         General: No swelling or deformity.      Cervical back: Normal range of motion and neck supple.   Skin:     General: Skin is warm and dry.   Neurological:      General: No focal deficit present.      Mental Status: alert and oriented to person, place, and time.     Assessment and Plan:  Proceed with:  Colonoscopy for screening for abdominal pain, gas, belching, urgency, bloody mucous and stomach pain.     Jacob Dillard MD  Gastroenterology

## 2022-08-15 NOTE — ANESTHESIA PREPROCEDURE EVALUATION
08/15/2022  Vandana Price is a 56 y.o., female.  Current Outpatient Medications   Medication Sig    aspirin (ECOTRIN) 81 MG EC tablet Take 81 mg by mouth once daily.    famotidine (PEPCID) 40 MG tablet     gabapentin (NEURONTIN) 300 MG capsule Take 1 capsule (300 mg total) by mouth nightly.    meloxicam (MOBIC) 15 MG tablet Take 1 tablet (15 mg total) by mouth daily as needed.    mesalamine (LIALDA) 1.2 gram TbEC Take 4 tablets (4.8 g total) by mouth daily with breakfast.     Current Facility-Administered Medications   Medication    0.9%  NaCl infusion    ondansetron injection 4 mg    prochlorperazine injection Soln 5 mg    sodium chloride 0.9% flush 10 mL     Active Ambulatory Problems     Diagnosis Date Noted    Allergic rhinitis due to pollen 06/22/2021    Abscess 08/09/2021    Anemia 09/30/2021    RLS (restless legs syndrome) 09/30/2021    Daytime sleepiness 09/30/2021    Other long term (current) drug therapy 09/30/2021    Elevated glucose 09/30/2021    Arthritis 10/14/2021    Deep vein thrombosis (DVT) of femoral vein of left lower extremity 10/14/2021    Chronic pain of both knees 10/14/2021    Bilateral primary osteoarthritis of knee     Lumbosacral spondylosis without myelopathy     PLMD (periodic limb movement disorder) 12/01/2021     Resolved Ambulatory Problems     Diagnosis Date Noted    Empyema of gallbladder 03/31/2021    Gangrene of gallbladder in cholecystitis 04/01/2021    Screening for diabetes mellitus 09/30/2021    Screening for lipoid disorders 09/30/2021    Well adult exam 10/26/2021     Past Medical History:   Diagnosis Date    Allergy     Eosinophilia     Hematochezia     Hyperlipidemia     Hypertension     Sleep apnea      Past Surgical History:   Procedure Laterality Date    Bilateral IA knee injection Bilateral 03/03/2021    D Shows    bladder tact        SECTION      CHOLECYSTECTOMY      FRACTURE SURGERY      jaw 1972    INJECTION OF ANESTHETIC AGENT AROUND NERVE Bilateral 2022    Procedure: GNB   BILATERAL;  Surgeon: Amanda Goetz MD;  Location: [facility]IN [facility];  Service: Pain Management;  Laterality: Bilateral;  PT HAS NOT HAD VAC   STATES WILL BE TESTED AT [facility] [place]    knee scope Bilateral     KNEE SURGERY      LAPAROSCOPIC CHOLECYSTECTOMY N/A 3/31/2021    Procedure: LAPAROSCOPIC CHOLECYSTECTOMY CONVERTED TO OPEN;  Surgeon: Darnell Mcgraw MD;  Location: [facility] OR;  Service: General;  Laterality: N/A;  CONVERTED TO OPEN    TUBAL LIGATION           Pre-op Assessment    I have reviewed the Patient Summary Reports.     I have reviewed the Nursing Notes. I have reviewed the NPO Status.   I have reviewed the Medications.     Review of Systems  Anesthesia Hx:  No problems with previous Anesthesia  Denies Family Hx of Anesthesia complications.   Denies Personal Hx of Anesthesia complications.   Social:  Former Smoker, No Alcohol Use    Hematology/Oncology:  Hematology Normal   Oncology Normal     EENT/Dental:EENT/Dental Normal   Cardiovascular:   Exercise tolerance: good Hypertension, well controlled hyperlipidemia NYHA Classification II    Pulmonary:   Sleep Apnea    Education provided regarding risk of obstructive sleep apnea     Renal/:  Renal/ Normal     Hepatic/GI:   GERD, well controlled    Musculoskeletal:   Arthritis     Neurological:  Neurology Normal    Endocrine:  Morbid Obesity / BMI > 40  Dermatological:  Skin Normal    Psych:  Psychiatric Normal             Physical Exam  General: Well nourished, Cooperative, Alert and Oriented    Airway:  Mallampati: II   Mouth Opening: Normal  TM Distance: Normal  Tongue: Normal  Neck ROM: Normal ROM    Dental:  Dentures    Chest/Lungs:  Normal Respiratory Rate        Anesthesia Plan  Type of Anesthesia, risks & benefits discussed:    Anesthesia Type: Gen Natural  Airway  Intra-op Monitoring Plan: Standard ASA Monitors  Post Op Pain Control Plan: multimodal analgesia  Induction:  IV  Informed Consent: Informed consent signed with the Patient and all parties understand the risks and agree with anesthesia plan.  All questions answered. Patient consented to blood products? Yes  ASA Score: 3  Day of Surgery Review of History & Physical: H&P Update referred to the surgeon/provider.I have interviewed and examined the patient. I have reviewed the patient's H&P dated: There are no significant changes.     Ready For Surgery From Anesthesia Perspective.     .

## 2022-08-16 LAB
ESTROGEN SERPL-MCNC: NORMAL PG/ML
INSULIN SERPL-ACNC: NORMAL U[IU]/ML
LAB AP GROSS DESCRIPTION: NORMAL
LAB AP LABORATORY NOTES: NORMAL
T3RU NFR SERPL: NORMAL %

## 2022-08-17 ENCOUNTER — PATIENT MESSAGE (OUTPATIENT)
Dept: GASTROENTEROLOGY | Facility: CLINIC | Age: 57
End: 2022-08-17
Payer: COMMERCIAL

## 2022-08-25 ENCOUNTER — OFFICE VISIT (OUTPATIENT)
Dept: GASTROENTEROLOGY | Facility: CLINIC | Age: 57
End: 2022-08-25
Payer: COMMERCIAL

## 2022-08-25 VITALS — HEART RATE: 74 BPM | DIASTOLIC BLOOD PRESSURE: 98 MMHG | OXYGEN SATURATION: 95 % | SYSTOLIC BLOOD PRESSURE: 159 MMHG

## 2022-08-25 DIAGNOSIS — K51.211 ULCERATIVE PROCTITIS WITH RECTAL BLEEDING: Primary | ICD-10-CM

## 2022-08-25 PROCEDURE — 1160F PR REVIEW ALL MEDS BY PRESCRIBER/CLIN PHARMACIST DOCUMENTED: ICD-10-PCS | Mod: ,,, | Performed by: NURSE PRACTITIONER

## 2022-08-25 PROCEDURE — 3080F PR MOST RECENT DIASTOLIC BLOOD PRESSURE >= 90 MM HG: ICD-10-PCS | Mod: ,,, | Performed by: NURSE PRACTITIONER

## 2022-08-25 PROCEDURE — 3077F PR MOST RECENT SYSTOLIC BLOOD PRESSURE >= 140 MM HG: ICD-10-PCS | Mod: ,,, | Performed by: NURSE PRACTITIONER

## 2022-08-25 PROCEDURE — 1159F PR MEDICATION LIST DOCUMENTED IN MEDICAL RECORD: ICD-10-PCS | Mod: ,,, | Performed by: NURSE PRACTITIONER

## 2022-08-25 PROCEDURE — 99214 OFFICE O/P EST MOD 30 MIN: CPT | Mod: ,,, | Performed by: NURSE PRACTITIONER

## 2022-08-25 PROCEDURE — 1160F RVW MEDS BY RX/DR IN RCRD: CPT | Mod: ,,, | Performed by: NURSE PRACTITIONER

## 2022-08-25 PROCEDURE — 3080F DIAST BP >= 90 MM HG: CPT | Mod: ,,, | Performed by: NURSE PRACTITIONER

## 2022-08-25 PROCEDURE — 99214 PR OFFICE/OUTPT VISIT, EST, LEVL IV, 30-39 MIN: ICD-10-PCS | Mod: ,,, | Performed by: NURSE PRACTITIONER

## 2022-08-25 PROCEDURE — 1159F MED LIST DOCD IN RCRD: CPT | Mod: ,,, | Performed by: NURSE PRACTITIONER

## 2022-08-25 PROCEDURE — 3077F SYST BP >= 140 MM HG: CPT | Mod: ,,, | Performed by: NURSE PRACTITIONER

## 2022-08-25 RX ORDER — USTEKINUMAB 130 MG/26ML
520 SOLUTION INTRAVENOUS ONCE
Qty: 104 ML | Refills: 11 | Status: SHIPPED | OUTPATIENT
Start: 2022-08-25 | End: 2022-08-25

## 2022-08-25 RX ORDER — USTEKINUMAB 90 MG/ML
90 INJECTION, SOLUTION SUBCUTANEOUS
Qty: 1 EACH | Refills: 11 | Status: SHIPPED | OUTPATIENT
Start: 2022-08-25 | End: 2022-09-26

## 2022-08-25 NOTE — PROGRESS NOTES
Pt is a 57 y/o WF here for IBD f/u. Having lower abd pain, fecal urgency, loose stool, mucus in stool, bright red blood in stool. On mesalamine 4.8 G PO. Worked initially, then stopped. Previously on mesalamine IA - same issue with working for a few weeks then stopped helping.    Colonoscopy 2 weeks ago showed Moderate erosions, erythema, and loss of vascularity in the distal 20cm of the rectum, compatible with Torres 2 disease. 3 small polyps removed from the cecum. Random TI and colon biopsies obtained.    Path: A. Cecal polyp, biopsies:  Tubulovillous adenoma    B. Terminal ileum, biopsies:  No significant microscopic pathologic change    C. Ascending colon, biopsies:  No significant microscopic pathologic change    D. Transverse colon, biopsies:  No significant microscopic pathologic change    E. Descending colon, biopsies:  No significant microscopic pathologic change    F. Sigmoid colon, biopsy:  No significant microscopic pathologic change    G. Rectum, biopsies:  Mildly active severe chronic colitis with ulceration and erosion; a properly controlled  CMV immunostain negative     Past Medical History:   Diagnosis Date    Allergy     Bilateral primary osteoarthritis of knee     Chronic pain of both knees 10/14/2021    Eosinophilia     Gangrene of gallbladder in cholecystitis 04/01/2021    Hematochezia     Hyperlipidemia     Hypertension     Sleep apnea      Review of Systems   Constitutional: Negative for chills and fever.   Respiratory: Negative for shortness of breath.    Cardiovascular: Negative for chest pain.   Gastrointestinal: Positive for abdominal pain and blood in stool. Negative for constipation, diarrhea, melena, nausea and vomiting.        Loose stool/mucus     Skin: Negative for rash.   Neurological: Negative for weakness.     Physical Exam  Vitals reviewed. Exam conducted with a chaperone present.   Constitutional:       General: She is not in acute distress.     Appearance: Normal  appearance.   HENT:      Head: Normocephalic.   Eyes:      General: No scleral icterus.     Pupils: Pupils are equal, round, and reactive to light.   Cardiovascular:      Rate and Rhythm: Normal rate.   Pulmonary:      Effort: Pulmonary effort is normal.   Abdominal:      General: There is no distension.      Palpations: Abdomen is soft.      Tenderness: There is no abdominal tenderness. There is no guarding or rebound.   Skin:     General: Skin is warm and dry.   Neurological:      General: No focal deficit present.      Mental Status: She is alert and oriented to person, place, and time. Mental status is at baseline.   Psychiatric:         Mood and Affect: Mood normal.         Behavior: Behavior normal.         Thought Content: Thought content normal.         Judgment: Judgment normal.         Plan  Discussed biologics with pt. She is agreeable because she wants her symptoms under control. D/w pt Entyvio due to safety profile & UC dx but she states she has hard to stick veins and does not like idea of infusions only. Wants something SQ at home. Will do Stelara.   -start Stelara - infusion weight based then Q8week injections  -CBC, CMP, TB today prior to biologic start  -RTC 3 months, sooner PRN

## 2022-09-01 DIAGNOSIS — K51.211 ULCERATIVE PROCTITIS WITH RECTAL BLEEDING: Primary | ICD-10-CM

## 2022-09-02 RX ORDER — METHYLPREDNISOLONE SOD SUCC 125 MG
40 VIAL (EA) INJECTION
OUTPATIENT
Start: 2022-09-06

## 2022-09-02 RX ORDER — ACETAMINOPHEN 325 MG/1
650 TABLET ORAL
OUTPATIENT
Start: 2022-09-06

## 2022-09-02 RX ORDER — IPRATROPIUM BROMIDE AND ALBUTEROL SULFATE 2.5; .5 MG/3ML; MG/3ML
3 SOLUTION RESPIRATORY (INHALATION)
OUTPATIENT
Start: 2022-09-06

## 2022-09-02 RX ORDER — EPINEPHRINE 1 MG/ML
0.3 INJECTION, SOLUTION, CONCENTRATE INTRAVENOUS
OUTPATIENT
Start: 2022-09-06

## 2022-09-02 RX ORDER — SODIUM CHLORIDE 0.9 % (FLUSH) 0.9 %
10 SYRINGE (ML) INJECTION
OUTPATIENT
Start: 2022-09-06

## 2022-09-02 RX ORDER — DIPHENHYDRAMINE HYDROCHLORIDE 50 MG/ML
25 INJECTION INTRAMUSCULAR; INTRAVENOUS
OUTPATIENT
Start: 2022-09-06

## 2022-09-06 ENCOUNTER — PATIENT MESSAGE (OUTPATIENT)
Dept: GASTROENTEROLOGY | Facility: CLINIC | Age: 57
End: 2022-09-06
Payer: COMMERCIAL

## 2022-09-06 ENCOUNTER — PATIENT MESSAGE (OUTPATIENT)
Dept: PAIN MEDICINE | Facility: CLINIC | Age: 57
End: 2022-09-06
Payer: COMMERCIAL

## 2022-09-14 ENCOUNTER — OFFICE VISIT (OUTPATIENT)
Dept: PAIN MEDICINE | Facility: CLINIC | Age: 57
End: 2022-09-14
Payer: COMMERCIAL

## 2022-09-14 VITALS
BODY MASS INDEX: 41.69 KG/M2 | HEIGHT: 67 IN | HEART RATE: 79 BPM | DIASTOLIC BLOOD PRESSURE: 98 MMHG | WEIGHT: 265.63 LBS | SYSTOLIC BLOOD PRESSURE: 166 MMHG

## 2022-09-14 DIAGNOSIS — G89.4 CHRONIC PAIN SYNDROME: Chronic | ICD-10-CM

## 2022-09-14 DIAGNOSIS — G89.29 CHRONIC PAIN OF BOTH KNEES: Chronic | ICD-10-CM

## 2022-09-14 DIAGNOSIS — M25.562 CHRONIC PAIN OF BOTH KNEES: Chronic | ICD-10-CM

## 2022-09-14 DIAGNOSIS — M25.561 CHRONIC PAIN OF BOTH KNEES: Chronic | ICD-10-CM

## 2022-09-14 DIAGNOSIS — M17.0 BILATERAL PRIMARY OSTEOARTHRITIS OF KNEE: Primary | Chronic | ICD-10-CM

## 2022-09-14 PROCEDURE — 99214 OFFICE O/P EST MOD 30 MIN: CPT | Mod: PBBFAC | Performed by: PAIN MEDICINE

## 2022-09-14 PROCEDURE — 99214 PR OFFICE/OUTPT VISIT, EST, LEVL IV, 30-39 MIN: ICD-10-PCS | Mod: S$PBB,,, | Performed by: PAIN MEDICINE

## 2022-09-14 PROCEDURE — 99214 OFFICE O/P EST MOD 30 MIN: CPT | Mod: S$PBB,,, | Performed by: PAIN MEDICINE

## 2022-09-14 PROCEDURE — 3077F SYST BP >= 140 MM HG: CPT | Mod: ,,, | Performed by: PAIN MEDICINE

## 2022-09-14 PROCEDURE — 3008F BODY MASS INDEX DOCD: CPT | Mod: ,,, | Performed by: PAIN MEDICINE

## 2022-09-14 PROCEDURE — 1159F MED LIST DOCD IN RCRD: CPT | Mod: ,,, | Performed by: PAIN MEDICINE

## 2022-09-14 PROCEDURE — 1159F PR MEDICATION LIST DOCUMENTED IN MEDICAL RECORD: ICD-10-PCS | Mod: ,,, | Performed by: PAIN MEDICINE

## 2022-09-14 PROCEDURE — 3080F PR MOST RECENT DIASTOLIC BLOOD PRESSURE >= 90 MM HG: ICD-10-PCS | Mod: ,,, | Performed by: PAIN MEDICINE

## 2022-09-14 PROCEDURE — 3008F PR BODY MASS INDEX (BMI) DOCUMENTED: ICD-10-PCS | Mod: ,,, | Performed by: PAIN MEDICINE

## 2022-09-14 PROCEDURE — 3077F PR MOST RECENT SYSTOLIC BLOOD PRESSURE >= 140 MM HG: ICD-10-PCS | Mod: ,,, | Performed by: PAIN MEDICINE

## 2022-09-14 PROCEDURE — 3080F DIAST BP >= 90 MM HG: CPT | Mod: ,,, | Performed by: PAIN MEDICINE

## 2022-09-14 RX ORDER — ACETAMINOPHEN AND CODEINE PHOSPHATE 300; 30 MG/1; MG/1
1 TABLET ORAL EVERY 4 HOURS PRN
Qty: 30 TABLET | Refills: 2 | Status: SHIPPED | OUTPATIENT
Start: 2022-09-14 | End: 2022-10-14

## 2022-09-14 NOTE — PROGRESS NOTES
She Disclaimer: This note has been generated using voice-recognition software. There may be typographical errors that have been missed during proof-reading        Patient ID: Vandana Price is a 57 y.o. female.      Chief Complaint: Knee Pain and Low-back Pain      57-year-old female returns for re-evaluation of bilateral knee pain.  She has a long history of knee pain for greater than 10 years.  She was initially treated in [place] with intra-articular knee injections and most recently with genicular radiofrequency procedures, July 2022.  She obtained greater than 80% pain relief but unfortunately the procedure lasted for only about a month.  She returns today reporting  the same level of pain severity.  She is not a surgical candidate due to BMI of greater than 40.  She does not desire opioids due to lack of benefit.  Medicinal marijuana was discussed with patient by Dr. Arias in great detail.  She desires to seek alternative treatment options since she is not a surgical candidate at this time.  Physical therapy and knee braces also failed to provide any relief.          Pain Assessment  Pain Assessment: 0-10  Pain Score:   7  Pain Location: Other (Comment) (bilateral knees and lower back)  Pain Orientation: Right, Left  Pain Descriptors: Aching, Burning, Constant, Dull, Sharp, Stabbing  Pain Frequency: Continuous  Pain Onset: Awakened from sleep  Clinical Progression: Gradually worsening  Aggravating Factors: Standing, Walking, Other (Comment) (sitting and lying)  Pain Intervention(s): Other (Comment) (no relief)      A's of Opioid Risk Assessment  Activity:Patient can barely perform ADL.   Analgesia:Patients pain is not controlled by current medication.   Adverse Effects: Patient denies constipation or sedation.  Aberrant Behavior:  reviewed with no aberrant drug seeking/taking behavior.      Patient denies any suicidal or homicidal ideations    Physical Therapy/Home Exercise: yes            Review of Systems    Constitutional: Negative.    HENT: Negative.     Eyes: Negative.    Respiratory: Negative.     Cardiovascular: Negative.    Gastrointestinal: Negative.    Endocrine: Negative.    Genitourinary: Negative.    Musculoskeletal:  Positive for arthralgias (Bilateral Knees).   Integumentary:  Negative.   Neurological: Negative.    Hematological: Negative.    Psychiatric/Behavioral: Negative.             Past Medical History:   Diagnosis Date    Allergy     Bilateral primary osteoarthritis of knee     Chronic pain of both knees 10/14/2021    Eosinophilia     Gangrene of gallbladder in cholecystitis 2021    Hematochezia     Hyperlipidemia     Hypertension     Sleep apnea      Past Surgical History:   Procedure Laterality Date    Bilateral IA knee injection Bilateral 2021    D Shows    bladder tact       SECTION      CHOLECYSTECTOMY      FRACTURE SURGERY      jaw 1972    INJECTION OF ANESTHETIC AGENT AROUND NERVE Bilateral 2022    Procedure: GNB   BILATERAL;  Surgeon: Amanda Goetz MD;  Location: [facility];  Service: Pain Management;  Laterality: Bilateral;  PT HAS NOT HAD VAC   STATES WILL BE TESTED AT [facility] [place]    knee scope Bilateral     KNEE SURGERY      LAPAROSCOPIC CHOLECYSTECTOMY N/A 3/31/2021    Procedure: LAPAROSCOPIC CHOLECYSTECTOMY CONVERTED TO OPEN;  Surgeon: Darnell Mcgraw MD;  Location: [facility] OR;  Service: General;  Laterality: N/A;  CONVERTED TO OPEN    TUBAL LIGATION       Social History     Socioeconomic History    Marital status: Single   Occupational History    Occupation:  at [employer]   Tobacco Use    Smoking status: Former     Packs/day: 1.00     Years: 12.00     Pack years: 12.00     Types: Cigarettes    Smokeless tobacco: Never   Substance and Sexual Activity    Alcohol use: Never    Drug use: Never    Sexual activity: Not Currently     Family History   Problem Relation Age of Onset    Hypertension Father     Heart disease Father     Alcohol abuse  Mother     Migraines Daughter     Lung cancer Maternal Aunt      Review of patient's allergies indicates:  No Known Allergies  has a current medication list which includes the following prescription(s): aspirin, famotidine, gabapentin, meloxicam, mesalamine, stelara, and acetaminophen-codeine 300-30mg.      Objective:  Vitals:    09/14/22 0843   BP: (!) 166/98   Pulse: 79        Physical Exam  Vitals and nursing note reviewed.   Constitutional:       General: She is not in acute distress.     Appearance: Normal appearance. She is obese. She is not ill-appearing, toxic-appearing or diaphoretic.   HENT:      Head: Normocephalic and atraumatic.      Nose: Nose normal.      Mouth/Throat:      Mouth: Mucous membranes are moist.   Eyes:      Extraocular Movements: Extraocular movements intact.      Pupils: Pupils are equal, round, and reactive to light.   Cardiovascular:      Rate and Rhythm: Normal rate and regular rhythm.      Heart sounds: Normal heart sounds.   Pulmonary:      Effort: Pulmonary effort is normal. No respiratory distress.      Breath sounds: Normal breath sounds. No stridor. No wheezing or rhonchi.   Abdominal:      General: Bowel sounds are normal.      Palpations: Abdomen is soft.   Musculoskeletal:         General: No swelling or deformity.      Cervical back: Normal and normal range of motion. No spasms or tenderness. No pain with movement. Normal range of motion.      Thoracic back: Normal.      Lumbar back: No spasms, tenderness or bony tenderness. Normal range of motion. Negative right straight leg raise test and negative left straight leg raise test. No scoliosis.      Right knee: Bony tenderness and crepitus present. Decreased range of motion. Tenderness present over the medial joint line.      Left knee: Crepitus present. Decreased range of motion. Tenderness present over the medial joint line.      Right lower leg: No edema.      Left lower leg: No edema.   Skin:     General: Skin is warm.    Neurological:      General: No focal deficit present.      Mental Status: She is alert and oriented to person, place, and time. Mental status is at baseline.      Cranial Nerves: No cranial nerve deficit.      Sensory: Sensation is intact. No sensory deficit.      Motor: No weakness.      Coordination: Coordination normal.      Gait: Gait normal.      Deep Tendon Reflexes: Reflexes are normal and symmetric.   Psychiatric:         Mood and Affect: Mood normal.         Behavior: Behavior normal.         Assessment:      1. Bilateral primary osteoarthritis of knee    2. Chronic pain of both knees    3. Chronic pain syndrome          Plan:  1. reviewed  2.Addiction, Dependency, Tolerance, Opioid abuse-misuse, Death, Diversion Discussed. Overdose reversal drug Naloxone discussed.  3.Refill/Continue medications for pain control and function       Requested Prescriptions     Signed Prescriptions Disp Refills    acetaminophen-codeine 300-30mg (TYLENOL #3) 300-30 mg Tab 30 tablet 2     Sig: Take 1 tablet by mouth every 4 (four) hours as needed.     4. Patient will seek medicinal marijuana for chronic pain     5. Patient will return to our clinic for re-evaluation as needed     report:  Reviewed and consistent with medication use as prescribed.      The total time spent for evaluation and management on 09/16/2022 including reviewing separately obtained history, performing a medically appropriate exam and evaluation, documenting clinical information in the health record, independently interpreting results and communicating them to the patient/family/caregiver, and ordering medications/tests/procedures was between 15-29 minutes.    The above plan and management options were discussed at length with patient. Patient is in agreement with the above and verbalized understanding. It will be communicated with the referring physician via electronic record, fax, or mail.

## 2022-09-14 NOTE — LETTER
"Tia "Tia" Albert was seen and treated in clinic by Dr Goetz for followup for knee pain on 9/14/2022.  She may return to work on  9-  If you have any questions or concerns, please don't hesitate to call.        "

## 2022-09-14 NOTE — LETTER
"Tia "Tia" Albert was seen and treated in our clinic today by Dr Goetz on 9/14/2022.  She may return to work on 9-  If you have any questions or concerns, please don't hesitate to call [phone]        "

## 2022-09-20 ENCOUNTER — PATIENT MESSAGE (OUTPATIENT)
Dept: GASTROENTEROLOGY | Facility: CLINIC | Age: 57
End: 2022-09-20
Payer: COMMERCIAL

## 2022-09-25 ENCOUNTER — PATIENT MESSAGE (OUTPATIENT)
Dept: PAIN MEDICINE | Facility: CLINIC | Age: 57
End: 2022-09-25
Payer: COMMERCIAL

## 2022-09-26 RX ORDER — ADALIMUMAB 80MG/0.8ML
80 KIT SUBCUTANEOUS ONCE
Qty: 3 PEN | Refills: 0 | Status: SHIPPED | OUTPATIENT
Start: 2022-09-26 | End: 2022-09-26

## 2022-09-26 RX ORDER — ADALIMUMAB 40MG/0.4ML
40 KIT SUBCUTANEOUS
Qty: 2 PEN | Refills: 2 | Status: SHIPPED | OUTPATIENT
Start: 2022-09-26 | End: 2022-10-05

## 2022-09-26 NOTE — PROGRESS NOTES
Stelara rejected by insurance, rejection letter states Humira is preferred. D/w Dr. Dillard about changing. Pt called to office, wants safest biologic but does not want infusions due to bad veins from prior IVDU in past. Has never taken biologics before. D/w pt Humira, Zeposia, and Entyvio. Pt and I agree on Humira.

## 2022-10-03 NOTE — TELEPHONE ENCOUNTER
I sent it to [facility] here in [place] on 9/26/22. Any biologic takes some time to process the paperwork with insurance. There will be directions but you can come by the office anytime if you need clarification. You will take 1 shot (it will be a 40 mg pen injector) every 2 weeks.

## 2022-10-04 ENCOUNTER — PATIENT MESSAGE (OUTPATIENT)
Dept: GASTROENTEROLOGY | Facility: CLINIC | Age: 57
End: 2022-10-04
Payer: COMMERCIAL

## 2022-10-05 ENCOUNTER — TELEPHONE (OUTPATIENT)
Dept: GASTROENTEROLOGY | Facility: CLINIC | Age: 57
End: 2022-10-05
Payer: COMMERCIAL

## 2022-10-05 ENCOUNTER — OFFICE VISIT (OUTPATIENT)
Dept: SLEEP MEDICINE | Facility: CLINIC | Age: 57
End: 2022-10-05
Attending: FAMILY MEDICINE
Payer: COMMERCIAL

## 2022-10-05 VITALS
SYSTOLIC BLOOD PRESSURE: 166 MMHG | DIASTOLIC BLOOD PRESSURE: 99 MMHG | WEIGHT: 265.81 LBS | OXYGEN SATURATION: 97 % | HEIGHT: 67 IN | HEART RATE: 68 BPM | BODY MASS INDEX: 41.72 KG/M2

## 2022-10-05 DIAGNOSIS — G47.33 OSA ON CPAP: Primary | ICD-10-CM

## 2022-10-05 DIAGNOSIS — E66.01 CLASS 3 SEVERE OBESITY DUE TO EXCESS CALORIES WITH BODY MASS INDEX (BMI) OF 40.0 TO 44.9 IN ADULT, UNSPECIFIED WHETHER SERIOUS COMORBIDITY PRESENT: ICD-10-CM

## 2022-10-05 PROBLEM — E66.813 CLASS 3 SEVERE OBESITY DUE TO EXCESS CALORIES WITH BODY MASS INDEX (BMI) OF 40.0 TO 44.9 IN ADULT: Status: ACTIVE | Noted: 2022-10-05

## 2022-10-05 PROCEDURE — 1159F MED LIST DOCD IN RCRD: CPT | Mod: ,,, | Performed by: FAMILY MEDICINE

## 2022-10-05 PROCEDURE — 1159F PR MEDICATION LIST DOCUMENTED IN MEDICAL RECORD: ICD-10-PCS | Mod: ,,, | Performed by: FAMILY MEDICINE

## 2022-10-05 PROCEDURE — 99213 PR OFFICE/OUTPT VISIT, EST, LEVL III, 20-29 MIN: ICD-10-PCS | Mod: S$PBB,,, | Performed by: FAMILY MEDICINE

## 2022-10-05 PROCEDURE — 1160F PR REVIEW ALL MEDS BY PRESCRIBER/CLIN PHARMACIST DOCUMENTED: ICD-10-PCS | Mod: ,,, | Performed by: FAMILY MEDICINE

## 2022-10-05 PROCEDURE — 3008F BODY MASS INDEX DOCD: CPT | Mod: ,,, | Performed by: FAMILY MEDICINE

## 2022-10-05 PROCEDURE — 3008F PR BODY MASS INDEX (BMI) DOCUMENTED: ICD-10-PCS | Mod: ,,, | Performed by: FAMILY MEDICINE

## 2022-10-05 PROCEDURE — 3077F PR MOST RECENT SYSTOLIC BLOOD PRESSURE >= 140 MM HG: ICD-10-PCS | Mod: ,,, | Performed by: FAMILY MEDICINE

## 2022-10-05 PROCEDURE — 1160F RVW MEDS BY RX/DR IN RCRD: CPT | Mod: ,,, | Performed by: FAMILY MEDICINE

## 2022-10-05 PROCEDURE — 3077F SYST BP >= 140 MM HG: CPT | Mod: ,,, | Performed by: FAMILY MEDICINE

## 2022-10-05 PROCEDURE — 3080F DIAST BP >= 90 MM HG: CPT | Mod: ,,, | Performed by: FAMILY MEDICINE

## 2022-10-05 PROCEDURE — 99213 OFFICE O/P EST LOW 20 MIN: CPT | Mod: PBBFAC,PN | Performed by: FAMILY MEDICINE

## 2022-10-05 PROCEDURE — 99213 OFFICE O/P EST LOW 20 MIN: CPT | Mod: S$PBB,,, | Performed by: FAMILY MEDICINE

## 2022-10-05 PROCEDURE — 3080F PR MOST RECENT DIASTOLIC BLOOD PRESSURE >= 90 MM HG: ICD-10-PCS | Mod: ,,, | Performed by: FAMILY MEDICINE

## 2022-10-05 NOTE — TELEPHONE ENCOUNTER
Called [facility], spoke with clifton, asked for update on theresa CHESTER. Clifton states the PA was denied because no clinical information was sent to insurance. Faxed clinical notes, labs, colonoscopy and path results to [facility] at [phone]. Clifton states they will resend PA.

## 2022-10-05 NOTE — PROGRESS NOTES
Subjective:       Patient ID: Vandana Price is a 57 y.o. female.    Chief Complaint: Sleep Apnea (CPAP management)    Patient follows up in sleep clinic for 1st face-to-face visit on auto PAP that she is had for approximately 2 months.  The patient was initially studied in December of 2021 and found to have an AHI of 14.1 with a low O2 saturation of 66% by home sleep apnea testing.  And auto PAP was ordered at that time but due to circumstances the patient was unable to get her CPAP until recently.  Patient's compliance is 9.8% with an average AHI of 1.2 at a best pressure of 6.4 cm H2O.  Due to lack of use of her auto PAP this pressure is not accurate.  Durand score is 10 and patient uses a fullface mask.  The patient states she feels like the machine is not only pushing air but pulling air back out of her mouth.  I have asked her to return to her DME and have adjustments made to her CPAP.  I discussed not treating her KYRIE and the risks involved but the patient states that she will not wear it but a few hours at a time due to the fact that she does not like to have marks on her face from using CPAP when she goes to work.  I discussed possible surgical procedures and patient is not interested.    Review of Systems   Constitutional:  Positive for fatigue.        Negative EDS   Respiratory:  Positive for apnea.    Psychiatric/Behavioral:  Positive for sleep disturbance.        Objective:      Physical Exam  Constitutional:       Appearance: She is obese.   Cardiovascular:      Rate and Rhythm: Normal rate and regular rhythm.      Heart sounds: No murmur heard.  Pulmonary:      Breath sounds: Normal breath sounds.   Skin:     General: Skin is warm and dry.   Neurological:      Mental Status: She is alert and oriented to person, place, and time.       Assessment:       Problem List Items Addressed This Visit          Endocrine    Class 3 severe obesity due to excess calories with body mass index (BMI) of 40.0 to 44.9 in  adult       Other    KYRIE on CPAP - Primary         Plan:       Continue auto CPAP @ 5-20 cm H20  Caution while operating a motor vehicle  Weight loss management  Prescription for new supplies  Follow up sleep clinic in 1 month.

## 2022-10-17 ENCOUNTER — PATIENT MESSAGE (OUTPATIENT)
Dept: GASTROENTEROLOGY | Facility: CLINIC | Age: 57
End: 2022-10-17
Payer: COMMERCIAL

## 2022-10-17 DIAGNOSIS — Z87.19 HISTORY OF INFLAMMATORY BOWEL DISEASE: ICD-10-CM

## 2022-10-17 DIAGNOSIS — M25.50 ARTHRALGIA, UNSPECIFIED JOINT: Primary | ICD-10-CM

## 2022-10-17 RX ORDER — ONDANSETRON 4 MG/1
4 TABLET, ORALLY DISINTEGRATING ORAL EVERY 8 HOURS PRN
Qty: 12 TABLET | Refills: 0 | Status: SHIPPED | OUTPATIENT
Start: 2022-10-17 | End: 2022-12-12

## 2022-10-19 ENCOUNTER — OFFICE VISIT (OUTPATIENT)
Dept: PAIN MEDICINE | Facility: CLINIC | Age: 57
End: 2022-10-19
Payer: COMMERCIAL

## 2022-10-19 ENCOUNTER — PATIENT MESSAGE (OUTPATIENT)
Dept: PAIN MEDICINE | Facility: CLINIC | Age: 57
End: 2022-10-19
Payer: COMMERCIAL

## 2022-10-19 VITALS
HEIGHT: 67 IN | HEART RATE: 76 BPM | WEIGHT: 262 LBS | DIASTOLIC BLOOD PRESSURE: 65 MMHG | SYSTOLIC BLOOD PRESSURE: 159 MMHG | BODY MASS INDEX: 41.12 KG/M2

## 2022-10-19 DIAGNOSIS — M25.561 CHRONIC PAIN OF BOTH KNEES: Primary | Chronic | ICD-10-CM

## 2022-10-19 DIAGNOSIS — M25.562 CHRONIC PAIN OF BOTH KNEES: Primary | Chronic | ICD-10-CM

## 2022-10-19 DIAGNOSIS — M17.0 BILATERAL PRIMARY OSTEOARTHRITIS OF KNEE: Chronic | ICD-10-CM

## 2022-10-19 DIAGNOSIS — Z79.899 ENCOUNTER FOR LONG-TERM (CURRENT) USE OF OTHER MEDICATIONS: ICD-10-CM

## 2022-10-19 DIAGNOSIS — M47.817 LUMBOSACRAL SPONDYLOSIS WITHOUT MYELOPATHY: Chronic | ICD-10-CM

## 2022-10-19 DIAGNOSIS — G89.29 CHRONIC PAIN OF BOTH KNEES: Primary | Chronic | ICD-10-CM

## 2022-10-19 DIAGNOSIS — G89.4 CHRONIC PAIN SYNDROME: Chronic | ICD-10-CM

## 2022-10-19 PROCEDURE — 1159F MED LIST DOCD IN RCRD: CPT | Mod: ,,, | Performed by: PAIN MEDICINE

## 2022-10-19 PROCEDURE — 3077F SYST BP >= 140 MM HG: CPT | Mod: ,,, | Performed by: PAIN MEDICINE

## 2022-10-19 PROCEDURE — 99215 OFFICE O/P EST HI 40 MIN: CPT | Mod: PBBFAC | Performed by: PAIN MEDICINE

## 2022-10-19 PROCEDURE — 99214 PR OFFICE/OUTPT VISIT, EST, LEVL IV, 30-39 MIN: ICD-10-PCS | Mod: S$PBB,,, | Performed by: PAIN MEDICINE

## 2022-10-19 PROCEDURE — 80305 DRUG TEST PRSMV DIR OPT OBS: CPT | Mod: PBBFAC | Performed by: PAIN MEDICINE

## 2022-10-19 PROCEDURE — 3077F PR MOST RECENT SYSTOLIC BLOOD PRESSURE >= 140 MM HG: ICD-10-PCS | Mod: ,,, | Performed by: PAIN MEDICINE

## 2022-10-19 PROCEDURE — 1159F PR MEDICATION LIST DOCUMENTED IN MEDICAL RECORD: ICD-10-PCS | Mod: ,,, | Performed by: PAIN MEDICINE

## 2022-10-19 PROCEDURE — 3078F PR MOST RECENT DIASTOLIC BLOOD PRESSURE < 80 MM HG: ICD-10-PCS | Mod: ,,, | Performed by: PAIN MEDICINE

## 2022-10-19 PROCEDURE — 3078F DIAST BP <80 MM HG: CPT | Mod: ,,, | Performed by: PAIN MEDICINE

## 2022-10-19 PROCEDURE — 99214 OFFICE O/P EST MOD 30 MIN: CPT | Mod: S$PBB,,, | Performed by: PAIN MEDICINE

## 2022-10-19 RX ORDER — ACETAMINOPHEN AND CODEINE PHOSPHATE 300; 30 MG/1; MG/1
1 TABLET ORAL EVERY 12 HOURS
Qty: 60 TABLET | Refills: 0 | Status: SHIPPED | OUTPATIENT
Start: 2022-10-19 | End: 2022-11-18

## 2022-10-19 RX ORDER — ACETAMINOPHEN AND CODEINE PHOSPHATE 300; 30 MG/1; MG/1
1 TABLET ORAL EVERY 4 HOURS PRN
Status: ON HOLD | COMMUNITY
End: 2022-12-15

## 2022-10-19 RX ORDER — CELECOXIB 200 MG/1
200 CAPSULE ORAL DAILY
Qty: 30 CAPSULE | Refills: 1 | Status: SHIPPED | OUTPATIENT
Start: 2022-10-19 | End: 2022-11-02 | Stop reason: SDUPTHER

## 2022-10-19 NOTE — LETTER
"Tia "Tia" Albert was seen and treated in our clinic by Dr Goetz for knee pain on 10/19/2022.  She may return to work on 10-.  If you have any questions or concerns, please don't hesitate to call [phone].  "

## 2022-10-19 NOTE — LETTER
"Tia "Tia" Albert was seen and treated in our clinic by Dr Goetz for followup for knee pain on 10/19/2022.  She may return to work on 10-  If you have any questions or concerns, please don't hesitate to call [phone].        "

## 2022-10-19 NOTE — PROGRESS NOTES
She Disclaimer: This note has been generated using voice-recognition software. There may be typographical errors that have been missed during proof-reading        Patient ID: Vandana Price is a 57 y.o. female.      Chief Complaint: Low-back Pain      57-year-old female returns for re-evaluation of intractable bilateral knee pain.  She is s/p  genicular RF, July 2022 and received greater than 80% pain relief for 1-2 months.  The pain has returned and she presents today requesting intra-articular knee injections with steroids.  Tylenol No. 3 has provided minimal relief.  Mobic is providing minimal improvement.  She is awaiting to start Humira in 1 week.  She did not follow with Dr. Arias  to discuss medicinal marijuana.  She complains of difficulty with standing and going from a sitting to standing position.  She does not desire stronger opiates.  Physical therapy and knee braces failed to provide relief in the past.  She is requesting medication adjustment.        Pain Assessment  Pain Assessment: 0-10  Pain Score:   8  Pain Location: Other (Comment) (bilateral knees and lower back)  Pain Descriptors: Aching, Burning, Constant, Sharp, Dull, Stabbing  Pain Frequency: Continuous  Pain Onset: Awakened from sleep  Clinical Progression: Gradually worsening  Aggravating Factors: Standing, Walking, Other (Comment) (sitting and lying)      A's of Opioid Risk Assessment  Activity:Patient can partially perform ADL.   Analgesia:Patients pain is  controlled by current medication.   Adverse Effects: Patient denies constipation or sedation.  Aberrant Behavior:  reviewed with no aberrant drug seeking/taking behavior.      Patient denies any suicidal or homicidal ideations    Physical Therapy/Home Exercise: yes          Review of Systems   Constitutional: Negative.    HENT: Negative.     Eyes: Negative.    Respiratory: Negative.     Cardiovascular: Negative.    Gastrointestinal: Negative.    Endocrine: Negative.    Genitourinary:  Negative.    Musculoskeletal:  Positive for arthralgias (Bilateral knees), back pain and gait problem.   Integumentary:  Negative.   Hematological: Negative.    Psychiatric/Behavioral:  Positive for sleep disturbance.            Past Medical History:   Diagnosis Date    Allergy     Bilateral primary osteoarthritis of knee     Chronic pain of both knees 10/14/2021    Eosinophilia     Gangrene of gallbladder in cholecystitis 2021    Hematochezia     Hyperlipidemia     Hypertension     Sleep apnea      Past Surgical History:   Procedure Laterality Date    Bilateral IA knee injection Bilateral 2021    D Shows    bladder tact       SECTION      CHOLECYSTECTOMY      FRACTURE SURGERY      jaw 1972    INJECTION OF ANESTHETIC AGENT AROUND NERVE Bilateral 2022    Procedure: GNB   BILATERAL;  Surgeon: Amanda Goetz MD;  Location: [facility];  Service: Pain Management;  Laterality: Bilateral;  PT HAS NOT HAD VAC   STATES WILL BE TESTED AT [facility] [place]    knee scope Bilateral     KNEE SURGERY      LAPAROSCOPIC CHOLECYSTECTOMY N/A 3/31/2021    Procedure: LAPAROSCOPIC CHOLECYSTECTOMY CONVERTED TO OPEN;  Surgeon: Darnell Mcgraw MD;  Location: [facility] OR;  Service: General;  Laterality: N/A;  CONVERTED TO OPEN    TUBAL LIGATION       Social History     Socioeconomic History    Marital status: Single   Occupational History    Occupation:  at [employer]   Tobacco Use    Smoking status: Former     Packs/day: 1.00     Years: 12.00     Pack years: 12.00     Types: Cigarettes    Smokeless tobacco: Never   Substance and Sexual Activity    Alcohol use: Never    Drug use: Never    Sexual activity: Not Currently     Family History   Problem Relation Age of Onset    Hypertension Father     Heart disease Father     Alcohol abuse Mother     Migraines Daughter     Lung cancer Maternal Aunt      Review of patient's allergies indicates:  No Known Allergies  has a current medication list which includes  the following prescription(s): acetaminophen-codeine 300-30mg, aspirin, famotidine, gabapentin, meloxicam, ondansetron, acetaminophen-codeine 300-30mg, and celecoxib.      Objective:  Vitals:    10/19/22 0800   BP: (!) 159/65   Pulse: 76        Physical Exam  Vitals and nursing note reviewed.   Constitutional:       General: She is not in acute distress.     Appearance: Normal appearance. She is not ill-appearing, toxic-appearing or diaphoretic.   HENT:      Head: Normocephalic and atraumatic.      Nose: Nose normal.      Mouth/Throat:      Mouth: Mucous membranes are moist.   Eyes:      Extraocular Movements: Extraocular movements intact.      Pupils: Pupils are equal, round, and reactive to light.   Cardiovascular:      Rate and Rhythm: Normal rate and regular rhythm.      Heart sounds: Normal heart sounds.   Pulmonary:      Effort: Pulmonary effort is normal. No respiratory distress.      Breath sounds: Normal breath sounds. No stridor. No wheezing or rhonchi.   Abdominal:      General: Bowel sounds are normal.      Palpations: Abdomen is soft.   Musculoskeletal:         General: No swelling or deformity.      Cervical back: Normal and normal range of motion. No spasms or tenderness. No pain with movement. Normal range of motion.      Thoracic back: Normal.      Lumbar back: Tenderness and bony tenderness present. No spasms. Decreased range of motion. Negative right straight leg raise test and negative left straight leg raise test. No scoliosis.      Right knee: Crepitus present. Decreased range of motion. Tenderness present.      Left knee: Crepitus present. Decreased range of motion. Tenderness present.      Right lower leg: No edema.      Left lower leg: No edema.   Skin:     General: Skin is warm.   Neurological:      General: No focal deficit present.      Mental Status: She is alert and oriented to person, place, and time. Mental status is at baseline.      Cranial Nerves: No cranial nerve deficit.       Sensory: Sensation is intact. No sensory deficit.      Motor: No weakness.      Coordination: Coordination normal.      Gait: Gait normal.      Deep Tendon Reflexes: Reflexes are normal and symmetric.   Psychiatric:         Mood and Affect: Mood normal.         Behavior: Behavior normal.         Assessment:      1. Chronic pain of both knees    2. Encounter for long-term (current) use of other medications    3. Chronic pain syndrome    4. Is    5. Bilateral primary osteoarthritis of knee            Plan:  1. reviewed  2.Addiction, Dependency, Tolerance, Opioid abuse-misuse, Death, Diversion Discussed. Overdose reversal drug Naloxone discussed.  3.Refill/Continue medications for pain control and function.  Discontinue Mobic and start Celebrex for diffuse joint osteoarthritis.  Increase Tylenol No. 3 to twice a day for better pain relief       Requested Prescriptions     Signed Prescriptions Disp Refills    celecoxib (CELEBREX) 200 MG capsule 30 capsule 1     Sig: Take 1 capsule (200 mg total) by mouth once daily.    acetaminophen-codeine 300-30mg (TYLENOL #3) 300-30 mg Tab 60 tablet 0     Sig: Take 1 tablet by mouth every 12 (twelve) hours.     4.Urine drug screen point of care obtained and consistent with prescribed medications and medication refill date  5. Office bilateral intra-articular knee injections for bilateral knee pain and osteoarthritis    Orders Placed This Encounter   Procedures    POCT Urine Drug Screen Presump     Interpretive Information:     Negative:  No drug detected at the cut off level.   Positive:  This result represents presumptive positive for the   tested drug, other substances may yield a positive response other   than the analyte of interest. This result should be utilized for   diagnostic purpose only. Confirmation testing will be performed upon physician request only.       Case Request Operating Room: Bilateral knee intra-articular injection     Order Specific Question:   Medical  Necessity:     Answer:   Medically Non-Urgent [100]     Order Specific Question:   CPT Code:     Answer:   AL DRAIN/INJECT LARGE JOINT/BURSA [20610]     Order Specific Question:   Post-Procedure Disposition:     Answer:   PACU [1]     Order Specific Question:   Estimated Length of Stay:     Answer:   0 midnight     Order Specific Question:   Implant Required:     Answer:   No [1001]     Order Specific Question:   Is an on-site pathologist required for this procedure?     Answer:   N/A      6. Patient defers surgical knee replacement  7. Patient to start Humira in 1 week for diffuse joint pain and osteoarthritis     report:  Reviewed and consistent with medication use as prescribed.      The total time spent for evaluation and management on 10/20/2022 including reviewing separately obtained history, performing a medically appropriate exam and evaluation, documenting clinical information in the health record, independently interpreting results and communicating them to the patient/family/caregiver, and ordering medications/tests/procedures was between 15-29 minutes.    The above plan and management options were discussed at length with patient. Patient is in agreement with the above and verbalized understanding. It will be communicated with the referring physician via electronic record, fax, or mail.

## 2022-10-20 ENCOUNTER — PATIENT MESSAGE (OUTPATIENT)
Dept: PAIN MEDICINE | Facility: CLINIC | Age: 57
End: 2022-10-20
Payer: COMMERCIAL

## 2022-10-21 ENCOUNTER — PATIENT MESSAGE (OUTPATIENT)
Dept: GASTROENTEROLOGY | Facility: CLINIC | Age: 57
End: 2022-10-21
Payer: COMMERCIAL

## 2022-10-28 ENCOUNTER — PATIENT MESSAGE (OUTPATIENT)
Dept: GASTROENTEROLOGY | Facility: CLINIC | Age: 57
End: 2022-10-28
Payer: COMMERCIAL

## 2022-10-31 ENCOUNTER — OFFICE VISIT (OUTPATIENT)
Dept: PAIN MEDICINE | Facility: CLINIC | Age: 57
End: 2022-10-31
Payer: COMMERCIAL

## 2022-10-31 VITALS
SYSTOLIC BLOOD PRESSURE: 133 MMHG | WEIGHT: 250 LBS | DIASTOLIC BLOOD PRESSURE: 87 MMHG | BODY MASS INDEX: 39.24 KG/M2 | HEIGHT: 67 IN | HEART RATE: 78 BPM

## 2022-10-31 DIAGNOSIS — M25.561 CHRONIC PAIN OF BOTH KNEES: Primary | ICD-10-CM

## 2022-10-31 DIAGNOSIS — G89.29 CHRONIC PAIN OF BOTH KNEES: Primary | ICD-10-CM

## 2022-10-31 DIAGNOSIS — M25.562 CHRONIC PAIN OF BOTH KNEES: Primary | ICD-10-CM

## 2022-10-31 PROCEDURE — 3079F PR MOST RECENT DIASTOLIC BLOOD PRESSURE 80-89 MM HG: ICD-10-PCS | Mod: ,,, | Performed by: PAIN MEDICINE

## 2022-10-31 PROCEDURE — 3008F BODY MASS INDEX DOCD: CPT | Mod: ,,, | Performed by: PAIN MEDICINE

## 2022-10-31 PROCEDURE — 3008F PR BODY MASS INDEX (BMI) DOCUMENTED: ICD-10-PCS | Mod: ,,, | Performed by: PAIN MEDICINE

## 2022-10-31 PROCEDURE — 3044F PR MOST RECENT HEMOGLOBIN A1C LEVEL <7.0%: ICD-10-PCS | Mod: ,,, | Performed by: PAIN MEDICINE

## 2022-10-31 PROCEDURE — 3075F SYST BP GE 130 - 139MM HG: CPT | Mod: ,,, | Performed by: PAIN MEDICINE

## 2022-10-31 PROCEDURE — 99214 OFFICE O/P EST MOD 30 MIN: CPT | Mod: PBBFAC | Performed by: PAIN MEDICINE

## 2022-10-31 PROCEDURE — 99212 OFFICE O/P EST SF 10 MIN: CPT | Mod: S$PBB,,, | Performed by: PAIN MEDICINE

## 2022-10-31 PROCEDURE — 3044F HG A1C LEVEL LT 7.0%: CPT | Mod: ,,, | Performed by: PAIN MEDICINE

## 2022-10-31 PROCEDURE — 3079F DIAST BP 80-89 MM HG: CPT | Mod: ,,, | Performed by: PAIN MEDICINE

## 2022-10-31 PROCEDURE — 3075F PR MOST RECENT SYSTOLIC BLOOD PRESS GE 130-139MM HG: ICD-10-PCS | Mod: ,,, | Performed by: PAIN MEDICINE

## 2022-10-31 PROCEDURE — 99212 PR OFFICE/OUTPT VISIT, EST, LEVL II, 10-19 MIN: ICD-10-PCS | Mod: S$PBB,,, | Performed by: PAIN MEDICINE

## 2022-10-31 RX ORDER — ADALIMUMAB 80MG/0.8ML
KIT SUBCUTANEOUS
COMMUNITY
Start: 2022-10-13 | End: 2023-03-09

## 2022-10-31 NOTE — PROGRESS NOTES
She Disclaimer: This note has been generated using voice-recognition software. There may be typographical errors that have been missed during proof-reading        Patient ID: Vandana Price is a 57 y.o. female.      Chief Complaint: Knee Pain and Low-back Pain      Patient returns today for bilateral intra-articular knee injections.          Pain Assessment  Pain Assessment: 0-10  Pain Score:   8  Pain Location: Other (Comment) (bilateral knees and lower back)  Pain Orientation: Right, Left  Pain Descriptors: Aching, Burning, Constant, Sharp, Dull, Stabbing  Pain Frequency: Continuous  Pain Onset: Awakened from sleep  Clinical Progression: Gradually worsening  Aggravating Factors: Standing, Walking, Other (Comment) (sitting and lying)  Pain Intervention(s): Other (Comment) (no relief)      A's of Opioid Risk Assessment  Activity:Patient can perform ADL.   Analgesia:Patients pain is not controlled by current medication.   Adverse Effects: Patient denies constipation or sedation.  Aberrant Behavior:  reviewed with no aberrant drug seeking/taking behavior.      Patient denies any suicidal or homicidal ideations    Physical Therapy/Home Exercise: yes      X-Ray Foot Complete 3 view Right  Narrative: EXAMINATION:  XR FOOT COMPLETE 3 VIEW RIGHT    CLINICAL HISTORY:  Pain in right foot    COMPARISON:  Right calcaneus dated 02/09/2022    FINDINGS:  Osteoarthritis is present at the talotibial joint.  A mild pes planus deformity is seen with mild osteoarthritis at the talonavicular joint.  There are calcaneal spurs at the insertion site of the plantar fascia and at the insertion site of the Achilles tendon.    No abnormalities are seen in the forefoot.  Impression: Osteoarthritis is present at the talotibial joint and talonavicular joint and there are calcaneal spurs.    [facility] of service: [facility]    Electronically signed by: Diamante Vallejo  Date:    11/02/2022  Time:    17:07      Review of Systems    Constitutional: Negative.    HENT: Negative.     Eyes: Negative.    Respiratory: Negative.     Cardiovascular: Negative.    Gastrointestinal: Negative.    Endocrine: Negative.    Genitourinary: Negative.    Musculoskeletal:  Positive for arthralgias (Bilateral knees).   Integumentary:  Negative.   Neurological: Negative.    Hematological: Negative.    Psychiatric/Behavioral: Negative.             Past Medical History:   Diagnosis Date    Allergy     Bilateral primary osteoarthritis of knee     Chronic pain of both knees 10/14/2021    Eosinophilia     Gangrene of gallbladder in cholecystitis 2021    Hematochezia     History of colonoscopy     Hyperlipidemia     Hypertension     Sleep apnea      Past Surgical History:   Procedure Laterality Date    Bilateral IA knee injection Bilateral 2021    D Shows    bladder tact       SECTION      CHOLECYSTECTOMY      FRACTURE SURGERY      jaw 1972    INJECTION OF ANESTHETIC AGENT AROUND NERVE Bilateral 2022    Procedure: GNB   BILATERAL;  Surgeon: Amanda Goetz MD;  Location: [facility] PAIN [facility];  Service: Pain Management;  Laterality: Bilateral;  PT HAS NOT HAD VAC   STATES WILL BE TESTED AT [facility] [place]    knee scope Bilateral     KNEE SURGERY      LAPAROSCOPIC CHOLECYSTECTOMY N/A 3/31/2021    Procedure: LAPAROSCOPIC CHOLECYSTECTOMY CONVERTED TO OPEN;  Surgeon: Darnell Mcgraw MD;  Location: [facility] OR;  Service: General;  Laterality: N/A;  CONVERTED TO OPEN    TUBAL LIGATION       Social History     Socioeconomic History    Marital status: Single   Occupational History    Occupation:  at [employer]   Tobacco Use    Smoking status: Former     Packs/day: 1.00     Years: 12.00     Pack years: 12.00     Types: Cigarettes     Quit date:      Years since quittin.8    Smokeless tobacco: Never   Substance and Sexual Activity    Alcohol use: Never    Drug use: Never    Sexual activity: Not Currently     Family History   Problem Relation  Age of Onset    Hypertension Father     Heart disease Father     Alcohol abuse Mother     Migraines Daughter     Lung cancer Maternal Aunt      Review of patient's allergies indicates:  No Known Allergies  has a current medication list which includes the following prescription(s): acetaminophen-codeine 300-30mg, acetaminophen-codeine 300-30mg, aspirin, famotidine, gabapentin, humira(cf) pen [id number]-[id number], ondansetron, and celecoxib, and the following Facility-Administered Medications: cyanocobalamin.      Objective:  Vitals:    10/31/22 0754   BP: 133/87   Pulse: 78        Physical Exam  Vitals and nursing note reviewed.   Constitutional:       General: She is not in acute distress.     Appearance: Normal appearance. She is not ill-appearing, toxic-appearing or diaphoretic.   HENT:      Head: Normocephalic and atraumatic.      Nose: Nose normal.      Mouth/Throat:      Mouth: Mucous membranes are moist.   Eyes:      Extraocular Movements: Extraocular movements intact.      Pupils: Pupils are equal, round, and reactive to light.   Cardiovascular:      Rate and Rhythm: Normal rate and regular rhythm.      Heart sounds: Normal heart sounds.   Pulmonary:      Effort: Pulmonary effort is normal. No respiratory distress.      Breath sounds: Normal breath sounds. No stridor. No wheezing or rhonchi.   Abdominal:      General: Bowel sounds are normal.      Palpations: Abdomen is soft.   Musculoskeletal:         General: No swelling or deformity.      Cervical back: Normal and normal range of motion. No spasms or tenderness. No pain with movement. Normal range of motion.      Thoracic back: Normal.      Lumbar back: No spasms, tenderness or bony tenderness. Normal range of motion. Negative right straight leg raise test and negative left straight leg raise test. No scoliosis.      Right knee: Bony tenderness present. Decreased range of motion. Tenderness present over the medial joint line.      Left knee: Bony tenderness  present. Decreased range of motion. Tenderness present over the medial joint line.      Right lower leg: No edema.      Left lower leg: No edema.   Skin:     General: Skin is warm.   Neurological:      General: No focal deficit present.      Mental Status: She is alert and oriented to person, place, and time. Mental status is at baseline.      Cranial Nerves: No cranial nerve deficit.      Sensory: Sensation is intact. No sensory deficit.      Motor: No weakness.      Coordination: Coordination normal.      Gait: Gait normal.      Deep Tendon Reflexes: Reflexes are normal and symmetric.   Psychiatric:         Mood and Affect: Mood normal.         Behavior: Behavior normal.         Assessment:      1. Chronic pain of both knees            Plan:  1. Patient returned for bilateral intra-articular knee injections today in office setting.     report:  Reviewed and consistent with medication use as prescribed.      The total time spent for evaluation and management on 11/07/2022 including reviewing separately obtained history, performing a medically appropriate exam and evaluation, documenting clinical information in the health record, independently interpreting results and communicating them to the patient/family/caregiver, and ordering medications/tests/procedures was between 15-29 minutes.    The above plan and management options were discussed at length with patient. Patient is in agreement with the above and verbalized understanding. It will be communicated with the referring physician via electronic record, fax, or mail.

## 2022-10-31 NOTE — LETTER
"Tia "Tia" Albert was seen and treated in our clinic by Dr Goetz on 10/31/2022 for followup for knee pain.  She may return to work on 10-.  If you have any questions or concerns, please don't hesitate to call [phone].  "

## 2022-10-31 NOTE — PROCEDURES
"Vandana Price is a 57 y.o. female patient.    Pulse: 78 (10/31/22 0754)  BP: 133/87 (10/31/22 0754)  Weight: 113.4 kg (250 lb) (10/31/22 0754)  Height: 5' 7.2" (170.7 cm) (10/31/22 0754)       Bilateral Intra-articular knee injection    Date/Time: 10/31/2022 8:37 AM  Location procedure was performed: [facility]  Performed by: Amanda Goetz MD  Authorized by: Amanda Goetz MD   Pre-op diagnosis: Bilateral knee pain and osteoarthritis  Post-op diagnosis: Bilateral knee pain and osteoarthritis  Consent Done: Yes  Consent: Verbal consent obtained. Written consent obtained.  Risks and benefits: risks, benefits and alternatives were discussed  Consent given by: patient  Patient understanding: patient states understanding of the procedure being performed  Patient consent: the patient's understanding of the procedure matches consent given  Procedure consent: procedure consent matches procedure scheduled  Relevant documents: relevant documents present and verified  Site marked: the operative site was marked  Imaging studies: imaging studies available  Patient identity confirmed: , name, MRN and verbally with patient  Time out: Immediately prior to procedure a "time out" was called to verify the correct patient, procedure, equipment, support staff and site/side marked as required.  Indications: joint swelling and pain   Body area: knee  Local anesthesia used: yes    Anesthesia:  Local anesthesia used: yes    Patient sedated: no  Preparation: Patient was prepped and draped in the usual sterile fashion.  Needle size: 22 G  Approach: lateral  Triamcinolone amount: 40 mg  Bupivacaine 0.25% amount: 10 mL  Lidocaine 1% amount: 2 mL  Patient tolerance: Patient tolerated the procedure well with no immediate complications  Technical procedures used: 5 cc of 0.25% Marcaine and 20 mg Kenalog injected per knee  Complications: No  Estimated blood loss (mL): 0  Specimens: No  Implants: No        10/31/2022    "

## 2022-11-02 ENCOUNTER — PATIENT MESSAGE (OUTPATIENT)
Dept: GASTROENTEROLOGY | Facility: CLINIC | Age: 57
End: 2022-11-02
Payer: COMMERCIAL

## 2022-11-02 ENCOUNTER — OFFICE VISIT (OUTPATIENT)
Dept: FAMILY MEDICINE | Facility: CLINIC | Age: 57
End: 2022-11-02
Payer: COMMERCIAL

## 2022-11-02 VITALS
WEIGHT: 262 LBS | HEART RATE: 69 BPM | BODY MASS INDEX: 41.12 KG/M2 | SYSTOLIC BLOOD PRESSURE: 133 MMHG | HEIGHT: 67 IN | RESPIRATION RATE: 18 BRPM | OXYGEN SATURATION: 98 % | TEMPERATURE: 98 F | DIASTOLIC BLOOD PRESSURE: 84 MMHG

## 2022-11-02 DIAGNOSIS — M25.561 CHRONIC PAIN OF BOTH KNEES: ICD-10-CM

## 2022-11-02 DIAGNOSIS — M25.562 CHRONIC PAIN OF BOTH KNEES: ICD-10-CM

## 2022-11-02 DIAGNOSIS — M19.90 ARTHRITIS: ICD-10-CM

## 2022-11-02 DIAGNOSIS — Z00.01 ANNUAL VISIT FOR GENERAL ADULT MEDICAL EXAMINATION WITH ABNORMAL FINDINGS: ICD-10-CM

## 2022-11-02 DIAGNOSIS — G89.29 CHRONIC PAIN OF BOTH KNEES: ICD-10-CM

## 2022-11-02 DIAGNOSIS — Z13.1 SCREENING FOR DIABETES MELLITUS: ICD-10-CM

## 2022-11-02 DIAGNOSIS — G25.81 RESTLESS LEGS: ICD-10-CM

## 2022-11-02 DIAGNOSIS — M79.671 RIGHT FOOT PAIN: ICD-10-CM

## 2022-11-02 DIAGNOSIS — G25.81 RLS (RESTLESS LEGS SYNDROME): ICD-10-CM

## 2022-11-02 DIAGNOSIS — K51.919 ULCERATIVE COLITIS WITH COMPLICATION, UNSPECIFIED LOCATION: ICD-10-CM

## 2022-11-02 DIAGNOSIS — Z13.220 SCREENING FOR LIPOID DISORDERS: ICD-10-CM

## 2022-11-02 DIAGNOSIS — D64.9 ANEMIA, UNSPECIFIED TYPE: ICD-10-CM

## 2022-11-02 DIAGNOSIS — E61.1 IRON DEFICIENCY: ICD-10-CM

## 2022-11-02 LAB — PAP RECOMMENDATION EXT: NORMAL

## 2022-11-02 PROCEDURE — 1160F PR REVIEW ALL MEDS BY PRESCRIBER/CLIN PHARMACIST DOCUMENTED: ICD-10-PCS | Mod: ,,, | Performed by: FAMILY MEDICINE

## 2022-11-02 PROCEDURE — 99396 PR PREVENTIVE VISIT,EST,40-64: ICD-10-PCS | Mod: ,,, | Performed by: FAMILY MEDICINE

## 2022-11-02 PROCEDURE — 1159F PR MEDICATION LIST DOCUMENTED IN MEDICAL RECORD: ICD-10-PCS | Mod: ,,, | Performed by: FAMILY MEDICINE

## 2022-11-02 PROCEDURE — 1160F RVW MEDS BY RX/DR IN RCRD: CPT | Mod: ,,, | Performed by: FAMILY MEDICINE

## 2022-11-02 PROCEDURE — 3075F SYST BP GE 130 - 139MM HG: CPT | Mod: ,,, | Performed by: FAMILY MEDICINE

## 2022-11-02 PROCEDURE — 99396 PREV VISIT EST AGE 40-64: CPT | Mod: ,,, | Performed by: FAMILY MEDICINE

## 2022-11-02 PROCEDURE — 3008F PR BODY MASS INDEX (BMI) DOCUMENTED: ICD-10-PCS | Mod: ,,, | Performed by: FAMILY MEDICINE

## 2022-11-02 PROCEDURE — 3008F BODY MASS INDEX DOCD: CPT | Mod: ,,, | Performed by: FAMILY MEDICINE

## 2022-11-02 PROCEDURE — 3079F DIAST BP 80-89 MM HG: CPT | Mod: ,,, | Performed by: FAMILY MEDICINE

## 2022-11-02 PROCEDURE — 3079F PR MOST RECENT DIASTOLIC BLOOD PRESSURE 80-89 MM HG: ICD-10-PCS | Mod: ,,, | Performed by: FAMILY MEDICINE

## 2022-11-02 PROCEDURE — 1159F MED LIST DOCD IN RCRD: CPT | Mod: ,,, | Performed by: FAMILY MEDICINE

## 2022-11-02 PROCEDURE — 3075F PR MOST RECENT SYSTOLIC BLOOD PRESS GE 130-139MM HG: ICD-10-PCS | Mod: ,,, | Performed by: FAMILY MEDICINE

## 2022-11-02 RX ORDER — HEPARIN 100 UNIT/ML
500 SYRINGE INTRAVENOUS
OUTPATIENT
Start: 2022-11-02

## 2022-11-02 RX ORDER — SODIUM CHLORIDE 0.9 % (FLUSH) 0.9 %
10 SYRINGE (ML) INJECTION
OUTPATIENT
Start: 2022-11-02

## 2022-11-02 RX ORDER — DIPHENHYDRAMINE HYDROCHLORIDE 50 MG/ML
50 INJECTION INTRAMUSCULAR; INTRAVENOUS ONCE AS NEEDED
OUTPATIENT
Start: 2022-11-02

## 2022-11-02 RX ORDER — METHYLPREDNISOLONE SOD SUCC 125 MG
125 VIAL (EA) INJECTION ONCE AS NEEDED
OUTPATIENT
Start: 2022-11-02

## 2022-11-02 RX ORDER — EPINEPHRINE 0.3 MG/.3ML
0.3 INJECTION SUBCUTANEOUS ONCE AS NEEDED
OUTPATIENT
Start: 2022-11-02

## 2022-11-02 RX ORDER — CYANOCOBALAMIN 1000 UG/ML
1000 INJECTION, SOLUTION INTRAMUSCULAR; SUBCUTANEOUS
Status: DISCONTINUED | OUTPATIENT
Start: 2022-11-03 | End: 2022-11-22

## 2022-11-02 RX ORDER — CELECOXIB 200 MG/1
200 CAPSULE ORAL DAILY
Qty: 30 CAPSULE | Refills: 1 | Status: SHIPPED | OUTPATIENT
Start: 2022-11-02 | End: 2022-12-02

## 2022-11-02 NOTE — PROGRESS NOTES
Subjective     Patient ID: Vandana Price is a 57 y.o. female.    Chief Complaint: Establish Care, Healthy You, and Ankle Pain (Right ankle)    She has UC, began humara and now has right ankle pain, began shortly after injections, pain on the medial aspect, moderate to severe, has iced it, inserts     Review of Systems   Constitutional:  Negative for activity change and unexpected weight change.   HENT:  Negative for hearing loss, rhinorrhea and trouble swallowing.    Eyes:  Negative for discharge and visual disturbance.   Respiratory:  Negative for chest tightness and wheezing.    Cardiovascular:  Negative for chest pain and palpitations.   Gastrointestinal:  Negative for blood in stool, constipation, diarrhea and vomiting.   Endocrine: Negative for polydipsia and polyuria.   Genitourinary:  Negative for difficulty urinating, dysuria, hematuria and menstrual problem.   Musculoskeletal:  Positive for arthralgias, gait problem and joint deformity. Negative for joint swelling and neck pain.   Neurological:  Negative for weakness and headaches.   Psychiatric/Behavioral:  Negative for confusion and dysphoric mood.      Tobacco Use: Medium Risk    Smoking Tobacco Use: Former    Smokeless Tobacco Use: Never    Passive Exposure: Not on file     Review of patient's allergies indicates:  No Known Allergies  Current Outpatient Medications   Medication Instructions    acetaminophen-codeine 300-30mg (TYLENOL #3) 300-30 mg Tab 1 tablet, Oral, Every 4 hours PRN    acetaminophen-codeine 300-30mg (TYLENOL #3) 300-30 mg Tab 1 tablet, Oral, Every 12 hours    aspirin (ECOTRIN) 81 mg, Oral, Daily    celecoxib (CELEBREX) 200 mg, Oral, Daily    famotidine (PEPCID) 40 MG tablet No dose, route, or frequency recorded.    gabapentin (NEURONTIN) 300 mg, Oral, Nightly    HUMIRA,CF, PEN CROHNS-UC-HS 80 mg/0.8 mL PnKt Subcutaneous    ondansetron (ZOFRAN-ODT) 4 mg, Oral, Every 8 hours PRN     Medications Discontinued During This Encounter   Medication  "Reason    meloxicam (MOBIC) 15 MG tablet     celecoxib (CELEBREX) 200 MG capsule Reorder       Past Medical History:   Diagnosis Date    Allergy     Bilateral primary osteoarthritis of knee     Chronic pain of both knees 10/14/2021    Eosinophilia     Gangrene of gallbladder in cholecystitis 04/01/2021    Hematochezia     History of colonoscopy     Hyperlipidemia     Hypertension     Sleep apnea      Health Maintenance Topics with due status: Not Due       Topic Last Completion Date    Lipid Panel 09/23/2021    Colorectal Cancer Screening 08/15/2022       There is no immunization history on file for this patient.    Objective     Body mass index is 40.79 kg/m².  Wt Readings from Last 3 Encounters:   11/02/22 118.8 kg (262 lb)   10/31/22 113.4 kg (250 lb)   10/19/22 118.8 kg (262 lb)     Ht Readings from Last 3 Encounters:   11/02/22 5' 7.2" (1.707 m)   10/31/22 5' 7.2" (1.707 m)   10/19/22 5' 7.2" (1.707 m)     BP Readings from Last 3 Encounters:   11/02/22 133/84   10/31/22 133/87   10/19/22 (!) 159/65     Temp Readings from Last 3 Encounters:   11/02/22 98.2 °F (36.8 °C) (Oral)   08/15/22 98 °F (36.7 °C) (Oral)   07/26/22 97.9 °F (36.6 °C) (Oral)     Pulse Readings from Last 3 Encounters:   11/02/22 69   10/31/22 78   10/19/22 76     Resp Readings from Last 3 Encounters:   11/02/22 18   08/15/22 (!) 24   07/26/22 13     PF Readings from Last 3 Encounters:   No data found for PF       Physical Exam  Constitutional:       Appearance: Normal appearance. She is obese. She is not ill-appearing.   HENT:      Mouth/Throat:      Pharynx: No oropharyngeal exudate or posterior oropharyngeal erythema.   Cardiovascular:      Rate and Rhythm: Normal rate and regular rhythm.   Pulmonary:      Effort: Pulmonary effort is normal.      Breath sounds: Normal breath sounds.   Neurological:      Mental Status: She is alert and oriented to person, place, and time.      Gait: Gait abnormal.   Psychiatric:         Mood and Affect: Mood " normal.         Behavior: Behavior normal.         Judgment: Judgment normal.       Assessment and Plan     Problem List Items Addressed This Visit          Neuro    RLS (restless legs syndrome)    Relevant Orders    CBC Auto Differential       Oncology    Anemia    Relevant Orders    Lactate dehydrogenase    Haptoglobin    Reticulocytes    Vitamin B12    Folate       Orthopedic    Chronic pain of both knees (Chronic)    Relevant Orders    Ambulatory referral/consult to Orthopedics    Arthritis     Other Visit Diagnoses       BMI 40.0-44.9, adult    -  Primary    Ulcerative colitis with complication, unspecified location        Relevant Orders    Ambulatory referral/consult to Rheumatology    Restless legs        Iron deficiency        Relevant Orders    Ferritin    Iron and TIBC    Annual visit for general adult medical examination with abnormal findings        Right foot pain        Relevant Orders    X-Ray Foot Complete 3 view Right    Screening for diabetes mellitus        Relevant Orders    Hemoglobin A1C    Glucose, Fasting    Screening for lipoid disorders        Relevant Orders    Lipid Panel            Plan:   She is going to try to improve healthy choices, reduce calorie intake, try to do more physical activity to lose weight. Recommended at least 150 minutes a week with resistance training as tolerated  Monitor weight  attend regularly scheduled apts   Schedule yearly eye exam  Take medications as prescirved  Improve nutrition, decrease car intake, decrease fast food  Stay away from tobacco products  Sun screen recommended and discussed    Apt with dietitian will be made  Labs send  Vitamin B 12 will be given tomorrow after labs  - will send for Iron infusion has been on PO with continuation of low iron and restless legs, likely not absorbing it well due to UC          I have reviewed the medications, allergies, and problem list.     Goal Actions:    What type of visit is the patient here for today?:  Healthy You  Does the patient consent to enroll in Color Me Healthy?: Yes  Is this a Wellness Follow Up?: No  What is your overall wellness goal? (select at least one): Improve overall health  Choose 3: Nutrition, Lifestyle, Biometric  Biometric Actions: Attend regularly scheduled office visits, BMI>30 lose 1-2 lbs per week  Lifestyle Actions : Obtain yearly mammogram, Take medications as prescribed, Pap smear or HPV  Nurtrition Actions: Avoid adding table salt, Read nutrition labels, Recommend weight loss    Yahaira Roman MD

## 2022-11-02 NOTE — PATIENT INSTRUCTIONS
"Patient Education       High Blood Pressure in Adults   The Basics   Written by the doctors and editors at [facility]   What is high blood pressure? -- High blood pressure is a condition that puts you at risk for heart attack, stroke, and kidney disease. It does not usually cause symptoms. But it can be serious.  When your doctor or nurse tells you your blood pressure, they will say 2 numbers. For instance, your doctor or nurse might say that your blood pressure is "130 over 80." The top number is the pressure inside your arteries when your heart is jose manuel. The bottom number is the pressure inside your arteries when your heart is relaxed.  "Elevated blood pressure" is a term doctors or nurses use as a warning. People with elevated blood pressure do not yet have high blood pressure. But their blood pressure is not as low as it should be for good health.  Many experts define high, elevated, and normal blood pressure as follows:  High - Top number of 130 or above and/or bottom number of 80 or above  Elevated - Top number between 120 and 129 and bottom number of 79 or below  Normal - Top number of 119 or below and bottom number of 79 or below  This information is also in the table (table 1).   How can I lower my blood pressure? -- If your doctor or nurse has prescribed blood pressure medicine, the most important thing you can do is to take it. If it causes side effects, do not just stop taking it. Instead, talk to your doctor or nurse about the problems it causes. They might be able to lower your dose or switch you to another medicine. If cost is a problem, mention that too. They might be able to put you on a less expensive medicine. Taking your blood pressure medicine can keep you from having a heart attack or stroke, and it can save your life!  Can I do anything on my own? -- You have a lot of control over your blood pressure. To lower it:  Lose weight (if you are overweight)  Choose a diet low in fat and rich in " "fruits, vegetables, and low-fat dairy products  Reduce the amount of salt you eat  Do something active for at least 30 minutes a day on most days of the week  Cut down on alcohol (if you drink more than 2 alcoholic drinks per day)  It's also a good idea to get a home blood pressure meter. People who check their own blood pressure at home do better at keeping it low and can sometimes even reduce the amount of medicine they take.  All topics are updated as new evidence becomes available and our peer review process is complete.  This topic retrieved from [employer] on: Sep 21, 2021.  Topic 26981 Version 15.0  Release: 29.4.2 - C29.263  © 2021 UpToDate, Inc. and/or its affiliates. All rights reserved.  table 1: Definition of normal and high blood pressure  Level  Top number  Bottom number    High 130 or above 80 or above   Elevated 120 to 129 79 or below   Normal 119 or below 79 or below   These definitions are from the American College of Cardiology/American Heart Association. Other expert groups might use slightly different definitions.  "Elevated blood pressure" is a term doctor or nurses use as a warning. It means you do not yet have high blood pressure, but your blood pressure is not as low as it should be for good health.  Graphic 22360 Version 6.0  Consumer Information Use and Disclaimer   This information is not specific medical advice and does not replace information you receive from your health care provider. This is only a brief summary of general information. It does NOT include all information about conditions, illnesses, injuries, tests, procedures, treatments, therapies, discharge instructions or life-style choices that may apply to you. You must talk with your health care provider for complete information about your health and treatment options. This information should not be used to decide whether or not to accept your health care provider's advice, instructions or recommendations. Only your health care " provider has the knowledge and training to provide advice that is right for you. The use of this information is governed by the [employer] End User License Agreement, available at https://www.[employer]/en/solutions/[employer]/about/home.The use of [employer] content is governed by the [employer] Terms of Use. ©2021 UpToDate, Inc. All rights reserved.  Copyright   © 2021 UpToDate, Inc. and/or its affiliates. All rights reserved.

## 2022-11-02 NOTE — TELEPHONE ENCOUNTER
Humira can help with joint pain but can also be a side effect. If it worsens, we can stop Humira and change to Stelara (got notified it was approved yesterday) and also refer to rheumatology.

## 2022-11-03 ENCOUNTER — CLINICAL SUPPORT (OUTPATIENT)
Dept: FAMILY MEDICINE | Facility: CLINIC | Age: 57
End: 2022-11-03
Payer: COMMERCIAL

## 2022-11-03 ENCOUNTER — NUTRITION (OUTPATIENT)
Dept: FAMILY MEDICINE | Facility: CLINIC | Age: 57
End: 2022-11-03
Payer: COMMERCIAL

## 2022-11-03 DIAGNOSIS — E61.1 IRON DEFICIENCY: Primary | ICD-10-CM

## 2022-11-03 DIAGNOSIS — D64.9 ANEMIA, UNSPECIFIED TYPE: ICD-10-CM

## 2022-11-03 PROCEDURE — 96372 PR INJECTION,THERAP/PROPH/DIAG2ST, IM OR SUBCUT: ICD-10-PCS | Mod: ,,, | Performed by: FAMILY MEDICINE

## 2022-11-03 PROCEDURE — 96372 THER/PROPH/DIAG INJ SC/IM: CPT | Mod: ,,, | Performed by: FAMILY MEDICINE

## 2022-11-03 RX ADMIN — CYANOCOBALAMIN 1000 MCG: 1000 INJECTION, SOLUTION INTRAMUSCULAR; SUBCUTANEOUS at 08:11

## 2022-11-03 NOTE — PROGRESS NOTES
Pt works at [employer] in [place] and has difficult time to come into clinic, today we discussed some tips for striving for some type of moving during the day for exercise. Pt having knee problems and now has pain in ankles since starting Humara.  A referral from Dr Yahaira Roman for Weight Management initiated today on the phone giving some starter tips to practice. Pt reports walking in building during break time/lunch, may go to [employer] across from workplace. I gave some tips for sit n be fit non-stress exercises and how to hold water bottles in each hand with arms out to side, moving slowly up / down/ front/ back. Pt states using a video sit n be fit at home. The importance of moving without pain during day to use some of the Kcals taken in. Portion control tips suggested. To keep drinking only water as beverage.  We will talk again next Friday and see how doing. Weight not going to come off but took awhile to gain and will take some work to maintain/lose.

## 2022-11-08 ENCOUNTER — PATIENT MESSAGE (OUTPATIENT)
Dept: FAMILY MEDICINE | Facility: CLINIC | Age: 57
End: 2022-11-08
Payer: COMMERCIAL

## 2022-11-14 ENCOUNTER — PATIENT MESSAGE (OUTPATIENT)
Dept: GASTROENTEROLOGY | Facility: CLINIC | Age: 57
End: 2022-11-14
Payer: COMMERCIAL

## 2022-11-22 ENCOUNTER — PATIENT MESSAGE (OUTPATIENT)
Dept: FAMILY MEDICINE | Facility: CLINIC | Age: 57
End: 2022-11-22
Payer: COMMERCIAL

## 2022-11-22 ENCOUNTER — OFFICE VISIT (OUTPATIENT)
Dept: FAMILY MEDICINE | Facility: CLINIC | Age: 57
End: 2022-11-22
Payer: COMMERCIAL

## 2022-11-22 VITALS
WEIGHT: 262 LBS | HEART RATE: 97 BPM | RESPIRATION RATE: 18 BRPM | TEMPERATURE: 98 F | DIASTOLIC BLOOD PRESSURE: 94 MMHG | OXYGEN SATURATION: 100 % | HEIGHT: 67 IN | BODY MASS INDEX: 41.12 KG/M2 | SYSTOLIC BLOOD PRESSURE: 128 MMHG

## 2022-11-22 DIAGNOSIS — M54.9 LEFT-SIDED BACK PAIN, UNSPECIFIED BACK LOCATION, UNSPECIFIED CHRONICITY: Primary | ICD-10-CM

## 2022-11-22 DIAGNOSIS — R35.0 URINARY FREQUENCY: ICD-10-CM

## 2022-11-22 DIAGNOSIS — G47.61 PLMD (PERIODIC LIMB MOVEMENT DISORDER): ICD-10-CM

## 2022-11-22 DIAGNOSIS — E66.01 CLASS 3 SEVERE OBESITY DUE TO EXCESS CALORIES WITH BODY MASS INDEX (BMI) OF 40.0 TO 44.9 IN ADULT, UNSPECIFIED WHETHER SERIOUS COMORBIDITY PRESENT: ICD-10-CM

## 2022-11-22 PROCEDURE — 3074F SYST BP LT 130 MM HG: CPT | Mod: ,,, | Performed by: FAMILY MEDICINE

## 2022-11-22 PROCEDURE — 3080F PR MOST RECENT DIASTOLIC BLOOD PRESSURE >= 90 MM HG: ICD-10-PCS | Mod: ,,, | Performed by: FAMILY MEDICINE

## 2022-11-22 PROCEDURE — 99213 OFFICE O/P EST LOW 20 MIN: CPT | Mod: 25,,, | Performed by: FAMILY MEDICINE

## 2022-11-22 PROCEDURE — 1160F PR REVIEW ALL MEDS BY PRESCRIBER/CLIN PHARMACIST DOCUMENTED: ICD-10-PCS | Mod: ,,, | Performed by: FAMILY MEDICINE

## 2022-11-22 PROCEDURE — 1159F MED LIST DOCD IN RCRD: CPT | Mod: ,,, | Performed by: FAMILY MEDICINE

## 2022-11-22 PROCEDURE — 3008F PR BODY MASS INDEX (BMI) DOCUMENTED: ICD-10-PCS | Mod: ,,, | Performed by: FAMILY MEDICINE

## 2022-11-22 PROCEDURE — 3008F BODY MASS INDEX DOCD: CPT | Mod: ,,, | Performed by: FAMILY MEDICINE

## 2022-11-22 PROCEDURE — 3044F HG A1C LEVEL LT 7.0%: CPT | Mod: ,,, | Performed by: FAMILY MEDICINE

## 2022-11-22 PROCEDURE — 3074F PR MOST RECENT SYSTOLIC BLOOD PRESSURE < 130 MM HG: ICD-10-PCS | Mod: ,,, | Performed by: FAMILY MEDICINE

## 2022-11-22 PROCEDURE — 20552 NJX 1/MLT TRIGGER POINT 1/2: CPT | Mod: ,,, | Performed by: FAMILY MEDICINE

## 2022-11-22 PROCEDURE — 3080F DIAST BP >= 90 MM HG: CPT | Mod: ,,, | Performed by: FAMILY MEDICINE

## 2022-11-22 PROCEDURE — 3044F PR MOST RECENT HEMOGLOBIN A1C LEVEL <7.0%: ICD-10-PCS | Mod: ,,, | Performed by: FAMILY MEDICINE

## 2022-11-22 PROCEDURE — 1159F PR MEDICATION LIST DOCUMENTED IN MEDICAL RECORD: ICD-10-PCS | Mod: ,,, | Performed by: FAMILY MEDICINE

## 2022-11-22 PROCEDURE — 20552 PR INJECT TRIGGER POINT, 1 OR 2: ICD-10-PCS | Mod: ,,, | Performed by: FAMILY MEDICINE

## 2022-11-22 PROCEDURE — 1160F RVW MEDS BY RX/DR IN RCRD: CPT | Mod: ,,, | Performed by: FAMILY MEDICINE

## 2022-11-22 PROCEDURE — 99213 PR OFFICE/OUTPT VISIT, EST, LEVL III, 20-29 MIN: ICD-10-PCS | Mod: 25,,, | Performed by: FAMILY MEDICINE

## 2022-11-22 RX ORDER — TIRZEPATIDE 5 MG/.5ML
5 INJECTION, SOLUTION SUBCUTANEOUS
Qty: 12 PEN | Refills: 3 | Status: SHIPPED | OUTPATIENT
Start: 2022-11-22 | End: 2022-12-12

## 2022-11-22 RX ORDER — CYANOCOBALAMIN 1000 UG/ML
1000 INJECTION, SOLUTION INTRAMUSCULAR; SUBCUTANEOUS
Qty: 3 ML | Refills: 3 | Status: SHIPPED | OUTPATIENT
Start: 2022-11-22 | End: 2023-07-03

## 2022-11-22 RX ORDER — ADALIMUMAB 40MG/0.4ML
KIT SUBCUTANEOUS
COMMUNITY
Start: 2022-11-10 | End: 2023-08-01

## 2022-11-22 RX ORDER — TIRZEPATIDE 2.5 MG/.5ML
2.5 INJECTION, SOLUTION SUBCUTANEOUS
COMMUNITY
End: 2022-11-22

## 2022-11-22 NOTE — LETTER
November 22, 2022      [facility] [address] [place] [address]  Phone: [phone]  Fax: [phone]       Patient: Vandana Price   YOB: 1965  Date of Visit: 11/22/2022    To Whom It May Concern:    Enrike Price  was at [facility] on 11/22/2022. The patient may return to work/school on 11/22/2022 with no restrictions. Vandana was here for a follow-up.If you have any questions or concerns, or if I can be of further assistance, please do not hesitate to contact me.    Sincerely,  Dr. Yahaira Roman/  Bharti Mendez RN

## 2022-11-22 NOTE — PROGRESS NOTES
Yahaira Roman MD        PATIENT NAME: Vandana Price  : 1965  DATE: 22  MRN: [id number]      Billing Provider: Yahaira Roman MD  Level of Service:   Patient PCP Information       Provider PCP Type    Yahaira Roman MD General            Reason for Visit / Chief Complaint: Low-back Pain (C/o low back pain worse on left side. Has noticed this since she started the Humira) and Urinary Urgency (Noticed odor to urine and urine darker for a week or two)       History of Present Illness      Vandana Price presents to the clinic with Low-back Pain (C/o low back pain worse on left side. Has noticed this since she started the Humira) and Urinary Urgency (Noticed odor to urine and urine darker for a week or two)     Low-back Pain  This is a new problem. Associated symptoms include arthralgias, joint swelling and myalgias. Pertinent negatives include no abdominal pain, chest pain, congestion, coughing, diaphoresis, fever, headaches, nausea, neck pain, numbness, sore throat, swollen glands, vertigo, visual change, vomiting or weakness.   Back Pain  This is a recurrent problem. The current episode started 1 to 4 weeks ago. The problem occurs 2 to 4 times per day. The problem is unchanged. The pain is present in the costovertebral angle. The quality of the pain is described as aching. The pain does not radiate. The pain is at a severity of 6/10. The pain is moderate. The pain is Worse during the day. Stiffness is present All day. Pertinent negatives include no abdominal pain, bladder incontinence, bowel incontinence, chest pain, dysuria, fever, headaches, leg pain, numbness, paresis, paresthesias, pelvic pain, perianal numbness, tingling, weakness or weight loss.     Review of Systems     Review of Systems   Constitutional:  Negative for diaphoresis, fever and weight loss.   HENT:  Negative for congestion and sore throat.    Respiratory:  Negative for cough.    Cardiovascular:  Negative for chest pain.   Gastrointestinal:   Negative for abdominal pain, bowel incontinence, nausea and vomiting.   Genitourinary:  Negative for bladder incontinence, dysuria, hematuria and pelvic pain.   Musculoskeletal:  Positive for arthralgias, back pain, joint swelling and myalgias. Negative for neck pain.   Neurological:  Negative for vertigo, tingling, weakness, numbness, headaches and paresthesias.     Medical / Social / Family History     Past Medical History:   Diagnosis Date    Allergy     Bilateral primary osteoarthritis of knee     Chronic pain of both knees 10/14/2021    Eosinophilia     Gangrene of gallbladder in cholecystitis 2021    Hematochezia     History of colonoscopy     Hyperlipidemia     Hypertension     Sleep apnea        Past Surgical History:   Procedure Laterality Date    Bilateral IA knee injection Bilateral 2021    D Shows    bladder tact       SECTION      CHOLECYSTECTOMY      FRACTURE SURGERY      jaw 1972    INJECTION OF ANESTHETIC AGENT AROUND NERVE Bilateral 2022    Procedure: GNB   BILATERAL;  Surgeon: Amanda Goetz MD;  Location: [facility];  Service: Pain Management;  Laterality: Bilateral;  PT HAS NOT HAD VAC   STATES WILL BE TESTED AT [facility] IN [place]    knee scope Bilateral     KNEE SURGERY      LAPAROSCOPIC CHOLECYSTECTOMY N/A 3/31/2021    Procedure: LAPAROSCOPIC CHOLECYSTECTOMY CONVERTED TO OPEN;  Surgeon: Darnell Mcgraw MD;  Location: [facility] OR;  Service: General;  Laterality: N/A;  CONVERTED TO OPEN    TUBAL LIGATION         Social History  Ms.  reports that she quit smoking about 12 years ago. Her smoking use included cigarettes. She has a 12.00 pack-year smoking history. She has never used smokeless tobacco. She reports that she does not drink alcohol and does not use drugs.    Family History  Ms.'s family history includes Alcohol abuse in her mother; Heart disease in her father; Hypertension in her father; Lung cancer in her maternal aunt; Migraines in her  "daughter.    Medications and Allergies     Medications  Outpatient Medications Marked as Taking for the 22 encounter (Office Visit) with Yahaira Roman MD   Medication Sig Dispense Refill    acetaminophen-codeine 300-30mg (TYLENOL #3) 300-30 mg Tab Take 1 tablet by mouth every 4 (four) hours as needed.      aspirin (ECOTRIN) 81 MG EC tablet Take 81 mg by mouth once daily.      celecoxib (CELEBREX) 200 MG capsule Take 1 capsule (200 mg total) by mouth once daily. 30 capsule 1    famotidine (PEPCID) 40 MG tablet       gabapentin (NEURONTIN) 300 MG capsule Take 1 capsule (300 mg total) by mouth nightly. 90 capsule 1    HUMIRA,CF, PEN 40 mg/0.4 mL PnKt SMARTSI Milligram(s) SUB-Q Every 2 Weeks      ondansetron (ZOFRAN-ODT) 4 MG TbDL Take 1 tablet (4 mg total) by mouth every 8 (eight) hours as needed (nausea). 12 tablet 0    [DISCONTINUED] tirzepatide (MOUNJARO) 2.5 mg/0.5 mL PnIj Inject 2.5 mg into the skin every 7 days.       Current Facility-Administered Medications for the 22 encounter (Office Visit) with Yahaira Roman MD   Medication Dose Route Frequency Provider Last Rate Last Admin    [DISCONTINUED] cyanocobalamin injection 1,000 mcg  1,000 mcg Intramuscular Q30 Days Yahaira Roman MD   1,000 mcg at 22 0822       Allergies  Review of patient's allergies indicates:  No Known Allergies    Physical Examination   BP (!) 128/94 (BP Location: Left arm, Patient Position: Sitting)   Pulse 97   Temp 98.4 °F (36.9 °C) (Oral)   Resp 18   Ht 5' 7" (1.702 m)   Wt 118.8 kg (262 lb)   LMP 2020 Comment: tubal ligation   SpO2 100%   BMI 41.04 kg/m²     Physical Exam  Constitutional:       Appearance: Normal appearance. She is obese.   Cardiovascular:      Rate and Rhythm: Normal rate and regular rhythm.      Pulses: Normal pulses.      Heart sounds: Normal heart sounds.   Musculoskeletal:         General: Normal range of motion.      Lumbar back: Spasms and tenderness present.        " Back:    Skin:     General: Skin is warm.   Neurological:      General: No focal deficit present.      Mental Status: She is alert.   Psychiatric:         Mood and Affect: Mood normal.         Behavior: Behavior normal.         Judgment: Judgment normal.        Assessment and Plan (including Health Maintenance)     Plan:         Problem List Items Addressed This Visit          Endocrine    Class 3 severe obesity due to excess calories with body mass index (BMI) of 40.0 to 44.9 in adult    Relevant Medications    tirzepatide (MOUNJARO) 5 mg/0.5 mL PnIj       Other    PLMD (periodic limb movement disorder)    Relevant Medications    cyanocobalamin 1,000 mcg/mL injection     Other Visit Diagnoses       Left-sided back pain, unspecified back location, unspecified chronicity    -  Primary    Urinary frequency              Along the lateral aspect of the lower back along L 1 dermatome after finding area of muscle spasm, 6 ccm of lidocaine without epinephrine was injected into three parts. The skin was cleanse with alcohol before administration. The pt tolerated this well.     Follow up if symptoms worsen or fail to improve.        Signature:  Yahaira Roman MD      Date of encounter: 11/22/22

## 2022-11-23 ENCOUNTER — CLINICAL SUPPORT (OUTPATIENT)
Dept: FAMILY MEDICINE | Facility: CLINIC | Age: 57
End: 2022-11-23
Payer: COMMERCIAL

## 2022-11-23 VITALS — DIASTOLIC BLOOD PRESSURE: 82 MMHG | SYSTOLIC BLOOD PRESSURE: 130 MMHG

## 2022-11-23 DIAGNOSIS — D64.9 ANEMIA, UNSPECIFIED TYPE: Primary | ICD-10-CM

## 2022-11-23 NOTE — PROGRESS NOTES
Pt unsure about giving herself B12 injections. Reviewed with her how to draw up and sites to give injections and how to administer. Verbalized understanding and inst if she has any difficulty to come back and she can watch me give the first one.

## 2022-11-27 ENCOUNTER — PATIENT MESSAGE (OUTPATIENT)
Dept: FAMILY MEDICINE | Facility: CLINIC | Age: 57
End: 2022-11-27
Payer: COMMERCIAL

## 2022-11-28 ENCOUNTER — PATIENT MESSAGE (OUTPATIENT)
Dept: FAMILY MEDICINE | Facility: CLINIC | Age: 57
End: 2022-11-28
Payer: COMMERCIAL

## 2022-11-28 RX ORDER — TIZANIDINE 4 MG/1
4 TABLET ORAL EVERY 6 HOURS PRN
Qty: 30 TABLET | Refills: 1 | Status: SHIPPED | OUTPATIENT
Start: 2022-11-28 | End: 2022-12-08

## 2022-11-30 ENCOUNTER — HOSPITAL ENCOUNTER (OUTPATIENT)
Dept: RADIOLOGY | Facility: HOSPITAL | Age: 57
Discharge: HOME | End: 2022-11-30
Attending: ORTHOPAEDIC SURGERY
Payer: COMMERCIAL

## 2022-11-30 DIAGNOSIS — M25.561 PAIN IN BOTH KNEES, UNSPECIFIED CHRONICITY: ICD-10-CM

## 2022-11-30 DIAGNOSIS — M25.562 PAIN IN BOTH KNEES, UNSPECIFIED CHRONICITY: ICD-10-CM

## 2022-11-30 PROCEDURE — 73564 X-RAY EXAM KNEE 4 OR MORE: CPT | Mod: TC,50

## 2022-12-01 ENCOUNTER — PATIENT MESSAGE (OUTPATIENT)
Dept: FAMILY MEDICINE | Facility: CLINIC | Age: 57
End: 2022-12-01
Payer: COMMERCIAL

## 2022-12-02 ENCOUNTER — HOSPITAL ENCOUNTER (OUTPATIENT)
Dept: RADIOLOGY | Facility: HOSPITAL | Age: 57
Discharge: HOME | End: 2022-12-02
Attending: ORTHOPAEDIC SURGERY
Payer: COMMERCIAL

## 2022-12-02 DIAGNOSIS — M79.662 PAIN IN BOTH LOWER LEGS: ICD-10-CM

## 2022-12-02 DIAGNOSIS — M79.661 PAIN IN BOTH LOWER LEGS: ICD-10-CM

## 2022-12-02 PROCEDURE — 93970 EXTREMITY STUDY: CPT | Mod: 26,,, | Performed by: RADIOLOGY

## 2022-12-02 PROCEDURE — 93970 US LOWER EXTREMITY VEINS BILATERAL: ICD-10-PCS | Mod: 26,,, | Performed by: RADIOLOGY

## 2022-12-02 PROCEDURE — 93970 EXTREMITY STUDY: CPT | Mod: TC

## 2022-12-12 ENCOUNTER — OFFICE VISIT (OUTPATIENT)
Dept: FAMILY MEDICINE | Facility: CLINIC | Age: 57
End: 2022-12-12
Payer: COMMERCIAL

## 2022-12-12 VITALS
TEMPERATURE: 98 F | DIASTOLIC BLOOD PRESSURE: 87 MMHG | BODY MASS INDEX: 40.84 KG/M2 | HEART RATE: 76 BPM | WEIGHT: 260.19 LBS | SYSTOLIC BLOOD PRESSURE: 133 MMHG | HEIGHT: 67 IN | RESPIRATION RATE: 18 BRPM | OXYGEN SATURATION: 97 %

## 2022-12-12 DIAGNOSIS — M54.9 UPPER BACK PAIN ON LEFT SIDE: ICD-10-CM

## 2022-12-12 DIAGNOSIS — M17.12 PRIMARY OSTEOARTHRITIS OF LEFT KNEE: ICD-10-CM

## 2022-12-12 DIAGNOSIS — Z01.818 PRE-OP EVALUATION: Primary | ICD-10-CM

## 2022-12-12 DIAGNOSIS — E66.01 CLASS 3 SEVERE OBESITY DUE TO EXCESS CALORIES WITH BODY MASS INDEX (BMI) OF 40.0 TO 44.9 IN ADULT, UNSPECIFIED WHETHER SERIOUS COMORBIDITY PRESENT: ICD-10-CM

## 2022-12-12 DIAGNOSIS — Z01.812 PRE-OPERATIVE LABORATORY EXAMINATION: ICD-10-CM

## 2022-12-12 LAB
ALBUMIN SERPL BCP-MCNC: 3.7 G/DL (ref 3.5–5)
ALBUMIN/GLOB SERPL: 0.8 {RATIO}
ALP SERPL-CCNC: 140 U/L (ref 46–118)
ALT SERPL W P-5'-P-CCNC: 21 U/L (ref 13–56)
ANION GAP SERPL CALCULATED.3IONS-SCNC: 9 MMOL/L (ref 7–16)
APTT PPP: 28.6 SECONDS (ref 25.2–37.3)
AST SERPL W P-5'-P-CCNC: 13 U/L (ref 15–37)
BACTERIA #/AREA URNS HPF: ABNORMAL /HPF
BASOPHILS # BLD AUTO: 0.05 K/UL (ref 0–0.2)
BASOPHILS NFR BLD AUTO: 0.9 % (ref 0–1)
BILIRUB SERPL-MCNC: 0.4 MG/DL (ref ?–1.2)
BILIRUB UR QL STRIP: NEGATIVE
BUN SERPL-MCNC: 21 MG/DL (ref 7–18)
BUN/CREAT SERPL: 21 (ref 6–20)
CALCIUM SERPL-MCNC: 9.5 MG/DL (ref 8.5–10.1)
CHLORIDE SERPL-SCNC: 106 MMOL/L (ref 98–107)
CLARITY UR: CLEAR
CO2 SERPL-SCNC: 27 MMOL/L (ref 21–32)
COLOR UR: YELLOW
CREAT SERPL-MCNC: 0.99 MG/DL (ref 0.55–1.02)
DIFFERENTIAL METHOD BLD: ABNORMAL
EGFR (NO RACE VARIABLE) (RUSH/TITUS): 67 ML/MIN/1.73M²
EKG 12-LEAD: NORMAL
EOSINOPHIL # BLD AUTO: 0.29 K/UL (ref 0–0.5)
EOSINOPHIL NFR BLD AUTO: 5.4 % (ref 1–4)
ERYTHROCYTE [DISTWIDTH] IN BLOOD BY AUTOMATED COUNT: 13.9 % (ref 11.5–14.5)
GLOBULIN SER-MCNC: 4.4 G/DL (ref 2–4)
GLUCOSE SERPL-MCNC: 97 MG/DL (ref 74–106)
GLUCOSE UR STRIP-MCNC: NORMAL MG/DL
HCT VFR BLD AUTO: 40.5 % (ref 38–47)
HGB BLD-MCNC: 12.8 G/DL (ref 12–16)
IMM GRANULOCYTES # BLD AUTO: 0.01 K/UL (ref 0–0.04)
IMM GRANULOCYTES NFR BLD: 0.2 % (ref 0–0.4)
INR BLD: 0.88
KETONES UR STRIP-SCNC: NEGATIVE MG/DL
LEUKOCYTE ESTERASE UR QL STRIP: ABNORMAL
LYMPHOCYTES # BLD AUTO: 1.85 K/UL (ref 1–4.8)
LYMPHOCYTES NFR BLD AUTO: 34.1 % (ref 27–41)
MCH RBC QN AUTO: 27.5 PG (ref 27–31)
MCHC RBC AUTO-ENTMCNC: 31.6 G/DL (ref 32–36)
MCV RBC AUTO: 86.9 FL (ref 80–96)
MONOCYTES # BLD AUTO: 0.36 K/UL (ref 0–0.8)
MONOCYTES NFR BLD AUTO: 6.6 % (ref 2–6)
MPC BLD CALC-MCNC: 11.3 FL (ref 9.4–12.4)
MUCOUS, UA: ABNORMAL /LPF
NEUTROPHILS # BLD AUTO: 2.86 K/UL (ref 1.8–7.7)
NEUTROPHILS NFR BLD AUTO: 52.8 % (ref 53–65)
NITRITE UR QL STRIP: NEGATIVE
NRBC # BLD AUTO: 0 X10E3/UL
NRBC, AUTO (.00): 0 %
PH UR STRIP: 5.5 PH UNITS
PLATELET # BLD AUTO: 295 K/UL (ref 150–400)
POTASSIUM SERPL-SCNC: 3.9 MMOL/L (ref 3.5–5.1)
PR INTERVAL: 177
PROT SERPL-MCNC: 8.1 G/DL (ref 6.4–8.2)
PROT UR QL STRIP: 20
PROTHROMBIN TIME: 11.6 SECONDS (ref 11.7–14.7)
PRT AXES: NORMAL
QRS DURATION: 90
QT/QTC: NORMAL
RBC # BLD AUTO: 4.66 M/UL (ref 4.2–5.4)
RBC # UR STRIP: NEGATIVE /UL
RBC #/AREA URNS HPF: 2 /HPF
SODIUM SERPL-SCNC: 138 MMOL/L (ref 136–145)
SP GR UR STRIP: 1.03
SQUAMOUS #/AREA URNS LPF: ABNORMAL /HPF
UROBILINOGEN UR STRIP-ACNC: NORMAL MG/DL
VENTRICULAR RATE: 68
WBC # BLD AUTO: 5.42 K/UL (ref 4.5–11)
WBC #/AREA URNS HPF: 7 /HPF

## 2022-12-12 PROCEDURE — 85730 THROMBOPLASTIN TIME PARTIAL: CPT | Mod: ,,, | Performed by: CLINICAL MEDICAL LABORATORY

## 2022-12-12 PROCEDURE — 81001 URINALYSIS: ICD-10-PCS | Mod: ,,, | Performed by: CLINICAL MEDICAL LABORATORY

## 2022-12-12 PROCEDURE — 1160F PR REVIEW ALL MEDS BY PRESCRIBER/CLIN PHARMACIST DOCUMENTED: ICD-10-PCS | Mod: ,,, | Performed by: FAMILY MEDICINE

## 2022-12-12 PROCEDURE — 3079F PR MOST RECENT DIASTOLIC BLOOD PRESSURE 80-89 MM HG: ICD-10-PCS | Mod: ,,, | Performed by: FAMILY MEDICINE

## 2022-12-12 PROCEDURE — 93005 POCT EKG 12-LEAD: ICD-10-PCS | Mod: ,,, | Performed by: FAMILY MEDICINE

## 2022-12-12 PROCEDURE — 99213 PR OFFICE/OUTPT VISIT, EST, LEVL III, 20-29 MIN: ICD-10-PCS | Mod: ,,, | Performed by: FAMILY MEDICINE

## 2022-12-12 PROCEDURE — 3008F PR BODY MASS INDEX (BMI) DOCUMENTED: ICD-10-PCS | Mod: ,,, | Performed by: FAMILY MEDICINE

## 2022-12-12 PROCEDURE — 3044F PR MOST RECENT HEMOGLOBIN A1C LEVEL <7.0%: ICD-10-PCS | Mod: ,,, | Performed by: FAMILY MEDICINE

## 2022-12-12 PROCEDURE — 1159F PR MEDICATION LIST DOCUMENTED IN MEDICAL RECORD: ICD-10-PCS | Mod: ,,, | Performed by: FAMILY MEDICINE

## 2022-12-12 PROCEDURE — 80053 COMPREHENSIVE METABOLIC PANEL: ICD-10-PCS | Mod: ,,, | Performed by: CLINICAL MEDICAL LABORATORY

## 2022-12-12 PROCEDURE — 85610 PROTHROMBIN TIME: CPT | Mod: ,,, | Performed by: CLINICAL MEDICAL LABORATORY

## 2022-12-12 PROCEDURE — 3008F BODY MASS INDEX DOCD: CPT | Mod: ,,, | Performed by: FAMILY MEDICINE

## 2022-12-12 PROCEDURE — 80053 COMPREHEN METABOLIC PANEL: CPT | Mod: ,,, | Performed by: CLINICAL MEDICAL LABORATORY

## 2022-12-12 PROCEDURE — 99213 OFFICE O/P EST LOW 20 MIN: CPT | Mod: ,,, | Performed by: FAMILY MEDICINE

## 2022-12-12 PROCEDURE — 85730 APTT: ICD-10-PCS | Mod: ,,, | Performed by: CLINICAL MEDICAL LABORATORY

## 2022-12-12 PROCEDURE — 1159F MED LIST DOCD IN RCRD: CPT | Mod: ,,, | Performed by: FAMILY MEDICINE

## 2022-12-12 PROCEDURE — 93005 ELECTROCARDIOGRAM TRACING: CPT | Mod: ,,, | Performed by: FAMILY MEDICINE

## 2022-12-12 PROCEDURE — 85025 CBC WITH DIFFERENTIAL: ICD-10-PCS | Mod: ,,, | Performed by: CLINICAL MEDICAL LABORATORY

## 2022-12-12 PROCEDURE — 85610 PROTIME-INR: ICD-10-PCS | Mod: ,,, | Performed by: CLINICAL MEDICAL LABORATORY

## 2022-12-12 PROCEDURE — 1160F RVW MEDS BY RX/DR IN RCRD: CPT | Mod: ,,, | Performed by: FAMILY MEDICINE

## 2022-12-12 PROCEDURE — 3079F DIAST BP 80-89 MM HG: CPT | Mod: ,,, | Performed by: FAMILY MEDICINE

## 2022-12-12 PROCEDURE — 3075F SYST BP GE 130 - 139MM HG: CPT | Mod: ,,, | Performed by: FAMILY MEDICINE

## 2022-12-12 PROCEDURE — 85025 COMPLETE CBC W/AUTO DIFF WBC: CPT | Mod: ,,, | Performed by: CLINICAL MEDICAL LABORATORY

## 2022-12-12 PROCEDURE — 3075F PR MOST RECENT SYSTOLIC BLOOD PRESS GE 130-139MM HG: ICD-10-PCS | Mod: ,,, | Performed by: FAMILY MEDICINE

## 2022-12-12 PROCEDURE — 3044F HG A1C LEVEL LT 7.0%: CPT | Mod: ,,, | Performed by: FAMILY MEDICINE

## 2022-12-12 PROCEDURE — 81001 URINALYSIS AUTO W/SCOPE: CPT | Mod: ,,, | Performed by: CLINICAL MEDICAL LABORATORY

## 2022-12-12 RX ORDER — CYCLOBENZAPRINE HCL 10 MG
10 TABLET ORAL 3 TIMES DAILY PRN
Qty: 60 TABLET | Refills: 0 | Status: SHIPPED | OUTPATIENT
Start: 2022-12-12 | End: 2023-01-03

## 2022-12-12 RX ORDER — TIRZEPATIDE 5 MG/.5ML
5 INJECTION, SOLUTION SUBCUTANEOUS
Qty: 12 PEN | Refills: 3 | Status: SHIPPED | OUTPATIENT
Start: 2022-12-12 | End: 2023-01-26

## 2022-12-12 RX ORDER — CELECOXIB 200 MG/1
200 CAPSULE ORAL DAILY
COMMUNITY
Start: 2022-12-07 | End: 2023-01-26 | Stop reason: SDUPTHER

## 2022-12-12 NOTE — LETTER
December 12, 2022      [facility] [address] [place] [address]  Phone: [phone]  Fax: [phone]       Patient: Vandana Price   YOB: 1965  Date of Visit: 12/12/2022    To Whom It May Concern:    Enrike Price  was at [facility] on 12/12/2022. The patient may return to work/school on 12/13/2022 with no restrictions. If you have any questions or concerns, or if I can be of further assistance, please do not hesitate to contact me.    Sincerely,  Dr. Yahaira Roman/  Bharti Mendez RN

## 2022-12-12 NOTE — PROGRESS NOTES
Yahaira Roman MD        PATIENT NAME: Vandana Price  : 1965  DATE: 22  MRN: [id number]      Billing Provider: Yahaira Roman MD  Level of Service:   Patient PCP Information       Provider PCP Type    Yahaira Roman MD General            Reason for Visit / Chief Complaint: surgical clearance (Here for surgery clearance for total knee replacement left knee on Dec 15th) and Abdominal Pain ( C/o upper abd pain since she started holding the Humira and is not able to eat anything but ramen noodles and mashed potatoes and rice. )       History of Present Illness      Vandana Price presents to the clinic with surgical clearance (Here for surgery clearance for total knee replacement left knee on Dec 15th) and Abdominal Pain ( C/o upper abd pain since she started holding the Humira and is not able to eat anything but ramen noodles and mashed potatoes and rice. )     Surgical clerance for knee replacement on 12/15/22 with Dr Valladares    She does not have cardiac not pulmonary hx    She also complains of stomach symptoms since holding humara, discussed that this is normal, hand out given    Never had a reaction to anasthesia     On partial denture on the top and bottom      Review of Systems     Review of Systems   Constitutional:  Negative for activity change and unexpected weight change.   HENT:  Negative for hearing loss, rhinorrhea and trouble swallowing.    Eyes:  Negative for discharge and visual disturbance.   Respiratory:  Negative for chest tightness and wheezing.    Cardiovascular:  Negative for chest pain and palpitations.   Gastrointestinal:  Positive for constipation. Negative for blood in stool, diarrhea and vomiting.   Endocrine: Negative for polydipsia and polyuria.   Genitourinary:  Negative for difficulty urinating, dysuria, hematuria and menstrual problem.   Musculoskeletal:  Negative for arthralgias, joint swelling and neck pain.   Neurological:  Negative for weakness and headaches.    Psychiatric/Behavioral:  Negative for confusion and dysphoric mood.      Medical / Social / Family History     Past Medical History:   Diagnosis Date    Allergy     Bilateral primary osteoarthritis of knee     Chronic pain of both knees 10/14/2021    Eosinophilia     Gangrene of gallbladder in cholecystitis 2021    Hematochezia     History of colonoscopy     Hyperlipidemia     Hypertension     Sleep apnea        Past Surgical History:   Procedure Laterality Date    Bilateral IA knee injection Bilateral 2021    D Shows    bladder tact       SECTION      CHOLECYSTECTOMY      FRACTURE SURGERY      jaw 1972    INJECTION OF ANESTHETIC AGENT AROUND NERVE Bilateral 2022    Procedure: GNB   BILATERAL;  Surgeon: Amanda Goetz MD;  [facility];  Service: Pain Management;  Laterality: Bilateral;  PT HAS NOT HAD VAC   STATES WILL BE TESTED AT [facility] [place]    knee scope Bilateral     KNEE SURGERY      LAPAROSCOPIC CHOLECYSTECTOMY N/A 3/31/2021    Procedure: LAPAROSCOPIC CHOLECYSTECTOMY CONVERTED TO OPEN;  Surgeon: Darnell Mcgraw MD;  Location: [facility] OR;  Service: General;  Laterality: N/A;  CONVERTED TO OPEN    TUBAL LIGATION         Social History  Ms.  reports that she quit smoking about 12 years ago. Her smoking use included cigarettes. She has a 12.00 pack-year smoking history. She has never used smokeless tobacco. She reports that she does not drink alcohol and does not use drugs.    Family History  Ms.'s family history includes Alcohol abuse in her mother; Heart disease in her father; Hypertension in her father; Lung cancer in her maternal aunt; Migraines in her daughter.    Medications and Allergies     Medications  Outpatient Medications Marked as Taking for the 22 encounter (Office Visit) with Yahaira Roman MD   Medication Sig Dispense Refill    celecoxib (CELEBREX) 200 MG capsule Take 200 mg by mouth.      cyanocobalamin 1,000 mcg/mL injection Inject  "1 mL (1,000 mcg total) into the muscle every 30 days. 3 mL 3    famotidine (PEPCID) 40 MG tablet       gabapentin (NEURONTIN) 300 MG capsule Take 1 capsule (300 mg total) by mouth nightly. 90 capsule 1       Allergies  Review of patient's allergies indicates:  No Known Allergies    Physical Examination   /87 (BP Location: Left arm, Patient Position: Sitting)   Pulse 76   Temp 98.1 °F (36.7 °C) (Oral)   Resp 18   Ht 5' 7" (1.702 m)   Wt 118 kg (260 lb 3.2 oz)   LMP 06/08/2020 Comment: tubal ligation   SpO2 97%   BMI 40.75 kg/m²     Physical Exam  Constitutional:       Appearance: Normal appearance. She is obese.   Cardiovascular:      Rate and Rhythm: Normal rate and regular rhythm.      Pulses: Normal pulses.      Heart sounds: Normal heart sounds.   Musculoskeletal:         General: Normal range of motion.   Skin:     General: Skin is warm.   Neurological:      General: No focal deficit present.      Mental Status: She is alert.   Psychiatric:         Mood and Affect: Mood normal.         Behavior: Behavior normal.         Judgment: Judgment normal.       EKG   Sinus rhythm  Rate 68  No st elevation or depression  Qtc WNL  QRS WNL    Assessment and Plan (including Health Maintenance)     Plan:         Problem List Items Addressed This Visit    None  Visit Diagnoses       Pre-op evaluation    -  Primary    Relevant Orders    X-Ray Chest PA And Lateral    Primary osteoarthritis of left knee        Relevant Medications    cyclobenzaprine (FLEXERIL) 10 MG tablet    Other Relevant Orders    X-Ray Chest PA And Lateral    Pre-operative laboratory examination        Relevant Medications    cyclobenzaprine (FLEXERIL) 10 MG tablet    Upper back pain on left side                  - It is under my medical opinion that this pt is medically optimized for surgery         Signature:  Yahaira Roman MD      Date of encounter: 12/12/22    "

## 2022-12-12 NOTE — LETTER
December 12, 2022      [facility] [address] [place] [address]  Phone: [phone]  Fax: [phone]       Patient: Vandana Price   YOB: 1965  Date of Visit: 12/12/2022    To Whom It May Concern:    Enrike Price  was at [facility] on 12/12/2022. The patient may return to work/school on 12/12/2022 with no restrictions. If you have any questions or concerns, or if I can be of further assistance, please do not hesitate to contact me.    Sincerely,  Dr. Yahaira Roman/  Bharti Mendez RN

## 2022-12-14 NOTE — H&P (VIEW-ONLY)
Department of Orthopedic Surgery    History and Physical       Principal Problem: Pain in both knees, unspecified chronicity [M25.561, M25.562]           HISTORY:  57-year-old female with severe degenerative joint disease of her left knee needing total knee arthroplasty on left.  She has failed non operative treatment including injections walking with assistance for greater than 3 months she is now risk for falls needing total knee arthroplasty.    PAST MEDICAL HISTORY:   Past Medical History:   Diagnosis Date    Allergy     Bilateral primary osteoarthritis of knee     Chronic pain of both knees 10/14/2021    Eosinophilia     Gangrene of gallbladder in cholecystitis 2021    Hematochezia     History of colonoscopy     Hyperlipidemia     Hypertension     Sleep apnea         PAST SURGICAL HISTORY:   Past Surgical History:   Procedure Laterality Date    Bilateral IA knee injection Bilateral 2021    D Shows    bladder tact       SECTION      CHOLECYSTECTOMY      FRACTURE SURGERY      jaw     INJECTION OF ANESTHETIC AGENT AROUND NERVE Bilateral 2022    Procedure: GNB   BILATERAL;  Surgeon: Amanda Goetz MD;  Location: [facility] PAIN [facility];  Service: Pain Management;  Laterality: Bilateral;  PT HAS NOT HAD VAC   STATES WILL BE TESTED AT [facility] IN [place]    knee scope Bilateral     KNEE SURGERY      LAPAROSCOPIC CHOLECYSTECTOMY N/A 3/31/2021    Procedure: LAPAROSCOPIC CHOLECYSTECTOMY CONVERTED TO OPEN;  Surgeon: Darnell Mcgraw MD;  Location: [facility] OR;  Service: General;  Laterality: N/A;  CONVERTED TO OPEN    TUBAL LIGATION            ALLERGIES: Review of patient's allergies indicates:  No Known Allergies       MEDICATIONS: (Not in a hospital admission)       SOCIAL HISTORY:   Social History     Socioeconomic History    Marital status: Single    Number of children: 2   Occupational History    Occupation:  at [employer]   Tobacco Use    Smoking status: Former     Packs/day: 1.00      Years: 12.00     Pack years: 12.00     Types: Cigarettes     Quit date:      Years since quittin.9    Smokeless tobacco: Never   Substance and Sexual Activity    Alcohol use: Never    Drug use: Never    Sexual activity: Not Currently          FAMILY HISTORY:   Family History   Problem Relation Age of Onset    Hypertension Father     Heart disease Father     Alcohol abuse Mother     Migraines Daughter     Lung cancer Maternal Aunt           PHYSICAL EXAM:   There were no vitals filed for this visit.  There is no height or weight on file to calculate BMI.     In general, this is a well-developed, well-nourished female . The patient is alert, oriented and cooperative.      HEENT:  Normocephalic, atraumatic.  Extraocular movements are intact bilaterally.  The oropharynx is benign.       NECK:  Nontender with good range of motion.      LUNGS:  Clear to auscultation bilaterally.      HEART:  Demonstrates a regular rate and rhythm.  No murmurs appreciated.      ABDOMEN:  Soft, non-tender, non-distended.        EXTREMITIES:  Left lower extremity she moves her toes she has sensation to touch has palpable pulses tender to palpation over medial and lateral joint lines.  She has 1+ effusion walks with antalgic gait range of motion 5-100 degrees crepitus on range of motion no instability on varus valgus stress      RADIOGRAPHIC FINDINGS:  X-rays show severe degenerative joint disease left knee      IMPRESSION:  Severe degenerative joint disease left knee      PLAN:  Total knee arthroplasty on left with or without patellar resurfacing    I had a long discussion with the patient about treatment options, including operative and nonoperative treatments. We discussed pros and cons of each including risks pertinent to surgery including pain, infection, bleeding, damage to adjacent structures like nerves and blood vessels, failure to heal, need for future surgeries, stiffness, instability, loss of limb, anesthesia risks like  stroke, blood clot, loss of life. We discussed the possibility of need for later hardware removal in the case that hardware was used. We discussed common and uncommon risks, and discussed patient specific factors that may increase the risks present with surgery. All questions were answered. The patient expressed understanding of the pros and cons of surgery and wanted to proceed with surgical treatment.        (Subject to voice recognition error, transcription service not allowed)

## 2022-12-15 ENCOUNTER — HOSPITAL ENCOUNTER (OUTPATIENT)
Facility: HOSPITAL | Age: 57
Discharge: HOME HEALTH | End: 2022-12-16
Attending: ORTHOPAEDIC SURGERY | Admitting: ORTHOPAEDIC SURGERY
Payer: COMMERCIAL

## 2022-12-15 ENCOUNTER — ANESTHESIA EVENT (OUTPATIENT)
Dept: SURGERY | Facility: HOSPITAL | Age: 57
End: 2022-12-15
Payer: COMMERCIAL

## 2022-12-15 ENCOUNTER — ANESTHESIA (OUTPATIENT)
Dept: SURGERY | Facility: HOSPITAL | Age: 57
End: 2022-12-15
Payer: COMMERCIAL

## 2022-12-15 DIAGNOSIS — M17.0 BILATERAL PRIMARY OSTEOARTHRITIS OF KNEE: ICD-10-CM

## 2022-12-15 PROBLEM — E66.01 SEVERE OBESITY (BMI >= 40): Chronic | Status: ACTIVE | Noted: 2022-12-15

## 2022-12-15 PROBLEM — K51.90 ULCERATIVE COLITIS: Chronic | Status: ACTIVE | Noted: 2022-12-15

## 2022-12-15 LAB
BASOPHILS # BLD AUTO: 0.04 K/UL (ref 0–0.2)
BASOPHILS NFR BLD AUTO: 0.6 % (ref 0–1)
BILIRUB UR QL STRIP: NEGATIVE
CLARITY UR: CLEAR
COLOR UR: NORMAL
DIFFERENTIAL METHOD BLD: ABNORMAL
EOSINOPHIL # BLD AUTO: 0.33 K/UL (ref 0–0.5)
EOSINOPHIL NFR BLD AUTO: 4.6 % (ref 1–4)
ERYTHROCYTE [DISTWIDTH] IN BLOOD BY AUTOMATED COUNT: 14 % (ref 11.5–14.5)
GLUCOSE UR STRIP-MCNC: NORMAL MG/DL
HCT VFR BLD AUTO: 35.3 % (ref 38–47)
HGB BLD-MCNC: 11.1 G/DL (ref 12–16)
IMM GRANULOCYTES # BLD AUTO: 0.01 K/UL (ref 0–0.04)
IMM GRANULOCYTES NFR BLD: 0.1 % (ref 0–0.4)
KETONES UR STRIP-SCNC: NEGATIVE MG/DL
LEUKOCYTE ESTERASE UR QL STRIP: NEGATIVE
LYMPHOCYTES # BLD AUTO: 2.19 K/UL (ref 1–4.8)
LYMPHOCYTES NFR BLD AUTO: 30.8 % (ref 27–41)
MCH RBC QN AUTO: 27.3 PG (ref 27–31)
MCHC RBC AUTO-ENTMCNC: 31.4 G/DL (ref 32–36)
MCV RBC AUTO: 86.7 FL (ref 80–96)
MONOCYTES # BLD AUTO: 0.39 K/UL (ref 0–0.8)
MONOCYTES NFR BLD AUTO: 5.5 % (ref 2–6)
MPC BLD CALC-MCNC: 10.5 FL (ref 9.4–12.4)
NEUTROPHILS # BLD AUTO: 4.15 K/UL (ref 1.8–7.7)
NEUTROPHILS NFR BLD AUTO: 58.4 % (ref 53–65)
NITRITE UR QL STRIP: NEGATIVE
NRBC # BLD AUTO: 0 X10E3/UL
NRBC, AUTO (.00): 0 %
PH UR STRIP: 5 PH UNITS
PLATELET # BLD AUTO: 232 K/UL (ref 150–400)
PROT UR QL STRIP: NEGATIVE
RBC # BLD AUTO: 4.07 M/UL (ref 4.2–5.4)
RBC # UR STRIP: NEGATIVE /UL
SP GR UR STRIP: 1.02
UROBILINOGEN UR STRIP-ACNC: NORMAL MG/DL
WBC # BLD AUTO: 7.11 K/UL (ref 4.5–11)

## 2022-12-15 PROCEDURE — 27201423 OPTIME MED/SURG SUP & DEVICES STERILE SUPPLY: Performed by: ORTHOPAEDIC SURGERY

## 2022-12-15 PROCEDURE — 37000009 HC ANESTHESIA EA ADD 15 MINS: Performed by: ORTHOPAEDIC SURGERY

## 2022-12-15 PROCEDURE — 94761 N-INVAS EAR/PLS OXIMETRY MLT: CPT

## 2022-12-15 PROCEDURE — 97165 OT EVAL LOW COMPLEX 30 MIN: CPT

## 2022-12-15 PROCEDURE — 81003 URINALYSIS AUTO W/O SCOPE: CPT | Performed by: ORTHOPAEDIC SURGERY

## 2022-12-15 PROCEDURE — 71000033 HC RECOVERY, INTIAL HOUR: Performed by: ORTHOPAEDIC SURGERY

## 2022-12-15 PROCEDURE — 97161 PT EVAL LOW COMPLEX 20 MIN: CPT

## 2022-12-15 PROCEDURE — 27200750 HC INSULATED NEEDLE/ STIMUPLEX: Performed by: NURSE ANESTHETIST, CERTIFIED REGISTERED

## 2022-12-15 PROCEDURE — C1713 ANCHOR/SCREW BN/BN,TIS/BN: HCPCS | Performed by: ORTHOPAEDIC SURGERY

## 2022-12-15 PROCEDURE — 25000003 PHARM REV CODE 250: Performed by: ANESTHESIOLOGY

## 2022-12-15 PROCEDURE — 63600175 PHARM REV CODE 636 W HCPCS: Performed by: ANESTHESIOLOGY

## 2022-12-15 PROCEDURE — 25000003 PHARM REV CODE 250: Performed by: ORTHOPAEDIC SURGERY

## 2022-12-15 PROCEDURE — 27000177 HC AIRWAY, LARYNGEAL MASK: Performed by: NURSE ANESTHETIST, CERTIFIED REGISTERED

## 2022-12-15 PROCEDURE — D9220A PRA ANESTHESIA: ICD-10-PCS | Mod: CRNA,,, | Performed by: NURSE ANESTHETIST, CERTIFIED REGISTERED

## 2022-12-15 PROCEDURE — 85025 COMPLETE CBC W/AUTO DIFF WBC: CPT | Performed by: ORTHOPAEDIC SURGERY

## 2022-12-15 PROCEDURE — 63600175 PHARM REV CODE 636 W HCPCS: Performed by: ORTHOPAEDIC SURGERY

## 2022-12-15 PROCEDURE — 99900031 HC PATIENT EDUCATION (STAT)

## 2022-12-15 PROCEDURE — 27000510 HC BLANKET BAIR HUGGER ANY SIZE: Performed by: NURSE ANESTHETIST, CERTIFIED REGISTERED

## 2022-12-15 PROCEDURE — 71000039 HC RECOVERY, EACH ADD'L HOUR: Performed by: ORTHOPAEDIC SURGERY

## 2022-12-15 PROCEDURE — C1769 GUIDE WIRE: HCPCS | Performed by: ORTHOPAEDIC SURGERY

## 2022-12-15 PROCEDURE — D9220A PRA ANESTHESIA: ICD-10-PCS | Mod: ANES,,, | Performed by: ANESTHESIOLOGY

## 2022-12-15 PROCEDURE — 36000712 HC OR TIME LEV V 1ST 15 MIN: Performed by: ORTHOPAEDIC SURGERY

## 2022-12-15 PROCEDURE — 99203 OFFICE O/P NEW LOW 30 MIN: CPT | Mod: ,,, | Performed by: INTERNAL MEDICINE

## 2022-12-15 PROCEDURE — 36415 COLL VENOUS BLD VENIPUNCTURE: CPT | Performed by: ORTHOPAEDIC SURGERY

## 2022-12-15 PROCEDURE — 64447 NJX AA&/STRD FEMORAL NRV IMG: CPT | Mod: 59,LT,, | Performed by: ANESTHESIOLOGY

## 2022-12-15 PROCEDURE — 37000008 HC ANESTHESIA 1ST 15 MINUTES: Performed by: ORTHOPAEDIC SURGERY

## 2022-12-15 PROCEDURE — D9220A PRA ANESTHESIA: Mod: ANES,,, | Performed by: ANESTHESIOLOGY

## 2022-12-15 PROCEDURE — 63600175 PHARM REV CODE 636 W HCPCS: Performed by: NURSE ANESTHETIST, CERTIFIED REGISTERED

## 2022-12-15 PROCEDURE — C1776 JOINT DEVICE (IMPLANTABLE): HCPCS | Performed by: ORTHOPAEDIC SURGERY

## 2022-12-15 PROCEDURE — 36000713 HC OR TIME LEV V EA ADD 15 MIN: Performed by: ORTHOPAEDIC SURGERY

## 2022-12-15 PROCEDURE — 99203 PR OFFICE/OUTPT VISIT, NEW, LEVL III, 30-44 MIN: ICD-10-PCS | Mod: ,,, | Performed by: INTERNAL MEDICINE

## 2022-12-15 PROCEDURE — 27000221 HC OXYGEN, UP TO 24 HOURS

## 2022-12-15 PROCEDURE — 25000003 PHARM REV CODE 250: Performed by: NURSE ANESTHETIST, CERTIFIED REGISTERED

## 2022-12-15 PROCEDURE — D9220A PRA ANESTHESIA: Mod: CRNA,,, | Performed by: NURSE ANESTHETIST, CERTIFIED REGISTERED

## 2022-12-15 PROCEDURE — 99900035 HC TECH TIME PER 15 MIN (STAT)

## 2022-12-15 PROCEDURE — 64447 PERIPHERAL BLOCK: ICD-10-PCS | Mod: 59,LT,, | Performed by: ANESTHESIOLOGY

## 2022-12-15 PROCEDURE — 27201960 HC SPINAL TRAY: Performed by: NURSE ANESTHETIST, CERTIFIED REGISTERED

## 2022-12-15 PROCEDURE — 27000716 HC OXISENSOR PROBE, ANY SIZE: Performed by: NURSE ANESTHETIST, CERTIFIED REGISTERED

## 2022-12-15 DEVICE — INSERT TIBIAL CS SIZE 4 9MM: Type: IMPLANTABLE DEVICE | Site: KNEE | Status: FUNCTIONAL

## 2022-12-15 DEVICE — FEMORAL CRUC RTN CEM SZ 4 LEFT: Type: IMPLANTABLE DEVICE | Site: KNEE | Status: FUNCTIONAL

## 2022-12-15 DEVICE — CEMENT BONE SURG SMPLX P RADPQ: Type: IMPLANTABLE DEVICE | Site: KNEE | Status: FUNCTIONAL

## 2022-12-15 DEVICE — PATELLA TRI 32X10 X3 POLYETHYL: Type: IMPLANTABLE DEVICE | Site: KNEE | Status: FUNCTIONAL

## 2022-12-15 DEVICE — BASEPLATE TIB CEM PRIM SZ 4: Type: IMPLANTABLE DEVICE | Site: KNEE | Status: FUNCTIONAL

## 2022-12-15 RX ORDER — BUPIVACAINE HYDROCHLORIDE 7.5 MG/ML
INJECTION, SOLUTION EPIDURAL; RETROBULBAR
Status: COMPLETED | OUTPATIENT
Start: 2022-12-15 | End: 2022-12-15

## 2022-12-15 RX ORDER — HYDROMORPHONE HYDROCHLORIDE 2 MG/ML
0.5 INJECTION, SOLUTION INTRAMUSCULAR; INTRAVENOUS; SUBCUTANEOUS EVERY 5 MIN PRN
Status: COMPLETED | OUTPATIENT
Start: 2022-12-15 | End: 2022-12-15

## 2022-12-15 RX ORDER — CEFAZOLIN SODIUM 2 G/50ML
2 SOLUTION INTRAVENOUS
Status: DISCONTINUED | OUTPATIENT
Start: 2022-12-15 | End: 2022-12-15 | Stop reason: HOSPADM

## 2022-12-15 RX ORDER — BISACODYL 10 MG
10 SUPPOSITORY, RECTAL RECTAL DAILY PRN
Status: DISCONTINUED | OUTPATIENT
Start: 2022-12-15 | End: 2022-12-16 | Stop reason: HOSPADM

## 2022-12-15 RX ORDER — MORPHINE SULFATE 10 MG/ML
4 INJECTION INTRAMUSCULAR; INTRAVENOUS; SUBCUTANEOUS EVERY 5 MIN PRN
Status: DISCONTINUED | OUTPATIENT
Start: 2022-12-15 | End: 2022-12-15 | Stop reason: HOSPADM

## 2022-12-15 RX ORDER — ASPIRIN 81 MG/1
81 TABLET ORAL DAILY
Status: DISCONTINUED | OUTPATIENT
Start: 2022-12-15 | End: 2022-12-16 | Stop reason: HOSPADM

## 2022-12-15 RX ORDER — DIPHENHYDRAMINE HYDROCHLORIDE 50 MG/ML
25 INJECTION INTRAMUSCULAR; INTRAVENOUS EVERY 6 HOURS PRN
Status: DISCONTINUED | OUTPATIENT
Start: 2022-12-15 | End: 2022-12-15 | Stop reason: HOSPADM

## 2022-12-15 RX ORDER — ONDANSETRON 2 MG/ML
4 INJECTION INTRAMUSCULAR; INTRAVENOUS DAILY PRN
Status: DISCONTINUED | OUTPATIENT
Start: 2022-12-15 | End: 2022-12-15 | Stop reason: HOSPADM

## 2022-12-15 RX ORDER — FAMOTIDINE 20 MG/1
40 TABLET, FILM COATED ORAL DAILY
Status: DISCONTINUED | OUTPATIENT
Start: 2022-12-15 | End: 2022-12-16 | Stop reason: HOSPADM

## 2022-12-15 RX ORDER — TRANEXAMIC ACID 100 MG/ML
INJECTION, SOLUTION INTRAVENOUS
Status: DISCONTINUED | OUTPATIENT
Start: 2022-12-15 | End: 2022-12-15

## 2022-12-15 RX ORDER — CYCLOBENZAPRINE HCL 10 MG
10 TABLET ORAL 3 TIMES DAILY PRN
Status: DISCONTINUED | OUTPATIENT
Start: 2022-12-15 | End: 2022-12-16 | Stop reason: HOSPADM

## 2022-12-15 RX ORDER — ONDANSETRON 2 MG/ML
INJECTION INTRAMUSCULAR; INTRAVENOUS
Status: DISCONTINUED | OUTPATIENT
Start: 2022-12-15 | End: 2022-12-15

## 2022-12-15 RX ORDER — MEPERIDINE HYDROCHLORIDE 25 MG/ML
25 INJECTION INTRAMUSCULAR; INTRAVENOUS; SUBCUTANEOUS ONCE AS NEEDED
Status: DISCONTINUED | OUTPATIENT
Start: 2022-12-15 | End: 2022-12-15 | Stop reason: HOSPADM

## 2022-12-15 RX ORDER — MIDAZOLAM HYDROCHLORIDE 5 MG/ML
INJECTION INTRAMUSCULAR; INTRAVENOUS
Status: DISCONTINUED | OUTPATIENT
Start: 2022-12-15 | End: 2022-12-15

## 2022-12-15 RX ORDER — FENTANYL CITRATE 50 UG/ML
INJECTION, SOLUTION INTRAMUSCULAR; INTRAVENOUS
Status: DISCONTINUED | OUTPATIENT
Start: 2022-12-15 | End: 2022-12-15

## 2022-12-15 RX ORDER — SODIUM CHLORIDE 9 MG/ML
75 INJECTION, SOLUTION INTRAVENOUS CONTINUOUS
Status: DISCONTINUED | OUTPATIENT
Start: 2022-12-15 | End: 2022-12-16 | Stop reason: HOSPADM

## 2022-12-15 RX ORDER — DOCUSATE SODIUM 100 MG/1
100 CAPSULE, LIQUID FILLED ORAL EVERY 12 HOURS
Status: DISCONTINUED | OUTPATIENT
Start: 2022-12-15 | End: 2022-12-16 | Stop reason: HOSPADM

## 2022-12-15 RX ORDER — IPRATROPIUM BROMIDE AND ALBUTEROL SULFATE 2.5; .5 MG/3ML; MG/3ML
3 SOLUTION RESPIRATORY (INHALATION) ONCE AS NEEDED
Status: DISCONTINUED | OUTPATIENT
Start: 2022-12-15 | End: 2022-12-15 | Stop reason: HOSPADM

## 2022-12-15 RX ORDER — SODIUM CHLORIDE 9 MG/ML
INJECTION, SOLUTION INTRAVENOUS CONTINUOUS
Status: DISCONTINUED | OUTPATIENT
Start: 2022-12-15 | End: 2022-12-15

## 2022-12-15 RX ORDER — ONDANSETRON 2 MG/ML
4 INJECTION INTRAMUSCULAR; INTRAVENOUS EVERY 8 HOURS PRN
Status: DISCONTINUED | OUTPATIENT
Start: 2022-12-15 | End: 2022-12-16 | Stop reason: HOSPADM

## 2022-12-15 RX ORDER — PROPOFOL 10 MG/ML
VIAL (ML) INTRAVENOUS
Status: DISCONTINUED | OUTPATIENT
Start: 2022-12-15 | End: 2022-12-15

## 2022-12-15 RX ORDER — HYDROCODONE BITARTRATE AND ACETAMINOPHEN 5; 325 MG/1; MG/1
1 TABLET ORAL EVERY 4 HOURS PRN
Status: DISCONTINUED | OUTPATIENT
Start: 2022-12-15 | End: 2022-12-16

## 2022-12-15 RX ORDER — GABAPENTIN 300 MG/1
300 CAPSULE ORAL NIGHTLY
Status: DISCONTINUED | OUTPATIENT
Start: 2022-12-15 | End: 2022-12-16 | Stop reason: HOSPADM

## 2022-12-15 RX ORDER — EPHEDRINE SULFATE 50 MG/ML
INJECTION, SOLUTION INTRAVENOUS
Status: DISCONTINUED | OUTPATIENT
Start: 2022-12-15 | End: 2022-12-15

## 2022-12-15 RX ORDER — CEFAZOLIN SODIUM 2 G/50ML
2 SOLUTION INTRAVENOUS
Status: COMPLETED | OUTPATIENT
Start: 2022-12-15 | End: 2022-12-16

## 2022-12-15 RX ORDER — CEFAZOLIN SODIUM 1 G/3ML
INJECTION, POWDER, FOR SOLUTION INTRAMUSCULAR; INTRAVENOUS
Status: DISCONTINUED | OUTPATIENT
Start: 2022-12-15 | End: 2022-12-15

## 2022-12-15 RX ORDER — LIDOCAINE HYDROCHLORIDE 20 MG/ML
INJECTION, SOLUTION EPIDURAL; INFILTRATION; INTRACAUDAL; PERINEURAL
Status: DISCONTINUED | OUTPATIENT
Start: 2022-12-15 | End: 2022-12-15

## 2022-12-15 RX ORDER — ROPIVACAINE HYDROCHLORIDE 5 MG/ML
INJECTION, SOLUTION EPIDURAL; INFILTRATION; PERINEURAL
Status: COMPLETED | OUTPATIENT
Start: 2022-12-15 | End: 2022-12-15

## 2022-12-15 RX ORDER — MORPHINE SULFATE 4 MG/ML
4 INJECTION, SOLUTION INTRAMUSCULAR; INTRAVENOUS EVERY 4 HOURS PRN
Status: DISCONTINUED | OUTPATIENT
Start: 2022-12-15 | End: 2022-12-16 | Stop reason: HOSPADM

## 2022-12-15 RX ADMIN — ONDANSETRON 8 MG: 2 INJECTION INTRAMUSCULAR; INTRAVENOUS at 08:12

## 2022-12-15 RX ADMIN — LIDOCAINE HYDROCHLORIDE 100 MG: 20 INJECTION, SOLUTION EPIDURAL; INFILTRATION; INTRACAUDAL; PERINEURAL at 08:12

## 2022-12-15 RX ADMIN — ASPIRIN 81 MG: 81 TABLET, DELAYED RELEASE ORAL at 02:12

## 2022-12-15 RX ADMIN — DOCUSATE SODIUM 100 MG: 100 CAPSULE ORAL at 09:12

## 2022-12-15 RX ADMIN — MIDAZOLAM HYDROCHLORIDE 2 MG: 5 INJECTION, SOLUTION INTRAMUSCULAR; INTRAVENOUS at 08:12

## 2022-12-15 RX ADMIN — MORPHINE SULFATE 4 MG: 10 INJECTION, SOLUTION INTRAMUSCULAR; INTRAVENOUS at 10:12

## 2022-12-15 RX ADMIN — HYDROMORPHONE HYDROCHLORIDE 0.5 MG: 2 INJECTION, SOLUTION INTRAMUSCULAR; INTRAVENOUS; SUBCUTANEOUS at 11:12

## 2022-12-15 RX ADMIN — HYDROCODONE BITARTRATE AND ACETAMINOPHEN 1 TABLET: 5; 325 TABLET ORAL at 05:12

## 2022-12-15 RX ADMIN — SODIUM CHLORIDE: 9 INJECTION, SOLUTION INTRAVENOUS at 08:12

## 2022-12-15 RX ADMIN — DEXTROSE MONOHYDRATE 2 G: 50 INJECTION, SOLUTION INTRAVENOUS at 11:12

## 2022-12-15 RX ADMIN — FENTANYL CITRATE 100 MCG: 50 INJECTION INTRAMUSCULAR; INTRAVENOUS at 10:12

## 2022-12-15 RX ADMIN — MORPHINE SULFATE 4 MG: 4 INJECTION, SOLUTION INTRAMUSCULAR; INTRAVENOUS at 02:12

## 2022-12-15 RX ADMIN — DIPHENHYDRAMINE HYDROCHLORIDE 25 MG: 50 INJECTION, SOLUTION INTRAMUSCULAR; INTRAVENOUS at 10:12

## 2022-12-15 RX ADMIN — FENTANYL CITRATE 100 MCG: 50 INJECTION INTRAMUSCULAR; INTRAVENOUS at 08:12

## 2022-12-15 RX ADMIN — TRANEXAMIC ACID 1000 MG: 100 INJECTION, SOLUTION INTRAVENOUS at 09:12

## 2022-12-15 RX ADMIN — MORPHINE SULFATE 4 MG: 4 INJECTION, SOLUTION INTRAMUSCULAR; INTRAVENOUS at 11:12

## 2022-12-15 RX ADMIN — CYCLOBENZAPRINE 10 MG: 10 TABLET, FILM COATED ORAL at 09:12

## 2022-12-15 RX ADMIN — TRANEXAMIC ACID 1000 MG: 100 INJECTION, SOLUTION INTRAVENOUS at 08:12

## 2022-12-15 RX ADMIN — MORPHINE SULFATE 4 MG: 4 INJECTION, SOLUTION INTRAMUSCULAR; INTRAVENOUS at 07:12

## 2022-12-15 RX ADMIN — HYDROMORPHONE HYDROCHLORIDE 0.5 MG: 2 INJECTION, SOLUTION INTRAMUSCULAR; INTRAVENOUS; SUBCUTANEOUS at 10:12

## 2022-12-15 RX ADMIN — VANCOMYCIN HYDROCHLORIDE 1500 MG: 1 INJECTION, POWDER, LYOPHILIZED, FOR SOLUTION INTRAVENOUS at 08:12

## 2022-12-15 RX ADMIN — SODIUM CHLORIDE 75 ML/HR: 9 INJECTION, SOLUTION INTRAVENOUS at 03:12

## 2022-12-15 RX ADMIN — BUPIVACAINE HYDROCHLORIDE 1.6 ML: 7.5 INJECTION, SOLUTION EPIDURAL; RETROBULBAR at 08:12

## 2022-12-15 RX ADMIN — CEFAZOLIN 2 G: 1 INJECTION, POWDER, FOR SOLUTION INTRAMUSCULAR; INTRAVENOUS; PARENTERAL at 08:12

## 2022-12-15 RX ADMIN — FAMOTIDINE 40 MG: 20 TABLET ORAL at 02:12

## 2022-12-15 RX ADMIN — ROPIVACAINE HYDROCHLORIDE 20 ML: 5 INJECTION, SOLUTION EPIDURAL; INFILTRATION; PERINEURAL at 08:12

## 2022-12-15 RX ADMIN — VANCOMYCIN HYDROCHLORIDE 1000 MG: 1 INJECTION, POWDER, LYOPHILIZED, FOR SOLUTION INTRAVENOUS at 09:12

## 2022-12-15 RX ADMIN — HYDROCODONE BITARTRATE AND ACETAMINOPHEN 1 TABLET: 5; 325 TABLET ORAL at 12:12

## 2022-12-15 RX ADMIN — FENTANYL CITRATE 100 MCG: 50 INJECTION INTRAMUSCULAR; INTRAVENOUS at 09:12

## 2022-12-15 RX ADMIN — GABAPENTIN 300 MG: 300 CAPSULE ORAL at 09:12

## 2022-12-15 RX ADMIN — PROPOFOL 200 MG: 10 INJECTION, EMULSION INTRAVENOUS at 08:12

## 2022-12-15 RX ADMIN — EPHEDRINE SULFATE 25 MG: 50 INJECTION INTRAVENOUS at 08:12

## 2022-12-15 RX ADMIN — ONDANSETRON 4 MG: 2 INJECTION INTRAMUSCULAR; INTRAVENOUS at 07:12

## 2022-12-15 RX ADMIN — HYDROCODONE BITARTRATE AND ACETAMINOPHEN 1 TABLET: 5; 325 TABLET ORAL at 09:12

## 2022-12-15 RX ADMIN — DEXTROSE MONOHYDRATE 2 G: 50 INJECTION, SOLUTION INTRAVENOUS at 05:12

## 2022-12-15 NOTE — ANESTHESIA POSTPROCEDURE EVALUATION
Anesthesia Post Evaluation    Patient: Vandana Price    Procedure(s) Performed: Procedure(s) (LRB):  ARTHROPLASTY, KNEE, TOTAL, USING COMPUTER-ASSISTED NAVIGATION (Left)    Final Anesthesia Type: general      Patient location during evaluation: PACU  Patient participation: Yes- Able to Participate  Level of consciousness: awake and alert and oriented  Post-procedure vital signs: reviewed and stable  Pain management: adequate  Airway patency: patent  KYRIE mitigation strategies: Multimodal analgesia and Use of major conduction anesthesia (spinal/epidural) or peripheral nerve block  PONV status at discharge: No PONV  Anesthetic complications: no      Cardiovascular status: hemodynamically stable  Respiratory status: unassisted and spontaneous ventilation  Hydration status: euvolemic  Follow-up not needed.      Patient developed rash with post op morphine 4mg IV, morphine dc'd, benadryl 25mg IV given and dilaudid then used for pain control. No s/s of systemic allergy - respirations unlabored and breath sounds clear - vitals stable    Vitals Value Taken Time   /65 12/15/22 1105   Temp 37 °C (98.6 °F) 12/15/22 1040   Pulse 81 12/15/22 1106   Resp 11 12/15/22 1059   SpO2 100 % 12/15/22 1106   Vitals shown include unvalidated device data.      No case tracking events are documented in the log.      Pain/Dinorah Score: Pain Rating Prior to Med Admin: 8 (12/15/2022 10:59 AM)  Dinorah Score: 8 (12/15/2022 10:37 AM)

## 2022-12-15 NOTE — ANESTHESIA PROCEDURE NOTES
Spinal    Diagnosis: left TKA  Patient location during procedure: OR  Start time: 12/15/2022 8:18 AM  Timeout: 12/15/2022 8:17 AM  End time: 12/15/2022 8:21 AM    Staffing  Authorizing Provider: Matty Vega  Performing Provider: Matty Vega    Preanesthetic Checklist  Completed: patient identified, IV checked, site marked, risks and benefits discussed, surgical consent, monitors and equipment checked, pre-op evaluation and timeout performed  Spinal Block  Patient position: sitting  Prep: ChloraPrep  Patient monitoring: heart rate, continuous pulse ox and frequent blood pressure checks  Approach: midline  Location: L4-5  Injection technique: single shot  CSF Fluid: clear free-flowing CSF  Needle  Needle type: Quincke   Needle gauge: 22 G  Needle length: 3.5 in  Additional Documentation: incremental injection, negative aspiration for heme and no paresthesia on injection  Needle localization: anatomical landmarks  Assessment  Sensory level: T6  Ease of block: moderate  Patient's tolerance of the procedure: comfortable throughout block  Medications:    Medications: BUPivacaine (pf) (MARCAINE) injection 0.75% - Intraspinal   1.6 mL - 12/15/2022 8:21:00 AM

## 2022-12-15 NOTE — INTERVAL H&P NOTE
The patient has been examined and the H&P has been reviewed:    I concur with the findings and no changes have occurred since H&P was written.    Surgery risks, benefits and alternative options discussed and understood by patient/family.          Active Hospital Problems    Diagnosis  POA    *Bilateral primary osteoarthritis of knee [M17.0]  Yes     Chronic      Resolved Hospital Problems   No resolved problems to display.

## 2022-12-15 NOTE — ASSESSMENT & PLAN NOTE
Sp ARTHROPLASTY, KNEE, TOTAL, USING COMPUTER-ASSISTED NAVIGATION (Left)  mx per primary  Pain tx and dvt ppx per primary     Consent (Nose)/Introductory Paragraph: The rationale for Mohs was explained to the patient and consent was obtained. The risks, benefits and alternatives to therapy were discussed in detail. Specifically, the risks of nasal deformity, changes in the flow of air through the nose, infection, scarring, bleeding, prolonged wound healing, incomplete removal, allergy to anesthesia, nerve injury and recurrence were addressed. Prior to the procedure, the treatment site was clearly identified and confirmed by the patient. All components of Universal Protocol/PAUSE Rule completed.

## 2022-12-15 NOTE — SUBJECTIVE & OBJECTIVE
Past Medical History:   Diagnosis Date    Allergy     Bilateral primary osteoarthritis of knee     Chronic pain of both knees 10/14/2021    Eosinophilia     Gangrene of gallbladder in cholecystitis 2021    Hematochezia     History of colonoscopy     Hyperlipidemia     Hypertension     Sleep apnea        Past Surgical History:   Procedure Laterality Date    Bilateral IA knee injection Bilateral 2021    D Shows    bladder tact       SECTION      CHOLECYSTECTOMY      FRACTURE SURGERY      jaw 1972    INJECTION OF ANESTHETIC AGENT AROUND NERVE Bilateral 2022    Procedure: GNB   BILATERAL;  Surgeon: Amanda Goetz MD;  Location: [facility]IN [facility];  Service: Pain Management;  Laterality: Bilateral;  PT HAS NOT HAD VAC   STATES WILL BE TESTED AT [facility] [place]    knee scope Bilateral     KNEE SURGERY      LAPAROSCOPIC CHOLECYSTECTOMY N/A 3/31/2021    Procedure: LAPAROSCOPIC CHOLECYSTECTOMY CONVERTED TO OPEN;  Surgeon: Darnell Mcgraw MD;  Location: [facility] OR;  Service: General;  Laterality: N/A;  CONVERTED TO OPEN    TUBAL LIGATION         Review of patient's allergies indicates:   Allergen Reactions    Opioids - morphine analogues Rash     Development of red rash at site morphine given.        No current facility-administered medications on file prior to encounter.     Current Outpatient Medications on File Prior to Encounter   Medication Sig    famotidine (PEPCID) 40 MG tablet     gabapentin (NEURONTIN) 300 MG capsule Take 1 capsule (300 mg total) by mouth nightly.    HUMIRA,CF, PEN 40 mg/0.4 mL PnKt SMARTSI Milligram(s) SUB-Q Every 2 Weeks    aspirin (ECOTRIN) 81 MG EC tablet Take 81 mg by mouth once daily.    cyanocobalamin 1,000 mcg/mL injection Inject 1 mL (1,000 mcg total) into the muscle every 30 days.    HUMIRA,CF, PEN CROHNS-UC-HS 80 mg/0.8 mL PnKt Inject into the skin.    [DISCONTINUED] acetaminophen-codeine 300-30mg (TYLENOL #3) 300-30 mg Tab Take 1 tablet by mouth  every 4 (four) hours as needed.     Family History       Problem Relation (Age of Onset)    Alcohol abuse Mother    Heart disease Father    Hypertension Father    Lung cancer Maternal Aunt    Migraines Daughter          Tobacco Use    Smoking status: Former     Packs/day: 1.00     Years: 12.00     Pack years: 12.00     Types: Cigarettes     Quit date:      Years since quittin.9    Smokeless tobacco: Never   Substance and Sexual Activity    Alcohol use: Not Currently    Drug use: Not Currently    Sexual activity: Not Currently     Review of Systems   Constitutional: Negative.  Negative for chills, fatigue and fever.   HENT: Negative.  Negative for congestion and rhinorrhea.    Respiratory:  Negative for apnea, cough, chest tightness and shortness of breath.    Cardiovascular: Negative.  Negative for chest pain and leg swelling.   Gastrointestinal: Negative.  Negative for abdominal pain, diarrhea, nausea and vomiting.   Endocrine: Negative.    Genitourinary: Negative.    Musculoskeletal:  Positive for arthralgias.   Skin: Negative.    Allergic/Immunologic: Negative.    Neurological: Negative.    Hematological: Negative.    Psychiatric/Behavioral: Negative.     Objective:     Vital Signs (Most Recent):  Temp: 97.5 °F (36.4 °C) (12/15/22 1150)  Pulse: 78 (12/15/22 1534)  Resp: 20 (12/15/22 1519)  BP: 122/75 (12/15/22 1534)  SpO2: 97 % (room air) (12/15/22 1551) Vital Signs (24h Range):  Temp:  [97.5 °F (36.4 °C)-98.6 °F (37 °C)] 97.5 °F (36.4 °C)  Pulse:  [62-90] 78  Resp:  [9-20] 20  SpO2:  [92 %-100 %] 97 %  BP: ()/() 122/75     Weight: 126 kg (277 lb 11.2 oz)  Body mass index is 44.82 kg/m².    Physical Exam  Vitals reviewed.   Constitutional:       Appearance: Normal appearance. She is obese.   HENT:      Head: Normocephalic and atraumatic.      Nose: Nose normal. No congestion.      Mouth/Throat:      Pharynx: Oropharynx is clear.   Eyes:      Extraocular Movements: Extraocular movements  intact.      Conjunctiva/sclera: Conjunctivae normal.      Pupils: Pupils are equal, round, and reactive to light.   Cardiovascular:      Rate and Rhythm: Normal rate and regular rhythm.      Pulses: Normal pulses.      Heart sounds: Normal heart sounds.   Pulmonary:      Effort: Pulmonary effort is normal. No respiratory distress.      Breath sounds: Normal breath sounds. No wheezing.   Abdominal:      General: Bowel sounds are normal. There is no distension.      Palpations: Abdomen is soft.      Tenderness: There is no abdominal tenderness.   Musculoskeletal:         General: Tenderness present.   Lymphadenopathy:      Cervical: No cervical adenopathy.   Skin:     General: Skin is warm.   Neurological:      Mental Status: She is alert. Mental status is at baseline.   Psychiatric:         Mood and Affect: Mood normal.       Significant Labs: All pertinent labs within the past 24 hours have been reviewed.    Significant Imaging: I have reviewed all pertinent imaging results/findings within the past 24 hours.

## 2022-12-15 NOTE — ANESTHESIA PREPROCEDURE EVALUATION
12/15/2022  Vandana Price is a 57 y.o., female.      Pre-op Assessment    I have reviewed the Patient Summary Reports.     I have reviewed the Nursing Notes. I have reviewed the NPO Status.   I have reviewed the Medications.     Review of Systems  Anesthesia Hx:  No problems with previous Anesthesia  Denies Family Hx of Anesthesia complications.   Denies Personal Hx of Anesthesia complications.   Social:  Former Smoker, No Alcohol Use    Hematology/Oncology:        Hematology Comments: H/o anemia   EENT/Dental:   chronic allergic rhinitis   Cardiovascular:   Hypertension H/o DVT, not on blood thinner   Pulmonary:   Sleep Apnea    Musculoskeletal:   Arthritis   Spine Disorders: lumbar Chronic Pain    Neurological:   Peripheral Neuropathy    Endocrine:   Diabetes H/o hyperglycemia, no diagnosis of DMT2 Morbid Obesity / BMI > 40      Physical Exam  General: Well nourished, Cooperative and Alert    Airway:  Mallampati: II   Mouth Opening: Normal  TM Distance: Normal  Tongue: Normal  Neck ROM: Normal ROM    Dental:  Dentures    Chest/Lungs:  Clear to auscultation, Normal Respiratory Rate    Heart:  Rate: Normal  Rhythm: Regular Rhythm        Chemistry        Component Value Date/Time     12/12/2022 0813    K 3.9 12/12/2022 0813     12/12/2022 0813    CO2 27 12/12/2022 0813    BUN 21 (H) 12/12/2022 0813    CREATININE 0.99 12/12/2022 0813    GLU 97 12/12/2022 0813        Component Value Date/Time    CALCIUM 9.5 12/12/2022 0813    ALKPHOS 140 (H) 12/12/2022 0813    AST 13 (L) 12/12/2022 0813    ALT 21 12/12/2022 0813    BILITOT 0.4 12/12/2022 0813    ESTGFRAFRICA 82 04/02/2021 0700    EGFRNONAA 73 09/23/2021 0824        Lab Results   Component Value Date    WBC 5.42 12/12/2022    RBC 4.66 12/12/2022    HGB 12.8 12/12/2022    MCV 86.9 12/12/2022    MCH 27.5 12/12/2022    MCHC 31.6 (L) 12/12/2022    RDW 13.9  "12/12/2022     12/12/2022    MPV 11.3 12/12/2022    LYMPH 34.1 12/12/2022    LYMPH 1.85 12/12/2022    MONO 6.6 (H) 12/12/2022    EOS 0.29 12/12/2022    BASO 0.05 12/12/2022     EKG - performed at PCP - read as NSR with no ST segment abnormalities      Anesthesia Plan  Type of Anesthesia, risks & benefits discussed:    Anesthesia Type: Gen Supraglottic Airway, Spinal, Regional  Intra-op Monitoring Plan: Standard ASA Monitors  Post Op Pain Control Plan: multimodal analgesia  Induction:  IV  Airway Plan: Direct, Post-Induction  Informed Consent: Informed consent signed with the Patient and all parties understand the risks and agree with anesthesia plan.  All questions answered.   ASA Score: 3  Day of Surgery Review of History & Physical: H&P Update referred to the surgeon/provider.I have interviewed and examined the patient. I have reviewed the patient's H&P dated: There are no significant changes.   Anesthesia Plan Notes: Medical Clearance note reviewed from her PCP - "medically optimized for this procedure"    Ready For Surgery From Anesthesia Perspective.     .      "

## 2022-12-15 NOTE — PLAN OF CARE
[facility]  Initial Discharge Assessment       Primary Care Provider: Yahaira Roman MD    Admission Diagnosis: Chronic pain of both knees [M25.561, M25.562, G89.29]  Bilateral primary osteoarthritis of knee [M17.0]    Admission Date: 12/15/2022  Expected Discharge Date:     Discharge Barriers Identified: None    Payor: BLUE CROSS [facility] / Plan: [place] [employer]ES / Product Type: Commercial /     Extended Emergency Contact Information  Primary Emergency Contact: TrevizoSurjit  Mobile Phone: [phone]  Relation: Other  Preferred language: English   needed? No    Discharge Plan A: Home Health  Discharge Plan B: Home Health      [facility] [place] - [address]  [address] [place] [address]  Phone: [phone] Fax: [phone]    [facility] - [place] - [address]  [address]  [place] [address]  Phone: [phone] Fax: [phone]    [facility] [address] [place] [address] - [address]  [address] [facility] [place] [address]  Phone: [phone] Fax: [phone]    [facility], [place] - [address]  [address] [place] [address] [place] [address]  Phone: [phone] Fax: [phone]      Initial Assessment (most recent)       Adult Discharge Assessment - 12/15/22 1230          Discharge Assessment    Assessment Type Discharge Planning Assessment     Source of Information patient     Communicated EDER with patient/caregiver Yes     People in Home child(cornell), adult     Do you expect to return to your current living situation? Yes     Do you have help at home or someone to help you manage your care at home? Yes     Who are your caregiver(s) and their phone number(s)? kristopher Eddy [phone]     Prior to hospitilization cognitive status: Alert/Oriented     Current cognitive status: Alert/Oriented     Equipment Currently Used at Home none     Patient currently being followed by outpatient  case management? No     Do you currently have service(s) that help you manage your care at home? No     Do you take prescription medications? Yes     Do you have prescription coverage? Yes     Coverage blue cross     Do you have any problems affording any of your prescribed medications? No     Is the patient taking medications as prescribed? yes     Who is going to help you get home at discharge? son     How do you get to doctors appointments? car, drives self;family or friend will provide     Are you on dialysis? No     Do you take coumadin? No     Discharge Plan A Home Health     Discharge Plan B Home Health     DME Needed Upon Discharge  bedside commode;walker, rolling     Discharge Plan discussed with: Patient     Discharge Barriers Identified None                 Spoke with patient in her room. Her son lives with her. He is in the room. He will be there to help with patients care at [place]. Patient was working prior to admit and was able to drive self. She was independent with ADLs. She has no equipment at home. Will obtain rolling walker and bedside commode from [employer]. Faxed. Patient gave choice for [facility] home health. Faxed and notified Lo. PA plan is home. Will follow.

## 2022-12-15 NOTE — ASSESSMENT & PLAN NOTE
Body mass index is 44.82 kg/m². Morbid obesity complicates all aspects of disease management from diagnostic modalities to treatment. Weight loss encouraged and health benefits explained to patient.

## 2022-12-15 NOTE — PLAN OF CARE
Problem: Physical Therapy  Goal: Physical Therapy Goal  Description: Short term goals:  Pt will perform supine to sit with contact guard assistance  Pt will perform sit to stand with contact guard assistance  Pt will demonstrate left knee flexion range of motion from 0 to 90 degrees  Pt will ambulate 100 ft with contact guard assistanceusing rolling walker      Long term goals:  Pt will perform supine to sit with modified independence  Pt will perform sit to stand with modified independence  Pt will ambulate 200 ft with modified independence using rolling walker    Outcome: Ongoing, Progressing

## 2022-12-15 NOTE — OP NOTE
[facility] Periop Services  General Surgery  Operative Note    SUMMARY     Date of Procedure: 12/15/2022     Procedure: Procedure(s) (LRB):  ARTHROPLASTY, KNEE, TOTAL, USING COMPUTER-ASSISTED NAVIGATION (Left)       Surgeon(s) and Role:     * Jerome Valladares MD - Primary    Assisting Surgeon: None    Pre-Operative Diagnosis: Chronic pain of both knees [M25.561, M25.562, G89.29]    Post-Operative Diagnosis: Post-Op Diagnosis Codes:     * Chronic pain of both knees [M25.561, M25.562, G89.29]    Anesthesia: General    Operative Findings (including complications, if any):         OPERATIVE REPORT    SURGERY DATE:  12/15/2022    PRE-OP DIAGNOSIS:  Chronic pain of both knees [M25.561, M25.562, G89.29]    POST-OP DIAGNOSIS:  Post-Op Diagnosis Codes:     * Chronic pain of both knees [M25.561, M25.562, G89.29]    PROCEDURE:  Procedure(s) (LRB):  ARTHROPLASTY, KNEE, TOTAL, USING COMPUTER-ASSISTED NAVIGATION (Left)    SURGEON:  Jerome Valladares M.D.    Assisting Surgeon:      ANESTHESIA:  General    BLOOD LOSS:  100cc    TOURNIQUET TIME:  67min    COMPLICATIONS:  None.    IMPLANTS PLACED:    Implant Name Type Inv. Item Serial No.  Lot No. LRB No. Used Action   CEMENT BONE SURG SMPLX P RADPQ - [id number]  CEMENT BONE SURG SMPLX P RADPQ  [employer] AL[employer]. [id number] Left 1 Implanted   CEMENT BONE SURG SMPLX P RADPQ - [id number]  CEMENT BONE SURG SMPLX P RADPQ  [employer] ALIC[employer]. [id number] Left 1 Implanted   GUIDEPIN 3.20MM 4 WARREN SQUARE - [id number]  GUIDEPIN 3.20MM 4 WARREN [facility]  [facility]  Left 1 Implanted and Explanted   GUIDEPIN 3.20MM 4 WARREN SQUARE - [id number]  GUIDEPIN 3.20MM 4 WARREN [facility] ([facility])  Left 1 Implanted and Explanted   BASEPLATE TIB SHILO PRIM SZ 4 - [id number]  BASEPLATE TIB SHILO PRIM SZ 4  [employer] ALIC[employer]. [id number] Left 1 Implanted   FEMORAL CRUC RTN SHILO SZ 4 LEFT - [id number]  FEMORAL CRUC RTN SHILO SZ 4 LEFT  [employer]NEY [employer] ALICJA. [id number] Left 1 Implanted    PATELLA TRI 32X10 X3 POLYETHYL - [id number]  PATELLA TRI 32X10 X3 POLYETHYL  [employer] ALICJA. [id number] Left 1 Implanted   INSERT TIBIAL CS SIZE 4 9MM - [id number]  INSERT TIBIAL CS SIZE 4 9MM  [facility] [id number] Left 1 Implanted       INDICATIONS:  Patient is a 57 y.o. year old female with severe degenerative joint disease of the left knee needing total knee arthroplasty.    PROCEDURE IN DETAIL:  After having the risks and benefits of the procedure explained at length to the patient and the patient stating that she understands the risks and benefits of the procedure and wishes to proceed with the procedure, written informed consent was obtained.  The patient was taken to the Operating room and placed on the Operative table at which time General was induced per anesthesia.  The patient then had a sandbag taped on the bed so the knee could be held at 90 degrees of flexion.  A tourniquet was placed over cast padding on the proximal left thigh.  The left lower extremity was prepped and draped in sterile fashion.  It was elevated and exsanguinated with an Esmarch bandage.    At this time a 15 centimeter incision was made centered over the patella going from the tibial tubercle mid-line to approximately 4-5 centimeters superior to the superior pole of the patella.  Skin incision was made with a #10 blade and was taken down to the anterior retinaculum of the patella.  At this point a medial flap was elevated up using the #10 blade.  At this point the knee was flexed up and the quadriceps tendon was identified and a medial arthrotomy starting approximately a centimeter superior to the superior pole of the patella and going over the media border of the patella and the medial border of the patella going down to the tibial tubercle was made using a #10 blade. At this point the joint opened up and there was noted to be severe DJD of the knee.  The knee was then flexed up.  The remnants of the medial and lateral  meniscus were removed, leaving minimal meniscus remaining.  The osteophytes off of the femur were removed using a rongeur.    The ASM navigation block was pinned on to the femur.  The cutting block was then positioned and aligned based on the computer alignment.  It was pinned in place and the distal femoral cut was made. Next the tibial guide was placed at the ASM block pinned to the tibia.  The cutting block was in position based on the computer navigation.  Cutting block pin place and the tibial cut was made.  At this point the knee was noted to be balanced with proper flexion and extension gaps.  The sizing block was then placed onto the distal femur.  It was pinned in place setting the 30 degrees of external rotation.  At this point the cutting block was noted to be a size 4 based off the sizer.  The sizer was removed.  The cutting block was placed.  The anterior cut followed by the anterior chamfer posterior cut followed by the posterior chamfer cut was made.  Trial femur was placed.  Trial tibial tray was placed.  It was a size 4 with a trial 9 millimeter poly.  The knee was noted to be balanced at this point.  The tibia was then prepped placing the tibial plate guide onto the proximal tibia.  It was then punched.  It was then punched comparing the size 4 tibial tray.  Once this was done the distal femur  holes were drilled based off of the trial femoral component.  At this point all trial components were removed.  The patella was resurfaced removing 9 millimeter of bone.  A 32 millimeter all polyethylene patella was inserted and peg holes drilled.  The knee was put through range of motion.  The patella was noted to track appropriately with good alignment and was felt to be stable.  The wounds were irrigated out with copious amounts of normal saline.  The tibia was cemented first removing excess cement followed by the femur.  The cement was allowed to polymerize and the tibial insert was placed.  The  knee was irrigated out with copious amounts of normal saline.  The knee was noted to be tracking in the correct position and alignment with good motion.     At this point two medium Hemovac drains were placed in the joint coming out through the skin.  Figure of eight #1 Vicryl interrupted sutures were used to close the arthrotomy. Subcuticular stitches of 2-0 Vicryl followed by skin staples were used to closed the skin.  A sterile occlusive dressing was placed followed by a Cryo-Cuff.  The patient was then taken from the Operative table and taken to the Recovery Room in good condition.  All counts are correct. There were no complications.         Description of Technical Procedures:     Significant Surgical Tasks Conducted by the Assistant(s), if Applicable:     Estimated Blood Loss (EBL): 100 mL           Implants:   Implant Name Type Inv. Item Serial No.  Lot No. LRB No. Used Action   CEMENT BONE SURG SMPLX P RADPQ - [id number]  CEMENT BONE SURG SMPLX P RADPQ  [employer]IC[employer]. [id number] Left 1 Implanted   CEMENT BONE SURG SMPLX P RADPQ - [id number]  CEMENT BONE SURG SMPLX P RADPQ  [employer] ALIC[employer]. [id number] Left 1 Implanted   GUIDEPIN 3.20MM 4 WARREN SQUARE - [id number]  GUIDEPIN 3.20MM 4 WARREN [employer]  [employer] [facility] ([facility])  Left 1 Implanted and Explanted   GUIDEPIN 3.20MM 4 WARREN SQUARE - [id number]  GUIDEPIN 3.20MM 4 WARREN SQUARE  [employer] ([facility])  Left 1 Implanted and Explanted   BASEPLATE TIB SHILO PRIM SZ 4 - [id number]  BASEPLATE TIB SHILO PRIM SZ 4  [employer]IC[employer]. [id number] Left 1 Implanted   FEMORAL CRUC RTN SHILO SZ 4 LEFT - [id number]  FEMORAL CRUC RTN SHILO SZ 4 LEFT  [employer]. [id number] Left 1 Implanted   PATELLA TRI 32X10 X3 POLYETHYL - [id number]  PATELLA TRI 32X10 X3 POLYETHYL  [employer]IC[employer]. [id number] Left 1 Implanted   INSERT TIBIAL CS SIZE 4 9MM - [id number]  INSERT TIBIAL CS SIZE 4 9MM  SYDNEY [employer] ALICJA. [id number] Left 1 Implanted       Specimens:   Specimen (24h ago, onward)       None                    Condition: Good    Disposition: PACU - hemodynamically stable.    Attestation: I was present and scrubbed for the entire procedure.

## 2022-12-15 NOTE — TRANSFER OF CARE
"Anesthesia Transfer of Care Note    Patient: Vandana Price    Procedure(s) Performed: Procedure(s) (LRB):  ARTHROPLASTY, KNEE, TOTAL, USING COMPUTER-ASSISTED NAVIGATION (Left)    Patient location: PACU    Anesthesia Type: general    Transport from OR: Transported from OR on 100% O2 by closed face mask with adequate spontaneous ventilation    Post pain: adequate analgesia    Post assessment: no apparent anesthetic complications    Post vital signs: stable    Level of consciousness: responds to stimulation    Nausea/Vomiting: no nausea/vomiting    Complications: none    Transfer of care protocol was followed      Last vitals:   Visit Vitals  /73 (BP Location: Right arm, Patient Position: Lying)   Pulse 89   Temp 37 °C (98.6 °F)   Resp 18   Ht 5' 6" (1.676 m)   Wt 116.1 kg (256 lb)   LMP 06/08/2020   SpO2 (!) 92%   Breastfeeding No   BMI 41.32 kg/m²     "

## 2022-12-15 NOTE — PT/OT/SLP EVAL
Physical Therapy Evaluation      Patient Name: Vandana Price   MRN: [id number]  Recent Surgery: Procedure(s) (LRB):  ARTHROPLASTY, KNEE, TOTAL, USING COMPUTER-ASSISTED NAVIGATION (Left) Day of Surgery    Recommendations:     Discharge Recommendations: home health PT   Discharge Equipment Recommendations: bedside commode, walker, rolling   Barriers to discharge: Increased level of assist    Assessment:     Vandana Price is a 57 y.o. female admitted with a medical diagnosis of Bilateral primary osteoarthritis of knee. She presents with the following impairments/functional limitations: weakness, impaired functional mobility, gait instability, decreased lower extremity function, pain, impaired balance, decreased ROM. Pt presents with increased pain post op which was somewhat relieved with repositioning of knee immobilizer. Pt demonstrates fair mobility and required minimal assistance with LE management. ROM to begin tomorrow. Should be able to return home at discharge    Rehab Prognosis: Good; patient would benefit from acute skilled PT services to address these deficits and reach maximum level of function.  Recent Surgery: Procedure(s) (LRB):  ARTHROPLASTY, KNEE, TOTAL, USING COMPUTER-ASSISTED NAVIGATION (Left) Day of Surgery    Plan:     During this hospitalization, patient to be seen BID (5x/wk, daily 2x/wk) to address the identified rehab impairments via gait training, therapeutic activities, therapeutic exercises and progress toward the following goals:    Plan of Care Expires: 01/15/23    Subjective     Chief Complaint: left total knee replacement   Patient/Family Comments/Goals: Pt agreeable to PT  Pain/Comfort:  Pain Rating 1: 10/10  Location - Side 1: Left  Location 1: knee  Pain Addressed 1: Nurse notified, Reposition  Pain Rating Post-Intervention 1: 7/10    Patients cultural, spiritual, Scientologist conflicts given the current situation: no    Social History:  Living Environment: Patient lives with their son in a single  story home, number of outside stairs: 1 .  Prior Level of Function: Prior to admission, patient was independent with ADLs.  Equipment Used at Home: none   DME owned (not currently used): standard walker  Assistance Upon Discharge: family    Objective:     Communicated with CAMILA Velásquez RN prior to session. Patient found HOB elevated with peripheral IV, oxygen, pulse ox (continuous), knee immobilizer, hemovac, SCD upon PT entry to room.    General Precautions: Standard, fall   Orthopedic Precautions:LLE weight bearing as tolerated   Braces: Knee immobilizer   Respiratory Status: Nasal cannula, flow 2 L/min    Exams:  RLE ROM: WFL  RLE Strength: WFL  LLE ROM:  in knee immobilizer  LLE Strength:  3/5  Cognitive Exam: Patient is oriented to Person, Place, Time, Situation  Gross Motor Coordination:  WFL    Functional Mobility:  Bed Mobility:   Scooting: contact guard assistance  Supine to Sit: minimum assistance  Transfers:  Sit to Stand: contact guard assistance with rolling walker  Bed to Chair: contact guard assistance with rolling walker using Step Transfer  Gait:   Patient ambulated 50 with rolling walker and contact guard assistance.   Balance:   Sitting: NORMAL: No deviations seen in posture held dynamically  Standing: GOOD: Needs SUPERVISION only during gait and able to self right with moderate LOB      Therapeutic Activities and Exercises:  Patient educated on role of acute care PT and PT POC, safety while in hospital including calling nurse for mobility, and call light usage.      AM-PAC 6 CLICK MOBILITY  Total Score:17     Patient left up in chair with all lines intact and call button in reach.    GOALS:   Multidisciplinary Problems       Physical Therapy Goals          Problem: Physical Therapy    Goal Priority Disciplines Outcome Goal Variances Interventions   Physical Therapy Goal     PT, PT/OT Ongoing, Progressing     Description: Short term goals:  Pt will perform supine to sit with contact guard  assistance  Pt will perform sit to stand with contact guard assistance  Pt will demonstrate left knee flexion range of motion from 0 to 90 degrees  Pt will ambulate 100 ft with contact guard assistanceusing rolling walker      Long term goals:  Pt will perform supine to sit with modified independence  Pt will perform sit to stand with modified independence  Pt will ambulate 200 ft with modified independence using rolling walker                         History:     Past Medical History:   Diagnosis Date    Allergy     Bilateral primary osteoarthritis of knee     Chronic pain of both knees 10/14/2021    Eosinophilia     Gangrene of gallbladder in cholecystitis 2021    Hematochezia     History of colonoscopy     Hyperlipidemia     Hypertension     Sleep apnea        Past Surgical History:   Procedure Laterality Date    Bilateral IA knee injection Bilateral 2021    D Shows    bladder tact       SECTION      CHOLECYSTECTOMY      FRACTURE SURGERY      jaw     INJECTION OF ANESTHETIC AGENT AROUND NERVE Bilateral 2022    Procedure: GNB   BILATERAL;  Surgeon: Amanda Goetz MD;  Location: [facility];  Service: Pain Management;  Laterality: Bilateral;  PT HAS NOT HAD VAC   STATES WILL BE TESTED AT [facility] [place]    knee scope Bilateral     KNEE SURGERY      LAPAROSCOPIC CHOLECYSTECTOMY N/A 3/31/2021    Procedure: LAPAROSCOPIC CHOLECYSTECTOMY CONVERTED TO OPEN;  Surgeon: Darnell Mcgraw MD;  Location: [facility];  Service: General;  Laterality: N/A;  CONVERTED TO OPEN    TUBAL LIGATION         Time Tracking:     PT Received On: 12/15/22  PT Start Time: 1345  PT Stop Time: 1408  PT Total Time (min): 23 min     Billable Minutes: Evaluation low complexity    12/15/2022

## 2022-12-15 NOTE — HPI
Patient is a very pleasant 57-year-old female with past medical history of osteoarthritis, ulcerative colitis, morbid obesity, sleep apnea, who is status post ARTHROPLASTY, KNEE, TOTAL, USING COMPUTER-ASSISTED NAVIGATION (Left), medicine team consulted for medical management.  Patient is seen postoperatively on the floor, she complains of pain at the site of her surgery, says she is trying to sleep however due to pain she can not sleep.  Patient states that she has been prescribed Tirzeptitde for weight loss however due to nationwide shortage she has not really been on it.  She also has ulcerative colitis and has been holding her Humira since November 22nd in preparation for surgery, she states that she thinks her ulcerative colitis symptoms may be starting soon if she does not get her shot again.  I spoke to Dr. Valladares, who thinks that after a week once the incision has healed she may be able to go back on the shots.  At this point I will defer this to her outpatient gastroenterologist and orthopedic surgeon on the timing of restarting Humira.  Patient otherwise denies any chest pain shortness of breath fever chills nausea vomiting or diarrhea.  She is on the hypertensive side with heart rate a little elevated because of pain.  We will continue to watch for now.  She denies any chest pain or shortness of breath.    Social history:  Former smoker and drug abuser, has been clean for the last 12 months, says she is living with her 19-year-old son and tries to stay healthy.    Family history:  Heart disease, hypertension

## 2022-12-15 NOTE — PT/OT/SLP EVAL
Occupational Therapy Evaluation      Name: Vandana Price  MRN: [id number]  Admitting Diagnosis: Bilateral primary osteoarthritis of knee Day of Surgery  Recent Surgery: Procedure(s) (LRB):  ARTHROPLASTY, KNEE, TOTAL, USING COMPUTER-ASSISTED NAVIGATION (Left) Day of Surgery    Recommendations:     Discharge Recommendations: home with home health  Level of Assistance Recommended: Intermittent assistance   Discharge Equipment Recommendations: walker, rolling, bedside commode  Barriers to discharge: None    Assessment:   Vandana Price is a 57 y.o. female with a medical diagnosis of Bilateral primary osteoarthritis of knee. She presents with performance deficits affecting function including impaired self care skills, impaired functional mobility, gait instability, impaired balance, pain, orthopedic precautions. Patient tolerated session good. Pt s/p left TKR on 12/15/22    Rehab Prognosis: Good; patient would benefit from acute skilled OT services to address these deficits and reach maximum level of function.    Plan:     Patient to be seen 5 x/week to address the above listed problems via self-care/home management, therapeutic activities, therapeutic exercises  Plan of Care Expires: 12/22/22  Plan of Care Reviewed with: patient    Subjective     Chief Complaint: s/p TKR  Patient Comments/Goals: pt agreeable to participate in OT eval  Pain/Comfort:  Pain Rating 1: 10/10  Location - Side 1: Left  Location 1: knee  Pain Addressed 1: Nurse notified    Patients cultural, spiritual, Jehovah's witness conflicts given the current situation: no    Social History:  Living Environment: Patient lives with their son in a single story home, number of outside stairs: 1 .  Prior Level of Function: Prior to admission, patient was independent with ADLs, driving and working as [employer] worker .  Roles and Routines: perform self care; works at [employer] office  Equipment Used at Home:none  DME owned (not currently used): none  Upon discharge, patient will have  assistance from family.    Objective:     Communicated with ALANNA Fajardo prior to session. Patient found supine with blood pressure cuff, ortiz catheter, hemovac, knee immobilizer, oxygen, peripheral IV, pulse ox (continuous), SCD upon OT entry to room.    General Precautions: Standard, fall   Orthopedic Precautions:LLE weight bearing as tolerated   Braces: Knee immobilizer   Respiratory Status: Nasal cannula, flow 2 L/min    Occupational Performance    Bed Mobility:  Supine to sit from right side of bed with minimum assistance    Functional Mobility/Transfers:  Sit <> Stand Transfer with minimum assistance with rolling walker  Bed <> Chair Transfer using Step Transfer technique with contact guard assistance with rolling walker  Functional Mobility: pt performed functional mobility to door and back to chair with RW with CGA    Activities of Daily Living:  Lower Body Dressing: maximal assistance to byron socks    Cognitive/Visual Perceptual:  Cognitive/Psychosocial Skills:     -       Oriented to: Person, Place, Time, and Situation   -       Follows Commands/attention:Follows multistep  commands  -       Mood/Affect/Coping skills/emotional control: Cooperative    Physical Exam:  Balance:    -       WFL with RW  Upper Extremity Range of Motion:     -       Right Upper Extremity: WFL  -       Left Upper Extremity: WFL  Upper Extremity Strength:    -       Right Upper Extremity: WFL  -       Left Upper Extremity: WFL  Gross motor coordination:   WFL    AMPAC 6 Click ADL:  AMPAC Total Score:      Treatment & Education:  Educated on the Importance of mobility to maximize recovery.  Educated on the importance of OOB mobility within safe range in order to decrease adverse effects of prolonged bedrest.  Educated on safety with functional mobility; hand placement to ensure safe transfers to various surfaces in prep for ADLs  Educated on weight bearing status  Will continue to educate as needed        Patient clear to ambulate  to/from bathroom with RN/PCT, assist x1 .    Patient left up in chair with all lines intact, call button in reach, RN notified, and family present.    GOALS:   Multidisciplinary Problems       Occupational Therapy Goals          Problem: Occupational Therapy    Goal Priority Disciplines Outcome Interventions   Occupational Therapy Goal     OT, PT/OT Ongoing, Progressing    Description: ST.Pt will perform bathing with Smita with setup at EOB  2.Pt will perform UE dressing with Alba  3.Pt will perform LE dressing with Smita with AD if needed  4.Pt will transfer bed/chair/bsc with CGA with RW  5.Pt will perform standing task x 2 min with CGA with RW  6.Tolerate 15 min of tx without fatigue.      LTG:   Restore to max I with selfcare and mobility.                           History:     Past Medical History:   Diagnosis Date    Allergy     Bilateral primary osteoarthritis of knee     Chronic pain of both knees 10/14/2021    Eosinophilia     Gangrene of gallbladder in cholecystitis 2021    Hematochezia     History of colonoscopy     Hyperlipidemia     Hypertension     Sleep apnea          Past Surgical History:   Procedure Laterality Date    Bilateral IA knee injection Bilateral 2021    D Shows    bladder tact       SECTION      CHOLECYSTECTOMY      FRACTURE SURGERY      jaw     INJECTION OF ANESTHETIC AGENT AROUND NERVE Bilateral 2022    Procedure: GNB   BILATERAL;  Surgeon: Amanda Goetz MD;  [facility];  Service: Pain Management;  Laterality: Bilateral;  PT HAS NOT HAD VAC   STATES WILL BE TESTED AT [facility] [place]    knee scope Bilateral     KNEE SURGERY      LAPAROSCOPIC CHOLECYSTECTOMY N/A 3/31/2021    Procedure: LAPAROSCOPIC CHOLECYSTECTOMY CONVERTED TO OPEN;  Surgeon: Darnell Mcgraw MD;  Location: [facility];  Service: General;  Laterality: N/A;  CONVERTED TO OPEN    TUBAL LIGATION         Time Tracking:     OT Date of Treatment: 12/15/22  OT Start  Time: 1345  OT Stop Time: 1407  OT Total Time (min): 22 min    Billable Minutes: Evaluation OT min complexity eval    12/15/2022

## 2022-12-15 NOTE — ANESTHESIA PROCEDURE NOTES
Peripheral Block    Patient location during procedure: OR   Block not for primary anesthetic.  Reason for block: at surgeon's request and post-op pain management   Post-op Pain Location: lef tka   Start time: 12/15/2022 8:22 AM  Timeout: 12/15/2022 8:21 AM   End time: 12/15/2022 8:25 AM    Staffing  Authorizing Provider: Mtaty Vega  Performing Provider: Matty Vega    Preanesthetic Checklist  Completed: patient identified, IV checked, site marked, risks and benefits discussed, surgical consent, monitors and equipment checked, pre-op evaluation and timeout performed  Peripheral Block  Patient position: supine  Prep: ChloraPrep  Patient monitoring: heart rate, cardiac monitor, continuous pulse ox, continuous capnometry and frequent blood pressure checks  Block type: adductor canal  Laterality: left  Injection technique: single shot  Needle  Needle type: Stimuplex   Needle gauge: 20 G  Needle length: 4 in  Needle localization: anatomical landmarks and ultrasound guidance   -ultrasound image captured on disc.  Assessment  Injection assessment: negative aspiration, negative parasthesia and local visualized surrounding nerve  Paresthesia pain: none  Heart rate change: no  Slow fractionated injection: yes    Medications:    Medications: ROPIvacaine (NAROPIN) injection 0.5% - Perineural   20 mL - 12/15/2022 8:25:00 AM    Additional Notes  VSS.  DOS[facility] RN monitoring vitals throughout procedure.  Patient tolerated procedure well.

## 2022-12-15 NOTE — OR NURSING
1037 Rec'd pt to PACU awake and alert with no distress noted, respirations even and unlabored. VSS. Left knee dressing C/D/I with immobilizer in place. Hemovac secure and intact. Johnson to gravity patent, secure, intact with no kinks or obstructions noted. Left pedal pulses 2+, cap refill less than 3 seconds, able to wiggle fingers on command. C/o tingling/numbness to right foot, states that left knee hurts. Will continue to monitor.     1045 IV morphine given, see MAR for admin.     1055 Red rash noted above IV morphine admin site. C/o slight itching, no distress/difficulty breathing. Dr. Vega notified and at bedside to assess. Rec'd orders to given 25mg benadryl IV and update allergies in chart. See MAR for admin.     1059 Pt c/o pain 8/10. Dr. Vega at bedside. States to given dilaudid, see MAR for admin. Rash to right arm improving.     1119 Family notified of pt status via messaging system.     1142 Out of PACU. VSS. No signs of bleeding/distress noted. Family notified of transfer to room 457 via messaging system.     1150 Pt to room 457 awake and alert with no distress noted, respirations even and unlabored. Family at bedside. Bedside report given to MADAI Harrison RN. Moved to regular bed with safety precautions in place, SCDs attached. Left knee dressing C/D/I with immobilizer in place. Hemovac noted, secure, intact with scant amount of bloody drainage. Left pedal pulses 2+, cap refill less than 3 seconds, able to wiggle toes on command. Johnson to gravity patent, secure, intact with no kinks or obstructions noted. Denies needs. /79, P 64, R16, O2 98% 2L NC, T 97.5 oral.

## 2022-12-15 NOTE — PLAN OF CARE
Problem: Occupational Therapy  Goal: Occupational Therapy Goal  Description: ST.Pt will perform bathing with Smita with setup at EOB  2.Pt will perform UE dressing with Alba  3.Pt will perform LE dressing with Smita with AD if needed  4.Pt will transfer bed/chair/bsc with CGA with RW  5.Pt will perform standing task x 2 min with CGA with RW  6.Tolerate 15 min of tx without fatigue.      LTG:   Restore to max I with selfcare and mobility.      Outcome: Ongoing, Progressing

## 2022-12-15 NOTE — CONSULTS
[facility]  [facility] Medicine  Consult Note    Patient Name: Vandana Price  MRN: [id number]  Admission Date: 12/15/2022  Hospital Length of Stay: 0 days  Attending Physician: Jerome Valladares MD   Primary Care Provider: Yahaira Roman MD           Patient information was obtained from patient and ER records.     Consults  Subjective:     Principal Problem: Bilateral primary osteoarthritis of knee    Chief Complaint: No chief complaint on file.       HPI: Patient is a very pleasant 57-year-old female with past medical history of osteoarthritis, ulcerative colitis, morbid obesity, sleep apnea, who is status post ARTHROPLASTY, KNEE, TOTAL, USING COMPUTER-ASSISTED NAVIGATION (Left), medicine team consulted for medical management.  Patient is seen postoperatively on the floor, she complains of pain at the site of her surgery, says she is trying to sleep however due to pain she can not sleep.  Patient states that she has been prescribed Tirzeptitde for weight loss however due to nationwide shortage she has not really been on it.  She also has ulcerative colitis and has been holding her Humira since November 22nd in preparation for surgery, she states that she thinks her ulcerative colitis symptoms may be starting soon if she does not get her shot again.  I spoke to Dr. Valladares, who thinks that after a week once the incision has healed she may be able to go back on the shots.  At this point I will defer this to her outpatient gastroenterologist and orthopedic surgeon on the timing of restarting Humira.  Patient otherwise denies any chest pain shortness of breath fever chills nausea vomiting or diarrhea.  She is on the hypertensive side with heart rate a little elevated because of pain.  We will continue to watch for now.  She denies any chest pain or shortness of breath.    Social history:  Former smoker and drug abuser, has been clean for the last 12 months, says she is living with her 19-year-old son and  tries to stay healthy.    Family history:  Heart disease, hypertension      Past Medical History:   Diagnosis Date    Allergy     Bilateral primary osteoarthritis of knee     Chronic pain of both knees 10/14/2021    Eosinophilia     Gangrene of gallbladder in cholecystitis 2021    Hematochezia     History of colonoscopy     Hyperlipidemia     Hypertension     Sleep apnea        Past Surgical History:   Procedure Laterality Date    Bilateral IA knee injection Bilateral 2021    D Shows    bladder tact       SECTION      CHOLECYSTECTOMY      FRACTURE SURGERY      jaw 1972    INJECTION OF ANESTHETIC AGENT AROUND NERVE Bilateral 2022    Procedure: GNB   BILATERAL;  Surgeon: Amanda Goetz MD;  Location: [facility]IN [facility];  Service: Pain Management;  Laterality: Bilateral;  PT HAS NOT HAD VAC   STATES WILL BE TESTED AT [facility] [place]    knee scope Bilateral     KNEE SURGERY      LAPAROSCOPIC CHOLECYSTECTOMY N/A 3/31/2021    Procedure: LAPAROSCOPIC CHOLECYSTECTOMY CONVERTED TO OPEN;  Surgeon: Darnell Mcgraw MD;  Location: [facility];  Service: General;  Laterality: N/A;  CONVERTED TO OPEN    TUBAL LIGATION         Review of patient's allergies indicates:   Allergen Reactions    Opioids - morphine analogues Rash     Development of red rash at site morphine given.        No current facility-administered medications on file prior to encounter.     Current Outpatient Medications on File Prior to Encounter   Medication Sig    famotidine (PEPCID) 40 MG tablet     gabapentin (NEURONTIN) 300 MG capsule Take 1 capsule (300 mg total) by mouth nightly.    HUMIRA,CF, PEN 40 mg/0.4 mL PnKt SMARTSI Milligram(s) SUB-Q Every 2 Weeks    aspirin (ECOTRIN) 81 MG EC tablet Take 81 mg by mouth once daily.    cyanocobalamin 1,000 mcg/mL injection Inject 1 mL (1,000 mcg total) into the muscle every 30 days.    HUMIRA,CF, PEN CROHNS-[facility]-HS 80 mg/0.8 mL PnKt Inject into the skin.     [DISCONTINUED] acetaminophen-codeine 300-30mg (TYLENOL #3) 300-30 mg Tab Take 1 tablet by mouth every 4 (four) hours as needed.     Family History       Problem Relation (Age of Onset)    Alcohol abuse Mother    Heart disease Father    Hypertension Father    Lung cancer Maternal Aunt    Migraines Daughter          Tobacco Use    Smoking status: Former     Packs/day: 1.00     Years: 12.00     Pack years: 12.00     Types: Cigarettes     Quit date:      Years since quittin.9    Smokeless tobacco: Never   Substance and Sexual Activity    Alcohol use: Not Currently    Drug use: Not Currently    Sexual activity: Not Currently     Review of Systems   Constitutional: Negative.  Negative for chills, fatigue and fever.   HENT: Negative.  Negative for congestion and rhinorrhea.    Respiratory:  Negative for apnea, cough, chest tightness and shortness of breath.    Cardiovascular: Negative.  Negative for chest pain and leg swelling.   Gastrointestinal: Negative.  Negative for abdominal pain, diarrhea, nausea and vomiting.   Endocrine: Negative.    Genitourinary: Negative.    Musculoskeletal:  Positive for arthralgias.   Skin: Negative.    Allergic/Immunologic: Negative.    Neurological: Negative.    Hematological: Negative.    Psychiatric/Behavioral: Negative.     Objective:     Vital Signs (Most Recent):  Temp: 97.5 °F (36.4 °C) (12/15/22 1150)  Pulse: 78 (12/15/22 1534)  Resp: 20 (12/15/22 1519)  BP: 122/75 (12/15/22 1534)  SpO2: 97 % (room air) (12/15/22 1551) Vital Signs (24h Range):  Temp:  [97.5 °F (36.4 °C)-98.6 °F (37 °C)] 97.5 °F (36.4 °C)  Pulse:  [62-90] 78  Resp:  [9-20] 20  SpO2:  [92 %-100 %] 97 %  BP: ()/() 122/75     Weight: 126 kg (277 lb 11.2 oz)  Body mass index is 44.82 kg/m².    Physical Exam  Vitals reviewed.   Constitutional:       Appearance: Normal appearance. She is obese.   HENT:      Head: Normocephalic and atraumatic.      Nose: Nose normal. No congestion.       Mouth/Throat:      Pharynx: Oropharynx is clear.   Eyes:      Extraocular Movements: Extraocular movements intact.      Conjunctiva/sclera: Conjunctivae normal.      Pupils: Pupils are equal, round, and reactive to light.   Cardiovascular:      Rate and Rhythm: Normal rate and regular rhythm.      Pulses: Normal pulses.      Heart sounds: Normal heart sounds.   Pulmonary:      Effort: Pulmonary effort is normal. No respiratory distress.      Breath sounds: Normal breath sounds. No wheezing.   Abdominal:      General: Bowel sounds are normal. There is no distension.      Palpations: Abdomen is soft.      Tenderness: There is no abdominal tenderness.   Musculoskeletal:         General: Tenderness present.   Lymphadenopathy:      Cervical: No cervical adenopathy.   Skin:     General: Skin is warm.   Neurological:      Mental Status: She is alert. Mental status is at baseline.   Psychiatric:         Mood and Affect: Mood normal.       Significant Labs: All pertinent labs within the past 24 hours have been reviewed.    Significant Imaging: I have reviewed all pertinent imaging results/findings within the past 24 hours.    Assessment/Plan:     * Bilateral primary osteoarthritis of knee  Sp ARTHROPLASTY, KNEE, TOTAL, USING COMPUTER-ASSISTED NAVIGATION (Left)  mx per primary  Pain tx and dvt ppx per primary      Severe obesity (BMI >= 40)  Body mass index is 44.82 kg/m². Morbid obesity complicates all aspects of disease management from diagnostic modalities to treatment. Weight loss encouraged and health benefits explained to patient.         Ulcerative colitis  Outpatient follow up with GI  Not in falre up right now      KYRIE on CPAP  CPAP at night.         VTE Risk Mitigation (From admission, onward)         Ordered     apixaban tablet 2.5 mg  2 times daily        Question:  Is the patient competent?  Answer:  Yes    12/15/22 1152     IP VTE HIGH RISK PATIENT  Once         12/15/22 1152     Place CLIFFORD hose  Until  discontinued         12/15/22 1157                    Thank you for your consult. I will follow-up with patient. Please contact us if you have any additional questions.    García Hilario MD  Department of Hospital Medicine   [facility]

## 2022-12-16 VITALS
WEIGHT: 277.69 LBS | DIASTOLIC BLOOD PRESSURE: 78 MMHG | BODY MASS INDEX: 44.63 KG/M2 | RESPIRATION RATE: 19 BRPM | HEIGHT: 66 IN | OXYGEN SATURATION: 93 % | TEMPERATURE: 99 F | HEART RATE: 103 BPM | SYSTOLIC BLOOD PRESSURE: 140 MMHG

## 2022-12-16 LAB
ALBUMIN SERPL BCP-MCNC: 2.9 G/DL (ref 3.5–5)
ALBUMIN/GLOB SERPL: 0.9 {RATIO}
ALP SERPL-CCNC: 112 U/L (ref 46–118)
ALT SERPL W P-5'-P-CCNC: 17 U/L (ref 13–56)
ANION GAP SERPL CALCULATED.3IONS-SCNC: 11 MMOL/L (ref 7–16)
AST SERPL W P-5'-P-CCNC: 14 U/L (ref 15–37)
BASOPHILS # BLD AUTO: 0.05 K/UL (ref 0–0.2)
BASOPHILS NFR BLD AUTO: 0.8 % (ref 0–1)
BILIRUB SERPL-MCNC: 0.3 MG/DL (ref ?–1.2)
BUN SERPL-MCNC: 10 MG/DL (ref 7–18)
BUN/CREAT SERPL: 11 (ref 6–20)
CALCIUM SERPL-MCNC: 7.9 MG/DL (ref 8.5–10.1)
CHLORIDE SERPL-SCNC: 106 MMOL/L (ref 98–107)
CO2 SERPL-SCNC: 26 MMOL/L (ref 21–32)
CREAT SERPL-MCNC: 0.89 MG/DL (ref 0.55–1.02)
DIFFERENTIAL METHOD BLD: ABNORMAL
EGFR (NO RACE VARIABLE) (RUSH/TITUS): 76 ML/MIN/1.73M²
EOSINOPHIL # BLD AUTO: 0.16 K/UL (ref 0–0.5)
EOSINOPHIL NFR BLD AUTO: 2.5 % (ref 1–4)
ERYTHROCYTE [DISTWIDTH] IN BLOOD BY AUTOMATED COUNT: 13.9 % (ref 11.5–14.5)
GLOBULIN SER-MCNC: 3.4 G/DL (ref 2–4)
GLUCOSE SERPL-MCNC: 124 MG/DL (ref 74–106)
HCT VFR BLD AUTO: 32.9 % (ref 38–47)
HGB BLD-MCNC: 10.4 G/DL (ref 12–16)
IMM GRANULOCYTES # BLD AUTO: 0.01 K/UL (ref 0–0.04)
IMM GRANULOCYTES NFR BLD: 0.2 % (ref 0–0.4)
LYMPHOCYTES # BLD AUTO: 1.75 K/UL (ref 1–4.8)
LYMPHOCYTES NFR BLD AUTO: 27.2 % (ref 27–41)
MCH RBC QN AUTO: 27.5 PG (ref 27–31)
MCHC RBC AUTO-ENTMCNC: 31.6 G/DL (ref 32–36)
MCV RBC AUTO: 87 FL (ref 80–96)
MONOCYTES # BLD AUTO: 0.55 K/UL (ref 0–0.8)
MONOCYTES NFR BLD AUTO: 8.5 % (ref 2–6)
MPC BLD CALC-MCNC: 10.5 FL (ref 9.4–12.4)
NEUTROPHILS # BLD AUTO: 3.92 K/UL (ref 1.8–7.7)
NEUTROPHILS NFR BLD AUTO: 60.8 % (ref 53–65)
NRBC # BLD AUTO: 0 X10E3/UL
NRBC, AUTO (.00): 0 %
PLATELET # BLD AUTO: 206 K/UL (ref 150–400)
POTASSIUM SERPL-SCNC: 3.9 MMOL/L (ref 3.5–5.1)
PROT SERPL-MCNC: 6.3 G/DL (ref 6.4–8.2)
RBC # BLD AUTO: 3.78 M/UL (ref 4.2–5.4)
SODIUM SERPL-SCNC: 139 MMOL/L (ref 136–145)
WBC # BLD AUTO: 6.44 K/UL (ref 4.5–11)

## 2022-12-16 PROCEDURE — 80053 COMPREHEN METABOLIC PANEL: CPT | Performed by: ORTHOPAEDIC SURGERY

## 2022-12-16 PROCEDURE — 99213 OFFICE O/P EST LOW 20 MIN: CPT | Mod: ,,, | Performed by: INTERNAL MEDICINE

## 2022-12-16 PROCEDURE — 99213 PR OFFICE/OUTPT VISIT, EST, LEVL III, 20-29 MIN: ICD-10-PCS | Mod: ,,, | Performed by: INTERNAL MEDICINE

## 2022-12-16 PROCEDURE — 85025 COMPLETE CBC W/AUTO DIFF WBC: CPT | Performed by: ORTHOPAEDIC SURGERY

## 2022-12-16 PROCEDURE — 97110 THERAPEUTIC EXERCISES: CPT

## 2022-12-16 PROCEDURE — 51798 US URINE CAPACITY MEASURE: CPT

## 2022-12-16 PROCEDURE — 97535 SELF CARE MNGMENT TRAINING: CPT

## 2022-12-16 PROCEDURE — 36415 COLL VENOUS BLD VENIPUNCTURE: CPT | Performed by: ORTHOPAEDIC SURGERY

## 2022-12-16 PROCEDURE — 97116 GAIT TRAINING THERAPY: CPT

## 2022-12-16 PROCEDURE — 63600175 PHARM REV CODE 636 W HCPCS: Performed by: ORTHOPAEDIC SURGERY

## 2022-12-16 PROCEDURE — 25000003 PHARM REV CODE 250: Performed by: ORTHOPAEDIC SURGERY

## 2022-12-16 RX ORDER — HYDROMORPHONE HYDROCHLORIDE 2 MG/1
2 TABLET ORAL EVERY 4 HOURS PRN
Status: DISCONTINUED | OUTPATIENT
Start: 2022-12-16 | End: 2022-12-16 | Stop reason: HOSPADM

## 2022-12-16 RX ORDER — HYDROMORPHONE HYDROCHLORIDE 2 MG/1
2 TABLET ORAL EVERY 4 HOURS PRN
Qty: 28 TABLET | Refills: 0 | Status: SHIPPED | OUTPATIENT
Start: 2022-12-16 | End: 2023-01-26

## 2022-12-16 RX ADMIN — SODIUM CHLORIDE 75 ML/HR: 9 INJECTION, SOLUTION INTRAVENOUS at 07:12

## 2022-12-16 RX ADMIN — APIXABAN 2.5 MG: 2.5 TABLET, FILM COATED ORAL at 08:12

## 2022-12-16 RX ADMIN — HYDROMORPHONE HYDROCHLORIDE 2 MG: 2 TABLET ORAL at 10:12

## 2022-12-16 RX ADMIN — MORPHINE SULFATE 4 MG: 4 INJECTION, SOLUTION INTRAMUSCULAR; INTRAVENOUS at 06:12

## 2022-12-16 RX ADMIN — HYDROCODONE BITARTRATE AND ACETAMINOPHEN 1 TABLET: 5; 325 TABLET ORAL at 03:12

## 2022-12-16 RX ADMIN — HYDROCODONE BITARTRATE AND ACETAMINOPHEN 1 TABLET: 5; 325 TABLET ORAL at 08:12

## 2022-12-16 RX ADMIN — FAMOTIDINE 40 MG: 20 TABLET ORAL at 08:12

## 2022-12-16 RX ADMIN — ASPIRIN 81 MG: 81 TABLET, DELAYED RELEASE ORAL at 08:12

## 2022-12-16 NOTE — PLAN OF CARE
[facility]  Discharge Final Note    Primary Care Provider: Yahaira Roman MD    Expected Discharge Date: 12/16/2022    Final Discharge Note (most recent)       Final Note - 12/16/22 1014          Final Note    Assessment Type Final Discharge Note     Anticipated Discharge Disposition Home-Health Care Svc        Post-Acute Status    Post-Acute Authorization Home Health     Home Health Status Set-up Complete/Auth obtained     Patient choice form signed by patient/caregiver List with quality metrics by geographic area provided;List from CMS Compare;List from System Post-Acute Care     Discharge Delays None known at this time                     Important Message from Medicare              Follow-up providers       Jerome Valladares MD   Specialty: Orthopedic Surgery    [address]  [address]  Jerome Valladares Md, Orthopedic & Sports Medicine, [facility] [address]   Phone: [phone]       Next Steps: Follow up in 3 week(s)    Instructions: Please follow up on January 11 at 8:50              After-discharge care                Durable Medical Equipment       [facility]   Service: Durable Medical Equipment    [address]  [facility] [address]   Phone: [phone]                 Home Medical Care       [facility]   Service: Home Health Services    [address]  [address]  [place] [address]   Phone: [phone]                             Spoke with patient in her room. Patient to [facility] today. Equipment has been delivered. Faxed updated info to [facility] for rehab and notified Lo. Son will pick patient up.

## 2022-12-16 NOTE — SUBJECTIVE & OBJECTIVE
Interval History:     NAEO   Awaiting to go home.     Review of Systems   Constitutional: Negative.  Negative for chills, fatigue and fever.   HENT: Negative.  Negative for congestion and rhinorrhea.    Respiratory:  Negative for apnea, cough, chest tightness and shortness of breath.    Cardiovascular: Negative.  Negative for chest pain and leg swelling.   Gastrointestinal: Negative.  Negative for abdominal pain, diarrhea, nausea and vomiting.   Endocrine: Negative.    Genitourinary: Negative.    Musculoskeletal:  Positive for arthralgias.   Skin: Negative.    Allergic/Immunologic: Negative.    Neurological: Negative.    Hematological: Negative.    Psychiatric/Behavioral: Negative.     Objective:     Vital Signs (Most Recent):  Temp: 98.5 °F (36.9 °C) (12/16/22 0737)  Pulse: 98 (12/16/22 0737)  Resp: 20 (12/16/22 1014)  BP: (!) 108/56 (12/16/22 0737)  SpO2: (!) 91 % (12/16/22 0737)   Vital Signs (24h Range):  Temp:  [97.3 °F (36.3 °C)-98.5 °F (36.9 °C)] 98.5 °F (36.9 °C)  Pulse:  [62-98] 98  Resp:  [15-20] 20  SpO2:  [91 %-98 %] 91 %  BP: ()/(56-75) 108/56     Weight: 126 kg (277 lb 11.2 oz)  Body mass index is 44.82 kg/m².    Intake/Output Summary (Last 24 hours) at 12/16/2022 1228  Last data filed at 12/16/2022 0441  Gross per 24 hour   Intake 852.5 ml   Output 2610 ml   Net -1757.5 ml      Physical Exam  Vitals reviewed.   Constitutional:       Appearance: Normal appearance. She is obese.   HENT:      Head: Normocephalic and atraumatic.      Nose: Nose normal. No congestion.      Mouth/Throat:      Pharynx: Oropharynx is clear.   Eyes:      Extraocular Movements: Extraocular movements intact.      Conjunctiva/sclera: Conjunctivae normal.      Pupils: Pupils are equal, round, and reactive to light.   Cardiovascular:      Rate and Rhythm: Normal rate and regular rhythm.      Pulses: Normal pulses.      Heart sounds: Normal heart sounds.   Pulmonary:      Effort: Pulmonary effort is normal. No respiratory  distress.      Breath sounds: Normal breath sounds. No wheezing.   Abdominal:      General: Bowel sounds are normal. There is no distension.      Palpations: Abdomen is soft.      Tenderness: There is no abdominal tenderness.   Musculoskeletal:         General: Tenderness present.   Lymphadenopathy:      Cervical: No cervical adenopathy.   Skin:     General: Skin is warm.   Neurological:      Mental Status: She is alert. Mental status is at baseline.   Psychiatric:         Mood and Affect: Mood normal.       Significant Labs: All pertinent labs within the past 24 hours have been reviewed.    Significant Imaging: I have reviewed all pertinent imaging results/findings within the past 24 hours.

## 2022-12-16 NOTE — NURSING
Discharge teaching completed patient verbalized understanding. Patient requesting pain medication, explained to patient that she would have to wait 45min if morphine IM is administered to her, explained that she would be able to take one of her pain pills when she gets into her vehicle. Patient verbalized understanding and agreed she would take her medication when she leaves.

## 2022-12-16 NOTE — DISCHARGE SUMMARY
[facility]  Orthopedics  Discharge Summary      Patient Name: Vandana Price  MRN: [id number]  Admission Date: 12/15/2022  Hospital Length of Stay: 0 days  Discharge Date and Time:  12/16/2022 9:03 AM  Attending Physician: Lila García MD   Discharging Provider: Lila García MD  Primary Care Provider: Yahaira Roman MD    HPI:   No notes on file    Procedure(s) (LRB):  ARTHROPLASTY, KNEE, TOTAL, USING COMPUTER-ASSISTED NAVIGATION (Left)      Hospital Course:  No notes on file patient to the hospital on 12 1522 underwent a left total knee arthroplasty without incident.  Postoperatively she moves her toes dorsiflexion plantar flexion.  Has sensation of foot.  Palpable dorsalis pedis pulse.  Her drain was removed on postop day 1.  She weightbear as tolerated on left lower extremity.  Eliquis for DVT prophylaxis.  Norco for pain.  Can remove resume her Humira in 1 week.  She will have home health PT.  DC staples in 2 weeks.  Follow-up Dr. García in 3 weeks.  No complications.    Goals of Care Treatment Preferences:  Code Status: Full Code      Consults (From admission, onward)        Status Ordering Provider     Inpatient consult to Hospitalist  Once        Provider:  (Not yet assigned)    Acknowledged LILA GARCÍA     IP consult case management/social work  Once        Provider:  (Not yet assigned)    Completed LILA GARCÍA          Significant Diagnostic Studies: Labs: All labs within the past 24 hours have been reviewed    Pending Diagnostic Studies:     None        Final Active Diagnoses:    Diagnosis Date Noted POA    PRINCIPAL PROBLEM:  Bilateral primary osteoarthritis of knee [M17.0]  Yes     Chronic    Ulcerative colitis [K51.90] 12/15/2022 Yes     Chronic    Severe obesity (BMI >= 40) [E66.01] 12/15/2022 Yes     Chronic    KYRIE on CPAP [G47.33, Z99.89] 10/05/2022 Not Applicable      Problems Resolved During this Admission:      Discharged Condition: good    Disposition:  "Home-Health Care [facility]    Follow Up:   Follow-up Information     Jerome Valladares MD Follow up in 3 week(s).    Specialty: Orthopedic Surgery  Why: Please follow up on January 11 at 8:50  Contact information:  [address]  [address]  Jerome Valladares Md, Orthopedic & Sports Medicine, [facility]  [place] [address]  [phone]                       Patient Instructions:      WALKER FOR HOME USE     Order Specific Question Answer Comments   Type of Walker: Adult (5'4"-6'6")    With wheels? Yes    Height: 5' 6" (1.676 m)    Weight: 126 kg (277 lb 11.2 oz)    Length of need (1-99 months): 3    Does patient have medical equipment at home? none    Please check all that apply: Patient's condition impairs ambulation.    Please check all that apply: Patient is unable to safely ambulate without equipment.      3 IN 1 COMMODE FOR HOME USE     Order Specific Question Answer Comments   Type: Standard    Height: 5' 6" (1.676 m)    Weight: 126 kg (277 lb 11.2 oz)    Does patient have medical equipment at home? none    Length of need (1-99 months): 3      Referral to Home health   Referral Priority: Routine Referral Type: Home Health   Referral Reason: Specialty Services Required   Requested Specialty: Home Health Services   Number of Visits Requested: 1     Diet general     Keep surgical extremity elevated   Order Comments: Elevated at ankle, no pillow under knee.  Wear CLIFFORD hose, may remove twice a day for 1 hour then replace. Continue incentive spirometry every 2 hours while awake. Increase fluid intake. Continue Nozin nasal swab to nares twice a day until bottle is empty. Physical therapy daily x 5 days then 3 x week     Ice to affected area   Order Comments: using barrier between ice and skin (specify duration&frequency). Apply Cool Jet to operative extremity.     No driving, operating heavy equipment or signing legal documents while taking pain medication     Change dressing (specify)   Order Comments: Swingbed / Home health nurse to " change dressing on post-op day #3.  Apply Aquacel AG dressing. Repeat dressing change in 7 days.  Notify physician of any wound drainage. Dc staples in 2 weeks from surgery and steristrip incision. Home health physical therapy daily x 5 days then 3 x week     Call MD for:  temperature >100.4     Call MD for:  redness, tenderness, or signs of infection (pain, swelling, redness, odor or green/yellow discharge around incision site)     Weight bearing as tolerated   Order Comments: With walker     Medications:  Reconciled Home Medications:      Medication List      START taking these medications    apixaban 2.5 mg Tab  Commonly known as: ELIQUIS  Take 1 tablet (2.5 mg total) by mouth 2 (two) times daily. for 12 days     HYDROmorphone 2 MG tablet  Commonly known as: DILAUDID  Take 1 tablet (2 mg total) by mouth every 4 (four) hours as needed for Pain.        CONTINUE taking these medications    aspirin 81 MG EC tablet  Commonly known as: ECOTRIN  Take 81 mg by mouth once daily.     celecoxib 200 MG capsule  Commonly known as: CeleBREX  Take 200 mg by mouth once daily.     cyanocobalamin 1,000 mcg/mL injection  Inject 1 mL (1,000 mcg total) into the muscle every 30 days.     cyclobenzaprine 10 MG tablet  Commonly known as: FLEXERIL  Take 1 tablet (10 mg total) by mouth 3 (three) times daily as needed for Muscle spasms.     famotidine 40 MG tablet  Commonly known as: PEPCID     gabapentin 300 MG capsule  Commonly known as: NEURONTIN  Take 1 capsule (300 mg total) by mouth nightly.     * HUMIRA(CF) PEN CROHNS-UC-HS 80 mg/0.8 mL Pnkt  Generic drug: adalimumab  Inject into the skin.     * HUMIRA(CF) PEN 40 mg/0.4 mL Pnkt  Generic drug: adalimumab  SMARTSI Milligram(s) SUB-Q Every 2 Weeks     MOUNJARO 5 mg/0.5 mL Pnij  Generic drug: tirzepatide  Inject 5 mg into the skin every 7 days.         * This list has 2 medication(s) that are the same as other medications prescribed for you. Read the directions carefully, and ask  your doctor or other care provider to review them with you.                Jerome Valladares MD  Orthopedics  [facility]

## 2022-12-16 NOTE — PT/OT/SLP PROGRESS
Occupational Therapy   Treatment    Name: Vandana Price  MRN: [id number]  Admitting Diagnosis:  Bilateral primary osteoarthritis of knee  1 Day Post-Op    Recommendations:     Discharge Recommendations: home with home health  Discharge Equipment Recommendations:  walker, rolling, bedside commode  Barriers to discharge:  None    Assessment:     Vandana Price is a 57 y.o. female with a medical diagnosis of Bilateral primary osteoarthritis of knee.  She presents with s/p left TKR on 12/15/22. Performance deficits affecting function are impaired self care skills, impaired functional mobility, gait instability, impaired balance, pain.     Rehab Prognosis:  Good; patient would benefit from acute skilled OT services to address these deficits and reach maximum level of function.       Plan:     Patient to be seen 5 x/week to address the above listed problems via self-care/home management, therapeutic activities, therapeutic exercises  Plan of Care Expires: 12/22/22  Plan of Care Reviewed with: patient    Subjective     Pain/Comfort:  Pain Rating 1: 8/10  Location - Side 1: Left    Objective:     Communicated with: ALANNA Fajardo prior to session.  Patient found up in chair with peripheral IV upon OT entry to room.    General Precautions: Standard, fall    Orthopedic Precautions:LLE weight bearing as tolerated  Braces: Knee immobilizer  Respiratory Status: Room air     Occupational Performance:     Bed Mobility:    Not performed     Functional Mobility/Transfers:  Patient completed Sit <> Stand Transfer with contact guard assistance  with  rolling walker   Functional Mobility: not performed    Activities of Daily Living:  Upper Body Dressing: modified independence to byron shirt  Lower Body Dressing: minimum assistance to byron underwear and pants      AMPAC 6 Click ADL:      Treatment & Education:  Pt performed ADL training as listed above.     Patient left up in chair with all lines intact and call button in reach    GOALS:    Multidisciplinary Problems       Occupational Therapy Goals          Problem: Occupational Therapy    Goal Priority Disciplines Outcome Interventions   Occupational Therapy Goal     OT, PT/OT Ongoing, Progressing    Description: ST.Pt will perform bathing with Smita with setup at EOB  2.Pt will perform UE dressing with Alba  3.Pt will perform LE dressing with Smita with AD if needed  4.Pt will transfer bed/chair/bsc with CGA with RW  5.Pt will perform standing task x 2 min with CGA with RW  6.Tolerate 15 min of tx without fatigue.      LTG:   Restore to max I with selfcare and mobility.                           Time Tracking:     OT Date of Treatment: 22  OT Start Time: 1031  OT Stop Time: 1042  OT Total Time (min): 11 min    Billable Minutes:Self Care/Home Management 11 minutes    OT/LUCIUS: OT          2022

## 2022-12-16 NOTE — PLAN OF CARE
Problem: Adult Inpatient Plan of Care  Goal: Plan of Care Review  Outcome: Ongoing, Progressing  Flowsheets (Taken 12/15/2022 1755)  Plan of Care Reviewed With: patient  Goal: Patient-Specific Goal (Individualized)  Outcome: Ongoing, Progressing  Goal: Absence of Hospital-Acquired Illness or Injury  Outcome: Ongoing, Progressing  Intervention: Identify and Manage Fall Risk  Flowsheets (Taken 12/15/2022 1755)  Safety Promotion/Fall Prevention:   assistive device/personal item within reach   bed alarm refused   Fall Risk reviewed with patient/family   Fall Risk signage in place   room near unit station   side rails raised x 2   instructed to call staff for mobility  Intervention: Prevent Skin Injury  Flowsheets (Taken 12/15/2022 1755)  Body Position:   supine   weight shifting  Intervention: Prevent and Manage VTE (Venous Thromboembolism) Risk  Flowsheets (Taken 12/15/2022 1755)  Activity Management:   Ankle pumps - L1   Arm raise - L1  VTE Prevention/Management: remove, assess skin, and reapply sequential compression device  Goal: Optimal Comfort and Wellbeing  Outcome: Ongoing, Progressing  Intervention: Monitor Pain and Promote Comfort  Flowsheets (Taken 12/15/2022 1755)  Pain Management Interventions:   awakened for pain meds per patient request   cold applied   medication offered   pain management plan reviewed with patient/caregiver  Intervention: Provide Person-Centered Care  Flowsheets (Taken 12/15/2022 1755)  Trust Relationship/Rapport:   care explained   choices provided   questions encouraged   thoughts/feelings acknowledged  Goal: Readiness for Transition of Care  Outcome: Ongoing, Progressing  Intervention: Mutually Develop Transition Plan  Flowsheets (Taken 12/15/2022 1755)  Equipment Currently Used at Home: none  Do you expect to return to your current living situation?: Yes  Do you have help at home or someone to help you manage your care at home?: Yes  Do you currently have service(s) that help you  manage your care at home?: Yes     Problem: Bariatric Environmental Safety  Goal: Safety Maintained with Care  Outcome: Ongoing, Progressing     Problem: Infection  Goal: Absence of Infection Signs and Symptoms  Outcome: Ongoing, Progressing  Intervention: Prevent or Manage Infection  Flowsheets (Taken 12/15/2022 1755)  Fever Reduction/Comfort Measures: ice pack(s) applied  Infection Management: aseptic technique maintained     Problem: Fall Injury Risk  Goal: Absence of Fall and Fall-Related Injury  Outcome: Ongoing, Progressing  Intervention: Identify and Manage Contributors  Flowsheets (Taken 12/15/2022 1755)  Self-Care Promotion: safe use of adaptive equipment encouraged  Medication Review/Management: medications reviewed  Intervention: Promote Injury-Free Environment  Flowsheets (Taken 12/15/2022 1755)  Safety Promotion/Fall Prevention:   assistive device/personal item within reach   bed alarm refused   Fall Risk reviewed with patient/family   Fall Risk signage in place   room near unit station   side rails raised x 2   instructed to call staff for mobility

## 2022-12-16 NOTE — PROGRESS NOTES
[facility]  [facility] Medicine  Progress Note    Patient Name: Vandana Price  MRN: [id number]  Patient Class: OP- Outpatient Recovery   Admission Date: 12/15/2022  Length of Stay: 0 days  Attending Physician: Jerome Valladares MD  Primary Care Provider: Yahaira Roman MD        Subjective:     Principal Problem:Bilateral primary osteoarthritis of knee        HPI:  Patient is a very pleasant 57-year-old female with past medical history of osteoarthritis, ulcerative colitis, morbid obesity, sleep apnea, who is status post ARTHROPLASTY, KNEE, TOTAL, USING COMPUTER-ASSISTED NAVIGATION (Left), medicine team consulted for medical management.  Patient is seen postoperatively on the floor, she complains of pain at the site of her surgery, says she is trying to sleep however due to pain she can not sleep.  Patient states that she has been prescribed Tirzeptitde for weight loss however due to nationwide shortage she has not really been on it.  She also has ulcerative colitis and has been holding her Humira since November 22nd in preparation for surgery, she states that she thinks her ulcerative colitis symptoms may be starting soon if she does not get her shot again.  I spoke to Dr. Valladares, who thinks that after a week once the incision has healed she may be able to go back on the shots.  At this point I will defer this to her outpatient gastroenterologist and orthopedic surgeon on the timing of restarting Humira.  Patient otherwise denies any chest pain shortness of breath fever chills nausea vomiting or diarrhea.  She is on the hypertensive side with heart rate a little elevated because of pain.  We will continue to watch for now.  She denies any chest pain or shortness of breath.    Social history:  Former smoker and drug abuser, has been clean for the last 12 months, says she is living with her 19-year-old son and tries to stay healthy.    Family history:  Heart disease, hypertension      Overview/Hospital  Course:  No notes on file    Interval History:     DIMPLEO   Awaiting to go home.     Review of Systems   Constitutional: Negative.  Negative for chills, fatigue and fever.   HENT: Negative.  Negative for congestion and rhinorrhea.    Respiratory:  Negative for apnea, cough, chest tightness and shortness of breath.    Cardiovascular: Negative.  Negative for chest pain and leg swelling.   Gastrointestinal: Negative.  Negative for abdominal pain, diarrhea, nausea and vomiting.   Endocrine: Negative.    Genitourinary: Negative.    Musculoskeletal:  Positive for arthralgias.   Skin: Negative.    Allergic/Immunologic: Negative.    Neurological: Negative.    Hematological: Negative.    Psychiatric/Behavioral: Negative.     Objective:     Vital Signs (Most Recent):  Temp: 98.5 °F (36.9 °C) (12/16/22 0737)  Pulse: 98 (12/16/22 0737)  Resp: 20 (12/16/22 1014)  BP: (!) 108/56 (12/16/22 0737)  SpO2: (!) 91 % (12/16/22 0737)   Vital Signs (24h Range):  Temp:  [97.3 °F (36.3 °C)-98.5 °F (36.9 °C)] 98.5 °F (36.9 °C)  Pulse:  [62-98] 98  Resp:  [15-20] 20  SpO2:  [91 %-98 %] 91 %  BP: ()/(56-75) 108/56     Weight: 126 kg (277 lb 11.2 oz)  Body mass index is 44.82 kg/m².    Intake/Output Summary (Last 24 hours) at 12/16/2022 1228  Last data filed at 12/16/2022 0441  Gross per 24 hour   Intake 852.5 ml   Output 2610 ml   Net -1757.5 ml      Physical Exam  Vitals reviewed.   Constitutional:       Appearance: Normal appearance. She is obese.   HENT:      Head: Normocephalic and atraumatic.      Nose: Nose normal. No congestion.      Mouth/Throat:      Pharynx: Oropharynx is clear.   Eyes:      Extraocular Movements: Extraocular movements intact.      Conjunctiva/sclera: Conjunctivae normal.      Pupils: Pupils are equal, round, and reactive to light.   Cardiovascular:      Rate and Rhythm: Normal rate and regular rhythm.      Pulses: Normal pulses.      Heart sounds: Normal heart sounds.   Pulmonary:      Effort: Pulmonary effort  is normal. No respiratory distress.      Breath sounds: Normal breath sounds. No wheezing.   Abdominal:      General: Bowel sounds are normal. There is no distension.      Palpations: Abdomen is soft.      Tenderness: There is no abdominal tenderness.   Musculoskeletal:         General: Tenderness present.   Lymphadenopathy:      Cervical: No cervical adenopathy.   Skin:     General: Skin is warm.   Neurological:      Mental Status: She is alert. Mental status is at baseline.   Psychiatric:         Mood and Affect: Mood normal.       Significant Labs: All pertinent labs within the past 24 hours have been reviewed.    Significant Imaging: I have reviewed all pertinent imaging results/findings within the past 24 hours.      Assessment/Plan:      * Bilateral primary osteoarthritis of knee  Sp ARTHROPLASTY, KNEE, TOTAL, USING COMPUTER-ASSISTED NAVIGATION (Left)  mx per primary  Pain tx and dvt ppx per primary      Severe obesity (BMI >= 40)  Body mass index is 44.82 kg/m². Morbid obesity complicates all aspects of disease management from diagnostic modalities to treatment. Weight loss encouraged and health benefits explained to patient.         Ulcerative colitis  Outpatient follow up with GI  Not in falre up right now      KYRIE on CPAP  CPAP at night.         VTE Risk Mitigation (From admission, onward)         Ordered     apixaban tablet 2.5 mg  2 times daily        Question:  Is the patient competent?  Answer:  Yes    12/15/22 1152     IP VTE HIGH RISK PATIENT  Once         12/16/22 0821     Place CLIFFORD hose  Until discontinued         12/16/22 0821     Place CLIFFORD hose  Until discontinued         12/15/22 1152                Discharge Planning   EDER: 12/16/2022     Code Status: Prior   Is the patient medically ready for discharge?:     Reason for patient still in hospital (select all that apply): Treatment  Discharge Plan A: Home Health   Discharge Delays: None known at this time              Rehmat U Sulaiman  MD  Department of Hospital Medicine   [facility]

## 2022-12-16 NOTE — PT/OT/SLP PROGRESS
Physical Therapy Treatment    Patient Name:  Vandana Price   MRN:  [id number]    Recommendations:     Discharge Recommendations: home health PT  Discharge Equipment Recommendations: bedside commode, walker, rolling  Barriers to discharge: None    Assessment:     Vandana Price is a 57 y.o. female admitted with a medical diagnosis of Bilateral primary osteoarthritis of knee.  She presents with the following impairments/functional limitations: weakness, impaired functional mobility, gait instability, decreased lower extremity function, pain, impaired balance, decreased ROM Pt has increased pain to left knee with ROM, both flexion and extension. Pt is weak in quad, likely related to pain. Ambulates well with rolling walker but fatigues easily. Should be able to return home with assistance of family.    Rehab Prognosis: Good; patient would benefit from acute skilled PT services to address these deficits and reach maximum level of function.    Recent Surgery: Procedure(s) (LRB):  ARTHROPLASTY, KNEE, TOTAL, USING COMPUTER-ASSISTED NAVIGATION (Left) 1 Day Post-Op    Plan:     During this hospitalization, patient to be seen BID (5x/wk, daily 2x/wk) to address the identified rehab impairments via gait training, therapeutic activities, therapeutic exercises and progress toward the following goals:    Plan of Care Expires:  01/15/23    Subjective     Chief Complaint: left total knee replacement   Patient/Family Comments/goals: Pt agreeable to PT  Pain/Comfort:  Pain Rating 1: 8/10  Location - Side 1: Left  Location 1: knee  Pain Addressed 1: Pre-medicate for activity, Reposition  Pain Rating Post-Intervention 1: 9/10      Objective:     Communicated with CAMILA Velásquez RN prior to session.  Patient found supine with hemovac, peripheral IV upon PT entry to room.     General Precautions: Standard, fall  Orthopedic Precautions: LLE weight bearing as tolerated  Braces: N/A  Respiratory Status: Room air     Functional Mobility:  Bed Mobility:      Scooting: contact guard assistance  Supine to Sit: minimum assistance  Transfers:     Sit to Stand:  contact guard assistance with rolling walker  Toilet Transfer: contact guard assistance with  rolling walker  using  Step Transfer  Gait: 75 ft contact guard assistance with rolling walker, short step length, step to pattern, slow carolyn  Balance: good  Stairs:  Pt ascended/descended 3 stair(s) with No Assistive Device with bilateral handrails with Contact Guard Assistance.       AM-PAC 6 CLICK MOBILITY  Turning over in bed (including adjusting bedclothes, sheets and blankets)?: 3  Sitting down on and standing up from a chair with arms (e.g., wheelchair, bedside commode, etc.): 4  Moving from lying on back to sitting on the side of the bed?: 3  Moving to and from a bed to a chair (including a wheelchair)?: 3  Need to walk in hospital room?: 3  Climbing 3-5 steps with a railing?: 3  Basic Mobility Total Score: 19       Treatment & Education:  Pt performed left LE: ankle pumps, short arc quads, heel slides, hip abduction/adduction, and straight leg raises x 30 each  Knee extension stretch [id number]  CPM 0-35 degrees x 1 hr  Left knee flexion ROM 4-75 degrees    Patient left up in chair with all lines intact and call button in reach..    GOALS:   Multidisciplinary Problems       Physical Therapy Goals          Problem: Physical Therapy    Goal Priority Disciplines Outcome Goal Variances Interventions   Physical Therapy Goal     PT, PT/OT Ongoing, Progressing     Description: Short term goals:  Pt will perform supine to sit with contact guard assistance  Pt will perform sit to stand with contact guard assistance  Pt will demonstrate left knee flexion range of motion from 0 to 90 degrees  Pt will ambulate 100 ft with contact guard assistanceusing rolling walker      Long term goals:  Pt will perform supine to sit with modified independence  Pt will perform sit to stand with modified independence  Pt will ambulate 200 ft  with modified independence using rolling walker                         Time Tracking:     PT Received On: 12/16/22  PT Start Time: 0858     PT Stop Time: 0939  PT Total Time (min): 41 min     Billable Minutes: Gait Training 15 and Therapeutic Exercise 25    Treatment Type: Treatment  PT/PTA: PT     PTA Visit Number: 0     12/16/2022

## 2022-12-16 NOTE — DISCHARGE INSTRUCTIONS
ANKLE: Pumps        Point toes down, then up. Repeat 30 times, 2 sessions per day        Quad Set        With other leg bent, foot flat, slowly tighten muscles on left thigh of straight leg while counting out loud to 5.  Repeat 30 times, 2 sessions per day.          Hip Abduction / Adduction: with Extended Knee (Supine)        Bring left leg out to side and return. Keep knee straight.  Repeat 30 times, 2 sessions per day.         HIP / KNEE: Flexion, Heel Slides - Supine        Slide left heel up toward buttocks, keeping leg in straight line. Repeat 30 times, 2 sessions per day.  Use towel or pillowcase under heel as needed.       Straight Leg Raise        Bend left leg. Raise left leg 8-12 inches with knee locked. Exhale and tighten thigh muscles while raising leg.   Repeat 30 times, 2 sessions per day.                 KNEE: Flexion / Extension - Sitting        Sit at edge of surface, foot on towel or pillowcase. Bend and straighten left knee.  Repeat 30 times, 2 sessions per day.   Use opposite leg to increase knee flexion.    *Keep dressing dry and intact, do not remove dressing, if dressing becomes wet or bloody notify home health staff.  Swingbed/Home Health will remove your drain (if you have one) on post op day #2 and change your dressing on post op day #3 and day #10, give them that special dressing we sent home with you.  *Continue incentive spirometry at least every 2 hours while awake.  *Continue white stockings remove 2 times a day for 1 hour and replace. Once dressing is changed, apply other stocking to surgery leg.   *Continue cool-jet to knee. Do not apply directly to skin.   *Take laxative of choice to have a bowel movement at least by tomorrow and then every other day.  *Increase fluids by mouth.  *Staples will be removed by home health/swingbed staff 2 weeks from surgery prior to follow up appoinment.  *Elevate surgery leg on pillow at ankle. No pillow under knees.  *Notify swingbed/home health  staff of any concerns.

## 2023-01-04 ENCOUNTER — TELEPHONE (OUTPATIENT)
Dept: ORTHOPEDICS | Facility: CLINIC | Age: 58
End: 2023-01-04
Payer: COMMERCIAL

## 2023-01-04 NOTE — TELEPHONE ENCOUNTER
Notified patient that Dr. Valladares stated she could d/c the stockings after 2 weeks post op. She stated she had been 3 weeks. I told her it would be okay to d/c.

## 2023-01-04 NOTE — TELEPHONE ENCOUNTER
----- Message from Anne Marie Crowell sent at 1/4/2023  4:14 PM CST -----  PATIENT WANT TO KNOW HOW LONG DO SHE NEED TO WEAR COMPRESSION STOCKING. CALL BACK NUMBER [phone]

## 2023-01-09 ENCOUNTER — OUTSIDE PLACE OF SERVICE (OUTPATIENT)
Dept: ADMINISTRATIVE | Facility: HOSPITAL | Age: 58
End: 2023-01-09
Payer: COMMERCIAL

## 2023-01-10 DIAGNOSIS — Z96.652 STATUS POST TOTAL LEFT KNEE REPLACEMENT: Primary | ICD-10-CM

## 2023-01-11 ENCOUNTER — OFFICE VISIT (OUTPATIENT)
Dept: ORTHOPEDICS | Facility: CLINIC | Age: 58
End: 2023-01-11
Payer: COMMERCIAL

## 2023-01-11 ENCOUNTER — HOSPITAL ENCOUNTER (OUTPATIENT)
Dept: RADIOLOGY | Facility: HOSPITAL | Age: 58
Discharge: HOME | End: 2023-01-11
Attending: ORTHOPAEDIC SURGERY
Payer: COMMERCIAL

## 2023-01-11 DIAGNOSIS — Z96.652 STATUS POST TOTAL LEFT KNEE REPLACEMENT: Primary | ICD-10-CM

## 2023-01-11 DIAGNOSIS — Z96.652 STATUS POST TOTAL LEFT KNEE REPLACEMENT: ICD-10-CM

## 2023-01-11 PROCEDURE — 73560 X-RAY EXAM OF KNEE 1 OR 2: CPT | Mod: 26,LT,, | Performed by: ORTHOPAEDIC SURGERY

## 2023-01-11 PROCEDURE — 73560 X-RAY EXAM OF KNEE 1 OR 2: CPT | Mod: PBBFAC | Performed by: ORTHOPAEDIC SURGERY

## 2023-01-11 PROCEDURE — 99213 OFFICE O/P EST LOW 20 MIN: CPT | Mod: PBBFAC,25 | Performed by: ORTHOPAEDIC SURGERY

## 2023-01-11 PROCEDURE — 1159F PR MEDICATION LIST DOCUMENTED IN MEDICAL RECORD: ICD-10-PCS | Mod: ,,, | Performed by: ORTHOPAEDIC SURGERY

## 2023-01-11 PROCEDURE — 20610 PR DRAIN/INJECT LARGE JOINT/BURSA: ICD-10-PCS | Mod: S$PBB,79,RT, | Performed by: ORTHOPAEDIC SURGERY

## 2023-01-11 PROCEDURE — 1159F MED LIST DOCD IN RCRD: CPT | Mod: ,,, | Performed by: ORTHOPAEDIC SURGERY

## 2023-01-11 PROCEDURE — 20610 DRAIN/INJ JOINT/BURSA W/O US: CPT | Mod: PBBFAC | Performed by: ORTHOPAEDIC SURGERY

## 2023-01-11 PROCEDURE — 20610 DRAIN/INJ JOINT/BURSA W/O US: CPT | Mod: S$PBB,79,RT, | Performed by: ORTHOPAEDIC SURGERY

## 2023-01-11 PROCEDURE — 99024 POSTOP FOLLOW-UP VISIT: CPT | Mod: ,,, | Performed by: ORTHOPAEDIC SURGERY

## 2023-01-11 PROCEDURE — 73560 X-RAY EXAM OF KNEE 1 OR 2: CPT | Mod: TC,LT

## 2023-01-11 PROCEDURE — 73560 XR KNEE 1 OR 2 VIEW LEFT: ICD-10-PCS | Mod: 26,LT,, | Performed by: ORTHOPAEDIC SURGERY

## 2023-01-11 PROCEDURE — 99024 PR POST-OP FOLLOW-UP VISIT: ICD-10-PCS | Mod: ,,, | Performed by: ORTHOPAEDIC SURGERY

## 2023-01-11 RX ORDER — BUPIVACAINE HYDROCHLORIDE 2.5 MG/ML
1 INJECTION, SOLUTION INFILTRATION; PERINEURAL ONCE
Status: COMPLETED | OUTPATIENT
Start: 2023-01-11 | End: 2023-01-11

## 2023-01-11 RX ORDER — TRIAMCINOLONE ACETONIDE 40 MG/ML
40 INJECTION, SUSPENSION INTRA-ARTICULAR; INTRAMUSCULAR ONCE
Status: COMPLETED | OUTPATIENT
Start: 2023-01-11 | End: 2023-01-11

## 2023-01-11 RX ADMIN — BUPIVACAINE HYDROCHLORIDE 2.5 MG: 2.5 INJECTION, SOLUTION INFILTRATION; PERINEURAL at 09:01

## 2023-01-11 RX ADMIN — TRIAMCINOLONE ACETONIDE 40 MG: 40 INJECTION, SUSPENSION INTRA-ARTICULAR; INTRAMUSCULAR at 09:01

## 2023-01-11 NOTE — PROGRESS NOTES
Radiology Interpretation        Patient Name: Vandana Price  Date: 1/11/2023  YOB: 1965  MRN# [id number]        ORDERING DIAGNOSIS:    Encounter Diagnosis   Name Primary?    Status post total left knee replacement Yes        AP lateral left knee skeletally mature individual total knee arthroplasty in place no loosening fractures or subluxations impression total knee arthroplasty in place left knee no loosening               Jerome Valladares MD

## 2023-01-11 NOTE — LETTER
January 11, 2023      [facility]  [address] [place] [id number]  Phone: [phone]  Fax: [phone]       Patient: Vandana Price   YOB: 1965  Date of Visit: 01/11/2023    To Whom It May Concern:    Enrike Price  was at [facility] on 01/11/2023. The patient may return to work/school on 01/12/2023 with no restrictions. If you have any questions or concerns, or if I can be of further assistance, please do not hesitate to contact me.    Sincerely,    JASMIN Urias MD

## 2023-01-11 NOTE — PROGRESS NOTES
Patient is here for follow-up of her left total knee arthroplasty.  She is doing well.  Comes into full extension.  Walking with a cane.  Flexes past 90.  Continue work on range of motion staples are out.  Wounds clean and dry.  I will follow back up 6 weeks.  Her right knee she is having some pain and crepitus lacks a little bit of extension.  At this time we discussed treatment options I injected her right knee 1 cc Marcaine 1 cc Kenalog.  Weightbear as tolerates on right lower extremity.  I will let her go back to work.

## 2023-01-23 ENCOUNTER — PATIENT MESSAGE (OUTPATIENT)
Dept: FAMILY MEDICINE | Facility: CLINIC | Age: 58
End: 2023-01-23
Payer: COMMERCIAL

## 2023-01-26 ENCOUNTER — OFFICE VISIT (OUTPATIENT)
Dept: FAMILY MEDICINE | Facility: CLINIC | Age: 58
End: 2023-01-26
Payer: COMMERCIAL

## 2023-01-26 VITALS
DIASTOLIC BLOOD PRESSURE: 87 MMHG | BODY MASS INDEX: 41.97 KG/M2 | TEMPERATURE: 99 F | HEIGHT: 66 IN | OXYGEN SATURATION: 98 % | WEIGHT: 261.19 LBS | RESPIRATION RATE: 20 BRPM | HEART RATE: 89 BPM | SYSTOLIC BLOOD PRESSURE: 148 MMHG

## 2023-01-26 DIAGNOSIS — M47.817 LUMBOSACRAL SPONDYLOSIS WITHOUT MYELOPATHY: Primary | Chronic | ICD-10-CM

## 2023-01-26 DIAGNOSIS — E66.01 CLASS 3 SEVERE OBESITY DUE TO EXCESS CALORIES WITH BODY MASS INDEX (BMI) OF 40.0 TO 44.9 IN ADULT, UNSPECIFIED WHETHER SERIOUS COMORBIDITY PRESENT: ICD-10-CM

## 2023-01-26 DIAGNOSIS — Z79.899 OTHER LONG TERM (CURRENT) DRUG THERAPY: ICD-10-CM

## 2023-01-26 PROCEDURE — 96372 PR INJECTION,THERAP/PROPH/DIAG2ST, IM OR SUBCUT: ICD-10-PCS | Mod: ,,, | Performed by: FAMILY MEDICINE

## 2023-01-26 PROCEDURE — 3079F PR MOST RECENT DIASTOLIC BLOOD PRESSURE 80-89 MM HG: ICD-10-PCS | Mod: ,,, | Performed by: FAMILY MEDICINE

## 2023-01-26 PROCEDURE — 3077F PR MOST RECENT SYSTOLIC BLOOD PRESSURE >= 140 MM HG: ICD-10-PCS | Mod: ,,, | Performed by: FAMILY MEDICINE

## 2023-01-26 PROCEDURE — 3077F SYST BP >= 140 MM HG: CPT | Mod: ,,, | Performed by: FAMILY MEDICINE

## 2023-01-26 PROCEDURE — 1159F MED LIST DOCD IN RCRD: CPT | Mod: ,,, | Performed by: FAMILY MEDICINE

## 2023-01-26 PROCEDURE — 96372 THER/PROPH/DIAG INJ SC/IM: CPT | Mod: ,,, | Performed by: FAMILY MEDICINE

## 2023-01-26 PROCEDURE — 99213 PR OFFICE/OUTPT VISIT, EST, LEVL III, 20-29 MIN: ICD-10-PCS | Mod: 25,,, | Performed by: FAMILY MEDICINE

## 2023-01-26 PROCEDURE — 3079F DIAST BP 80-89 MM HG: CPT | Mod: ,,, | Performed by: FAMILY MEDICINE

## 2023-01-26 PROCEDURE — 1160F RVW MEDS BY RX/DR IN RCRD: CPT | Mod: ,,, | Performed by: FAMILY MEDICINE

## 2023-01-26 PROCEDURE — 99213 OFFICE O/P EST LOW 20 MIN: CPT | Mod: 25,,, | Performed by: FAMILY MEDICINE

## 2023-01-26 PROCEDURE — 1159F PR MEDICATION LIST DOCUMENTED IN MEDICAL RECORD: ICD-10-PCS | Mod: ,,, | Performed by: FAMILY MEDICINE

## 2023-01-26 PROCEDURE — 3008F PR BODY MASS INDEX (BMI) DOCUMENTED: ICD-10-PCS | Mod: ,,, | Performed by: FAMILY MEDICINE

## 2023-01-26 PROCEDURE — 1160F PR REVIEW ALL MEDS BY PRESCRIBER/CLIN PHARMACIST DOCUMENTED: ICD-10-PCS | Mod: ,,, | Performed by: FAMILY MEDICINE

## 2023-01-26 PROCEDURE — 3008F BODY MASS INDEX DOCD: CPT | Mod: ,,, | Performed by: FAMILY MEDICINE

## 2023-01-26 RX ORDER — METHYLPREDNISOLONE ACETATE 40 MG/ML
40 INJECTION, SUSPENSION INTRA-ARTICULAR; INTRALESIONAL; INTRAMUSCULAR; SOFT TISSUE
Status: COMPLETED | OUTPATIENT
Start: 2023-01-26 | End: 2023-01-26

## 2023-01-26 RX ORDER — CETIRIZINE HYDROCHLORIDE 10 MG/1
10 TABLET ORAL DAILY
Qty: 90 TABLET | Refills: 1 | Status: SHIPPED | OUTPATIENT
Start: 2023-01-26 | End: 2024-01-05 | Stop reason: SDUPTHER

## 2023-01-26 RX ORDER — KETOROLAC TROMETHAMINE 30 MG/ML
60 INJECTION, SOLUTION INTRAMUSCULAR; INTRAVENOUS
Status: COMPLETED | OUTPATIENT
Start: 2023-01-26 | End: 2023-01-26

## 2023-01-26 RX ORDER — FAMOTIDINE 40 MG/1
40 TABLET, FILM COATED ORAL 2 TIMES DAILY
Qty: 180 TABLET | Refills: 1 | Status: SHIPPED | OUTPATIENT
Start: 2023-01-26 | End: 2024-01-05 | Stop reason: SDUPTHER

## 2023-01-26 RX ORDER — CELECOXIB 200 MG/1
200 CAPSULE ORAL DAILY
Qty: 90 CAPSULE | Refills: 1 | Status: SHIPPED | OUTPATIENT
Start: 2023-01-26 | End: 2023-07-03 | Stop reason: SDUPTHER

## 2023-01-26 RX ORDER — TIRZEPATIDE 5 MG/.5ML
5 INJECTION, SOLUTION SUBCUTANEOUS
Qty: 12 PEN | Refills: 3 | Status: SHIPPED | OUTPATIENT
Start: 2023-01-26 | End: 2023-07-03

## 2023-01-26 RX ORDER — GABAPENTIN 300 MG/1
600 CAPSULE ORAL 2 TIMES DAILY
Qty: 90 CAPSULE | Refills: 1 | Status: SHIPPED | OUTPATIENT
Start: 2023-01-26 | End: 2023-04-24

## 2023-01-26 RX ORDER — CETIRIZINE HYDROCHLORIDE 10 MG/1
10 TABLET ORAL DAILY
COMMUNITY
End: 2023-01-26 | Stop reason: SDUPTHER

## 2023-01-26 RX ORDER — METHYLPREDNISOLONE 4 MG/1
TABLET ORAL
Qty: 21 EACH | Refills: 0 | Status: SHIPPED | OUTPATIENT
Start: 2023-01-26 | End: 2023-02-16

## 2023-01-26 RX ADMIN — KETOROLAC TROMETHAMINE 60 MG: 30 INJECTION, SOLUTION INTRAMUSCULAR; INTRAVENOUS at 09:01

## 2023-01-26 RX ADMIN — METHYLPREDNISOLONE ACETATE 40 MG: 40 INJECTION, SUSPENSION INTRA-ARTICULAR; INTRALESIONAL; INTRAMUSCULAR; SOFT TISSUE at 09:01

## 2023-01-26 NOTE — LETTER
January 26, 2023      [facility] [address] [place] [address]  Phone: [phone]  Fax: [phone]       Patient: Vandana Price   YOB: 1965  Date of Visit: 01/26/2023    To Whom It May Concern:    Enrike Price  was at [facility] on 01/26/2023 related to back pain. The patient may return to work/school on 01/26/23 with no restrictions. If you have any questions or concerns, or if I can be of further assistance, please do not hesitate to contact me.    Sincerely,    Dr. Abel Zurita RN

## 2023-01-26 NOTE — PROGRESS NOTES
Yahaira Roman MD        PATIENT NAME: Vandana Price  : 1965  DATE: 23  MRN: [id number]      Billing Provider: Yahaira Roman MD  Level of Service:   Patient PCP Information       Provider PCP Type    Yahaira Roman MD General            Reason for Visit / Chief Complaint: Back Pain (Worse the last 2 weeks- tingly and numb feeling. Feels better when lying down. )       History of Present Illness      Vandana Price presents to the clinic with Back Pain (Worse the last 2 weeks- tingly and numb feeling. Feels better when lying down. )     She does have a bulging disk, she has a dermatomal distribution of burning pain on the left side better when bending forward, worst when sitting up straight or standing.     Has taken muscle relaxer, but this gives her really bad dry mouth    Back Pain  This is a recurrent problem. The current episode started in the past 7 days. The problem has been gradually worsening since onset. The pain is present in the thoracic spine. The pain is at a severity of 7/10. The pain is moderate. The pain is Worse during the day. The symptoms are aggravated by standing. Stiffness is present In the morning. Pertinent negatives include no abdominal pain, bladder incontinence, bowel incontinence, chest pain, dysuria, fever, headaches, numbness, paresis, paresthesias, pelvic pain, perianal numbness, tingling, weakness or weight loss. Risk factors include sedentary lifestyle and obesity. She has tried nothing for the symptoms.     Review of Systems     Review of Systems   Constitutional:  Negative for activity change, appetite change, fatigue, fever and weight loss.   Respiratory:  Negative for shortness of breath.    Cardiovascular:  Negative for chest pain.   Gastrointestinal:  Negative for abdominal pain and bowel incontinence.   Genitourinary:  Negative for bladder incontinence, dysuria and pelvic pain.   Musculoskeletal:  Positive for arthralgias and back pain. Negative for gait problem.    Allergic/Immunologic: Positive for environmental allergies.   Neurological:  Negative for tingling, weakness, numbness, headaches and paresthesias.   Psychiatric/Behavioral:  Negative for agitation, behavioral problems and suicidal ideas.      Medical / Social / Family History     Past Medical History:   Diagnosis Date    Allergy     Bilateral primary osteoarthritis of knee     Chronic pain of both knees 10/14/2021    Eosinophilia     Gangrene of gallbladder in cholecystitis 2021    Hematochezia     History of colonoscopy     Hyperlipidemia     Hypertension     Sleep apnea        Past Surgical History:   Procedure Laterality Date    ARTHROPLASTY, KNEE, TOTAL, USING COMPUTER-ASSISTED NAVIGATION Left 12/15/2022    Procedure: ARTHROPLASTY, KNEE, TOTAL, USING COMPUTER-ASSISTED NAVIGATION;  Surgeon: Jerome Valladares MD;  Location: [facility];  Service: Orthopedics;  Laterality: Left;    Bilateral IA knee injection Bilateral 2021    D Shows    bladder tact       SECTION      CHOLECYSTECTOMY      FRACTURE SURGERY      jaw     INJECTION OF ANESTHETIC AGENT AROUND NERVE Bilateral 2022    Procedure: GNB   BILATERAL;  Surgeon: Amanda Goetz MD;  Location: [facility];  Service: Pain Management;  Laterality: Bilateral;  PT HAS NOT HAD VAC   STATES WILL BE TESTED AT [facility] [place]    knee scope Bilateral     KNEE SURGERY      LAPAROSCOPIC CHOLECYSTECTOMY N/A 3/31/2021    Procedure: LAPAROSCOPIC CHOLECYSTECTOMY CONVERTED TO OPEN;  Surgeon: Darnell Mcgraw MD;  Location: [facility] OR;  Service: General;  Laterality: N/A;  CONVERTED TO OPEN    TUBAL LIGATION         Social History  Ms.  reports that she quit smoking about 13 years ago. Her smoking use included cigarettes. She has a 12.00 pack-year smoking history. She has never used smokeless tobacco. She reports that she does not currently use alcohol. She reports that she does not currently use drugs.    Family  "History  Ms.'s family history includes Alcohol abuse in her mother; Heart disease in her father; Hypertension in her father; Lung cancer in her maternal aunt; Migraines in her daughter.    Medications and Allergies     Medications  Outpatient Medications Marked as Taking for the 1/26/23 encounter (Office Visit) with Yahaira Roman MD   Medication Sig Dispense Refill    acetaminophen with codeine (TYLENOL-CODEINE #3 ORAL) Take by mouth.      aspirin (ECOTRIN) 81 MG EC tablet Take 81 mg by mouth once daily.      cyanocobalamin 1,000 mcg/mL injection Inject 1 mL (1,000 mcg total) into the muscle every 30 days. 3 mL 3    [DISCONTINUED] celecoxib (CELEBREX) 200 MG capsule Take 200 mg by mouth once daily.      [DISCONTINUED] cetirizine (ZYRTEC) 10 MG tablet Take 10 mg by mouth once daily.      [DISCONTINUED] famotidine (PEPCID) 40 MG tablet       [DISCONTINUED] gabapentin (NEURONTIN) 300 MG capsule Take 1 capsule (300 mg total) by mouth nightly. 90 capsule 1     Current Facility-Administered Medications for the 1/26/23 encounter (Office Visit) with Yahaira Roman MD   Medication Dose Route Frequency Provider Last Rate Last Admin    ketorolac injection 60 mg  60 mg Intramuscular 1 time in Clinic/HOD Yahaira Roman MD        methylPREDNISolone acetate injection 40 mg  40 mg Intramuscular 1 time in Clinic/HOD Yahaira Roman MD           Allergies  Review of patient's allergies indicates:   Allergen Reactions    Opioids - morphine analogues Rash     Development of red rash at site morphine given.        Physical Examination   BP (!) 148/87 (BP Location: Right arm, Patient Position: Sitting)   Pulse 89   Temp 98.6 °F (37 °C) (Oral)   Resp 20   Ht 5' 6" (1.676 m)   Wt 118.5 kg (261 lb 3.2 oz)   LMP 06/08/2020 Comment: tubal ligation   SpO2 98%   BMI 42.16 kg/m²     Physical Exam  Constitutional:       Appearance: Normal appearance. She is obese.   Cardiovascular:      Rate and Rhythm: Normal rate and regular rhythm.     "  Pulses: Normal pulses.      Heart sounds: Normal heart sounds.   Musculoskeletal:      Thoracic back: Spasms and tenderness present. No bony tenderness. Decreased range of motion.        Back:    Skin:     General: Skin is warm.   Neurological:      General: No focal deficit present.      Mental Status: She is alert.   Psychiatric:         Mood and Affect: Mood normal.         Behavior: Behavior normal.         Judgment: Judgment normal.       Assessment and Plan (including Health Maintenance)     Plan:         Problem List Items Addressed This Visit          Neuro    Lumbosacral spondylosis without myelopathy - Primary (Chronic)    Relevant Medications    methylPREDNISolone (MEDROL DOSEPACK) 4 mg tablet    ketorolac injection 60 mg    methylPREDNISolone acetate injection 40 mg       Endocrine    Class 3 severe obesity due to excess calories with body mass index (BMI) of 40.0 to 44.9 in adult    Relevant Medications    tirzepatide (MOUNJARO) 5 mg/0.5 mL PnIj       Palliative Care    Other long term (current) drug therapy    Relevant Medications    gabapentin (NEURONTIN) 300 MG capsule         Follow up in about 3 months (around 4/26/2023) for back pain.        Signature:  Yahaira Roman MD      Date of encounter: 1/26/23

## 2023-02-20 ENCOUNTER — PATIENT OUTREACH (OUTPATIENT)
Dept: ADMINISTRATIVE | Facility: HOSPITAL | Age: 58
End: 2023-02-20

## 2023-02-22 ENCOUNTER — HOSPITAL ENCOUNTER (OUTPATIENT)
Dept: RADIOLOGY | Facility: HOSPITAL | Age: 58
Discharge: HOME | End: 2023-02-22
Attending: ORTHOPAEDIC SURGERY
Payer: COMMERCIAL

## 2023-02-22 ENCOUNTER — OFFICE VISIT (OUTPATIENT)
Dept: ORTHOPEDICS | Facility: CLINIC | Age: 58
End: 2023-02-22
Payer: COMMERCIAL

## 2023-02-22 DIAGNOSIS — Z96.652 STATUS POST TOTAL LEFT KNEE REPLACEMENT: Primary | ICD-10-CM

## 2023-02-22 DIAGNOSIS — Z96.652 STATUS POST TOTAL LEFT KNEE REPLACEMENT: ICD-10-CM

## 2023-02-22 PROCEDURE — 99024 POSTOP FOLLOW-UP VISIT: CPT | Mod: ,,, | Performed by: ORTHOPAEDIC SURGERY

## 2023-02-22 PROCEDURE — 73560 X-RAY EXAM OF KNEE 1 OR 2: CPT | Mod: TC,LT

## 2023-02-22 PROCEDURE — 99024 PR POST-OP FOLLOW-UP VISIT: ICD-10-PCS | Mod: ,,, | Performed by: ORTHOPAEDIC SURGERY

## 2023-02-22 PROCEDURE — 1159F PR MEDICATION LIST DOCUMENTED IN MEDICAL RECORD: ICD-10-PCS | Mod: ,,, | Performed by: ORTHOPAEDIC SURGERY

## 2023-02-22 PROCEDURE — 1159F MED LIST DOCD IN RCRD: CPT | Mod: ,,, | Performed by: ORTHOPAEDIC SURGERY

## 2023-02-22 PROCEDURE — 73560 XR KNEE 1 OR 2 VIEW LEFT: ICD-10-PCS | Mod: 26,LT,, | Performed by: ORTHOPAEDIC SURGERY

## 2023-02-22 PROCEDURE — 73560 X-RAY EXAM OF KNEE 1 OR 2: CPT | Mod: 26,LT,, | Performed by: ORTHOPAEDIC SURGERY

## 2023-02-22 PROCEDURE — 99213 OFFICE O/P EST LOW 20 MIN: CPT | Mod: PBBFAC | Performed by: ORTHOPAEDIC SURGERY

## 2023-02-22 NOTE — PROGRESS NOTES
Patient is here for follow-up of her left total knee arthroplasty.  At this time she is having no instability in the knee.  She has good motion.  Let her weightbear as tolerates.  I will follow-up 3 months.

## 2023-02-22 NOTE — PROGRESS NOTES
Radiology Interpretation        Patient Name: Vandana Price  Date: 2/22/2023  YOB: 1965  MRN# [id number]        ORDERING DIAGNOSIS:    Encounter Diagnosis   Name Primary?    Status post total left knee replacement Yes             Two views left knee skeletally mature individual total knee arthroplasty in place no loosening fractures or subluxations impression total knee arthroplasty in place left knee no loosening          Jerome Valladares MD

## 2023-02-23 ENCOUNTER — PATIENT OUTREACH (OUTPATIENT)
Dept: ADMINISTRATIVE | Facility: HOSPITAL | Age: 58
End: 2023-02-23

## 2023-03-09 ENCOUNTER — OFFICE VISIT (OUTPATIENT)
Dept: FAMILY MEDICINE | Facility: CLINIC | Age: 58
End: 2023-03-09
Payer: COMMERCIAL

## 2023-03-09 VITALS
HEIGHT: 66 IN | HEART RATE: 81 BPM | OXYGEN SATURATION: 95 % | SYSTOLIC BLOOD PRESSURE: 136 MMHG | BODY MASS INDEX: 42.27 KG/M2 | RESPIRATION RATE: 18 BRPM | TEMPERATURE: 98 F | WEIGHT: 263 LBS | DIASTOLIC BLOOD PRESSURE: 85 MMHG

## 2023-03-09 DIAGNOSIS — Z12.31 ENCOUNTER FOR SCREENING MAMMOGRAM FOR MALIGNANT NEOPLASM OF BREAST: ICD-10-CM

## 2023-03-09 DIAGNOSIS — M54.50 CHRONIC LOW BACK PAIN WITHOUT SCIATICA, UNSPECIFIED BACK PAIN LATERALITY: ICD-10-CM

## 2023-03-09 DIAGNOSIS — G89.29 CHRONIC LOW BACK PAIN WITHOUT SCIATICA, UNSPECIFIED BACK PAIN LATERALITY: ICD-10-CM

## 2023-03-09 DIAGNOSIS — M54.6 THORACIC SPINE PAIN: Primary | ICD-10-CM

## 2023-03-09 PROCEDURE — 99213 OFFICE O/P EST LOW 20 MIN: CPT | Mod: ,,, | Performed by: NURSE PRACTITIONER

## 2023-03-09 PROCEDURE — 1160F PR REVIEW ALL MEDS BY PRESCRIBER/CLIN PHARMACIST DOCUMENTED: ICD-10-PCS | Mod: ,,, | Performed by: NURSE PRACTITIONER

## 2023-03-09 PROCEDURE — 3079F PR MOST RECENT DIASTOLIC BLOOD PRESSURE 80-89 MM HG: ICD-10-PCS | Mod: ,,, | Performed by: NURSE PRACTITIONER

## 2023-03-09 PROCEDURE — 3075F SYST BP GE 130 - 139MM HG: CPT | Mod: ,,, | Performed by: NURSE PRACTITIONER

## 2023-03-09 PROCEDURE — 3008F BODY MASS INDEX DOCD: CPT | Mod: ,,, | Performed by: NURSE PRACTITIONER

## 2023-03-09 PROCEDURE — 3079F DIAST BP 80-89 MM HG: CPT | Mod: ,,, | Performed by: NURSE PRACTITIONER

## 2023-03-09 PROCEDURE — 3008F PR BODY MASS INDEX (BMI) DOCUMENTED: ICD-10-PCS | Mod: ,,, | Performed by: NURSE PRACTITIONER

## 2023-03-09 PROCEDURE — 1160F RVW MEDS BY RX/DR IN RCRD: CPT | Mod: ,,, | Performed by: NURSE PRACTITIONER

## 2023-03-09 PROCEDURE — 99213 PR OFFICE/OUTPT VISIT, EST, LEVL III, 20-29 MIN: ICD-10-PCS | Mod: ,,, | Performed by: NURSE PRACTITIONER

## 2023-03-09 PROCEDURE — 3075F PR MOST RECENT SYSTOLIC BLOOD PRESS GE 130-139MM HG: ICD-10-PCS | Mod: ,,, | Performed by: NURSE PRACTITIONER

## 2023-03-09 PROCEDURE — 1159F MED LIST DOCD IN RCRD: CPT | Mod: ,,, | Performed by: NURSE PRACTITIONER

## 2023-03-09 PROCEDURE — 1159F PR MEDICATION LIST DOCUMENTED IN MEDICAL RECORD: ICD-10-PCS | Mod: ,,, | Performed by: NURSE PRACTITIONER

## 2023-03-09 NOTE — LETTER
March 9, 2023      [facility]  [address]  [place] [address]  Phone: [phone]  Fax: [phone]       Patient: Vandana Price   YOB: 1965  Date of Visit: 03/09/2023    To Whom It May Concern:    Enrike Price  was at [facility] on 03/09/2023. The patient may return to work/school on 03/10/2023 with no restrictions. If you have any questions or concerns, or if I can be of further assistance, please do not hesitate to contact me.    Sincerely,  KIMBERLEE Norris RN

## 2023-03-09 NOTE — PROGRESS NOTES
Clinic Note    Vandana Price is a 57 y.o. female     Chief Complaint:   Chief Complaint   Patient presents with    Back Pain     C/o pain on left side of mid back that has not improved. States it is a constant pain. Dr. Roman had done some injections in her back but she had no relief.States the pain radiates around into her left rib cage at times.         Subjective:    Patient complains of pain to mid left side of back. Admits to pain X 2 months. Pain is constant. Patient has seen pcp for treatment and injections. Admits to no relief. States heating pad relieves pain some at night. Patient reports she has tried flexeril and zanaflex but made mouth extremely dry. Patient states woke up with tongue stuck to mouth. Denies dysuria.   Patient admits to chronic low back pain as well. Admits to hx of disc problems. Denies sciatica.    Back Pain  Pertinent negatives include no abdominal pain, dysuria, fever or leg pain.      Allergies:   Review of patient's allergies indicates:   Allergen Reactions    Opioids - morphine analogues Rash     Development of red rash at site morphine given.         Past Medical History:  Past Medical History:   Diagnosis Date    Allergy     Bilateral primary osteoarthritis of knee     Chronic pain of both knees 10/14/2021    Eosinophilia     Gangrene of gallbladder in cholecystitis 04/01/2021    Hematochezia     History of colonoscopy     Hyperlipidemia     Hypertension     Sleep apnea         Current Medications:    Current Outpatient Medications:     acetaminophen with codeine (TYLENOL-CODEINE #3 ORAL), Take by mouth., Disp: , Rfl:     aspirin (ECOTRIN) 81 MG EC tablet, Take 81 mg by mouth once daily., Disp: , Rfl:     celecoxib (CELEBREX) 200 MG capsule, Take 1 capsule (200 mg total) by mouth once daily., Disp: 90 capsule, Rfl: 1    cetirizine (ZYRTEC) 10 MG tablet, Take 1 tablet (10 mg total) by mouth once daily., Disp: 90 tablet, Rfl: 1    cyanocobalamin 1,000 mcg/mL injection, Inject 1 mL  "(1,000 mcg total) into the muscle every 30 days., Disp: 3 mL, Rfl: 3    famotidine (PEPCID) 40 MG tablet, Take 1 tablet (40 mg total) by mouth 2 (two) times daily., Disp: 180 tablet, Rfl: 1    gabapentin (NEURONTIN) 300 MG capsule, Take 2 capsules (600 mg total) by mouth 2 (two) times daily., Disp: 90 capsule, Rfl: 1    tirzepatide (MOUNJARO) 5 mg/0.5 mL PnIj, Inject 5 mg into the skin every 7 days., Disp: 12 pen, Rfl: 3    HUMIRA,CF, PEN 40 mg/0.4 mL PnKt, SMARTSI Milligram(s) SUB-Q Every 2 Weeks, Disp: , Rfl:        Review of Systems   Constitutional:  Negative for fever.   Respiratory:  Negative for cough and shortness of breath.    Gastrointestinal:  Negative for abdominal pain.   Genitourinary:  Negative for dysuria, flank pain and frequency.   Musculoskeletal:  Positive for back pain and myalgias. Negative for leg pain.        Objective:    /85 (BP Location: Right arm, Patient Position: Sitting)   Pulse 81   Temp 98 °F (36.7 °C) (Oral)   Resp 18   Ht 5' 6" (1.676 m)   Wt 119.3 kg (263 lb)   LMP 2020 Comment: tubal ligation   SpO2 95%   BMI 42.45 kg/m²      Physical Exam  Constitutional:       Appearance: Normal appearance. She is obese.   Eyes:      Extraocular Movements: Extraocular movements intact.   Cardiovascular:      Rate and Rhythm: Normal rate and regular rhythm.      Pulses: Normal pulses.      Heart sounds: Normal heart sounds.   Pulmonary:      Effort: Pulmonary effort is normal.      Breath sounds: Normal breath sounds.   Musculoskeletal:      Thoracic back: Tenderness present. No bony tenderness.      Lumbar back: Tenderness present. No bony tenderness.        Back:       Comments: Tenderness noted to left of t-spine   Neurological:      Mental Status: She is alert and oriented to person, place, and time.        Assessment and Plan:    1. Thoracic spine pain    2. Encounter for screening mammogram for malignant neoplasm of breast    3. Chronic low back pain without " sciatica, unspecified back pain laterality         Thoracic spine pain  -     Ambulatory referral/consult to Orthopedics; Future; Expected date: 03/16/2023  -patient currently takes nsaid daily. Patient also takes tylenol with codeine prn  -has has received steroid injections and oral  -has tried muscle relaxers with no relief and side effects  -patient would like to see ortho spine  -offered rehab therapy. Patient declined due to work schedule    Encounter for screening mammogram for malignant neoplasm of breast  -     Mammo Digital Screening Bilat; Future; Expected date: 03/09/2023    Chronic low back pain without sciatica, unspecified back pain laterality  -     Ambulatory referral/consult to Orthopedics; Future; Expected date: 03/16/2023    -continue current meds  -offered robaxin prn but could have same side effects as other muscle relaxers.  -alternate heat and ice therapy prn      There are no Patient Instructions on file for this visit.   Follow up if symptoms worsen or fail to improve.

## 2023-03-21 DIAGNOSIS — M54.16 LUMBAR RADICULOPATHY: Primary | ICD-10-CM

## 2023-03-21 DIAGNOSIS — M54.6 THORACIC BACK PAIN, UNSPECIFIED BACK PAIN LATERALITY, UNSPECIFIED CHRONICITY: ICD-10-CM

## 2023-03-22 ENCOUNTER — OFFICE VISIT (OUTPATIENT)
Dept: SPINE | Facility: CLINIC | Age: 58
End: 2023-03-22
Payer: COMMERCIAL

## 2023-03-22 ENCOUNTER — HOSPITAL ENCOUNTER (OUTPATIENT)
Dept: RADIOLOGY | Facility: HOSPITAL | Age: 58
Discharge: HOME | End: 2023-03-22
Attending: NURSE PRACTITIONER
Payer: COMMERCIAL

## 2023-03-22 VITALS — HEIGHT: 66 IN | BODY MASS INDEX: 42.27 KG/M2 | WEIGHT: 263 LBS

## 2023-03-22 DIAGNOSIS — G89.29 CHRONIC LOW BACK PAIN WITHOUT SCIATICA, UNSPECIFIED BACK PAIN LATERALITY: ICD-10-CM

## 2023-03-22 DIAGNOSIS — M54.16 LUMBAR RADICULOPATHY, CHRONIC: ICD-10-CM

## 2023-03-22 DIAGNOSIS — M54.50 CHRONIC LOW BACK PAIN WITHOUT SCIATICA, UNSPECIFIED BACK PAIN LATERALITY: ICD-10-CM

## 2023-03-22 DIAGNOSIS — M54.6 THORACIC SPINE PAIN: ICD-10-CM

## 2023-03-22 DIAGNOSIS — M54.6 PAIN IN THORACIC SPINE: Primary | ICD-10-CM

## 2023-03-22 DIAGNOSIS — M54.6 THORACIC BACK PAIN, UNSPECIFIED BACK PAIN LATERALITY, UNSPECIFIED CHRONICITY: ICD-10-CM

## 2023-03-22 DIAGNOSIS — M54.16 LUMBAR RADICULOPATHY: ICD-10-CM

## 2023-03-22 PROCEDURE — 72120 XR LUMBAR SPINE FLEXION AND EXTENSION ONLY: ICD-10-PCS | Mod: 26,59,, | Performed by: RADIOLOGY

## 2023-03-22 PROCEDURE — 99214 OFFICE O/P EST MOD 30 MIN: CPT | Mod: PBBFAC | Performed by: NURSE PRACTITIONER

## 2023-03-22 PROCEDURE — 1159F PR MEDICATION LIST DOCUMENTED IN MEDICAL RECORD: ICD-10-PCS | Mod: ,,, | Performed by: NURSE PRACTITIONER

## 2023-03-22 PROCEDURE — 99204 OFFICE O/P NEW MOD 45 MIN: CPT | Mod: S$PBB,,, | Performed by: NURSE PRACTITIONER

## 2023-03-22 PROCEDURE — 1159F MED LIST DOCD IN RCRD: CPT | Mod: ,,, | Performed by: NURSE PRACTITIONER

## 2023-03-22 PROCEDURE — 72082 X-RAY EXAM ENTIRE SPI 2/3 VW: CPT | Mod: 26,,, | Performed by: RADIOLOGY

## 2023-03-22 PROCEDURE — 72120 X-RAY BEND ONLY L-S SPINE: CPT | Mod: TC

## 2023-03-22 PROCEDURE — 99204 PR OFFICE/OUTPT VISIT, NEW, LEVL IV, 45-59 MIN: ICD-10-PCS | Mod: S$PBB,,, | Performed by: NURSE PRACTITIONER

## 2023-03-22 PROCEDURE — 3008F PR BODY MASS INDEX (BMI) DOCUMENTED: ICD-10-PCS | Mod: ,,, | Performed by: NURSE PRACTITIONER

## 2023-03-22 PROCEDURE — 3008F BODY MASS INDEX DOCD: CPT | Mod: ,,, | Performed by: NURSE PRACTITIONER

## 2023-03-22 PROCEDURE — 72082 XR SCOLIOSIS COMPLETE: ICD-10-PCS | Mod: 26,,, | Performed by: RADIOLOGY

## 2023-03-22 PROCEDURE — 72082 X-RAY EXAM ENTIRE SPI 2/3 VW: CPT | Mod: TC

## 2023-03-22 PROCEDURE — 72120 X-RAY BEND ONLY L-S SPINE: CPT | Mod: 26,59,, | Performed by: RADIOLOGY
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
## 2023-03-31 ENCOUNTER — HOSPITAL ENCOUNTER (OUTPATIENT)
Dept: RADIOLOGY | Facility: HOSPITAL | Age: 58
Discharge: HOME | End: 2023-03-31
Attending: NURSE PRACTITIONER
Payer: COMMERCIAL

## 2023-03-31 DIAGNOSIS — M54.16 LUMBAR RADICULOPATHY, CHRONIC: ICD-10-CM

## 2023-03-31 DIAGNOSIS — M54.6 PAIN IN THORACIC SPINE: ICD-10-CM

## 2023-03-31 PROCEDURE — 72148 MRI LUMBAR SPINE W/O DYE: CPT | Mod: TC

## 2023-03-31 PROCEDURE — 72146 MRI CHEST SPINE W/O DYE: CPT | Mod: TC

## 2023-04-12 ENCOUNTER — TELEPHONE (OUTPATIENT)
Dept: SPINE | Facility: CLINIC | Age: 58
End: 2023-04-12
Payer: COMMERCIAL

## 2023-04-12 DIAGNOSIS — M54.16 LUMBAR RADICULOPATHY: Primary | ICD-10-CM

## 2023-04-12 NOTE — TELEPHONE ENCOUNTER
Called patient and discussed Dr. Marks's review of MRI. He states that she has some mild stenosis and he recommends continued conservative treatment (PT, Injections, Medication. She states that she is not going to do PT, and is not interested in increased dose of gabapentin. She is agreeable to seeing Dr. Goetz for her back,.

## 2023-04-13 ENCOUNTER — OFFICE VISIT (OUTPATIENT)
Dept: FAMILY MEDICINE | Facility: CLINIC | Age: 58
End: 2023-04-13
Payer: COMMERCIAL

## 2023-04-13 VITALS
BODY MASS INDEX: 41.49 KG/M2 | RESPIRATION RATE: 18 BRPM | TEMPERATURE: 98 F | WEIGHT: 258.19 LBS | SYSTOLIC BLOOD PRESSURE: 132 MMHG | HEIGHT: 66 IN | OXYGEN SATURATION: 100 % | HEART RATE: 96 BPM | DIASTOLIC BLOOD PRESSURE: 80 MMHG

## 2023-04-13 DIAGNOSIS — L25.9 CONTACT DERMATITIS, UNSPECIFIED CONTACT DERMATITIS TYPE, UNSPECIFIED TRIGGER: Primary | ICD-10-CM

## 2023-04-13 PROCEDURE — 3075F SYST BP GE 130 - 139MM HG: CPT | Mod: ,,, | Performed by: NURSE PRACTITIONER

## 2023-04-13 PROCEDURE — 3008F PR BODY MASS INDEX (BMI) DOCUMENTED: ICD-10-PCS | Mod: ,,, | Performed by: NURSE PRACTITIONER

## 2023-04-13 PROCEDURE — 3079F PR MOST RECENT DIASTOLIC BLOOD PRESSURE 80-89 MM HG: ICD-10-PCS | Mod: ,,, | Performed by: NURSE PRACTITIONER

## 2023-04-13 PROCEDURE — 3008F BODY MASS INDEX DOCD: CPT | Mod: ,,, | Performed by: NURSE PRACTITIONER

## 2023-04-13 PROCEDURE — 3075F PR MOST RECENT SYSTOLIC BLOOD PRESS GE 130-139MM HG: ICD-10-PCS | Mod: ,,, | Performed by: NURSE PRACTITIONER

## 2023-04-13 PROCEDURE — 99214 PR OFFICE/OUTPT VISIT, EST, LEVL IV, 30-39 MIN: ICD-10-PCS | Mod: ,,, | Performed by: NURSE PRACTITIONER

## 2023-04-13 PROCEDURE — 1159F MED LIST DOCD IN RCRD: CPT | Mod: ,,, | Performed by: NURSE PRACTITIONER

## 2023-04-13 PROCEDURE — 1159F PR MEDICATION LIST DOCUMENTED IN MEDICAL RECORD: ICD-10-PCS | Mod: ,,, | Performed by: NURSE PRACTITIONER

## 2023-04-13 PROCEDURE — 3079F DIAST BP 80-89 MM HG: CPT | Mod: ,,, | Performed by: NURSE PRACTITIONER

## 2023-04-13 PROCEDURE — 99214 OFFICE O/P EST MOD 30 MIN: CPT | Mod: ,,, | Performed by: NURSE PRACTITIONER

## 2023-04-13 RX ORDER — TRIAMCINOLONE ACETONIDE 1 MG/G
CREAM TOPICAL 2 TIMES DAILY
Qty: 453.6 G | Refills: 1 | Status: SHIPPED | OUTPATIENT
Start: 2023-04-13 | End: 2023-07-03

## 2023-04-13 NOTE — ASSESSMENT & PLAN NOTE
Apply ceravue and kenalog cream as ordered, cont meds as ordered, take prednisone, refer to dr gavino han, [facility], [place], return ot clinic as needed

## 2023-04-13 NOTE — LETTER
April 13, 2023      [facility]  [address]  [place] [address]  Phone: [phone]  Fax: [phone]       Patient: Vandana Price   YOB: 1965  Date of Visit: 04/13/2023    To Whom It May Concern:    Enrike Price  was at [facility] on 04/13/2023. The patient may return to work/school on 04/14/23 with no restrictions. If you have any questions or concerns, or if I can be of further assistance, please do not hesitate to contact me.    Sincerely,    KIMBERLEE Gonzalez RN

## 2023-04-13 NOTE — PROGRESS NOTES
"   Esperanza Kapoor NP   [facility]  [address]  [place]     PATIENT NAME: Vandana Price  : 1965  DATE: 23  MRN: [id number]      Billing Provider: Esperanza Kapoor NP  Level of Service:   Patient PCP Information       Provider PCP Type    KIMBERLEE Scott General            Reason for Visit / Chief Complaint: Rash (Has been "itching" w/ generalized rash to back of bilateral legs, back, abdomen, chest and breast x 6 years. Has seen allergist- Alternates Zyrtec/ Claritin. Possibly hormonal?)       Update PCP  Update Chief Complaint         History of Present Illness / Problem Focused Workflow     Vandana Price presents to the clinic c/o rash , with itching to back of thais legs, back , abd, chest and breast x 6 years, has seen allergist, alternate zyrtec  and claritin, poss hormonal       Review of Systems     Review of Systems   Constitutional:  Negative for chills, fatigue and fever.   HENT:  Negative for nasal congestion, ear pain, facial swelling, hearing loss, mouth dryness, mouth sores, postnasal drip, rhinorrhea, sinus pressure/congestion and goiter.    Eyes:  Negative for discharge and itching.   Respiratory:  Negative for cough, shortness of breath and wheezing.    Cardiovascular:  Negative for chest pain and leg swelling.   Gastrointestinal:  Negative for abdominal pain, change in bowel habit and change in bowel habit.   Genitourinary:  Negative for difficulty urinating, dysuria, enuresis, frequency, hematuria and urgency.   Integumentary:  Positive for rash.   Neurological:  Negative for dizziness, vertigo, syncope, weakness and headaches.   Psychiatric/Behavioral:  Negative for decreased concentration.       Medical / Social / Family History     Past Medical History:   Diagnosis Date    Allergy     Bilateral primary osteoarthritis of knee     Chronic pain of both knees 10/14/2021    Eosinophilia     Gangrene of gallbladder in cholecystitis 2021    Hematochezia     History of " colonoscopy     Hyperlipidemia     Hypertension     Sleep apnea        Past Surgical History:   Procedure Laterality Date    ARTHROPLASTY, KNEE, TOTAL, USING COMPUTER-ASSISTED NAVIGATION Left 12/15/2022    Procedure: ARTHROPLASTY, KNEE, TOTAL, USING COMPUTER-ASSISTED NAVIGATION;  Surgeon: Jerome Valladares MD;  Location: [facility] OR;  Service: Orthopedics;  Laterality: Left;    Bilateral IA knee injection Bilateral 2021    D Shows    bladder tact       SECTION      CHOLECYSTECTOMY      FRACTURE SURGERY      jaw 1972    INJECTION OF ANESTHETIC AGENT AROUND NERVE Bilateral 2022    Procedure: GNB   BILATERAL;  Surgeon: Amanda Goetz MD;  Location: [facility];  Service: Pain Management;  Laterality: Bilateral;  PT HAS NOT HAD VAC   STATES WILL BE TESTED AT [facility] IN [place]    knee scope Bilateral     KNEE SURGERY      LAPAROSCOPIC CHOLECYSTECTOMY N/A 3/31/2021    Procedure: LAPAROSCOPIC CHOLECYSTECTOMY CONVERTED TO OPEN;  Surgeon: Darnell Mcgraw MD;  Location: [facility] OR;  Service: General;  Laterality: N/A;  CONVERTED TO OPEN    TUBAL LIGATION         Social History  Ms.  reports that she quit smoking about 13 years ago. Her smoking use included cigarettes. She has a 12.00 pack-year smoking history. She has never been exposed to tobacco smoke. She has never used smokeless tobacco. She reports that she does not currently use alcohol. She reports that she does not currently use drugs.    Family History  Ms.'s family history includes Alcohol abuse in her mother; Heart disease in her father; Hypertension in her father; Lung cancer in her maternal aunt; Migraines in her daughter.    Medications and Allergies     Medications  Outpatient Medications Marked as Taking for the 23 encounter (Office Visit) with Esperanza Kapoor NP   Medication Sig Dispense Refill    aspirin (ECOTRIN) 81 MG EC tablet Take 81 mg by mouth once daily.      celecoxib (CELEBREX) 200 MG capsule Take 1  "capsule (200 mg total) by mouth once daily. 90 capsule 1    cetirizine (ZYRTEC) 10 MG tablet Take 1 tablet (10 mg total) by mouth once daily. 90 tablet 1    cyanocobalamin 1,000 mcg/mL injection Inject 1 mL (1,000 mcg total) into the muscle every 30 days. 3 mL 3    famotidine (PEPCID) 40 MG tablet Take 1 tablet (40 mg total) by mouth 2 (two) times daily. 180 tablet 1    gabapentin (NEURONTIN) 300 MG capsule Take 2 capsules (600 mg total) by mouth 2 (two) times daily. 90 capsule 1    tirzepatide (MOUNJARO) 5 mg/0.5 mL PnIj Inject 5 mg into the skin every 7 days. 12 pen 3       Allergies  Review of patient's allergies indicates:   Allergen Reactions    Opioids - morphine analogues Rash     Development of red rash at site morphine given.        Physical Examination     Vitals:    04/13/23 0834   BP: 132/80   BP Location: Left arm   Patient Position: Sitting   Pulse: 96   Resp: 18   Temp: 98.3 °F (36.8 °C)   TempSrc: Oral   SpO2: 100%   Weight: 117.1 kg (258 lb 3.2 oz)   Height: 5' 6" (1.676 m)      Physical Exam  Vitals and nursing note reviewed.   Constitutional:       Appearance: Normal appearance.   HENT:      Head: Normocephalic.      Right Ear: Tympanic membrane, ear canal and external ear normal.      Left Ear: Tympanic membrane, ear canal and external ear normal.      Nose: Nose normal.      Mouth/Throat:      Mouth: Mucous membranes are moist.      Pharynx: Oropharynx is clear.   Eyes:      Extraocular Movements: Extraocular movements intact.      Conjunctiva/sclera: Conjunctivae normal.      Pupils: Pupils are equal, round, and reactive to light.   Cardiovascular:      Rate and Rhythm: Normal rate and regular rhythm.      Pulses: Normal pulses.      Heart sounds: Normal heart sounds.   Pulmonary:      Effort: Pulmonary effort is normal.      Breath sounds: Normal breath sounds.   Abdominal:      General: Bowel sounds are normal.      Palpations: Abdomen is soft.   Musculoskeletal:         General: Normal range " of motion.   Skin:     General: Skin is warm and dry.      Capillary Refill: Capillary refill takes less than 2 seconds.      Findings: Rash (dry scaley erythematous skin on chest and back, mild on abd and post legs, several dried scabs noted from where cshe has scratched) present.   Neurological:      General: No focal deficit present.      Mental Status: She is alert and oriented to person, place, and time.   Psychiatric:         Mood and Affect: Mood normal.         Behavior: Behavior normal.        Assessment and Plan (including Health Maintenance)      Problem List  Smart Sets  Document Outside HM   :    Plan:     There are no diagnoses linked to this encounter.        Health Maintenance Due   Topic Date Due    Hepatitis C Screening  Never done    Mammogram  02/24/2021       Problem List Items Addressed This Visit    None        Health Maintenance Topics with due status: Not Due       Topic Last Completion Date    Colorectal Cancer Screening 08/15/2022    Cervical Cancer Screening 11/02/2022    Hemoglobin A1c (Prediabetes) 11/03/2022    Lipid Panel 11/03/2022       Procedures     Future Appointments   Date Time Provider Department Center   5/3/2023  8:00 AM Amanda Goetz MD [facility] P[facility]   5/24/2023  4:00 PM Jerome Valladares MD [facility]   6/12/2023  4:00 PM [facility] MAMMO[facility] M[facility]MO [facility]        No follow-ups on file.       Signature:  Esperanza Kapoor NP    Date of encounter: 4/13/23

## 2023-05-03 ENCOUNTER — OFFICE VISIT (OUTPATIENT)
Dept: PAIN MEDICINE | Facility: CLINIC | Age: 58
End: 2023-05-03
Payer: COMMERCIAL

## 2023-05-03 VITALS
HEIGHT: 66 IN | SYSTOLIC BLOOD PRESSURE: 148 MMHG | BODY MASS INDEX: 41.14 KG/M2 | HEART RATE: 74 BPM | WEIGHT: 256 LBS | OXYGEN SATURATION: 99 % | DIASTOLIC BLOOD PRESSURE: 81 MMHG | RESPIRATION RATE: 18 BRPM

## 2023-05-03 DIAGNOSIS — G89.4 CHRONIC PAIN SYNDROME: Chronic | ICD-10-CM

## 2023-05-03 DIAGNOSIS — M54.16 LUMBAR RADICULOPATHY: ICD-10-CM

## 2023-05-03 DIAGNOSIS — M47.817 SPONDYLOSIS OF LUMBOSACRAL REGION WITHOUT MYELOPATHY OR RADICULOPATHY: Chronic | ICD-10-CM

## 2023-05-03 DIAGNOSIS — M17.0 BILATERAL PRIMARY OSTEOARTHRITIS OF KNEE: Chronic | ICD-10-CM

## 2023-05-03 DIAGNOSIS — Z79.899 ENCOUNTER FOR LONG-TERM (CURRENT) USE OF OTHER MEDICATIONS: Primary | ICD-10-CM

## 2023-05-03 LAB
CTP QC/QA: YES
POC (AMP) AMPHETAMINE: NEGATIVE
POC (BAR) BARBITURATES: NEGATIVE
POC (BUP) BUPRENORPHINE: NEGATIVE
POC (BZO) BENZODIAZEPINES: NEGATIVE
POC (COC) COCAINE: NEGATIVE
POC (MDMA) METHYLENEDIOXYMETHAMPHETAMINE 3,4: NEGATIVE
POC (MET) METHAMPHETAMINE: NEGATIVE
POC (MOP) OPIATES: NEGATIVE
POC (MTD) METHADONE: NEGATIVE
POC (OXY) OXYCODONE: NEGATIVE
POC (PCP) PHENCYCLIDINE: NEGATIVE
POC (TCA) TRICYCLIC ANTIDEPRESSANTS: NEGATIVE
POC TEMPERATURE (URINE): 90
POC THC: NEGATIVE

## 2023-05-03 PROCEDURE — 3077F SYST BP >= 140 MM HG: CPT | Mod: ,,, | Performed by: PAIN MEDICINE

## 2023-05-03 PROCEDURE — 80305 DRUG TEST PRSMV DIR OPT OBS: CPT | Mod: PBBFAC | Performed by: PAIN MEDICINE

## 2023-05-03 PROCEDURE — 3008F PR BODY MASS INDEX (BMI) DOCUMENTED: ICD-10-PCS | Mod: ,,, | Performed by: PAIN MEDICINE

## 2023-05-03 PROCEDURE — 3008F BODY MASS INDEX DOCD: CPT | Mod: ,,, | Performed by: PAIN MEDICINE

## 2023-05-03 PROCEDURE — 1159F PR MEDICATION LIST DOCUMENTED IN MEDICAL RECORD: ICD-10-PCS | Mod: ,,, | Performed by: PAIN MEDICINE

## 2023-05-03 PROCEDURE — 99215 OFFICE O/P EST HI 40 MIN: CPT | Mod: PBBFAC | Performed by: PAIN MEDICINE

## 2023-05-03 PROCEDURE — 3077F PR MOST RECENT SYSTOLIC BLOOD PRESSURE >= 140 MM HG: ICD-10-PCS | Mod: ,,, | Performed by: PAIN MEDICINE

## 2023-05-03 PROCEDURE — 1159F MED LIST DOCD IN RCRD: CPT | Mod: ,,, | Performed by: PAIN MEDICINE

## 2023-05-03 PROCEDURE — 3079F DIAST BP 80-89 MM HG: CPT | Mod: ,,, | Performed by: PAIN MEDICINE

## 2023-05-03 PROCEDURE — 99214 PR OFFICE/OUTPT VISIT, EST, LEVL IV, 30-39 MIN: ICD-10-PCS | Mod: S$PBB,,, | Performed by: PAIN MEDICINE

## 2023-05-03 PROCEDURE — 99214 OFFICE O/P EST MOD 30 MIN: CPT | Mod: S$PBB,,, | Performed by: PAIN MEDICINE

## 2023-05-03 PROCEDURE — 3079F PR MOST RECENT DIASTOLIC BLOOD PRESSURE 80-89 MM HG: ICD-10-PCS | Mod: ,,, | Performed by: PAIN MEDICINE

## 2023-05-03 NOTE — PROGRESS NOTES
She Disclaimer: This note has been generated using voice-recognition software. There may be typographical errors that have been missed during proof-reading        Patient ID: Vandana Price is a 57 y.o. female.      Chief Complaint: Back Pain and Knee Pain        57-year-old female returns for re-evaluation of bilateral knee and lower back pain.  She received a left total knee replacement December 15, 2022 by Dr. Valladares and reports significantly improvement.   She developed lower back pain and was evaluated by Rosalba Love, [facility] and  MRI of the lumbar revealed multilevel degenerative changes, ligamentum flavum thickening, mild canal narrowing and facet arthropathy and thoracic spines were obtained.  She complains of axial lower back pain without radicular symptoms, paresthesia or weakness  of the lower extremities.  Her pain is worse with early morning awakening. Medications are providing ineffective pain relief.  She has not been involved in physical therapy for the lower back pain. She was referred to our clinic for nerve block injections. She is not a surgical candidate at this time.            Pain Assessment  Pain Assessment: 0-10  Pain Score:   4  Pain Location: Back  Pain Descriptors: Aching  Pain Frequency: Continuous  Aggravating Factors: Bending, Walking  Pain Intervention(s): Home medication, Rest      A's of Opioid Risk Assessment  Activity:Patient can partially perform ADL.   Analgesia:Patients pain is  not controlled by current medication.   Adverse Effects: Patient denies constipation or sedation.  Aberrant Behavior:  reviewed with no aberrant drug seeking/taking behavior.      Patient denies any suicidal or homicidal ideations    Physical Therapy/Home Exercise:  Not for lower back pain          Review of Systems   Constitutional: Negative.    HENT: Negative.     Eyes: Negative.    Respiratory: Negative.     Cardiovascular: Negative.    Gastrointestinal: Negative.    Endocrine: Negative.     Genitourinary: Negative.    Musculoskeletal:  Positive for arthralgias (Bilateral knees), back pain and gait problem.   Integumentary:  Negative.   Hematological: Negative.    Psychiatric/Behavioral:  Positive for sleep disturbance.            Past Medical History:   Diagnosis Date    Allergy     Bilateral primary osteoarthritis of knee     Chronic pain of both knees 10/14/2021    Eosinophilia     Gangrene of gallbladder in cholecystitis 2021    Hematochezia     History of colonoscopy     Hyperlipidemia     Hypertension     Sleep apnea      Past Surgical History:   Procedure Laterality Date    ARTHROPLASTY, KNEE, TOTAL, USING COMPUTER-ASSISTED NAVIGATION Left 12/15/2022    Procedure: ARTHROPLASTY, KNEE, TOTAL, USING COMPUTER-ASSISTED NAVIGATION;  Surgeon: Jerome Valladares MD;  Location: [facility] ORTHO OR;  Service: Orthopedics;  Laterality: Left;    Bilateral IA knee injection Bilateral 2021    D Shows    bladder tact       SECTION      CHOLECYSTECTOMY      FRACTURE SURGERY      jaw     INJECTION OF ANESTHETIC AGENT AROUND NERVE Bilateral 2022    Procedure: GNB   BILATERAL;  Surgeon: Amanda Goetz MD;  Location: [facility] PAIN MGMT;  Service: Pain Management;  Laterality: Bilateral;  PT HAS NOT HAD VAC   STATES WILL BE TESTED AT [facility] [place]    knee scope Bilateral     KNEE SURGERY      LAPAROSCOPIC CHOLECYSTECTOMY N/A 3/31/2021    Procedure: LAPAROSCOPIC CHOLECYSTECTOMY CONVERTED TO OPEN;  Surgeon: Darnell Mcgraw MD;  Location: [facility] OR;  Service: General;  Laterality: N/A;  CONVERTED TO OPEN    TUBAL LIGATION       Social History     Socioeconomic History    Marital status: Single    Number of children: 2   Occupational History    Occupation:  at [employer]   Tobacco Use    Smoking status: Former     Packs/day: 1.00     Years: 12.00     Pack years: 12.00     Types: Cigarettes     Quit date:      Years since quittin.3     Passive exposure: Never    Smokeless  tobacco: Never   Substance and Sexual Activity    Alcohol use: Not Currently    Drug use: Not Currently    Sexual activity: Not Currently     Family History   Problem Relation Age of Onset    Hypertension Father     Heart disease Father     Alcohol abuse Mother     Migraines Daughter     Lung cancer Maternal Aunt      Review of patient's allergies indicates:   Allergen Reactions    Opioids - morphine analogues Rash     Development of red rash at site morphine given.      has a current medication list which includes the following prescription(s): acetaminophen with codeine, aspirin, celecoxib, cetirizine, cyanocobalamin, famotidine, gabapentin, humira(cf) pen, mounjaro, and triamcinolone acetonide 0.1%.      Objective:  Vitals:    05/03/23 0756   BP: (!) 148/81   Pulse: 74   Resp: 18        Physical Exam  Vitals and nursing note reviewed.   Constitutional:       General: She is not in acute distress.     Appearance: Normal appearance. She is obese. She is not ill-appearing, toxic-appearing or diaphoretic.   HENT:      Head: Normocephalic and atraumatic.      Nose: Nose normal.      Mouth/Throat:      Mouth: Mucous membranes are moist.   Eyes:      Extraocular Movements: Extraocular movements intact.      Pupils: Pupils are equal, round, and reactive to light.   Cardiovascular:      Rate and Rhythm: Normal rate and regular rhythm.      Heart sounds: Normal heart sounds.   Pulmonary:      Effort: Pulmonary effort is normal. No respiratory distress.      Breath sounds: Normal breath sounds. No stridor. No wheezing or rhonchi.   Abdominal:      General: Bowel sounds are normal.      Palpations: Abdomen is soft.   Musculoskeletal:         General: No swelling or deformity.      Cervical back: Normal and normal range of motion. No spasms or tenderness. No pain with movement. Normal range of motion.      Thoracic back: Normal.      Lumbar back: Tenderness and bony tenderness present. No spasms. Decreased range of motion.  Negative right straight leg raise test and negative left straight leg raise test. No scoliosis.      Right knee: Crepitus present. Decreased range of motion. Tenderness present.      Left knee: Crepitus present. Decreased range of motion. Tenderness present.      Right lower leg: No edema.      Left lower leg: No edema.      Comments:  Lumbar pain with flexion, extension and  lateral rotation.  Bilateral lumbar facet tenderness to palpation from L3-S1.   Skin:     General: Skin is warm.   Neurological:      General: No focal deficit present.      Mental Status: She is alert and oriented to person, place, and time. Mental status is at baseline.      Cranial Nerves: No cranial nerve deficit.      Sensory: Sensation is intact. No sensory deficit.      Motor: No weakness.      Coordination: Coordination normal.      Gait: Gait normal.      Deep Tendon Reflexes: Reflexes are normal and symmetric.   Psychiatric:         Mood and Affect: Mood normal.         Behavior: Behavior normal.         Assessment:      1. Encounter for long-term (current) use of other medications    2. Lumbar radiculopathy    3. Spondylosis of lumbosacral region without myelopathy or radiculopathy    4. Bilateral primary osteoarthritis of knee    5. Chronic pain syndrome            Plan:  1. reviewed  2.Addiction, Dependency, Tolerance, Opioid abuse-misuse, Death, Diversion Discussed. Overdose reversal drug Naloxone discussed.  3.Refill/Continue medications for pain control and function.  Discontinue Mobic and start Celebrex for diffuse joint osteoarthritis.  Increase Tylenol No. 3 to twice a day for better pain relief       Requested Prescriptions      No prescriptions requested or ordered in this encounter     4.Urine drug screen point of care obtained and consistent with prescribed medications and medication refill date  5. Vandana Price has chronic, moderate to severe axial lower back pain present for over the past 3 months that has failed to  respond to PT-directed home exercises, NSAIDs, and muscle relaxants. There is no untreated radiculopathy or claudication present.  The patient has clinical and radiologic findings suggestive of facet mediated pain.  We will schedule for 1st diagnostic bilateral lumbar L4/5 and L5/S1 medial branch blocks.      Orders Placed This Encounter   Procedures    POCT Urine Drug Screen Presump     Interpretive Information:     Negative:  No drug detected at the cut off level.   Positive:  This result represents presumptive positive for the   tested drug, other substances may yield a positive response other   than the analyte of interest. This result should be utilized for   diagnostic purpose only. Confirmation testing will be performed upon physician request only.       Case Request Operating Room: BIlateral L4-5,5-S1 MBB     Order Specific Question:   Medical Necessity:     Answer:   Medically Non-Urgent [100]     Order Specific Question:   CPT Code:     Answer:   AZ INJ DX/THER AGNT PARAVERT FACET JOINT,IMG GUIDE,LUMBAR/SAC,1ST LVL [87299]     Order Specific Question:   CPT Code:     Answer:   AZ INJ DX/THER AGNT PARAVERT FACET JOINT,IMG GUIDE,LUMBAR/SAC, 2ND LEVEL [47333]     Order Specific Question:   Post-Procedure Disposition:     Answer:   PACU [1]     Order Specific Question:   Estimated Length of Stay:     Answer:   0 midnight     Order Specific Question:   Implant Required:     Answer:   No [1001]     Order Specific Question:   Is an on-site pathologist required for this procedure?     Answer:   N/A      6.Indications for this procedure for this specific patient include the following   - Pt has had symptoms for three months with moderate to severe pain with functional impairment rated of 7/10 pain.   - Pain non-responsive to conservative care.    - Pain predominately axial and not associated with radiculopathy or claudication.    - No non-facet pathology as source of pain.    - Clinical assessment implicates facet  joint as putative pain source.    - Pain is exacerbated by extension or prolonged sitting/standing and relieved by rest.    - No unexplained neurologic deficit.    - No history of coagulopathy, infection or unstable medical conditions.    - Pain is causing significant functional limitation resulting in diminished quality of life and impaired age appropriate ADL's.   - Clinical assessment implicates facet joint as putative source of pain  - Repeat injections not done prior to 7 days   - no more than 2 levels will be done per side      7.The planned medically necessary  surgical procedure is performed in a hospital outpatient department and not in an ambulatory surgical center due to:     -there is no geographically assessable ambulatory surgery center that has the  necessary equipment and fluoroscopy needed for the procedure     -there is no geographically assessable ambulatory surgical center available at which the physician has privileges     -an ASC's  specific  guideline regarding the individuals weight or health conditions that prevent the use of an ASC           report:  Reviewed and consistent with medication use as prescribed.      The total time spent for evaluation and management on 05/03/2023 including reviewing separately obtained history, performing a medically appropriate exam and evaluation, documenting clinical information in the health record, independently interpreting results and communicating them to the patient/family/caregiver, and ordering medications/tests/procedures was between 15-29 minutes.    The above plan and management options were discussed at length with patient. Patient is in agreement with the above and verbalized understanding. It will be communicated with the referring physician via electronic record, fax, or mail.

## 2023-05-03 NOTE — H&P (VIEW-ONLY)
She Disclaimer: This note has been generated using voice-recognition software. There may be typographical errors that have been missed during proof-reading        Patient ID: Vandana Price is a 57 y.o. female.      Chief Complaint: Back Pain and Knee Pain        57-year-old female returns for re-evaluation of bilateral knee and lower back pain.  She received a left total knee replacement December 15, 2022 by Dr. Valladares and reports significantly improvement.   She developed lower back pain and was evaluated by Rosalba Love, [facility] and  MRI of the lumbar revealed multilevel degenerative changes, ligamentum flavum thickening, mild canal narrowing and facet arthropathy and thoracic spines were obtained.  She complains of axial lower back pain without radicular symptoms, paresthesia or weakness  of the lower extremities.  Her pain is worse with early morning awakening. Medications are providing ineffective pain relief.  She has not been involved in physical therapy for the lower back pain. She was referred to our clinic for nerve block injections. She is not a surgical candidate at this time.            Pain Assessment  Pain Assessment: 0-10  Pain Score:   4  Pain Location: Back  Pain Descriptors: Aching  Pain Frequency: Continuous  Aggravating Factors: Bending, Walking  Pain Intervention(s): Home medication, Rest      A's of Opioid Risk Assessment  Activity:Patient can partially perform ADL.   Analgesia:Patients pain is  not controlled by current medication.   Adverse Effects: Patient denies constipation or sedation.  Aberrant Behavior:  reviewed with no aberrant drug seeking/taking behavior.      Patient denies any suicidal or homicidal ideations    Physical Therapy/Home Exercise:  Not for lower back pain          Review of Systems   Constitutional: Negative.    HENT: Negative.     Eyes: Negative.    Respiratory: Negative.     Cardiovascular: Negative.    Gastrointestinal: Negative.    Endocrine: Negative.     Genitourinary: Negative.    Musculoskeletal:  Positive for arthralgias (Bilateral knees), back pain and gait problem.   Integumentary:  Negative.   Hematological: Negative.    Psychiatric/Behavioral:  Positive for sleep disturbance.            Past Medical History:   Diagnosis Date    Allergy     Bilateral primary osteoarthritis of knee     Chronic pain of both knees 10/14/2021    Eosinophilia     Gangrene of gallbladder in cholecystitis 2021    Hematochezia     History of colonoscopy     Hyperlipidemia     Hypertension     Sleep apnea      Past Surgical History:   Procedure Laterality Date    ARTHROPLASTY, KNEE, TOTAL, USING COMPUTER-ASSISTED NAVIGATION Left 12/15/2022    Procedure: ARTHROPLASTY, KNEE, TOTAL, USING COMPUTER-ASSISTED NAVIGATION;  Surgeon: Jerome Valladares MD;  Location: [facility] ORTHO OR;  Service: Orthopedics;  Laterality: Left;    Bilateral IA knee injection Bilateral 2021    D Shows    bladder tact       SECTION      CHOLECYSTECTOMY      FRACTURE SURGERY      jaw     INJECTION OF ANESTHETIC AGENT AROUND NERVE Bilateral 2022    Procedure: GNB   BILATERAL;  Surgeon: Amanda Goetz MD;  Location: [facility] PAIN MGMT;  Service: Pain Management;  Laterality: Bilateral;  PT HAS NOT HAD VAC   STATES WILL BE TESTED AT [facility] [place]    knee scope Bilateral     KNEE SURGERY      LAPAROSCOPIC CHOLECYSTECTOMY N/A 3/31/2021    Procedure: LAPAROSCOPIC CHOLECYSTECTOMY CONVERTED TO OPEN;  Surgeon: Darnell Mcgraw MD;  Location: [facility] OR;  Service: General;  Laterality: N/A;  CONVERTED TO OPEN    TUBAL LIGATION       Social History     Socioeconomic History    Marital status: Single    Number of children: 2   Occupational History    Occupation:  at [employer]   Tobacco Use    Smoking status: Former     Packs/day: 1.00     Years: 12.00     Pack years: 12.00     Types: Cigarettes     Quit date:      Years since quittin.3     Passive exposure: Never    Smokeless  tobacco: Never   Substance and Sexual Activity    Alcohol use: Not Currently    Drug use: Not Currently    Sexual activity: Not Currently     Family History   Problem Relation Age of Onset    Hypertension Father     Heart disease Father     Alcohol abuse Mother     Migraines Daughter     Lung cancer Maternal Aunt      Review of patient's allergies indicates:   Allergen Reactions    Opioids - morphine analogues Rash     Development of red rash at site morphine given.      has a current medication list which includes the following prescription(s): acetaminophen with codeine, aspirin, celecoxib, cetirizine, cyanocobalamin, famotidine, gabapentin, humira(cf) pen, mounjaro, and triamcinolone acetonide 0.1%.      Objective:  Vitals:    05/03/23 0756   BP: (!) 148/81   Pulse: 74   Resp: 18        Physical Exam  Vitals and nursing note reviewed.   Constitutional:       General: She is not in acute distress.     Appearance: Normal appearance. She is obese. She is not ill-appearing, toxic-appearing or diaphoretic.   HENT:      Head: Normocephalic and atraumatic.      Nose: Nose normal.      Mouth/Throat:      Mouth: Mucous membranes are moist.   Eyes:      Extraocular Movements: Extraocular movements intact.      Pupils: Pupils are equal, round, and reactive to light.   Cardiovascular:      Rate and Rhythm: Normal rate and regular rhythm.      Heart sounds: Normal heart sounds.   Pulmonary:      Effort: Pulmonary effort is normal. No respiratory distress.      Breath sounds: Normal breath sounds. No stridor. No wheezing or rhonchi.   Abdominal:      General: Bowel sounds are normal.      Palpations: Abdomen is soft.   Musculoskeletal:         General: No swelling or deformity.      Cervical back: Normal and normal range of motion. No spasms or tenderness. No pain with movement. Normal range of motion.      Thoracic back: Normal.      Lumbar back: Tenderness and bony tenderness present. No spasms. Decreased range of motion.  Negative right straight leg raise test and negative left straight leg raise test. No scoliosis.      Right knee: Crepitus present. Decreased range of motion. Tenderness present.      Left knee: Crepitus present. Decreased range of motion. Tenderness present.      Right lower leg: No edema.      Left lower leg: No edema.      Comments:  Lumbar pain with flexion, extension and  lateral rotation.  Bilateral lumbar facet tenderness to palpation from L3-S1.   Skin:     General: Skin is warm.   Neurological:      General: No focal deficit present.      Mental Status: She is alert and oriented to person, place, and time. Mental status is at baseline.      Cranial Nerves: No cranial nerve deficit.      Sensory: Sensation is intact. No sensory deficit.      Motor: No weakness.      Coordination: Coordination normal.      Gait: Gait normal.      Deep Tendon Reflexes: Reflexes are normal and symmetric.   Psychiatric:         Mood and Affect: Mood normal.         Behavior: Behavior normal.         Assessment:      1. Encounter for long-term (current) use of other medications    2. Lumbar radiculopathy    3. Spondylosis of lumbosacral region without myelopathy or radiculopathy    4. Bilateral primary osteoarthritis of knee    5. Chronic pain syndrome            Plan:  1. reviewed  2.Addiction, Dependency, Tolerance, Opioid abuse-misuse, Death, Diversion Discussed. Overdose reversal drug Naloxone discussed.  3.Refill/Continue medications for pain control and function.  Discontinue Mobic and start Celebrex for diffuse joint osteoarthritis.  Increase Tylenol No. 3 to twice a day for better pain relief       Requested Prescriptions      No prescriptions requested or ordered in this encounter     4.Urine drug screen point of care obtained and consistent with prescribed medications and medication refill date  5. Vandana Price has chronic, moderate to severe axial lower back pain present for over the past 3 months that has failed to  respond to PT-directed home exercises, NSAIDs, and muscle relaxants. There is no untreated radiculopathy or claudication present.  The patient has clinical and radiologic findings suggestive of facet mediated pain.  We will schedule for 1st diagnostic bilateral lumbar L4/5 and L5/S1 medial branch blocks.      Orders Placed This Encounter   Procedures    POCT Urine Drug Screen Presump     Interpretive Information:     Negative:  No drug detected at the cut off level.   Positive:  This result represents presumptive positive for the   tested drug, other substances may yield a positive response other   than the analyte of interest. This result should be utilized for   diagnostic purpose only. Confirmation testing will be performed upon physician request only.       Case Request Operating Room: BIlateral L4-5,5-S1 MBB     Order Specific Question:   Medical Necessity:     Answer:   Medically Non-Urgent [100]     Order Specific Question:   CPT Code:     Answer:   NE INJ DX/THER AGNT PARAVERT FACET JOINT,IMG GUIDE,LUMBAR/SAC,1ST LVL [71583]     Order Specific Question:   CPT Code:     Answer:   NE INJ DX/THER AGNT PARAVERT FACET JOINT,IMG GUIDE,LUMBAR/SAC, 2ND LEVEL [96859]     Order Specific Question:   Post-Procedure Disposition:     Answer:   PACU [1]     Order Specific Question:   Estimated Length of Stay:     Answer:   0 midnight     Order Specific Question:   Implant Required:     Answer:   No [1001]     Order Specific Question:   Is an on-site pathologist required for this procedure?     Answer:   N/A      6.Indications for this procedure for this specific patient include the following   - Pt has had symptoms for three months with moderate to severe pain with functional impairment rated of 7/10 pain.   - Pain non-responsive to conservative care.    - Pain predominately axial and not associated with radiculopathy or claudication.    - No non-facet pathology as source of pain.    - Clinical assessment implicates facet  joint as putative pain source.    - Pain is exacerbated by extension or prolonged sitting/standing and relieved by rest.    - No unexplained neurologic deficit.    - No history of coagulopathy, infection or unstable medical conditions.    - Pain is causing significant functional limitation resulting in diminished quality of life and impaired age appropriate ADL's.   - Clinical assessment implicates facet joint as putative source of pain  - Repeat injections not done prior to 7 days   - no more than 2 levels will be done per side      7.The planned medically necessary  surgical procedure is performed in a hospital outpatient department and not in an ambulatory surgical center due to:     -there is no geographically assessable ambulatory surgery center that has the  necessary equipment and fluoroscopy needed for the procedure     -there is no geographically assessable ambulatory surgical center available at which the physician has privileges     -an ASC's  specific  guideline regarding the individuals weight or health conditions that prevent the use of an ASC           report:  Reviewed and consistent with medication use as prescribed.      The total time spent for evaluation and management on 05/03/2023 including reviewing separately obtained history, performing a medically appropriate exam and evaluation, documenting clinical information in the health record, independently interpreting results and communicating them to the patient/family/caregiver, and ordering medications/tests/procedures was between 15-29 minutes.    The above plan and management options were discussed at length with patient. Patient is in agreement with the above and verbalized understanding. It will be communicated with the referring physician via electronic record, fax, or mail.

## 2023-05-03 NOTE — PATIENT INSTRUCTIONS
BILATERAL L4-S1 MBBProcedure Instructions: 5/18/23 AT 8:40 AM    Nothing to eat or drink for 8 hours or after midnight including gum, candy, mints, or tobacco products.  If you are scheduled for 1:30 or later nothing to eat or drink after 5 a.m. the morning of the procedure, including gum, candy, mints, or tobacco products.  Must have a  at least 18 yrs of age to stay present at all times  No Diabetic medications the morning of procedure, check blood sugar the morning of procedure, if it is greater than 200 call the office at [phone]  If you are started on antibiotics or have been prescribed antibiotics, have a fever, or have any other type of infection call to reschedule procedure.  If you take blood pressure medications you can take it at your regular scheduled time with a small sip of WATER!  Dentures are to be removed prior to procedure or we can provide you with a denture cup to remove them prior to being taken back for procedure.   False eyelashes are to be removed before the morning of procedure.    Contacts will have to be removed prior to procedure.  No jewelry is to be worn to the procedure.     HOLD ASPIRIN AND ASPIRIN PRODUCTS  (ASPIRIN, BC POWDER ETC. ) FOR 7 DAYS  PRIOR TO PROCEDURE  HOLD NSAIDS( ibuprofen, mobic, meloxicam, advil, diclofenac, naproxen, relafen, celebrex,  methotrexate, aleve etc....)  FOR 3 DAYS   PRIOR TO PROCEDURE

## 2023-05-03 NOTE — LETTER
"Tia "Tia" Albert was seen and treated in our department on 5/3/2023.  She may return to work on 5/3/23.      If you have any questions or concerns, please don't hesitate to call.        "

## 2023-05-11 ENCOUNTER — PATIENT OUTREACH (OUTPATIENT)
Dept: ADMINISTRATIVE | Facility: HOSPITAL | Age: 58
End: 2023-05-11

## 2023-05-18 ENCOUNTER — OFFICE VISIT (OUTPATIENT)
Dept: GASTROENTEROLOGY | Facility: CLINIC | Age: 58
End: 2023-05-18
Payer: COMMERCIAL

## 2023-05-18 ENCOUNTER — HOSPITAL ENCOUNTER (OUTPATIENT)
Facility: HOSPITAL | Age: 58
Discharge: HOME | End: 2023-05-18
Attending: PAIN MEDICINE | Admitting: PAIN MEDICINE
Payer: COMMERCIAL

## 2023-05-18 VITALS
HEIGHT: 66 IN | TEMPERATURE: 98 F | OXYGEN SATURATION: 98 % | DIASTOLIC BLOOD PRESSURE: 83 MMHG | WEIGHT: 248 LBS | RESPIRATION RATE: 18 BRPM | HEART RATE: 92 BPM | BODY MASS INDEX: 39.86 KG/M2 | SYSTOLIC BLOOD PRESSURE: 134 MMHG

## 2023-05-18 VITALS
HEIGHT: 66 IN | HEART RATE: 91 BPM | DIASTOLIC BLOOD PRESSURE: 80 MMHG | SYSTOLIC BLOOD PRESSURE: 128 MMHG | OXYGEN SATURATION: 96 % | BODY MASS INDEX: 40.15 KG/M2 | WEIGHT: 249.81 LBS

## 2023-05-18 DIAGNOSIS — M47.817 LUMBOSACRAL SPONDYLOSIS WITHOUT MYELOPATHY: Primary | Chronic | ICD-10-CM

## 2023-05-18 DIAGNOSIS — K52.9 INFLAMMATORY BOWEL DISEASE: Primary | ICD-10-CM

## 2023-05-18 PROCEDURE — 1160F RVW MEDS BY RX/DR IN RCRD: CPT | Mod: ,,,

## 2023-05-18 PROCEDURE — 99213 OFFICE O/P EST LOW 20 MIN: CPT | Mod: S$PBB,,,

## 2023-05-18 PROCEDURE — 3074F PR MOST RECENT SYSTOLIC BLOOD PRESSURE < 130 MM HG: ICD-10-PCS | Mod: ,,,

## 2023-05-18 PROCEDURE — 64493 PR INJ DX/THER AGNT PARAVERT FACET JOINT,IMG GUIDE,LUMBAR/SAC,1ST LVL: ICD-10-PCS | Mod: LT,,, | Performed by: PAIN MEDICINE

## 2023-05-18 PROCEDURE — 64494 PR INJ DX/THER AGNT PARAVERT FACET JOINT,IMG GUIDE,LUMBAR/SAC, 2ND LEVEL: ICD-10-PCS | Mod: RT,,, | Performed by: PAIN MEDICINE

## 2023-05-18 PROCEDURE — 99213 PR OFFICE/OUTPT VISIT, EST, LEVL III, 20-29 MIN: ICD-10-PCS | Mod: S$PBB,,,

## 2023-05-18 PROCEDURE — 99214 OFFICE O/P EST MOD 30 MIN: CPT | Mod: PBBFAC

## 2023-05-18 PROCEDURE — 3008F PR BODY MASS INDEX (BMI) DOCUMENTED: ICD-10-PCS | Mod: ,,,

## 2023-05-18 PROCEDURE — 1159F MED LIST DOCD IN RCRD: CPT | Mod: ,,,

## 2023-05-18 PROCEDURE — 64494 INJ PARAVERT F JNT L/S 2 LEV: CPT | Mod: 50 | Performed by: PAIN MEDICINE

## 2023-05-18 PROCEDURE — 3079F PR MOST RECENT DIASTOLIC BLOOD PRESSURE 80-89 MM HG: ICD-10-PCS | Mod: ,,,

## 2023-05-18 PROCEDURE — 63600175 PHARM REV CODE 636 W HCPCS: Performed by: PAIN MEDICINE

## 2023-05-18 PROCEDURE — 64493 INJ PARAVERT F JNT L/S 1 LEV: CPT | Mod: LT,,, | Performed by: PAIN MEDICINE

## 2023-05-18 PROCEDURE — 64494 INJ PARAVERT F JNT L/S 2 LEV: CPT | Mod: RT,,, | Performed by: PAIN MEDICINE

## 2023-05-18 PROCEDURE — 3008F BODY MASS INDEX DOCD: CPT | Mod: ,,,

## 2023-05-18 PROCEDURE — 64493 INJ PARAVERT F JNT L/S 1 LEV: CPT | Mod: 50 | Performed by: PAIN MEDICINE

## 2023-05-18 PROCEDURE — 3079F DIAST BP 80-89 MM HG: CPT | Mod: ,,,

## 2023-05-18 PROCEDURE — 25000003 PHARM REV CODE 250: Performed by: PAIN MEDICINE

## 2023-05-18 PROCEDURE — 3074F SYST BP LT 130 MM HG: CPT | Mod: ,,,

## 2023-05-18 PROCEDURE — 1159F PR MEDICATION LIST DOCUMENTED IN MEDICAL RECORD: ICD-10-PCS | Mod: ,,,

## 2023-05-18 PROCEDURE — 1160F PR REVIEW ALL MEDS BY PRESCRIBER/CLIN PHARMACIST DOCUMENTED: ICD-10-PCS | Mod: ,,,

## 2023-05-18 RX ORDER — SODIUM CHLORIDE 9 MG/ML
INJECTION, SOLUTION INTRAVENOUS CONTINUOUS
Status: DISCONTINUED | OUTPATIENT
Start: 2023-05-18 | End: 2023-05-18 | Stop reason: HOSPADM

## 2023-05-18 RX ORDER — BUPIVACAINE HYDROCHLORIDE 2.5 MG/ML
INJECTION, SOLUTION INFILTRATION; PERINEURAL CODE/TRAUMA/SEDATION MEDICATION
Status: DISCONTINUED | OUTPATIENT
Start: 2023-05-18 | End: 2023-05-18 | Stop reason: HOSPADM

## 2023-05-18 RX ORDER — TRIAMCINOLONE ACETONIDE 40 MG/ML
INJECTION, SUSPENSION INTRA-ARTICULAR; INTRAMUSCULAR CODE/TRAUMA/SEDATION MEDICATION
Status: DISCONTINUED | OUTPATIENT
Start: 2023-05-18 | End: 2023-05-18 | Stop reason: HOSPADM

## 2023-05-18 NOTE — PLAN OF CARE
Plan:  D/c pt at 1000  Informed pt if does not void in 8 hours to go to ER. Notify if redness, drainage, from injection site or fever over next 3-4 days. Rest and drink plenty of fluids for the remainder of the day. No lifting over 5 lbs. For the remainder of the day. Continue regular medications as prescribed. May take pain medications as prescribed.     Pain improved 100%

## 2023-05-18 NOTE — DISCHARGE SUMMARY
[facility] - Pain Management  Discharge Note  Short Stay    Procedure(s) (LRB):  BIlateral L4-5,5-S1 MBB (Bilateral)      OUTCOME: Patient tolerated treatment/procedure well without complication and is now ready for discharge.    DISPOSITION: Home or Self Care    FINAL DIAGNOSIS:  Lumbosacral spondylosis    FOLLOWUP: In clinic    DISCHARGE INSTRUCTIONS:  See nurse's notes     TIME SPENT ON DISCHARGE: 5 minutes

## 2023-05-18 NOTE — BRIEF OP NOTE
Discharge Note  Short Stay    Admit Date: 5/18/2023    Discharge Date: 5/18/2023    Attending Physician: Amanda Goetz     Discharge Provider: Amanda Goetz    Diagnosis:  Lumbosacral spondylosis    Discharged Condition: Good    Final Diagnoses: Spondylosis of lumbosacral region without myelopathy or radiculopathy [M47.817]    Disposition: Home or Self Care    Hospital Course: No complications, uneventful    Outcome of Hospitalization, Treatment, Procedure, or Surgery:  Patient was admitted for outpatient interventional pain management procedure. The patient tolerated the procedure well with no complications.    Follow up/Patient Instructions:  Follow up as scheduled in Pain Management office in 3-4 weeks.  Patient has received instructions and follow up date and time.    Medications:  Continue previous medications

## 2023-05-18 NOTE — OP NOTE
Procedure Note    Procedure Date: 5/18/2023    Procedure Performed:  Bilateral Lumbar Facet  Medial Branch Block @ L4-5, L5-S1 with Fluoroscopic Guidance    Indications: Patient has failed conservative therapy.      Pre-op diagnosis: Lumbar Spondylosis    Post-op diagnosis: same    Physician: TATI Goetz MD    Anesthesia:  Local    Estimated Blood Loss: Less than 1cc    IVF:  None    Complications: None    Technique:  The patient was interviewed in the holding area and Risks/Benefits were discussed, including but not limited to  the possibility of new or different pain, bleeding or infection.   All questions were answered.  The patient understood and accepted risks.  Consent was reviewed and signed.  A time out was taken to identify the patient, procedure and side of the procedure. The patient was placed in a prone position, then prepped and draped in the usual sterile fashion using ChloraPrep and sterile towels.  The levels were determined under fluoroscopic guidance and then marked.  1% Lidocaine was given by raising a skin wheal at the skin over each site and then infiltrated.  A 22-gauge for inch needle was introduced to the anatomic location of the bilateral L4-5, L5- medial branch nerves.  Then, after negative aspiration, 1 cc of a 1:1 mixture of  (Bupivacaine 0.25% 3 cc  and  40mg kenalog ) was injected @ each level.The patient tolerated the procedure well and was transferred to the [facility] in stable condition.  The patient was monitored after the procedure.  Patient was given post procedure and discharge instructions to follow at home.  The patient was discharged in a stable condition with an adult .

## 2023-05-18 NOTE — PROGRESS NOTES
Vandana Price is a 57 y.o. female here for Follow-up and Abdominal Pain        PCP: Mariana Hearn  Referring Provider: No referring provider defined for this encounter.     HPI:  Patient is a 58 yo female with history of UC (diagnosed on colonoscopy ) who presents to clinic today for follow-up. Was initially treated with Mesalamine. Last colonoscopy 2022 showed severe disease. She was started on Humira and took 2 doses prior to stopping medication for knee surgery in 2023. She initially was doing well, recently started having loose, ribbon-like stool. Denies any blood in stool. Has some abdominal cramping. No recent steroid use.       Follow-up  Associated symptoms include abdominal pain. Pertinent negatives include no nausea or vomiting.   Abdominal Pain  Associated symptoms include diarrhea. Pertinent negatives include no constipation, nausea or vomiting.       ROS:  Review of Systems   Constitutional:  Negative for appetite change.   HENT:  Negative for trouble swallowing.    Gastrointestinal:  Positive for abdominal pain and diarrhea. Negative for blood in stool, constipation, nausea, vomiting and reflux.        PMHX:  has a past medical history of Allergy, Bilateral primary osteoarthritis of knee, Chronic pain of both knees (10/14/2021), Eosinophilia, Gangrene of gallbladder in cholecystitis (2021), Hematochezia, History of colonoscopy, Hyperlipidemia, Hypertension, and Sleep apnea.    PSHX:  has a past surgical history that includes Laparoscopic cholecystectomy (N/A, 2021); Cholecystectomy;  section; Knee surgery; Tubal ligation (); Fracture surgery; bladder tact; knee scope (Bilateral); Bilateral IA knee injection (Bilateral, 2021); Injection of anesthetic agent around nerve (Bilateral, 2022); arthroplasty, knee, total, using computer-assisted navigation (Left, 12/15/2022); and injection.    PFHX: family history includes Alcohol abuse in her mother; Heart  "disease in her father; Hypertension in her father; Lung cancer in her maternal aunt; Migraines in her daughter.    PSlHX:  reports that she quit smoking about 13 years ago. Her smoking use included cigarettes. She has a 33.00 pack-year smoking history. She has never been exposed to tobacco smoke. She has never used smokeless tobacco. She reports that she does not currently use alcohol. She reports that she does not currently use drugs after having used the following drugs: Methamphetamines.        Review of patient's allergies indicates:   Allergen Reactions    Opioids - morphine analogues Rash     Development of red rash at site morphine given.        Medication List with Changes/Refills   Current Medications    ACETAMINOPHEN WITH CODEINE (TYLENOL-CODEINE #3 ORAL)    Take by mouth.    ASPIRIN (ECOTRIN) 81 MG EC TABLET    Take 81 mg by mouth once daily.    CELECOXIB (CELEBREX) 200 MG CAPSULE    Take 1 capsule (200 mg total) by mouth once daily.    CETIRIZINE (ZYRTEC) 10 MG TABLET    Take 1 tablet (10 mg total) by mouth once daily.    CYANOCOBALAMIN 1,000 MCG/ML INJECTION    Inject 1 mL (1,000 mcg total) into the muscle every 30 days.    FAMOTIDINE (PEPCID) 40 MG TABLET    Take 1 tablet (40 mg total) by mouth 2 (two) times daily.    GABAPENTIN (NEURONTIN) 300 MG CAPSULE    TAKE TWO Capsules BY MOUTH TWICE DAILY    LUCIEN TUCKER PEN 40 MG/0.4 ML PNKT    SMARTSI Milligram(s) SUB-Q Every 2 Weeks    TIRZEPATIDE (MOUNJARO) 5 MG/0.5 ML PNIJ    Inject 5 mg into the skin every 7 days.    TRIAMCINOLONE ACETONIDE 0.1% (KENALOG) 0.1 % CREAM    Apply topically 2 (two) times daily.        Objective Findings:  Vital Signs:  /80   Pulse 91   Ht 5' 6" (1.676 m)   Wt 113.3 kg (249 lb 12.8 oz)   LMP 2020 Comment: tubal ligation   SpO2 96%   BMI 40.32 kg/m²  Body mass index is 40.32 kg/m².    Physical Exam:  Physical Exam  Vitals reviewed.   Constitutional:       General: She is not in acute distress.     Appearance: " Normal appearance.   HENT:      Mouth/Throat:      Mouth: Mucous membranes are moist.   Cardiovascular:      Rate and Rhythm: Normal rate and regular rhythm.      Pulses: Normal pulses.   Pulmonary:      Effort: Pulmonary effort is normal.      Breath sounds: Normal breath sounds.   Abdominal:      General: Bowel sounds are normal. There is no distension.      Palpations: Abdomen is soft. There is no mass.      Tenderness: There is no abdominal tenderness. There is no guarding.   Musculoskeletal:         General: Normal range of motion.   Skin:     General: Skin is warm and dry.   Neurological:      Mental Status: She is alert and oriented to person, place, and time. Mental status is at baseline.   Psychiatric:         Mood and Affect: Mood normal.        Labs:  Lab Results   Component Value Date    WBC 8.78 05/18/2023    HGB 13.4 05/18/2023    HCT 42.0 05/18/2023    MCV 86.6 05/18/2023    RDW 14.9 (H) 05/18/2023     05/18/2023    LYMPH 19.8 (L) 05/18/2023    LYMPH 1.74 05/18/2023    MONO 6.5 (H) 05/18/2023    EOS 0.59 (H) 05/18/2023    BASO 0.10 05/18/2023     Lab Results   Component Value Date     05/18/2023    K 4.3 05/18/2023     (H) 05/18/2023    CO2 26 05/18/2023    GLU 94 05/18/2023    BUN 20 (H) 05/18/2023    CREATININE 1.07 (H) 05/18/2023    CALCIUM 9.7 05/18/2023    PROT 7.7 05/18/2023    ALBUMIN 4.0 05/18/2023    BILITOT 0.3 05/18/2023    ALKPHOS 144 (H) 05/18/2023    AST 15 05/18/2023    ALT 23 05/18/2023         Imaging: No results found.      Assessment:  Vandana Price is a 57 y.o. female here with:  1. Inflammatory bowel disease          Recommendations:  1. Labs today with plans to restart Humira therapy following results  2. Repeat colonoscopy after 6 months of therapy to assess mucosal healing    Follow up in about 3 months (around 8/18/2023).      Order summary:  Orders Placed This Encounter    Giardia antigen    Enteric Pathogen Panel    C Diff Toxin by PCR    Folate    Vitamin B12     Vitamin D    Ferritin    Iron and TIBC    Comprehensive Metabolic Panel    CBC Auto Differential    Hepatitis Panel, Acute    Quantiferon Gold TB    C-Reactive Protein    Calprotectin, Stool       Thank you for allowing me to participate in the care of Vandana Price.      Bessy Álvarez, FNP-BC, AG-ACNP-BC

## 2023-05-19 ENCOUNTER — TELEPHONE (OUTPATIENT)
Dept: GASTROENTEROLOGY | Facility: CLINIC | Age: 58
End: 2023-05-19
Payer: COMMERCIAL

## 2023-05-19 ENCOUNTER — PATIENT MESSAGE (OUTPATIENT)
Dept: GASTROENTEROLOGY | Facility: CLINIC | Age: 58
End: 2023-05-19
Payer: COMMERCIAL

## 2023-05-19 NOTE — TELEPHONE ENCOUNTER
Spoke with patient. Instructed to start OTC Prilosec daily. Stool studies pending. If infectious studies are negative, will start on Prednisone taper.

## 2023-05-20 ENCOUNTER — LAB VISIT (OUTPATIENT)
Dept: LAB | Facility: HOSPITAL | Age: 58
End: 2023-05-20
Payer: COMMERCIAL

## 2023-05-20 DIAGNOSIS — K52.9 INFLAMMATORY BOWEL DISEASE: ICD-10-CM

## 2023-05-20 LAB — CRP SERPL-MCNC: <0.29 MG/DL (ref 0–0.8)

## 2023-05-20 PROCEDURE — 87506 IADNA-DNA/RNA PROBE TQ 6-11: CPT

## 2023-05-20 PROCEDURE — 36415 COLL VENOUS BLD VENIPUNCTURE: CPT

## 2023-05-20 PROCEDURE — 87329 GIARDIA AG IA: CPT

## 2023-05-20 PROCEDURE — 86140 C-REACTIVE PROTEIN: CPT

## 2023-05-20 PROCEDURE — 83993 ASSAY FOR CALPROTECTIN FECAL: CPT | Mod: 90

## 2023-05-21 LAB
C COLI+JEJ+UPSA DNA STL QL NAA+NON-PROBE: NEGATIVE
E COLI SXT1 STL QL IA: NEGATIVE
E COLI SXT2 STL QL IA: NEGATIVE
GIARDIA ANTIGEN: NEGATIVE
NOROVIRUS GI+II RNA STL QL NAA+NON-PROBE: NEGATIVE
RVA RNA STL QL NAA+NON-PROBE: NEGATIVE
S ENT+BONG DNA STL QL NAA+NON-PROBE: NEGATIVE
SHIGELLA SPECIES NAT: NEGATIVE
V CHOL+PARA+VUL DNA STL QL NAA+NON-PROBE: NEGATIVE
Y ENTEROCOL DNA STL QL NAA+NON-PROBE: NEGATIVE

## 2023-05-22 DIAGNOSIS — K51.918 ULCERATIVE COLITIS WITH OTHER COMPLICATION, UNSPECIFIED LOCATION: Primary | ICD-10-CM

## 2023-05-22 RX ORDER — PREDNISONE 20 MG/1
TABLET ORAL
Qty: 35 TABLET | Refills: 0 | Status: SHIPPED | OUTPATIENT
Start: 2023-05-22 | End: 2023-06-19

## 2023-05-23 ENCOUNTER — PATIENT MESSAGE (OUTPATIENT)
Dept: GASTROENTEROLOGY | Facility: CLINIC | Age: 58
End: 2023-05-23
Payer: COMMERCIAL

## 2023-05-23 DIAGNOSIS — Z96.652 STATUS POST TOTAL LEFT KNEE REPLACEMENT: Primary | ICD-10-CM

## 2023-05-24 ENCOUNTER — HOSPITAL ENCOUNTER (OUTPATIENT)
Dept: RADIOLOGY | Facility: HOSPITAL | Age: 58
Discharge: HOME | End: 2023-05-24
Attending: ORTHOPAEDIC SURGERY
Payer: COMMERCIAL

## 2023-05-24 ENCOUNTER — OFFICE VISIT (OUTPATIENT)
Dept: ORTHOPEDICS | Facility: CLINIC | Age: 58
End: 2023-05-24
Payer: COMMERCIAL

## 2023-05-24 DIAGNOSIS — M25.561 CHRONIC PAIN OF RIGHT KNEE: ICD-10-CM

## 2023-05-24 DIAGNOSIS — Z96.652 STATUS POST TOTAL LEFT KNEE REPLACEMENT: ICD-10-CM

## 2023-05-24 DIAGNOSIS — G89.29 CHRONIC PAIN OF RIGHT KNEE: ICD-10-CM

## 2023-05-24 DIAGNOSIS — Z96.652 STATUS POST TOTAL LEFT KNEE REPLACEMENT: Primary | ICD-10-CM

## 2023-05-24 PROCEDURE — 99213 OFFICE O/P EST LOW 20 MIN: CPT | Mod: S$PBB,25,ICN, | Performed by: ORTHOPAEDIC SURGERY

## 2023-05-24 PROCEDURE — 99213 PR OFFICE/OUTPT VISIT, EST, LEVL III, 20-29 MIN: ICD-10-PCS | Mod: S$PBB,25,ICN, | Performed by: ORTHOPAEDIC SURGERY

## 2023-05-24 PROCEDURE — 99213 OFFICE O/P EST LOW 20 MIN: CPT | Mod: PBBFAC,25 | Performed by: ORTHOPAEDIC SURGERY

## 2023-05-24 PROCEDURE — 73560 X-RAY EXAM OF KNEE 1 OR 2: CPT | Mod: TC,LT

## 2023-05-24 PROCEDURE — 73560 XR KNEE 1 OR 2 VIEW LEFT: ICD-10-PCS | Mod: 26,LT,, | Performed by: ORTHOPAEDIC SURGERY

## 2023-05-24 PROCEDURE — 20610 PR DRAIN/INJECT LARGE JOINT/BURSA: ICD-10-PCS | Mod: S$PBB,RT,ICN, | Performed by: ORTHOPAEDIC SURGERY

## 2023-05-24 PROCEDURE — 20610 DRAIN/INJ JOINT/BURSA W/O US: CPT | Mod: S$PBB,RT,ICN, | Performed by: ORTHOPAEDIC SURGERY

## 2023-05-24 PROCEDURE — 1159F MED LIST DOCD IN RCRD: CPT | Mod: ICN,,, | Performed by: ORTHOPAEDIC SURGERY

## 2023-05-24 PROCEDURE — 20610 DRAIN/INJ JOINT/BURSA W/O US: CPT | Mod: PBBFAC | Performed by: ORTHOPAEDIC SURGERY

## 2023-05-24 PROCEDURE — 73560 X-RAY EXAM OF KNEE 1 OR 2: CPT | Mod: 26,LT,, | Performed by: ORTHOPAEDIC SURGERY

## 2023-05-24 PROCEDURE — 1159F PR MEDICATION LIST DOCUMENTED IN MEDICAL RECORD: ICD-10-PCS | Mod: ICN,,, | Performed by: ORTHOPAEDIC SURGERY

## 2023-05-24 RX ORDER — TRIAMCINOLONE ACETONIDE 40 MG/ML
40 INJECTION, SUSPENSION INTRA-ARTICULAR; INTRAMUSCULAR
Status: COMPLETED | OUTPATIENT
Start: 2023-05-24 | End: 2023-05-24

## 2023-05-24 RX ORDER — BUPIVACAINE HYDROCHLORIDE 2.5 MG/ML
1 INJECTION, SOLUTION INFILTRATION; PERINEURAL
Status: COMPLETED | OUTPATIENT
Start: 2023-05-24 | End: 2023-05-24

## 2023-05-24 RX ADMIN — BUPIVACAINE HYDROCHLORIDE 2.5 MG: 2.5 INJECTION, SOLUTION INFILTRATION; PERINEURAL at 06:05

## 2023-05-24 RX ADMIN — TRIAMCINOLONE ACETONIDE 40 MG: 40 INJECTION, SUSPENSION INTRA-ARTICULAR; INTRAMUSCULAR at 06:05

## 2023-05-24 NOTE — PROGRESS NOTES
Patient is here for follow-up of left total knee arthroplasty.  She is doing well.  She has good motion of the knee.  No instability.  She recently had some injections for lumbar radiculopathy.  This time let her weightbear as tolerates I will follow up in 3 months.  At this time she wished an injection in her right knee.  I injected her right knee with 1 cc Marcaine 1 cc Kenalog get relief the symptoms let her weightbear as tolerates follow-up 3 months she would like to have the right knee total knee arthroplasty performed sometime at the end of the year.Vandana Price presents today for knee pain from ploa.  rightKnee prepped sterile technique and injected with 1cc marcaine snd 1cc kenalog.  Tolerated procedure well. Verbal consent obtained

## 2023-05-24 NOTE — PROGRESS NOTES
Radiology Interpretation        Patient Name: Vandana Price  Date: 5/24/2023  YOB: 1965  MRN# [id number]        ORDERING DIAGNOSIS:    Encounter Diagnosis   Name Primary?    Status post total left knee replacement Yes        AP lateral two views left knee skeletally mature individual total knee arthroplasty in place no loosening fractures or subluxations impression total knee arthroplasty in place left knee no loosening               Jerome Valladares MD

## 2023-05-25 ENCOUNTER — TELEPHONE (OUTPATIENT)
Dept: FAMILY MEDICINE | Facility: CLINIC | Age: 58
End: 2023-05-25
Payer: COMMERCIAL

## 2023-05-25 LAB — CALPROTECTIN STL-MCNT: <50 MCG/G

## 2023-05-25 NOTE — TELEPHONE ENCOUNTER
----- Message from Amparo Kelly sent at 5/25/2023 12:04 PM CDT -----  Pt called wanting to get her gabapentin (NEURONTIN) 300 MG capsule prescription renewed. That the pharmacy needed it in order to get anymore I guess.      Thanks

## 2023-05-26 DIAGNOSIS — Z79.899 OTHER LONG TERM (CURRENT) DRUG THERAPY: ICD-10-CM

## 2023-05-30 RX ORDER — GABAPENTIN 300 MG/1
600 CAPSULE ORAL 2 TIMES DAILY
Qty: 90 CAPSULE | Refills: 0 | Status: SHIPPED | OUTPATIENT
Start: 2023-05-30 | End: 2023-07-03 | Stop reason: SDUPTHER

## 2023-06-12 ENCOUNTER — OFFICE VISIT (OUTPATIENT)
Dept: PAIN MEDICINE | Facility: CLINIC | Age: 58
End: 2023-06-12
Payer: COMMERCIAL

## 2023-06-12 ENCOUNTER — PATIENT MESSAGE (OUTPATIENT)
Dept: GASTROENTEROLOGY | Facility: CLINIC | Age: 58
End: 2023-06-12
Payer: COMMERCIAL

## 2023-06-12 ENCOUNTER — TELEPHONE (OUTPATIENT)
Dept: PAIN MEDICINE | Facility: CLINIC | Age: 58
End: 2023-06-12
Payer: COMMERCIAL

## 2023-06-12 ENCOUNTER — PATIENT MESSAGE (OUTPATIENT)
Dept: ADMINISTRATIVE | Facility: HOSPITAL | Age: 58
End: 2023-06-12

## 2023-06-12 ENCOUNTER — PATIENT OUTREACH (OUTPATIENT)
Dept: ADMINISTRATIVE | Facility: HOSPITAL | Age: 58
End: 2023-06-12

## 2023-06-12 VITALS
BODY MASS INDEX: 40.02 KG/M2 | HEIGHT: 66 IN | DIASTOLIC BLOOD PRESSURE: 91 MMHG | HEART RATE: 69 BPM | RESPIRATION RATE: 12 BRPM | SYSTOLIC BLOOD PRESSURE: 138 MMHG | WEIGHT: 249 LBS

## 2023-06-12 DIAGNOSIS — G89.29 CHRONIC PAIN OF BOTH KNEES: Chronic | ICD-10-CM

## 2023-06-12 DIAGNOSIS — M25.561 CHRONIC PAIN OF BOTH KNEES: Chronic | ICD-10-CM

## 2023-06-12 DIAGNOSIS — M25.562 CHRONIC PAIN OF BOTH KNEES: Chronic | ICD-10-CM

## 2023-06-12 DIAGNOSIS — M17.0 BILATERAL PRIMARY OSTEOARTHRITIS OF KNEE: Chronic | ICD-10-CM

## 2023-06-12 DIAGNOSIS — G89.4 CHRONIC PAIN SYNDROME: Chronic | ICD-10-CM

## 2023-06-12 DIAGNOSIS — M47.817 LUMBOSACRAL SPONDYLOSIS WITHOUT MYELOPATHY: Primary | Chronic | ICD-10-CM

## 2023-06-12 PROCEDURE — 3080F DIAST BP >= 90 MM HG: CPT | Mod: ,,, | Performed by: PAIN MEDICINE

## 2023-06-12 PROCEDURE — 3008F PR BODY MASS INDEX (BMI) DOCUMENTED: ICD-10-PCS | Mod: ,,, | Performed by: PAIN MEDICINE

## 2023-06-12 PROCEDURE — 3080F PR MOST RECENT DIASTOLIC BLOOD PRESSURE >= 90 MM HG: ICD-10-PCS | Mod: ,,, | Performed by: PAIN MEDICINE

## 2023-06-12 PROCEDURE — 99212 OFFICE O/P EST SF 10 MIN: CPT | Mod: S$PBB,,, | Performed by: PAIN MEDICINE

## 2023-06-12 PROCEDURE — 99212 PR OFFICE/OUTPT VISIT, EST, LEVL II, 10-19 MIN: ICD-10-PCS | Mod: S$PBB,,, | Performed by: PAIN MEDICINE

## 2023-06-12 PROCEDURE — 3075F SYST BP GE 130 - 139MM HG: CPT | Mod: ,,, | Performed by: PAIN MEDICINE

## 2023-06-12 PROCEDURE — 1159F PR MEDICATION LIST DOCUMENTED IN MEDICAL RECORD: ICD-10-PCS | Mod: ,,, | Performed by: PAIN MEDICINE

## 2023-06-12 PROCEDURE — 3075F PR MOST RECENT SYSTOLIC BLOOD PRESS GE 130-139MM HG: ICD-10-PCS | Mod: ,,, | Performed by: PAIN MEDICINE

## 2023-06-12 PROCEDURE — 3008F BODY MASS INDEX DOCD: CPT | Mod: ,,, | Performed by: PAIN MEDICINE

## 2023-06-12 PROCEDURE — 99215 OFFICE O/P EST HI 40 MIN: CPT | Mod: PBBFAC | Performed by: PAIN MEDICINE

## 2023-06-12 PROCEDURE — 1159F MED LIST DOCD IN RCRD: CPT | Mod: ,,, | Performed by: PAIN MEDICINE

## 2023-06-12 NOTE — PROGRESS NOTES
She Disclaimer: This note has been generated using voice-recognition software. There may be typographical errors that have been missed during proof-reading        Patient ID: Vandana Price is a 57 y.o. female.      Chief Complaint: Knee Pain and Low-back Pain      57-year-old female returns for re-evaluation following bilateral lumbar medial branch block # 1, 5/18/2023.  Patient has experienced 100% pain relief since the procedure.  She desires to proceed with the 2nd medial branch block for recurrent pain and will call and schedule the procedure as indicated for recurrent lower back pain and lumbosacral spondylosis.                Pain Assessment  Pain Assessment: 0-10  Pain Score:   3  Pain Location:  (lower back and right knee)  Pain Descriptors: Stabbing, Sharp  Pain Frequency: Intermittent  Pain Onset: Gradual  Clinical Progression: Gradually improving  Aggravating Factors: Standing, Walking, Bending  Pain Intervention(s):  (Patient states she has not done anything for pain. Pain has improved related to procedure.)      A's of Opioid Risk Assessment  Activity:Patient can perform ADL.   Analgesia:Patients pain is controlled by current medication.   Adverse Effects: Patient denies constipation or sedation.  Aberrant Behavior:  reviewed with no aberrant drug seeking/taking behavior.      Patient denies any suicidal or homicidal ideations    Physical Therapy/Home Exercise:  Not for lower back pain          Review of Systems   Constitutional: Negative.    HENT: Negative.     Eyes: Negative.    Respiratory: Negative.     Cardiovascular: Negative.    Gastrointestinal: Negative.    Endocrine: Negative.    Genitourinary: Negative.    Musculoskeletal:  Positive for arthralgias (Bilateral knees), back pain and gait problem.   Integumentary:  Negative.   Hematological: Negative.            Past Medical History:   Diagnosis Date    Allergy     Bilateral primary osteoarthritis of knee     Chronic pain of both knees 10/14/2021     Eosinophilia     Gangrene of gallbladder in cholecystitis 2021    Hematochezia     History of colonoscopy     Hyperlipidemia     Hypertension     Sleep apnea      Past Surgical History:   Procedure Laterality Date    ARTHROPLASTY, KNEE, TOTAL, USING COMPUTER-ASSISTED NAVIGATION Left 12/15/2022    Procedure: ARTHROPLASTY, KNEE, TOTAL, USING COMPUTER-ASSISTED NAVIGATION;  Surgeon: Jerome Valladares MD;  Location: [facility] OR;  Service: Orthopedics;  Laterality: Left;    Bilateral IA knee injection Bilateral 2021    D Shows    bladder tact       SECTION      CHOLECYSTECTOMY      FRACTURE SURGERY      jaw 1972    INJECTION      back    INJECTION OF ANESTHETIC AGENT AROUND MEDIAL BRANCH NERVES INNERVATING LUMBAR FACET JOINT Bilateral 2023    Procedure: BIlateral L4-5,5-S1 MBB;  Surgeon: Amanda Goetz MD;  Location: [facility];  Service: Pain Management;  Laterality: Bilateral;  LOCAL    INJECTION OF ANESTHETIC AGENT AROUND NERVE Bilateral 2022    Procedure: GNB   BILATERAL;  Surgeon: Amanda Goetz MD;  Location: [facility];  Service: Pain Management;  Laterality: Bilateral;  PT HAS NOT HAD VAC   STATES WILL BE TESTED AT [facility] [place]    knee scope Bilateral     KNEE SURGERY      LAPAROSCOPIC CHOLECYSTECTOMY N/A 2021    Procedure: LAPAROSCOPIC CHOLECYSTECTOMY CONVERTED TO OPEN;  Surgeon: Darnell Mcgraw MD;  Location: [facility] OR;  Service: General;  Laterality: N/A;  CONVERTED TO OPEN    TUBAL LIGATION       Social History     Socioeconomic History    Marital status: Single    Number of children: 2   Occupational History    Occupation:  at [employer]   Tobacco Use    Smoking status: Former     Packs/day: 1.00     Years: 33.00     Pack years: 33.00     Types: Cigarettes     Quit date:      Years since quittin.4     Passive exposure: Never    Smokeless tobacco: Never   Substance and Sexual Activity    Alcohol use: Not Currently    Drug use:  Not Currently     Types: Methamphetamines    Sexual activity: Not Currently     Family History   Problem Relation Age of Onset    Hypertension Father     Heart disease Father     Alcohol abuse Mother     Migraines Daughter     Lung cancer Maternal Aunt      Review of patient's allergies indicates:   Allergen Reactions    Opioids - morphine analogues Rash     Development of red rash at site morphine given.      has a current medication list which includes the following prescription(s): aspirin, celecoxib, cetirizine, cyanocobalamin, famotidine, gabapentin, prednisone, acetaminophen with codeine, humira(cf) pen, mounjaro, and triamcinolone acetonide 0.1%.      Objective:  Vitals:    06/12/23 1410   BP: (!) 138/91   Pulse: 69   Resp: 12        Physical Exam  Vitals and nursing note reviewed.   Constitutional:       General: She is not in acute distress.     Appearance: Normal appearance. She is obese. She is not ill-appearing, toxic-appearing or diaphoretic.   HENT:      Head: Normocephalic and atraumatic.      Nose: Nose normal.      Mouth/Throat:      Mouth: Mucous membranes are moist.   Eyes:      Extraocular Movements: Extraocular movements intact.      Pupils: Pupils are equal, round, and reactive to light.   Cardiovascular:      Rate and Rhythm: Normal rate and regular rhythm.      Heart sounds: Normal heart sounds.   Pulmonary:      Effort: Pulmonary effort is normal. No respiratory distress.      Breath sounds: Normal breath sounds. No stridor. No wheezing or rhonchi.   Abdominal:      General: Bowel sounds are normal.      Palpations: Abdomen is soft.   Musculoskeletal:         General: No swelling or deformity.      Cervical back: Normal and normal range of motion. No spasms or tenderness. No pain with movement. Normal range of motion.      Thoracic back: Normal.      Lumbar back: No spasms. Negative right straight leg raise test and negative left straight leg raise test. No scoliosis.      Right knee:  Crepitus present. Decreased range of motion. Tenderness present.      Left knee: Crepitus present. Decreased range of motion. Tenderness present.      Right lower leg: No edema.      Left lower leg: No edema.   Skin:     General: Skin is warm.   Neurological:      General: No focal deficit present.      Mental Status: She is alert and oriented to person, place, and time. Mental status is at baseline.      Cranial Nerves: No cranial nerve deficit.      Sensory: Sensation is intact. No sensory deficit.      Motor: No weakness.      Coordination: Coordination normal.      Gait: Gait normal.      Deep Tendon Reflexes: Reflexes are normal and symmetric.   Psychiatric:         Mood and Affect: Mood normal.         Behavior: Behavior normal.         Assessment:      1. Lumbosacral spondylosis without myelopathy    2. Bilateral primary osteoarthritis of knee    3. Chronic pain syndrome    4. Chronic pain of both knees            Plan:  1. reviewed  2.Indication: The patient has clinical and radiologic findings suggestive of facet mediated pain and is s/p 1st diagnostic bilateral lumbar L4/5 and L5/S1 medial branch blocks with at least 80% relief of their axial back pain lasting the duration of the local anesthetic utilized.    Orders Placed This Encounter   Procedures    Case Request Operating Room: Bilateral L4-5, 5-S1 medial branch block # 2     Order Specific Question:   Medical Necessity:     Answer:   Medically Non-Urgent [100]     Order Specific Question:   CPT Code:     Answer:   DC INJ DX/THER AGNT PARAVERT FACET JOINT,IMG GUIDE,LUMBAR/SAC,1ST LVL [[id number]]     Order Specific Question:   CPT Code:     Answer:   DC INJ DX/THER AGNT PARAVERT FACET JOINT,IMG GUIDE,LUMBAR/SAC, 2ND LEVEL [[id number]]     Order Specific Question:   Post-Procedure Disposition:     Answer:   PACU [1]     Order Specific Question:   Estimated Length of Stay:     Answer:   0 midnight     Order Specific Question:   Implant Required:     Answer:    No [1001]     Order Specific Question:   Is an on-site pathologist required for this procedure?     Answer:   N/A      3.Indications for this procedure for this specific patient include the following   - Pt has had symptoms for three months with moderate to severe pain with functional impairment rated of 7/10 pain.   - Pain non-responsive to conservative care.    - Pain predominately axial and not associated with radiculopathy or claudication.    - No non-facet pathology as source of pain.    - Clinical assessment implicates facet joint as putative pain source.    - Pain is exacerbated by extension or prolonged sitting/standing and relieved by rest.    - No unexplained neurologic deficit.    - No history of coagulopathy, infection or unstable medical conditions.    - Pain is causing significant functional limitation resulting in diminished quality of life and impaired age appropriate ADL's.   - Clinical assessment implicates facet joint as putative source of pain  - Repeat injections not done prior to 7 days   - no more than 2 levels will be done per side      4.Monitored Anesthesia Care medical necessity authorization request:    Monitor anesthesia request is medically indicated for the scheduled nerve block procedure due to:  - needle phobia and anxiety, placing  the patient at risk during the provided service.  -patient has a BMI greater than 40  -patient has severe sleep apnea for which BiPAP and oxygen are needed while sleeping  -patient has an ASA class greater than 3 and requires constant presence of an anesthesiologist during the procedure:  -patient has severe problems with muscles and muscle spasticity that makes it hard to lie still  -patient suffers from chronic pain and is unable to function due to  diminished ADLs    5.The planned medically necessary  surgical procedure is performed in a hospital outpatient department and not in an ambulatory surgical center due to:     -there is no geographically  assessable ambulatory surgery center that has the  necessary equipment and fluoroscopy needed for the procedure     -there is no geographically assessable ambulatory surgical center available at which the physician has privileges     -an ASC's  specific  guideline regarding the individuals weight or health conditions that prevent the use of an ASC       report:  Reviewed and consistent with medication use as prescribed.      The total time spent for evaluation and management on 06/15/2023 including reviewing separately obtained history, performing a medically appropriate exam and evaluation, documenting clinical information in the health record, independently interpreting results and communicating them to the patient/family/caregiver, and ordering medications/tests/procedures was between 15-29 minutes.    The above plan and management options were discussed at length with patient. Patient is in agreement with the above and verbalized understanding. It will be communicated with the referring physician via electronic record, fax, or mail.

## 2023-06-29 ENCOUNTER — TELEPHONE (OUTPATIENT)
Dept: PAIN MEDICINE | Facility: CLINIC | Age: 58
End: 2023-06-29
Payer: COMMERCIAL

## 2023-06-29 NOTE — TELEPHONE ENCOUNTER
----- Message from Patriciajosue Castro sent at 6/29/2023 12:23 PM CDT -----  Regarding: need to speak to nurse regarding a procedure  Needs to speak to nurse please call pt back at [phone]      Patient is requesting to schedule Bilateral L4-5, 5-S1 MBB#2 prior to 7/12/23. Patient is instructed I will review with Dr. Goetz. Patient verbalizes understanding.

## 2023-07-03 ENCOUNTER — OFFICE VISIT (OUTPATIENT)
Dept: FAMILY MEDICINE | Facility: CLINIC | Age: 58
End: 2023-07-03
Payer: COMMERCIAL

## 2023-07-03 VITALS
DIASTOLIC BLOOD PRESSURE: 87 MMHG | TEMPERATURE: 98 F | WEIGHT: 250.19 LBS | HEART RATE: 81 BPM | BODY MASS INDEX: 40.21 KG/M2 | OXYGEN SATURATION: 96 % | HEIGHT: 66 IN | RESPIRATION RATE: 20 BRPM | SYSTOLIC BLOOD PRESSURE: 131 MMHG

## 2023-07-03 DIAGNOSIS — Z79.899 OTHER LONG TERM (CURRENT) DRUG THERAPY: Primary | ICD-10-CM

## 2023-07-03 DIAGNOSIS — M25.562 CHRONIC PAIN OF BOTH KNEES: Chronic | ICD-10-CM

## 2023-07-03 DIAGNOSIS — M47.817 LUMBOSACRAL SPONDYLOSIS WITHOUT MYELOPATHY: Chronic | ICD-10-CM

## 2023-07-03 DIAGNOSIS — M25.561 CHRONIC PAIN OF BOTH KNEES: Chronic | ICD-10-CM

## 2023-07-03 DIAGNOSIS — G89.29 CHRONIC PAIN OF BOTH KNEES: Chronic | ICD-10-CM

## 2023-07-03 PROCEDURE — 1159F MED LIST DOCD IN RCRD: CPT | Mod: ,,,

## 2023-07-03 PROCEDURE — 99214 PR OFFICE/OUTPT VISIT, EST, LEVL IV, 30-39 MIN: ICD-10-PCS | Mod: ,,,

## 2023-07-03 PROCEDURE — 3079F PR MOST RECENT DIASTOLIC BLOOD PRESSURE 80-89 MM HG: ICD-10-PCS | Mod: ,,,

## 2023-07-03 PROCEDURE — 3008F PR BODY MASS INDEX (BMI) DOCUMENTED: ICD-10-PCS | Mod: ,,,

## 2023-07-03 PROCEDURE — 1159F PR MEDICATION LIST DOCUMENTED IN MEDICAL RECORD: ICD-10-PCS | Mod: ,,,

## 2023-07-03 PROCEDURE — 99214 OFFICE O/P EST MOD 30 MIN: CPT | Mod: ,,,

## 2023-07-03 PROCEDURE — 3079F DIAST BP 80-89 MM HG: CPT | Mod: ,,,

## 2023-07-03 PROCEDURE — 3075F SYST BP GE 130 - 139MM HG: CPT | Mod: ,,,

## 2023-07-03 PROCEDURE — 3075F PR MOST RECENT SYSTOLIC BLOOD PRESS GE 130-139MM HG: ICD-10-PCS | Mod: ,,,

## 2023-07-03 PROCEDURE — 3008F BODY MASS INDEX DOCD: CPT | Mod: ,,,

## 2023-07-03 RX ORDER — CELECOXIB 200 MG/1
200 CAPSULE ORAL DAILY
Qty: 90 CAPSULE | Refills: 1 | Status: SHIPPED | OUTPATIENT
Start: 2023-07-03 | End: 2023-12-30

## 2023-07-03 RX ORDER — GABAPENTIN 300 MG/1
600 CAPSULE ORAL 2 TIMES DAILY
Qty: 360 CAPSULE | Refills: 1 | Status: SHIPPED | OUTPATIENT
Start: 2023-07-03 | End: 2024-01-05 | Stop reason: SDUPTHER

## 2023-07-03 NOTE — PROGRESS NOTES
Subjective     Patient ID: Vandana Price is a 57 y.o. female.    Chief Complaint: Medication Refill    58 yo wf here for refill of Gabapentin and Celebrex. Pt states she is out and needs refill on both. Pt does not take opioids on a regular basis. Does see Dr. Goetz for lower back and injections. Pt states she is doing well with the Gabapentin and Celebrex for low back pain and knee pain. States she needs to have her Right knee replaced and that would help very much so.Would like to establish care with me today.    Medication Refill  This is a chronic problem. The current episode started more than 1 year ago. The problem occurs 2 to 4 times per day. The problem has been unchanged. Associated symptoms include arthralgias and myalgias. Pertinent negatives include no change in bowel habit, chest pain, congestion, coughing, fatigue, headaches, joint swelling, nausea, rash, sore throat, vomiting or weakness. Associated symptoms comments: Right knee pain lower back that you see Dr. Goezt for. The symptoms are aggravated by bending, standing, twisting and walking. She has tried rest for the symptoms. The treatment provided moderate relief.   Review of Systems   Constitutional:  Negative for activity change, appetite change and fatigue.   HENT:  Negative for nasal congestion, ear pain, postnasal drip and sore throat.    Eyes:  Negative for pain and visual disturbance.   Respiratory:  Negative for cough, chest tightness and shortness of breath.    Cardiovascular:  Negative for chest pain, palpitations and leg swelling.   Gastrointestinal:  Negative for change in bowel habit, constipation, diarrhea, nausea, vomiting and change in bowel habit.   Endocrine: Negative for polydipsia, polyphagia and polyuria.   Genitourinary:  Positive for nocturia. Negative for difficulty urinating, dysuria, hot flashes, urgency, vaginal bleeding, vaginal pain and vaginal dryness.        2-3 times a night   Musculoskeletal:  Positive for  arthralgias, back pain and myalgias. Negative for joint swelling.   Integumentary:  Negative for rash.   Allergic/Immunologic: Negative.    Neurological:  Negative for weakness, light-headedness and headaches.   Psychiatric/Behavioral:  Negative for behavioral problems and sleep disturbance. The patient is not nervous/anxious.         Objective     Physical Exam  Constitutional:       Appearance: Normal appearance. She is obese.   HENT:      Head: Normocephalic and atraumatic.      Right Ear: Tympanic membrane, ear canal and external ear normal.      Left Ear: Tympanic membrane, ear canal and external ear normal.      Nose: Nose normal.      Mouth/Throat:      Mouth: Mucous membranes are moist.      Pharynx: Oropharynx is clear.   Eyes:      Extraocular Movements: Extraocular movements intact.      Conjunctiva/sclera: Conjunctivae normal.      Pupils: Pupils are equal, round, and reactive to light.   Cardiovascular:      Rate and Rhythm: Normal rate and regular rhythm.      Pulses: Normal pulses.      Heart sounds: Normal heart sounds.   Pulmonary:      Effort: Pulmonary effort is normal.      Breath sounds: Normal breath sounds.   Abdominal:      General: Bowel sounds are normal.      Palpations: Abdomen is soft.   Musculoskeletal:         General: Normal range of motion.      Cervical back: Normal range of motion and neck supple.      Comments: Sees Dr. Goetz for low back pain and knee pain.   Skin:     General: Skin is warm and dry.      Capillary Refill: Capillary refill takes less than 2 seconds.   Neurological:      General: No focal deficit present.      Mental Status: She is alert and oriented to person, place, and time.   Psychiatric:         Mood and Affect: Mood normal.         Behavior: Behavior normal.         Thought Content: Thought content normal.         Judgment: Judgment normal.          Assessment and Plan     1. Other long term (current) drug therapy  -     celecoxib (CELEBREX) 200 MG capsule;  Take 1 capsule (200 mg total) by mouth once daily.  Dispense: 90 capsule; Refill: 1  -     gabapentin (NEURONTIN) 300 MG capsule; Take 2 capsules (600 mg total) by mouth 2 (two) times daily.  Dispense: 360 capsule; Refill: 1    2. Chronic pain of both knees  Assessment & Plan:  Celebrex refilled for knee and lower back pain.      3. Lumbosacral spondylosis without myelopathy  Assessment & Plan:  Neurotin and celebrex refilled for low back pain. Continue taking as prescribed.               Follow up in about 6 months (around 1/3/2024).

## 2023-07-11 ENCOUNTER — TELEPHONE (OUTPATIENT)
Dept: PAIN MEDICINE | Facility: CLINIC | Age: 58
End: 2023-07-11
Payer: COMMERCIAL

## 2023-07-11 ENCOUNTER — PATIENT MESSAGE (OUTPATIENT)
Dept: PAIN MEDICINE | Facility: CLINIC | Age: 58
End: 2023-07-11
Payer: COMMERCIAL

## 2023-07-11 RX ORDER — ACETAMINOPHEN AND CODEINE PHOSPHATE 300; 30 MG/1; MG/1
1 TABLET ORAL 2 TIMES DAILY
Qty: 30 TABLET | Refills: 0 | Status: SHIPPED | OUTPATIENT
Start: 2023-07-11 | End: 2023-07-26

## 2023-07-11 NOTE — TELEPHONE ENCOUNTER
----- Message from Patricia Castro sent at 7/11/2023 11:29 AM CDT -----  Regarding: rx  Pt requesting a refill on tylenol 3 please call pt back at [phone]      Attempted to reach patient at phone number given. Voicemail is left asking patient to call office. A request is sent to Dr. Goetz asking to refill Tylenol#3

## 2023-07-12 ENCOUNTER — TELEPHONE (OUTPATIENT)
Dept: PAIN MEDICINE | Facility: CLINIC | Age: 58
End: 2023-07-12
Payer: COMMERCIAL

## 2023-07-12 NOTE — TELEPHONE ENCOUNTER
----- Message from Amanda Goetz MD sent at 7/11/2023  5:16 PM CDT -----  Regarding: RE: medication request  Will refill medication for 1 month only  ----- Message -----  From: Nigel Su LPN  Sent: 7/11/2023   3:59 PM CDT  To: Amanda Goetz MD  Subject: medication request                               Patient is requesting a refill of Tylenol #3, on  is was last filled 2/10/23, Patient has not had her procedure yet. Patient called after office visit to scheduled procedure and procedure days are full up to August 2023.     Patient is notified concerning refill of medication and she verbalizes understanding. 07/12/23@08:42-tc.

## 2023-07-19 ENCOUNTER — PATIENT MESSAGE (OUTPATIENT)
Dept: GASTROENTEROLOGY | Facility: CLINIC | Age: 58
End: 2023-07-19
Payer: COMMERCIAL

## 2023-07-25 ENCOUNTER — PATIENT MESSAGE (OUTPATIENT)
Dept: PAIN MEDICINE | Facility: CLINIC | Age: 58
End: 2023-07-25
Payer: COMMERCIAL

## 2023-07-26 ENCOUNTER — TELEPHONE (OUTPATIENT)
Dept: ORTHOPEDICS | Facility: CLINIC | Age: 58
End: 2023-07-26
Payer: COMMERCIAL

## 2023-07-26 ENCOUNTER — PATIENT MESSAGE (OUTPATIENT)
Dept: PAIN MEDICINE | Facility: CLINIC | Age: 58
End: 2023-07-26
Payer: COMMERCIAL

## 2023-07-26 ENCOUNTER — PATIENT OUTREACH (OUTPATIENT)
Dept: ADMINISTRATIVE | Facility: HOSPITAL | Age: 58
End: 2023-07-26

## 2023-07-26 NOTE — PROGRESS NOTES
Non-compliant on mammogram. Mammo on 6/12/23 was canceled. Portal message sent to pt about rescheduling. Patient read this with no response. Next upcoming appt scheduled with PCP is on 1/3/2024, comment placed in appt note that pt needs a mammo. Letter mailed to patient about scheduling this.

## 2023-07-26 NOTE — LETTER
July 26, 2023     Vandana Price  [id number] [address] [place] [address]         Dear Ms. Price:      Your [facility] primary care team is dedicated to assisting you achieve your health goals.  Scheduling your routine screenings and tests are key to your good health.  Our records indicate you may be overdue for your screening mammogram.  Mammography screening can help find breast cancer at an early stage, when it is most likely to be successfully treated.    We encourage you to schedule your appointment at any of our [facility] imaging locations.     If you recently had your mammogram outside of [facility], please notify your primary care team so we can update your health record.    If you have questions or want to schedule your screening mammogram, please contact your primary care clinic at [phone] or Diamond Pringle, Care Coordinator at [phone].      Sincerely,      KIMBERLEE Norris and Your [facility] Primary Care Team

## 2023-07-26 NOTE — TELEPHONE ENCOUNTER
----- Message from Nkechi Blake sent at 7/25/2023  2:28 PM CDT -----  Regarding: PAIN MEDICATION  PATIENT IS REQUESTING AN RX FOR PAIN MEDICATION. PLEASE CALL IN TO [facility] [place].  CALL BACK NUMBER FOR PATIENT IS [phone].

## 2023-07-31 DIAGNOSIS — K51.918 ULCERATIVE COLITIS WITH OTHER COMPLICATION, UNSPECIFIED LOCATION: Primary | ICD-10-CM

## 2023-08-01 RX ORDER — ADALIMUMAB 40MG/0.4ML
KIT SUBCUTANEOUS
Qty: 2 PEN | Refills: 6 | Status: SHIPPED | OUTPATIENT
Start: 2023-08-01 | End: 2024-03-06

## 2023-08-02 ENCOUNTER — PATIENT MESSAGE (OUTPATIENT)
Dept: PAIN MEDICINE | Facility: CLINIC | Age: 58
End: 2023-08-02
Payer: COMMERCIAL

## 2023-08-18 ENCOUNTER — OFFICE VISIT (OUTPATIENT)
Dept: GASTROENTEROLOGY | Facility: CLINIC | Age: 58
End: 2023-08-18
Payer: COMMERCIAL

## 2023-08-18 VITALS
SYSTOLIC BLOOD PRESSURE: 149 MMHG | HEIGHT: 66 IN | HEART RATE: 75 BPM | BODY MASS INDEX: 40.34 KG/M2 | DIASTOLIC BLOOD PRESSURE: 92 MMHG | OXYGEN SATURATION: 96 % | WEIGHT: 251 LBS

## 2023-08-18 DIAGNOSIS — K51.918 ULCERATIVE COLITIS WITH OTHER COMPLICATION, UNSPECIFIED LOCATION: Primary | ICD-10-CM

## 2023-08-18 DIAGNOSIS — K21.9 GASTROESOPHAGEAL REFLUX DISEASE WITHOUT ESOPHAGITIS: ICD-10-CM

## 2023-08-18 PROCEDURE — 3080F PR MOST RECENT DIASTOLIC BLOOD PRESSURE >= 90 MM HG: ICD-10-PCS | Mod: S$GLB,,,

## 2023-08-18 PROCEDURE — 99214 OFFICE O/P EST MOD 30 MIN: CPT | Mod: PBBFAC

## 2023-08-18 PROCEDURE — 3077F PR MOST RECENT SYSTOLIC BLOOD PRESSURE >= 140 MM HG: ICD-10-PCS | Mod: S$GLB,,,

## 2023-08-18 PROCEDURE — 3080F DIAST BP >= 90 MM HG: CPT | Mod: S$GLB,,,

## 2023-08-18 PROCEDURE — 3008F PR BODY MASS INDEX (BMI) DOCUMENTED: ICD-10-PCS | Mod: S$GLB,,,

## 2023-08-18 PROCEDURE — 99213 OFFICE O/P EST LOW 20 MIN: CPT | Mod: S$GLB,,,

## 2023-08-18 PROCEDURE — 99213 PR OFFICE/OUTPT VISIT, EST, LEVL III, 20-29 MIN: ICD-10-PCS | Mod: S$GLB,,,

## 2023-08-18 PROCEDURE — 1159F MED LIST DOCD IN RCRD: CPT | Mod: S$GLB,,,

## 2023-08-18 PROCEDURE — 1159F PR MEDICATION LIST DOCUMENTED IN MEDICAL RECORD: ICD-10-PCS | Mod: S$GLB,,,

## 2023-08-18 PROCEDURE — 3077F SYST BP >= 140 MM HG: CPT | Mod: S$GLB,,,

## 2023-08-18 PROCEDURE — 3008F BODY MASS INDEX DOCD: CPT | Mod: S$GLB,,,

## 2023-08-18 NOTE — LETTER
August 18, 2023      [facility]  [facility] [address] [facility] [place] [address]  Phone: [phone]       Patient: Vandana Price   YOB: 1965  Date of Visit: 08/18/2023    To Whom It May Concern:    Enrike Price  was at [facility] on 08/18/2023 for a follow up office visit. The patient may return to work/school on 08/18/2023 with no restrictions. If you have any questions or concerns, or if I can be of further assistance, please do not hesitate to contact me.    Sincerely,    Miranda Cramer RN for Bessy Álvarez, NELLI  [phone]

## 2023-08-18 NOTE — PROGRESS NOTES
Vandana Price is a 57 y.o. female here for Follow-up        PCP: Mariana Hearn  Referring Provider: No referring provider defined for this encounter.     HPI:  Vandana Price is a 56 yo female who presents to clinic for follow-up of UC (dx on c-scope 2021 by Dr. Rondon). She was restarted on Humira at our previous visit after having stopped her medication for knee surgery. Since restarting the medication in May, her symptoms of diarrhea and abdominal pain have improved. She has occasional abdominal discomfort but no significant pain. She denies any blood in her stool. She has occasional constipation at present and intermittent loose stools. She denies any nausea or vomiting. Her GERD is well controlled on current regimen of Pepcid 40 mg BID.     Current regimen: Humira 40 mg every two weeks     Prior treatment: Mesalamine 4.8 G PO, mesalamine suppositories and tapering dose prednisone      Colonoscopy 08/15/2022 - Moderate erosions, erythema, and loss of vascularity in the distal 20cm of the rectum, compatible with Torres 2 disease. 3 small polyps removed from the cecum. Random TI and colon biopsies obtained.     A. Cecal polyp, biopsies:  Tubulovillous adenoma    B. Terminal ileum, biopsies:  No significant microscopic pathologic change    C. Ascending colon, biopsies:  No significant microscopic pathologic change    D. Transverse colon, biopsies:  No significant microscopic pathologic change    E. Descending colon, biopsies:  No significant microscopic pathologic change    F. Sigmoid colon, biopsy:  No significant microscopic pathologic change    G. Rectum, biopsies:  Mildly active severe chronic colitis with ulceration and erosion; a properly controlled  CMV immunostain negative            ROS:  Review of Systems   Constitutional:  Negative for appetite change, fatigue, fever and unexpected weight change.   HENT:  Negative for trouble swallowing.    Gastrointestinal:  Positive for abdominal pain, constipation and  diarrhea. Negative for blood in stool, nausea, vomiting and reflux.   Integumentary:  Negative for color change.          PMHX:  has a past medical history of Allergy, Bilateral primary osteoarthritis of knee, Chronic pain of both knees (10/14/2021), Eosinophilia, Gangrene of gallbladder in cholecystitis (2021), Hematochezia, History of colonoscopy, Hyperlipidemia, Hypertension, and Sleep apnea.    PSHX:  has a past surgical history that includes Laparoscopic cholecystectomy (N/A, 2021); Cholecystectomy;  section; Knee surgery; Tubal ligation (); Fracture surgery; bladder tact; knee scope (Bilateral); Bilateral IA knee injection (Bilateral, 2021); Injection of anesthetic agent around nerve (Bilateral, 2022); arthroplasty, knee, total, using computer-assisted navigation (Left, 12/15/2022); injection; and Injection of anesthetic agent around medial branch nerves innervating lumbar facet joint (Bilateral, 2023).    PFHX: family history includes Alcohol abuse in her mother; Heart disease in her father; Hypertension in her father; Lung cancer in her maternal aunt; Migraines in her daughter.    PSlHX:  reports that she quit smoking about 13 years ago. Her smoking use included cigarettes. She started smoking about 46 years ago. She has a 33.0 pack-year smoking history. She has never been exposed to tobacco smoke. She has never used smokeless tobacco. She reports that she does not currently use alcohol. She reports that she does not currently use drugs after having used the following drugs: Methamphetamines.        Review of patient's allergies indicates:   Allergen Reactions    Opioids - morphine analogues Rash     Development of red rash at site morphine given.        Medication List with Changes/Refills   Current Medications    ADALIMUMAB (HUMIRA,CF, PEN) 40 MG/0.4 ML PNKT    Inject 40mg under the skin every other week as directed by physician.    ASPIRIN (ECOTRIN) 81 MG EC TABLET  "   Take 81 mg by mouth once daily.    CELECOXIB (CELEBREX) 200 MG CAPSULE    Take 1 capsule (200 mg total) by mouth once daily.    CETIRIZINE (ZYRTEC) 10 MG TABLET    Take 1 tablet (10 mg total) by mouth once daily.    FAMOTIDINE (PEPCID) 40 MG TABLET    Take 1 tablet (40 mg total) by mouth 2 (two) times daily.    GABAPENTIN (NEURONTIN) 300 MG CAPSULE    Take 2 capsules (600 mg total) by mouth 2 (two) times daily.        Objective Findings:  Vital Signs:  BP (!) 149/92   Pulse 75   Ht 5' 6" (1.676 m)   Wt 113.9 kg (251 lb)   LMP 06/08/2020 Comment: tubal ligation   SpO2 96%   BMI 40.51 kg/m²  Body mass index is 40.51 kg/m².    Physical Exam:  Physical Exam  Vitals reviewed.   Constitutional:       General: She is not in acute distress.     Appearance: Normal appearance. She is obese.   HENT:      Mouth/Throat:      Mouth: Mucous membranes are moist.   Cardiovascular:      Rate and Rhythm: Normal rate.   Pulmonary:      Effort: Pulmonary effort is normal.   Abdominal:      General: There is no distension.      Palpations: Abdomen is soft.      Tenderness: There is no abdominal tenderness. There is no guarding.   Skin:     General: Skin is warm and dry.   Neurological:      Mental Status: She is alert and oriented to person, place, and time.   Psychiatric:         Mood and Affect: Mood normal.          Labs:  Lab Results   Component Value Date    WBC 8.78 05/18/2023    HGB 13.4 05/18/2023    HCT 42.0 05/18/2023    MCV 86.6 05/18/2023    RDW 14.9 (H) 05/18/2023     05/18/2023    LYMPH 19.8 (L) 05/18/2023    LYMPH 1.74 05/18/2023    MONO 6.5 (H) 05/18/2023    EOS 0.59 (H) 05/18/2023    BASO 0.10 05/18/2023     Lab Results   Component Value Date     05/18/2023    K 4.3 05/18/2023     (H) 05/18/2023    CO2 26 05/18/2023    GLU 94 05/18/2023    BUN 20 (H) 05/18/2023    CREATININE 1.07 (H) 05/18/2023    CALCIUM 9.7 05/18/2023    PROT 7.7 05/18/2023    ALBUMIN 4.0 05/18/2023    BILITOT 0.3 05/18/2023 "    ALKPHOS 144 (H) 05/18/2023    AST 15 05/18/2023    ALT 23 05/18/2023         Imaging: No results found.      Assessment:  Vandana Price is a 57 y.o. female here with:  1. Ulcerative colitis with other complication, unspecified location    2. Gastroesophageal reflux disease without esophagitis          Recommendations:  1. Continue Humira 40 mg every 2 weeks for UC  2. Schedule repeat colonoscopy 6 months from initiation of therapy (Nov/Dec) to assess for mucosal healing   3. Will be due for labs at follow-up (CBC, CMP, Iron studies, Vit D, Vit b12, folate, TB, Hepatitis, HIV); will need drug trough and antibody level  4. Will get DEXA - no prior evaluation noted on record     Follow up in about 6 months (around 2/18/2024).      Order summary:  Orders Placed This Encounter    DXA Bone Density with Vertebral Fracture Assesment    Colonoscopy       Thank you for allowing me to participate in the care of Vandana Price.      Bessy Álvarez, FNP-BC, COURTNEY-ACNP-BC

## 2023-08-25 DIAGNOSIS — Z96.652 STATUS POST TOTAL LEFT KNEE REPLACEMENT: Primary | ICD-10-CM

## 2023-08-25 NOTE — PROGRESS NOTES
She Disclaimer: This note has been generated using voice-recognition software. There may be typographical errors that have been missed during proof-reading        Patient ID: Vandana Price is a 57 y.o. female.      Chief Complaint: Low-back Pain and Knee Pain (Right knee)      57-year-old female returns for re-evaluation of intractable lower back pain and spondylosis.  She is post bilateral lumbar medial branch block # 1,  5/18/2023.  She experienced 100% pain relief from the procedure for the duration of the local anesthetic.  She returns to schedule the 2nd medial branch block and possible radiofrequency procedure.  She also has a pending appointment with Dr. Valladares today to discuss a possible right total knee replacement.  Tylenol No. 3 once a day has failed to provide relief.  Celebrex is providing ineffective pain relief but she is unable to tolerate NSAIDs due to gastric irritation.                      Pain Assessment  Pain Assessment: 0-10  Pain Score:   4  Pain Location: Back  Pain Descriptors: Aching, Sharp  Pain Frequency: Constant/continuous  Pain Onset: Awakened from sleep  Clinical Progression: Gradually worsening  Aggravating Factors: Standing, Bending  Pain Intervention(s): Medication (See eMAR), Home medication      A's of Opioid Risk Assessment  Activity:Patient can perform ADL.   Analgesia:Patients pain is partially controlled by current medication.   Adverse Effects: Patient denies constipation or sedation.  Aberrant Behavior:  reviewed with no aberrant drug seeking/taking behavior.      Patient denies any suicidal or homicidal ideations    Physical Therapy/Home Exercise:  Not for lower back pain          Review of Systems   Constitutional: Negative.    HENT: Negative.     Eyes: Negative.    Respiratory: Negative.     Cardiovascular: Negative.    Gastrointestinal: Negative.    Endocrine: Negative.    Genitourinary: Negative.    Musculoskeletal:  Positive for arthralgias (Bilateral knees), back  pain and gait problem.   Integumentary:  Negative.   Hematological: Negative.              Past Medical History:   Diagnosis Date    Allergy     Bilateral primary osteoarthritis of knee     Chronic pain of both knees 10/14/2021    Eosinophilia     Gangrene of gallbladder in cholecystitis 2021    Hematochezia     History of colonoscopy     Hyperlipidemia     Hypertension     Sleep apnea      Past Surgical History:   Procedure Laterality Date    ARTHROPLASTY, KNEE, TOTAL, USING COMPUTER-ASSISTED NAVIGATION Left 12/15/2022    Procedure: ARTHROPLASTY, KNEE, TOTAL, USING COMPUTER-ASSISTED NAVIGATION;  Surgeon: Jerome Valladares MD;  Location: [facility] OR;  Service: Orthopedics;  Laterality: Left;    Bilateral IA knee injection Bilateral 2021    D Shows    bladder tact       SECTION      CHOLECYSTECTOMY      FRACTURE SURGERY      jaw     INJECTION      back    INJECTION OF ANESTHETIC AGENT AROUND MEDIAL BRANCH NERVES INNERVATING LUMBAR FACET JOINT Bilateral 2023    Procedure: BIlateral L4-5,5-S1 MBB;  Surgeon: Amanda Goetz MD;  Location: [facility];  Service: Pain Management;  Laterality: Bilateral;  LOCAL    INJECTION OF ANESTHETIC AGENT AROUND NERVE Bilateral 2022    Procedure: GNB   BILATERAL;  Surgeon: Amanda Goetz MD;  Location: [facility];  Service: Pain Management;  Laterality: Bilateral;  PT HAS NOT HAD VAC   STATES WILL BE TESTED AT [facility] [place]    knee scope Bilateral     KNEE SURGERY      LAPAROSCOPIC CHOLECYSTECTOMY N/A 2021    Procedure: LAPAROSCOPIC CHOLECYSTECTOMY CONVERTED TO OPEN;  Surgeon: Darnell Mcgraw MD;  Location: [facility] OR;  Service: General;  Laterality: N/A;  CONVERTED TO OPEN    TUBAL LIGATION       Social History     Socioeconomic History    Marital status: Single    Number of children: 2   Occupational History    Occupation:  at [employer]   Tobacco Use    Smoking status: Former     Current packs/day: 0.00      Average packs/day: 1 pack/day for 33.0 years (33.0 ttl pk-yrs)     Types: Cigarettes     Start date:      Quit date:      Years since quittin.6     Passive exposure: Never    Smokeless tobacco: Never   Substance and Sexual Activity    Alcohol use: Not Currently    Drug use: Not Currently     Types: Methamphetamines    Sexual activity: Not Currently     Family History   Problem Relation Age of Onset    Hypertension Father     Heart disease Father     Alcohol abuse Mother     Migraines Daughter     Lung cancer Maternal Aunt      Review of patient's allergies indicates:   Allergen Reactions    Opioids - morphine analogues Rash     Development of red rash at site morphine given.      has a current medication list which includes the following prescription(s): humira(cf) pen, aspirin, celecoxib, gabapentin, acetaminophen-codeine 300-30mg, cetirizine, and famotidine.      Objective:  Vitals:    23 1038   BP: (!) 150/95   Pulse: 86   Resp: 18        Physical Exam  Vitals and nursing note reviewed.   Constitutional:       General: She is not in acute distress.     Appearance: Normal appearance. She is obese. She is not ill-appearing, toxic-appearing or diaphoretic.   HENT:      Head: Normocephalic and atraumatic.      Nose: Nose normal.      Mouth/Throat:      Mouth: Mucous membranes are moist.   Eyes:      Extraocular Movements: Extraocular movements intact.      Pupils: Pupils are equal, round, and reactive to light.   Cardiovascular:      Rate and Rhythm: Normal rate and regular rhythm.      Heart sounds: Normal heart sounds.   Pulmonary:      Effort: Pulmonary effort is normal. No respiratory distress.      Breath sounds: Normal breath sounds. No stridor. No wheezing or rhonchi.   Abdominal:      General: Bowel sounds are normal.      Palpations: Abdomen is soft.   Musculoskeletal:         General: No swelling or deformity.      Cervical back: Normal and normal range of motion. No spasms or tenderness.  No pain with movement. Normal range of motion.      Thoracic back: Normal.      Lumbar back: No spasms. Negative right straight leg raise test and negative left straight leg raise test. No scoliosis.      Right knee: Crepitus present. Decreased range of motion. Tenderness present.      Left knee: Crepitus present. Decreased range of motion. Tenderness present.      Right lower leg: No edema.      Left lower leg: No edema.   Skin:     General: Skin is warm.   Neurological:      General: No focal deficit present.      Mental Status: She is alert and oriented to person, place, and time. Mental status is at baseline.      Cranial Nerves: No cranial nerve deficit.      Sensory: Sensation is intact. No sensory deficit.      Motor: No weakness.      Coordination: Coordination normal.      Gait: Gait normal.      Deep Tendon Reflexes: Reflexes are normal and symmetric.   Psychiatric:         Mood and Affect: Mood normal.         Behavior: Behavior normal.           Assessment:      1. Encounter for long-term (current) use of other medications    2. Spondylosis of lumbosacral region without myelopathy or radiculopathy    3. Bilateral primary osteoarthritis of knee    4. Chronic pain syndrome    5. Chronic pain of both knees            Plan:  1. reviewed  2.Urine drug screen and confirmation testing was ordered as documented on the requisition form in order to verify medication compliance, test for illicit substances.    3. Indication: The patient has clinical and radiologic findings suggestive of facet mediated pain and is s/p 1st diagnostic bilateral lumbar L4/5 and L5/S1 medial branch blocks with at least 80% relief of their axial back pain lasting the duration of the local anesthetic utilized.    Orders Placed This Encounter   Procedures    POCT Urine Drug Screen ([facility])     Interpretive Information:     Negative:  No drug detected at the cut off level.   Positive:  This result represents presumptive positive for  the   tested drug, other substances may yield a positive response other   than the analyte of interest. This result should be utilized for   diagnostic purpose only. Confirmation testing will be performed upon physician request only.       Case Request Operating Room: Bilateral L4-5, 5-S1 medial branch block #2     Order Specific Question:   Medical Necessity:     Answer:   Medically Non-Urgent [100]     Order Specific Question:   CPT Code:     Answer:   TX INJ DX/THER AGNT PARAVERT FACET JOINT,IMG GUIDE,LUMBAR/SAC,1ST LVL [52892]     Order Specific Question:   CPT Code:     Answer:   TX INJ DX/THER AGNT PARAVERT FACET JOINT,IMG GUIDE,LUMBAR/SAC, 2ND LEVEL [86665]     Order Specific Question:   Post-Procedure Disposition:     Answer:   PACU [1]     Order Specific Question:   Estimated Length of Stay:     Answer:   0 midnight     Order Specific Question:   Implant Required:     Answer:   No [1001]     Order Specific Question:   Is an on-site pathologist required for this procedure?     Answer:   N/A      4.Indications for this procedure for this specific patient include the following   - Pt has had symptoms for three months with moderate to severe pain with functional impairment rated of 7/10 pain.   - Pain non-responsive to conservative care.    - Pain predominately axial and not associated with radiculopathy or claudication.    - No non-facet pathology as source of pain.    - Clinical assessment implicates facet joint as putative pain source.    - Pain is exacerbated by extension or prolonged sitting/standing and relieved by rest.    - No unexplained neurologic deficit.    - No history of coagulopathy, infection or unstable medical conditions.    - Pain is causing significant functional limitation resulting in diminished quality of life and impaired age appropriate ADL's.   - Clinical assessment implicates facet joint as putative source of pain  - Repeat injections not done prior to 7 days   - no more than 2 levels  will be done per side      5.Monitored Anesthesia Care medical necessity authorization request:    Monitor anesthesia request is medically indicated for the scheduled nerve block procedure due to:  - needle phobia and anxiety, placing  the patient at risk during the provided service.  -patient has a BMI greater than 40  -patient has severe sleep apnea for which BiPAP and oxygen are needed while sleeping  -patient has an ASA class greater than 3 and requires constant presence of an anesthesiologist during the procedure:  -patient has severe problems with muscles and muscle spasticity that makes it hard to lie still  -patient suffers from chronic pain and is unable to function due to  diminished ADLs    6.The planned medically necessary  surgical procedure is performed in a hospital outpatient department and not in an ambulatory surgical center due to:     -there is no geographically assessable ambulatory surgery center that has the  necessary equipment and fluoroscopy needed for the procedure     -there is no geographically assessable ambulatory surgical center available at which the physician has privileges     -an ASC's  specific  guideline regarding the individuals weight or health conditions that prevent the use of an ASC       report:  Reviewed and consistent with medication use as prescribed.      The total time spent for evaluation and management on 08/28/2023 including reviewing separately obtained history, performing a medically appropriate exam and evaluation, documenting clinical information in the health record, independently interpreting results and communicating them to the patient/family/caregiver, and ordering medications/tests/procedures was between 15-29 minutes.    The above plan and management options were discussed at length with patient. Patient is in agreement with the above and verbalized understanding. It will be communicated with the referring physician via electronic record, fax, or  mail.

## 2023-08-28 ENCOUNTER — HOSPITAL ENCOUNTER (OUTPATIENT)
Dept: RADIOLOGY | Facility: HOSPITAL | Age: 58
Discharge: HOME | End: 2023-08-28
Attending: ORTHOPAEDIC SURGERY
Payer: COMMERCIAL

## 2023-08-28 ENCOUNTER — OFFICE VISIT (OUTPATIENT)
Dept: ORTHOPEDICS | Facility: CLINIC | Age: 58
End: 2023-08-28
Payer: COMMERCIAL

## 2023-08-28 ENCOUNTER — OFFICE VISIT (OUTPATIENT)
Dept: PAIN MEDICINE | Facility: CLINIC | Age: 58
End: 2023-08-28
Payer: COMMERCIAL

## 2023-08-28 VITALS — BODY MASS INDEX: 40.66 KG/M2 | WEIGHT: 253 LBS | HEIGHT: 66 IN

## 2023-08-28 VITALS
SYSTOLIC BLOOD PRESSURE: 150 MMHG | HEIGHT: 66 IN | WEIGHT: 253 LBS | DIASTOLIC BLOOD PRESSURE: 95 MMHG | HEART RATE: 86 BPM | RESPIRATION RATE: 18 BRPM | BODY MASS INDEX: 40.66 KG/M2

## 2023-08-28 DIAGNOSIS — G89.29 CHRONIC PAIN OF RIGHT KNEE: ICD-10-CM

## 2023-08-28 DIAGNOSIS — G89.29 CHRONIC PAIN OF BOTH KNEES: Chronic | ICD-10-CM

## 2023-08-28 DIAGNOSIS — M25.561 RIGHT KNEE PAIN, UNSPECIFIED CHRONICITY: Primary | ICD-10-CM

## 2023-08-28 DIAGNOSIS — Z96.652 STATUS POST TOTAL LEFT KNEE REPLACEMENT: Primary | ICD-10-CM

## 2023-08-28 DIAGNOSIS — M25.562 CHRONIC PAIN OF BOTH KNEES: Chronic | ICD-10-CM

## 2023-08-28 DIAGNOSIS — M25.561 CHRONIC PAIN OF RIGHT KNEE: ICD-10-CM

## 2023-08-28 DIAGNOSIS — Z01.812 PRE-OPERATIVE LABORATORY EXAMINATION: ICD-10-CM

## 2023-08-28 DIAGNOSIS — M25.561 RIGHT KNEE PAIN, UNSPECIFIED CHRONICITY: ICD-10-CM

## 2023-08-28 DIAGNOSIS — M17.0 BILATERAL PRIMARY OSTEOARTHRITIS OF KNEE: Chronic | ICD-10-CM

## 2023-08-28 DIAGNOSIS — M17.0 BILATERAL PRIMARY OSTEOARTHRITIS OF KNEE: ICD-10-CM

## 2023-08-28 DIAGNOSIS — Z01.811 PRE-OPERATIVE RESPIRATORY EXAMINATION: ICD-10-CM

## 2023-08-28 DIAGNOSIS — Z01.810 PRE-OPERATIVE CARDIOVASCULAR EXAMINATION: ICD-10-CM

## 2023-08-28 DIAGNOSIS — Z79.899 ENCOUNTER FOR LONG-TERM (CURRENT) USE OF OTHER MEDICATIONS: Primary | ICD-10-CM

## 2023-08-28 DIAGNOSIS — M47.817 SPONDYLOSIS OF LUMBOSACRAL REGION WITHOUT MYELOPATHY OR RADICULOPATHY: Chronic | ICD-10-CM

## 2023-08-28 DIAGNOSIS — M25.561 CHRONIC PAIN OF BOTH KNEES: Chronic | ICD-10-CM

## 2023-08-28 DIAGNOSIS — Z96.652 STATUS POST TOTAL LEFT KNEE REPLACEMENT: ICD-10-CM

## 2023-08-28 DIAGNOSIS — G89.4 CHRONIC PAIN SYNDROME: Chronic | ICD-10-CM

## 2023-08-28 PROCEDURE — 3080F DIAST BP >= 90 MM HG: CPT | Mod: S$GLB,,, | Performed by: PAIN MEDICINE

## 2023-08-28 PROCEDURE — 3080F PR MOST RECENT DIASTOLIC BLOOD PRESSURE >= 90 MM HG: ICD-10-PCS | Mod: S$GLB,,, | Performed by: PAIN MEDICINE

## 2023-08-28 PROCEDURE — 73564 X-RAY EXAM KNEE 4 OR MORE: CPT | Mod: TC,RT

## 2023-08-28 PROCEDURE — 3008F BODY MASS INDEX DOCD: CPT | Mod: S$GLB,,, | Performed by: PAIN MEDICINE

## 2023-08-28 PROCEDURE — 99214 OFFICE O/P EST MOD 30 MIN: CPT | Mod: S$GLB,,, | Performed by: PAIN MEDICINE

## 2023-08-28 PROCEDURE — 1159F PR MEDICATION LIST DOCUMENTED IN MEDICAL RECORD: ICD-10-PCS | Mod: S$GLB,,, | Performed by: PAIN MEDICINE

## 2023-08-28 PROCEDURE — 99215 OFFICE O/P EST HI 40 MIN: CPT | Mod: PBBFAC | Performed by: PAIN MEDICINE

## 2023-08-28 PROCEDURE — 1159F PR MEDICATION LIST DOCUMENTED IN MEDICAL RECORD: ICD-10-PCS | Mod: S$GLB,,, | Performed by: ORTHOPAEDIC SURGERY

## 2023-08-28 PROCEDURE — 1159F MED LIST DOCD IN RCRD: CPT | Mod: S$GLB,,, | Performed by: PAIN MEDICINE

## 2023-08-28 PROCEDURE — 1159F MED LIST DOCD IN RCRD: CPT | Mod: S$GLB,,, | Performed by: ORTHOPAEDIC SURGERY

## 2023-08-28 PROCEDURE — 3008F PR BODY MASS INDEX (BMI) DOCUMENTED: ICD-10-PCS | Mod: S$GLB,,, | Performed by: ORTHOPAEDIC SURGERY

## 2023-08-28 PROCEDURE — 3077F PR MOST RECENT SYSTOLIC BLOOD PRESSURE >= 140 MM HG: ICD-10-PCS | Mod: S$GLB,,, | Performed by: PAIN MEDICINE

## 2023-08-28 PROCEDURE — 99213 OFFICE O/P EST LOW 20 MIN: CPT | Mod: PBBFAC | Performed by: ORTHOPAEDIC SURGERY

## 2023-08-28 PROCEDURE — 3008F BODY MASS INDEX DOCD: CPT | Mod: S$GLB,,, | Performed by: ORTHOPAEDIC SURGERY

## 2023-08-28 PROCEDURE — 73564 X-RAY EXAM KNEE 4 OR MORE: CPT | Mod: 26,RT,, | Performed by: ORTHOPAEDIC SURGERY

## 2023-08-28 PROCEDURE — 73560 X-RAY EXAM OF KNEE 1 OR 2: CPT | Mod: 26,LT,, | Performed by: ORTHOPAEDIC SURGERY

## 2023-08-28 PROCEDURE — 99214 PR OFFICE/OUTPT VISIT, EST, LEVL IV, 30-39 MIN: ICD-10-PCS | Mod: S$GLB,,, | Performed by: PAIN MEDICINE

## 2023-08-28 PROCEDURE — 3008F PR BODY MASS INDEX (BMI) DOCUMENTED: ICD-10-PCS | Mod: S$GLB,,, | Performed by: PAIN MEDICINE

## 2023-08-28 PROCEDURE — 80305 DRUG TEST PRSMV DIR OPT OBS: CPT | Mod: QW,S$GLB,, | Performed by: PAIN MEDICINE

## 2023-08-28 PROCEDURE — 3077F SYST BP >= 140 MM HG: CPT | Mod: S$GLB,,, | Performed by: PAIN MEDICINE

## 2023-08-28 PROCEDURE — 73564 XR KNEE COMP 4 OR MORE VIEWS RIGHT: ICD-10-PCS | Mod: 26,RT,, | Performed by: ORTHOPAEDIC SURGERY

## 2023-08-28 PROCEDURE — 80305 POCT URINE DRUG SCREEN PRESUMP: ICD-10-PCS | Mod: QW,S$GLB,, | Performed by: PAIN MEDICINE

## 2023-08-28 PROCEDURE — 73560 X-RAY EXAM OF KNEE 1 OR 2: CPT | Mod: TC,LT

## 2023-08-28 PROCEDURE — 99214 PR OFFICE/OUTPT VISIT, EST, LEVL IV, 30-39 MIN: ICD-10-PCS | Mod: S$GLB,,, | Performed by: ORTHOPAEDIC SURGERY

## 2023-08-28 PROCEDURE — 73560 XR KNEE 1 OR 2 VIEW LEFT: ICD-10-PCS | Mod: 26,LT,, | Performed by: ORTHOPAEDIC SURGERY

## 2023-08-28 PROCEDURE — 99214 OFFICE O/P EST MOD 30 MIN: CPT | Mod: S$GLB,,, | Performed by: ORTHOPAEDIC SURGERY

## 2023-08-28 RX ORDER — ACETAMINOPHEN AND CODEINE PHOSPHATE 300; 30 MG/1; MG/1
1 TABLET ORAL EVERY 12 HOURS PRN
Qty: 60 TABLET | Refills: 2 | Status: ON HOLD | OUTPATIENT
Start: 2023-08-28 | End: 2023-09-14

## 2023-08-28 NOTE — PROGRESS NOTES
Patient is here for follow-up of her left total knee arthroplasty is doing well she 0 to past 110° of flexion.  Right knee she is having pain and crepitus on motion lacks few degrees of extension flexes little bit past 100° no instability noted there is 1+ effusion walking with an antalgic gait.  We discussed treatment options.  We discussed risks benefits surgery.  At this time she wished to proceed with total knee arthroplasty on right.  Set this up in the near future.

## 2023-08-28 NOTE — PATIENT INSTRUCTIONS
Your surgery is scheduled at [facility] in [place] for 2023    Pre-Op Testin2023    __x_____ Lab   __x_____ Chest X-ray   __x_____ EKG   __x_____ Total Joint Patients Only--Cardiac/Medical Clearance appointment with KIMBERLEE Moseley on 2023 at 10:00a.m..    Our office will contact you the day before surgery to give you  the arrival time.    *Do NOT eat or drink anything after midnight the night before your surgery.    *Bring all medications in their original bottles.    *Bring anything you may need for an overnight stay.     *Bathe with Hibiclens the night or morning before surgery.    *The morning of your surgery ONLY take blood pressure medications, heart medications, medications for acid reflux, and thyroid medications (the morning dose only).  *Take these medications with a sip of water.    *Do not take Insulin or Diabetic Medications the night before or the morning of your surgery, unless directed otherwise.    *Be sure that you have stopped blood thinners at the appropriate time, as instructed.  (_____ days prior to surgery)    *Bring your C-Pap machine if you have one.    *All jewelry and piercings MUST be removed prior to surgery.    *False eye lashes must be removed prior to surgery.    *Any questions regarding co-pays or deductibles with insurance, please contact the [facility] at #[phone].    *For Financial Assistance you may call #[phone] or #[phone].    Thank you for choosing Dr. Jerome Valladares for your Orthopedic needs.  We look forward to caring for you. If you have any questions, please contact our office at [phone].

## 2023-08-28 NOTE — PLAN OF CARE
Sw spoke with pt on this day. Pt scheduled for sx on 09/14/23. Pt has rw and bsc. Dcp is home with [facility] health. Ss following.

## 2023-08-28 NOTE — PROGRESS NOTES
Radiology Interpretation        Patient Name: Vandana Price  Date: 8/28/2023  YOB: 1965  MRN# [id number]        ORDERING DIAGNOSIS:    Encounter Diagnosis   Name Primary?    Status post total left knee replacement Yes           Two views AP lateral left knee skeletally mature individual there is normal mineralization total knee arthroplasty in place no loosening fractures subluxations impression total knee arthroplasty in place left knee no loosening            Jerome Valladares MD

## 2023-08-28 NOTE — PATIENT INSTRUCTIONS
YOU ARE SCHEDULED ON 09/12/2023 AT 2:00 PM FOR YOUR PROCEDURE- LUMBAR MBB#2 WITH DR. CHRIS.        Procedure Instructions:    Nothing to eat or drink for 8 hours or after midnight including gum, candy, mints, or tobacco products.  If you are scheduled for 1:30 or later nothing to eat or drink after 5 a.m. the morning of the procedure, including gum, candy, mints, or tobacco products.  Must have a  at least 18 yrs of age to stay present at all times  No Diabetic medications the morning of procedure, check blood sugar the morning of procedure, if it is greater than 200 call the office at [phone]  If you are started on antibiotics or have been prescribed antibiotics, have a fever, or have any other type of infection call to reschedule procedure.  If you take blood pressure medications you can take it at your regular scheduled time with a small sip of WATER!  Dentures are to be removed prior to procedure or we can provide you with a denture cup to remove them prior to being taken back for procedure.   False eyelashes are to be removed before the morning of procedure.    Contacts will have to be removed prior to procedure.  No jewelry is to be worn to the procedure.     HOLD ASPIRIN AND ASPIRIN PRODUCTS  (ASPIRIN, BC POWDER ETC. ) FOR 7 DAYS  PRIOR TO PROCEDURE  HOLD NSAIDS( ibuprofen, mobic, meloxicam, advil, diclofenac, naproxen, relafen, celebrex,  methotrexate, aleve etc....)  FOR 3 DAYS   PRIOR TO PROCEDURE

## 2023-08-28 NOTE — PROGRESS NOTES
Radiology Interpretation        Patient Name: Vandana Price  Date: 8/28/2023  YOB: 1965  MRN# [id number]        ORDERING DIAGNOSIS:    Encounter Diagnosis   Name Primary?    Status post total left knee replacement Yes      Four views right knee skeletally mature individual there are tricompartmental degenerative changes with bone-on-bone medial compartment periarticular osteophytes subchondral sclerotic changes narrowing joint space impression severe degenerative changes right knee tricompartmental                 Jerome Valladares MD

## 2023-08-29 ENCOUNTER — TELEPHONE (OUTPATIENT)
Dept: PAIN MEDICINE | Facility: CLINIC | Age: 58
End: 2023-08-29
Payer: COMMERCIAL

## 2023-08-29 NOTE — TELEPHONE ENCOUNTER
----- Message from Amanda Goetz MD sent at 7/5/2023  4:29 PM CDT -----  Regarding: RE: Schedule procedure  Yes, schedule lumbar MBB #2 for lower back pain and spondylosis  ----- Message -----  From: Nigel Su LPN  Sent: 6/29/2023  12:58 PM CDT  To: Amanda Goetz MD  Subject: Schedule procedure                               Patient was seen 6/12/23 and was to call to schedule Bilateral L4-5, 5-S1 MBB#2 when ready. Patient wants to schedule prior to 7/12/23. She has [employer] of [place] and should not require precert.    Patient was seen 8/28/23 by Dr. Goetz and rescheduled for procedure.

## 2023-08-29 NOTE — TELEPHONE ENCOUNTER
Patient requests to cancel Pain Treatment procedure scheduled on 09/12/23 with Dr. Goetz. Patient has prior knee surgery scheduled on 9/14/2023. Patient will call to rescheduled procedure.

## 2023-08-30 ENCOUNTER — TELEPHONE (OUTPATIENT)
Dept: PAIN MEDICINE | Facility: CLINIC | Age: 58
End: 2023-08-30
Payer: COMMERCIAL

## 2023-08-30 NOTE — TELEPHONE ENCOUNTER
----- Message from Tatiana Vera sent at 8/28/2023  3:15 PM CDT -----  [phone] PATIENT CALLED SAID SHE WANTS TO CANCEL HER UPCOMING PROCEDURE SHE IS HAVING KNEE SURGERY ON THE 14TH.      Patient called and canceled procedure with Dr. Goetz due to knee surgery scheduled on 09/14/2023. Patient states will call to reschedule.

## 2023-09-01 ENCOUNTER — CLINICAL SUPPORT (OUTPATIENT)
Dept: RESPIRATORY THERAPY | Facility: HOSPITAL | Age: 58
End: 2023-09-01
Payer: COMMERCIAL

## 2023-09-01 ENCOUNTER — HOSPITAL ENCOUNTER (OUTPATIENT)
Dept: RADIOLOGY | Facility: HOSPITAL | Age: 58
Discharge: HOME | End: 2023-09-01
Payer: COMMERCIAL

## 2023-09-01 ENCOUNTER — OFFICE VISIT (OUTPATIENT)
Dept: FAMILY MEDICINE | Facility: CLINIC | Age: 58
End: 2023-09-01
Payer: COMMERCIAL

## 2023-09-01 VITALS
RESPIRATION RATE: 18 BRPM | WEIGHT: 248.19 LBS | TEMPERATURE: 98 F | OXYGEN SATURATION: 95 % | HEIGHT: 66 IN | HEART RATE: 81 BPM | BODY MASS INDEX: 39.89 KG/M2 | SYSTOLIC BLOOD PRESSURE: 118 MMHG | DIASTOLIC BLOOD PRESSURE: 78 MMHG

## 2023-09-01 DIAGNOSIS — Z01.818 PRE-OP EVALUATION: ICD-10-CM

## 2023-09-01 DIAGNOSIS — Z41.9 SURGERY, ELECTIVE: ICD-10-CM

## 2023-09-01 DIAGNOSIS — Z41.9 SURGERY, ELECTIVE: Primary | ICD-10-CM

## 2023-09-01 PROCEDURE — 1159F PR MEDICATION LIST DOCUMENTED IN MEDICAL RECORD: ICD-10-PCS | Mod: ,,,

## 2023-09-01 PROCEDURE — 3074F PR MOST RECENT SYSTOLIC BLOOD PRESSURE < 130 MM HG: ICD-10-PCS | Mod: ,,,

## 2023-09-01 PROCEDURE — 3008F BODY MASS INDEX DOCD: CPT | Mod: ,,,

## 2023-09-01 PROCEDURE — 93010 EKG 12-LEAD: ICD-10-PCS | Mod: ,,, | Performed by: INTERNAL MEDICINE

## 2023-09-01 PROCEDURE — 93010 ELECTROCARDIOGRAM REPORT: CPT | Mod: ,,, | Performed by: INTERNAL MEDICINE

## 2023-09-01 PROCEDURE — 3078F PR MOST RECENT DIASTOLIC BLOOD PRESSURE < 80 MM HG: ICD-10-PCS | Mod: ,,,

## 2023-09-01 PROCEDURE — 71046 X-RAY EXAM CHEST 2 VIEWS: CPT | Mod: TC

## 2023-09-01 PROCEDURE — 3078F DIAST BP <80 MM HG: CPT | Mod: ,,,

## 2023-09-01 PROCEDURE — 3074F SYST BP LT 130 MM HG: CPT | Mod: ,,,

## 2023-09-01 PROCEDURE — 99212 OFFICE O/P EST SF 10 MIN: CPT | Mod: ,,,

## 2023-09-01 PROCEDURE — 3008F PR BODY MASS INDEX (BMI) DOCUMENTED: ICD-10-PCS | Mod: ,,,

## 2023-09-01 PROCEDURE — 93005 ELECTROCARDIOGRAM TRACING: CPT

## 2023-09-01 PROCEDURE — 1159F MED LIST DOCD IN RCRD: CPT | Mod: ,,,

## 2023-09-01 PROCEDURE — 99212 PR OFFICE/OUTPT VISIT, EST, LEVL II, 10-19 MIN: ICD-10-PCS | Mod: ,,,

## 2023-09-01 PROCEDURE — 85730 THROMBOPLASTIN TIME PARTIAL: CPT | Performed by: ORTHOPAEDIC SURGERY

## 2023-09-01 PROCEDURE — 85610 PROTHROMBIN TIME: CPT | Performed by: ORTHOPAEDIC SURGERY

## 2023-09-01 NOTE — PROGRESS NOTES
Subjective     Patient ID: Vandana Price is a 57 y.o. female.    Chief Complaint: surgical clearence (Cxr, ekg)    Pt is here today for surgical clearance for Right TKR. Denies chest pain, sob or chest tightness. She is not a diabetic.    Review of Systems   Constitutional:  Negative for activity change, appetite change and fatigue.   HENT:  Negative for trouble swallowing.    Eyes:  Negative for visual disturbance.   Respiratory:  Negative for cough, chest tightness and shortness of breath.    Cardiovascular:  Negative for chest pain, palpitations and leg swelling.   Gastrointestinal:  Negative for abdominal pain, change in bowel habit, nausea, vomiting and change in bowel habit.   Endocrine: Negative for polydipsia, polyphagia and polyuria.   Genitourinary:  Negative for bladder incontinence, difficulty urinating, dysuria, frequency and urgency.   Musculoskeletal:  Positive for arthralgias.        She is scheduled for a TKR of the right knee with DR. Valladares on 9/14   Integumentary:  Negative for rash.   Neurological:  Negative for dizziness, light-headedness and headaches.   Psychiatric/Behavioral:  The patient is not nervous/anxious.           Objective     Physical Exam  Vitals and nursing note reviewed.   Constitutional:       General: She is not in acute distress.     Appearance: Normal appearance. She is not ill-appearing.   HENT:      Head: Normocephalic and atraumatic.      Right Ear: Tympanic membrane, ear canal and external ear normal.      Left Ear: Tympanic membrane, ear canal and external ear normal.      Nose: Nose normal.      Mouth/Throat:      Mouth: Mucous membranes are moist.      Pharynx: Oropharynx is clear. No posterior oropharyngeal erythema.   Eyes:      Extraocular Movements: Extraocular movements intact.      Conjunctiva/sclera: Conjunctivae normal.      Pupils: Pupils are equal, round, and reactive to light.   Neck:      Vascular: No carotid bruit.   Cardiovascular:      Rate and Rhythm:  Normal rate and regular rhythm.      Pulses: Normal pulses.      Heart sounds: Normal heart sounds.   Pulmonary:      Effort: Pulmonary effort is normal.      Breath sounds: Normal breath sounds. No wheezing, rhonchi or rales.   Chest:      Chest wall: No tenderness.   Abdominal:      General: Bowel sounds are normal. There is no distension.      Palpations: Abdomen is soft.      Tenderness: There is no abdominal tenderness.   Musculoskeletal:         General: Normal range of motion.      Cervical back: Normal range of motion and neck supple. No tenderness.      Right lower leg: No edema.      Left lower leg: No edema.      Comments: Right TKR scheduled for 9/14 with Dr. Valladares   Skin:     General: Skin is warm and dry.      Capillary Refill: Capillary refill takes less than 2 seconds.   Neurological:      General: No focal deficit present.      Mental Status: She is alert and oriented to person, place, and time.   Psychiatric:         Attention and Perception: Attention and perception normal.         Mood and Affect: Mood and affect normal.         Speech: Speech normal.         Behavior: Behavior normal. Behavior is cooperative.         Thought Content: Thought content normal. Thought content is not paranoid or delusional. Thought content does not include homicidal or suicidal ideation. Thought content does not include homicidal or suicidal plan.         Cognition and Memory: Cognition and memory normal.         Judgment: Judgment normal.      Comments: Pt is very ready to have knee replaced. She know it will be painful and is prepared and knows what to expect due to having Left TKR previously.          Assessment and Plan     1. Surgery, elective  -     Cancel: POCT EKG 12-LEAD (Manually Resulted by Ordering Provider)  -     X-Ray Chest PA And Lateral; Future; Expected date: 09/01/2023  -     EKG 12-lead; Future    2. Pre-op evaluation  Assessment & Plan:  Will have right TKR on 9/14/2023 with   Valladares    Orders:  -     EKG 12-lead; Future      Pt is clear for surgery at this time.           Follow up if symptoms worsen or fail to improve.

## 2023-09-05 RX ORDER — SULFAMETHOXAZOLE AND TRIMETHOPRIM 800; 160 MG/1; MG/1
1 TABLET ORAL 2 TIMES DAILY
Qty: 14 TABLET | Refills: 0 | Status: ON HOLD | OUTPATIENT
Start: 2023-09-05 | End: 2023-09-14

## 2023-09-13 ENCOUNTER — TELEPHONE (OUTPATIENT)
Dept: ORTHOPEDICS | Facility: CLINIC | Age: 58
End: 2023-09-13
Payer: COMMERCIAL

## 2023-09-13 NOTE — H&P (VIEW-ONLY)
Department of Orthopedic Surgery    History and Physical       Principal Problem: Status post total left knee replacement [Z96.652]           HISTORY:  58-year-old female with severe degenerative joint disease of the right knee who is failed non operative treatment now risk for falls needing total knee arthroplasty on the right    PAST MEDICAL HISTORY:   Past Medical History:   Diagnosis Date    Allergy     Bilateral primary osteoarthritis of knee     Chronic pain of both knees 10/14/2021    Eosinophilia     Gangrene of gallbladder in cholecystitis 2021    Hematochezia     History of colonoscopy     Hyperlipidemia     Hypertension     Sleep apnea         PAST SURGICAL HISTORY:   Past Surgical History:   Procedure Laterality Date    ARTHROPLASTY, KNEE, TOTAL, USING COMPUTER-ASSISTED NAVIGATION Left 12/15/2022    Procedure: ARTHROPLASTY, KNEE, TOTAL, USING COMPUTER-ASSISTED NAVIGATION;  Surgeon: Jerome Valladares MD;  Location: [facility] OR;  Service: Orthopedics;  Laterality: Left;    Bilateral IA knee injection Bilateral 2021    D Shows    bladder tact       SECTION      CHOLECYSTECTOMY      FRACTURE SURGERY      jaw     INJECTION      back    INJECTION OF ANESTHETIC AGENT AROUND MEDIAL BRANCH NERVES INNERVATING LUMBAR FACET JOINT Bilateral 2023    Procedure: BIlateral L4-5,5-S1 MBB;  Surgeon: Amanda Goetz MD;  Location: [facility];  Service: Pain Management;  Laterality: Bilateral;  LOCAL    INJECTION OF ANESTHETIC AGENT AROUND NERVE Bilateral 2022    Procedure: GNB   BILATERAL;  Surgeon: Amanda Goetz MD;  Location: [facility];  Service: Pain Management;  Laterality: Bilateral;  PT HAS NOT HAD VAC   STATES WILL BE TESTED AT [facility] [place]    knee scope Bilateral     KNEE SURGERY      LAPAROSCOPIC CHOLECYSTECTOMY N/A 2021    Procedure: LAPAROSCOPIC CHOLECYSTECTOMY CONVERTED TO OPEN;  Surgeon: Darnell Mcgraw MD;  Location: [facility] OR;   Service: General;  Laterality: N/A;  CONVERTED TO OPEN    TUBAL LIGATION            ALLERGIES:   Review of patient's allergies indicates:   Allergen Reactions    Opioids - morphine analogues Rash     Development of red rash at site morphine given.           MEDICATIONS: (Not in a hospital admission)       SOCIAL HISTORY:   Social History     Socioeconomic History    Marital status:     Number of children: 2   Occupational History    Occupation:  at [employer]   Tobacco Use    Smoking status: Former     Current packs/day: 0.00     Average packs/day: 1 pack/day for 33.0 years (33.0 ttl pk-yrs)     Types: Cigarettes     Start date:      Quit date:      Years since quittin.7     Passive exposure: Never    Smokeless tobacco: Never   Substance and Sexual Activity    Alcohol use: Not Currently    Drug use: Not Currently     Types: Methamphetamines    Sexual activity: Not Currently          FAMILY HISTORY:   Family History   Problem Relation Age of Onset    Hypertension Father     Heart disease Father     Alcohol abuse Mother     Migraines Daughter     Lung cancer Maternal Aunt           PHYSICAL EXAM:   There were no vitals filed for this visit.  Body mass index is 40.84 kg/m².     In general, this is a well-developed, well-nourished female . The patient is alert, oriented and cooperative.      HEENT:  Normocephalic, atraumatic.  Extraocular movements are intact bilaterally.  The oropharynx is benign.       NECK:  Nontender with good range of motion.      LUNGS:  Clear to auscultation bilaterally.      HEART:  Demonstrates a regular rate and rhythm.  No murmurs appreciated.      ABDOMEN:  Soft, non-tender, non-distended.        EXTREMITIES:  Right lower extremity she moves her toes has sensation to touch has palpable pulses tender palpation over posterior medial joint line and over lateral joint line there is crepitus on motion 5-100 degrees of flexion 1+ effusion walks with antalgic  gait      RADIOGRAPHIC FINDINGS:  X-rays show severe tricompartmental degenerative changes of the right knee      IMPRESSION:  Severe degenerative joint disease right knee      PLAN:  Total knee arthroplasty on the right with or without patellar resurfacing    I had a long discussion with the patient about treatment options, including operative and nonoperative treatments. We discussed pros and cons of each including risks pertinent to surgery including pain, infection, bleeding, damage to adjacent structures like nerves and blood vessels, failure to heal, need for future surgeries, stiffness, instability, loss of limb, anesthesia risks like stroke, blood clot, loss of life. We discussed the possibility of need for later hardware removal in the case that hardware was used. We discussed common and uncommon risks, and discussed patient specific factors that may increase the risks present with surgery. All questions were answered. The patient expressed understanding of the pros and cons of surgery and wanted to proceed with surgical treatment.        (Subject to voice recognition error, transcription service not allowed)

## 2023-09-13 NOTE — PROGRESS NOTES
Department of Orthopedic Surgery    History and Physical       Principal Problem: Status post total left knee replacement [Z96.652]           HISTORY:  58-year-old female with severe degenerative joint disease of the right knee who is failed non operative treatment now risk for falls needing total knee arthroplasty on the right    PAST MEDICAL HISTORY:   Past Medical History:   Diagnosis Date    Allergy     Bilateral primary osteoarthritis of knee     Chronic pain of both knees 10/14/2021    Eosinophilia     Gangrene of gallbladder in cholecystitis 2021    Hematochezia     History of colonoscopy     Hyperlipidemia     Hypertension     Sleep apnea         PAST SURGICAL HISTORY:   Past Surgical History:   Procedure Laterality Date    ARTHROPLASTY, KNEE, TOTAL, USING COMPUTER-ASSISTED NAVIGATION Left 12/15/2022    Procedure: ARTHROPLASTY, KNEE, TOTAL, USING COMPUTER-ASSISTED NAVIGATION;  Surgeon: Jerome Valladares MD;  Location: [facility] OR;  Service: Orthopedics;  Laterality: Left;    Bilateral IA knee injection Bilateral 2021    D Shows    bladder tact       SECTION      CHOLECYSTECTOMY      FRACTURE SURGERY      jaw     INJECTION      back    INJECTION OF ANESTHETIC AGENT AROUND MEDIAL BRANCH NERVES INNERVATING LUMBAR FACET JOINT Bilateral 2023    Procedure: BIlateral L4-5,5-S1 MBB;  Surgeon: Amanda Goetz MD;  Location: [facility];  Service: Pain Management;  Laterality: Bilateral;  LOCAL    INJECTION OF ANESTHETIC AGENT AROUND NERVE Bilateral 2022    Procedure: GNB   BILATERAL;  Surgeon: Amanda Goetz MD;  Location: [facility];  Service: Pain Management;  Laterality: Bilateral;  PT HAS NOT HAD VAC   STATES WILL BE TESTED AT [facility] [place]    knee scope Bilateral     KNEE SURGERY      LAPAROSCOPIC CHOLECYSTECTOMY N/A 2021    Procedure: LAPAROSCOPIC CHOLECYSTECTOMY CONVERTED TO OPEN;  Surgeon: Darnell Mcgraw MD;  Location: [facility] OR;   Service: General;  Laterality: N/A;  CONVERTED TO OPEN    TUBAL LIGATION            ALLERGIES:   Review of patient's allergies indicates:   Allergen Reactions    Opioids - morphine analogues Rash     Development of red rash at site morphine given.           MEDICATIONS: (Not in a hospital admission)       SOCIAL HISTORY:   Social History     Socioeconomic History    Marital status:     Number of children: 2   Occupational History    Occupation:  at [employer]   Tobacco Use    Smoking status: Former     Current packs/day: 0.00     Average packs/day: 1 pack/day for 33.0 years (33.0 ttl pk-yrs)     Types: Cigarettes     Start date:      Quit date:      Years since quittin.7     Passive exposure: Never    Smokeless tobacco: Never   Substance and Sexual Activity    Alcohol use: Not Currently    Drug use: Not Currently     Types: Methamphetamines    Sexual activity: Not Currently          FAMILY HISTORY:   Family History   Problem Relation Age of Onset    Hypertension Father     Heart disease Father     Alcohol abuse Mother     Migraines Daughter     Lung cancer Maternal Aunt           PHYSICAL EXAM:   There were no vitals filed for this visit.  Body mass index is 40.84 kg/m².     In general, this is a well-developed, well-nourished female . The patient is alert, oriented and cooperative.      HEENT:  Normocephalic, atraumatic.  Extraocular movements are intact bilaterally.  The oropharynx is benign.       NECK:  Nontender with good range of motion.      LUNGS:  Clear to auscultation bilaterally.      HEART:  Demonstrates a regular rate and rhythm.  No murmurs appreciated.      ABDOMEN:  Soft, non-tender, non-distended.        EXTREMITIES:  Right lower extremity she moves her toes has sensation to touch has palpable pulses tender palpation over posterior medial joint line and over lateral joint line there is crepitus on motion 5-100 degrees of flexion 1+ effusion walks with antalgic  gait      RADIOGRAPHIC FINDINGS:  X-rays show severe tricompartmental degenerative changes of the right knee      IMPRESSION:  Severe degenerative joint disease right knee      PLAN:  Total knee arthroplasty on the right with or without patellar resurfacing    I had a long discussion with the patient about treatment options, including operative and nonoperative treatments. We discussed pros and cons of each including risks pertinent to surgery including pain, infection, bleeding, damage to adjacent structures like nerves and blood vessels, failure to heal, need for future surgeries, stiffness, instability, loss of limb, anesthesia risks like stroke, blood clot, loss of life. We discussed the possibility of need for later hardware removal in the case that hardware was used. We discussed common and uncommon risks, and discussed patient specific factors that may increase the risks present with surgery. All questions were answered. The patient expressed understanding of the pros and cons of surgery and wanted to proceed with surgical treatment.        (Subject to voice recognition error, transcription service not allowed)

## 2023-09-14 ENCOUNTER — HOSPITAL ENCOUNTER (OUTPATIENT)
Facility: HOSPITAL | Age: 58
Discharge: HOME HEALTH | End: 2023-09-15
Attending: ORTHOPAEDIC SURGERY | Admitting: ORTHOPAEDIC SURGERY
Payer: COMMERCIAL

## 2023-09-14 ENCOUNTER — ANESTHESIA EVENT (OUTPATIENT)
Dept: SURGERY | Facility: HOSPITAL | Age: 58
End: 2023-09-14
Payer: COMMERCIAL

## 2023-09-14 ENCOUNTER — ANESTHESIA (OUTPATIENT)
Dept: SURGERY | Facility: HOSPITAL | Age: 58
End: 2023-09-14
Payer: COMMERCIAL

## 2023-09-14 DIAGNOSIS — M17.0 BILATERAL PRIMARY OSTEOARTHRITIS OF KNEE: Primary | ICD-10-CM

## 2023-09-14 LAB
ANION GAP SERPL CALCULATED.3IONS-SCNC: 9 MMOL/L (ref 7–16)
BASOPHILS # BLD AUTO: 0.06 K/UL (ref 0–0.2)
BASOPHILS NFR BLD AUTO: 0.8 % (ref 0–1)
BUN SERPL-MCNC: 16 MG/DL (ref 7–18)
BUN/CREAT SERPL: 14 (ref 6–20)
CALCIUM SERPL-MCNC: 8.7 MG/DL (ref 8.5–10.1)
CHLORIDE SERPL-SCNC: 114 MMOL/L (ref 98–107)
CO2 SERPL-SCNC: 23 MMOL/L (ref 21–32)
CREAT SERPL-MCNC: 1.14 MG/DL (ref 0.55–1.02)
DIFFERENTIAL METHOD BLD: ABNORMAL
EGFR (NO RACE VARIABLE) (RUSH/TITUS): 56 ML/MIN/1.73M2
EOSINOPHIL # BLD AUTO: 0.05 K/UL (ref 0–0.5)
EOSINOPHIL NFR BLD AUTO: 0.7 % (ref 1–4)
ERYTHROCYTE [DISTWIDTH] IN BLOOD BY AUTOMATED COUNT: 13.1 % (ref 11.5–14.5)
GLUCOSE SERPL-MCNC: 163 MG/DL (ref 74–106)
HCT VFR BLD AUTO: 37.4 % (ref 38–47)
HGB BLD-MCNC: 12.3 G/DL (ref 12–16)
IMM GRANULOCYTES # BLD AUTO: 0.05 K/UL (ref 0–0.04)
IMM GRANULOCYTES NFR BLD: 0.7 % (ref 0–0.4)
INDIRECT COOMBS: NORMAL
LYMPHOCYTES # BLD AUTO: 0.6 K/UL (ref 1–4.8)
LYMPHOCYTES NFR BLD AUTO: 8 % (ref 27–41)
MCH RBC QN AUTO: 29.6 PG (ref 27–31)
MCHC RBC AUTO-ENTMCNC: 32.9 G/DL (ref 32–36)
MCV RBC AUTO: 89.9 FL (ref 80–96)
MONOCYTES # BLD AUTO: 0.03 K/UL (ref 0–0.8)
MONOCYTES NFR BLD AUTO: 0.4 % (ref 2–6)
MPC BLD CALC-MCNC: 11.1 FL (ref 9.4–12.4)
NEUTROPHILS # BLD AUTO: 6.67 K/UL (ref 1.8–7.7)
NEUTROPHILS NFR BLD AUTO: 89.4 % (ref 53–65)
NRBC # BLD AUTO: 0 X10E3/UL
NRBC, AUTO (.00): 0 %
PLATELET # BLD AUTO: 230 K/UL (ref 150–400)
POTASSIUM SERPL-SCNC: 4.4 MMOL/L (ref 3.5–5.1)
RBC # BLD AUTO: 4.16 M/UL (ref 4.2–5.4)
RH BLD: NORMAL
SODIUM SERPL-SCNC: 142 MMOL/L (ref 136–145)
SPECIMEN OUTDATE: NORMAL
WBC # BLD AUTO: 7.46 K/UL (ref 4.5–11)

## 2023-09-14 PROCEDURE — 25000003 PHARM REV CODE 250: Performed by: FAMILY MEDICINE

## 2023-09-14 PROCEDURE — 37000008 HC ANESTHESIA 1ST 15 MINUTES: Performed by: ORTHOPAEDIC SURGERY

## 2023-09-14 PROCEDURE — 71000033 HC RECOVERY, INTIAL HOUR: Performed by: ORTHOPAEDIC SURGERY

## 2023-09-14 PROCEDURE — 71000016 HC POSTOP RECOV ADDL HR: Performed by: ORTHOPAEDIC SURGERY

## 2023-09-14 PROCEDURE — C1769 GUIDE WIRE: HCPCS | Performed by: ORTHOPAEDIC SURGERY

## 2023-09-14 PROCEDURE — 27447 TOTAL KNEE ARTHROPLASTY: CPT | Mod: RT,,, | Performed by: ORTHOPAEDIC SURGERY

## 2023-09-14 PROCEDURE — 85025 COMPLETE CBC W/AUTO DIFF WBC: CPT | Performed by: ORTHOPAEDIC SURGERY

## 2023-09-14 PROCEDURE — D9220A PRA ANESTHESIA: ICD-10-PCS | Mod: ANES,,, | Performed by: ANESTHESIOLOGY

## 2023-09-14 PROCEDURE — 99222 1ST HOSP IP/OBS MODERATE 55: CPT | Mod: ,,, | Performed by: FAMILY MEDICINE

## 2023-09-14 PROCEDURE — 71000015 HC POSTOP RECOV 1ST HR: Performed by: ORTHOPAEDIC SURGERY

## 2023-09-14 PROCEDURE — 27000655: Performed by: ANESTHESIOLOGY

## 2023-09-14 PROCEDURE — C1776 JOINT DEVICE (IMPLANTABLE): HCPCS | Performed by: ORTHOPAEDIC SURGERY

## 2023-09-14 PROCEDURE — D9220A PRA ANESTHESIA: Mod: ANES,,, | Performed by: ANESTHESIOLOGY

## 2023-09-14 PROCEDURE — 37000009 HC ANESTHESIA EA ADD 15 MINS: Performed by: ORTHOPAEDIC SURGERY

## 2023-09-14 PROCEDURE — 25000003 PHARM REV CODE 250: Performed by: ORTHOPAEDIC SURGERY

## 2023-09-14 PROCEDURE — 36000712 HC OR TIME LEV V 1ST 15 MIN: Performed by: ORTHOPAEDIC SURGERY

## 2023-09-14 PROCEDURE — 0055T BONE SRGRY CMPTR CT/MRI IMAG: CPT | Mod: ,,, | Performed by: ORTHOPAEDIC SURGERY

## 2023-09-14 PROCEDURE — 63600175 PHARM REV CODE 636 W HCPCS: Performed by: FAMILY MEDICINE

## 2023-09-14 PROCEDURE — 64447 PERIPHERAL BLOCK: ICD-10-PCS | Mod: 59,RT,, | Performed by: ANESTHESIOLOGY

## 2023-09-14 PROCEDURE — D9220A PRA ANESTHESIA: ICD-10-PCS | Mod: CRNA,,,

## 2023-09-14 PROCEDURE — 27000716 HC OXISENSOR PROBE, ANY SIZE: Performed by: ANESTHESIOLOGY

## 2023-09-14 PROCEDURE — 27447 PR TOTAL KNEE ARTHROPLASTY: ICD-10-PCS | Mod: RT,,, | Performed by: ORTHOPAEDIC SURGERY

## 2023-09-14 PROCEDURE — 80048 BASIC METABOLIC PNL TOTAL CA: CPT | Performed by: ORTHOPAEDIC SURGERY

## 2023-09-14 PROCEDURE — D9220A PRA ANESTHESIA: Mod: CRNA,,,

## 2023-09-14 PROCEDURE — 27201423 OPTIME MED/SURG SUP & DEVICES STERILE SUPPLY: Performed by: ORTHOPAEDIC SURGERY

## 2023-09-14 PROCEDURE — 27201960 HC SPINAL TRAY: Performed by: ANESTHESIOLOGY

## 2023-09-14 PROCEDURE — 25000003 PHARM REV CODE 250

## 2023-09-14 PROCEDURE — 36000713 HC OR TIME LEV V EA ADD 15 MIN: Performed by: ORTHOPAEDIC SURGERY

## 2023-09-14 PROCEDURE — 63600175 PHARM REV CODE 636 W HCPCS

## 2023-09-14 PROCEDURE — 99900035 HC TECH TIME PER 15 MIN (STAT)

## 2023-09-14 PROCEDURE — 27200750 HC INSULATED NEEDLE/ STIMUPLEX: Performed by: ANESTHESIOLOGY

## 2023-09-14 PROCEDURE — 86850 RBC ANTIBODY SCREEN: CPT | Performed by: ORTHOPAEDIC SURGERY

## 2023-09-14 PROCEDURE — 27000177 HC AIRWAY, LARYNGEAL MASK: Performed by: ANESTHESIOLOGY

## 2023-09-14 PROCEDURE — 97161 PT EVAL LOW COMPLEX 20 MIN: CPT

## 2023-09-14 PROCEDURE — 99222 PR INITIAL HOSPITAL CARE,LEVL II: ICD-10-PCS | Mod: ,,, | Performed by: FAMILY MEDICINE

## 2023-09-14 PROCEDURE — 0055T PR COMPUTER-ASSIST MUSCSKEL NAVIG, ORTHO PROC, CT/MRI: ICD-10-PCS | Mod: ,,, | Performed by: ORTHOPAEDIC SURGERY

## 2023-09-14 PROCEDURE — 64447 NJX AA&/STRD FEMORAL NRV IMG: CPT | Mod: 59,RT,, | Performed by: ANESTHESIOLOGY

## 2023-09-14 PROCEDURE — C1713 ANCHOR/SCREW BN/BN,TIS/BN: HCPCS | Performed by: ORTHOPAEDIC SURGERY

## 2023-09-14 PROCEDURE — 63600175 PHARM REV CODE 636 W HCPCS: Performed by: ANESTHESIOLOGY

## 2023-09-14 DEVICE — CEMENT BONE SURG SMPLX P RADPQ: Type: IMPLANTABLE DEVICE | Site: KNEE | Status: FUNCTIONAL

## 2023-09-14 DEVICE — PRIMARY TIBIAL BASEPLATE
Type: IMPLANTABLE DEVICE | Site: KNEE | Status: FUNCTIONAL
Brand: TRIATHLON

## 2023-09-14 DEVICE — TIBIAL BEARING INSERT - CS
Type: IMPLANTABLE DEVICE | Site: KNEE | Status: FUNCTIONAL
Brand: TRIATHLON

## 2023-09-14 DEVICE — ASYMMETRIC PATELLA
Type: IMPLANTABLE DEVICE | Site: KNEE | Status: FUNCTIONAL
Brand: TRIATHLON

## 2023-09-14 DEVICE — CRUCIATE RETAINING FEMORAL
Type: IMPLANTABLE DEVICE | Site: KNEE | Status: FUNCTIONAL
Brand: TRIATHLON

## 2023-09-14 RX ORDER — ASPIRIN 81 MG/1
81 TABLET ORAL DAILY
Status: DISCONTINUED | OUTPATIENT
Start: 2023-09-14 | End: 2023-09-15 | Stop reason: HOSPADM

## 2023-09-14 RX ORDER — EPHEDRINE SULFATE 50 MG/ML
INJECTION, SOLUTION INTRAVENOUS
Status: DISCONTINUED | OUTPATIENT
Start: 2023-09-14 | End: 2023-09-14

## 2023-09-14 RX ORDER — SODIUM CHLORIDE, SODIUM LACTATE, POTASSIUM CHLORIDE, CALCIUM CHLORIDE 600; 310; 30; 20 MG/100ML; MG/100ML; MG/100ML; MG/100ML
INJECTION, SOLUTION INTRAVENOUS CONTINUOUS
Status: DISCONTINUED | OUTPATIENT
Start: 2023-09-14 | End: 2023-09-14

## 2023-09-14 RX ORDER — FENTANYL CITRATE 50 UG/ML
INJECTION, SOLUTION INTRAMUSCULAR; INTRAVENOUS
Status: DISCONTINUED | OUTPATIENT
Start: 2023-09-14 | End: 2023-09-14

## 2023-09-14 RX ORDER — HYDROMORPHONE HYDROCHLORIDE 2 MG/ML
1 INJECTION, SOLUTION INTRAMUSCULAR; INTRAVENOUS; SUBCUTANEOUS EVERY 4 HOURS PRN
Status: DISCONTINUED | OUTPATIENT
Start: 2023-09-14 | End: 2023-09-15 | Stop reason: HOSPADM

## 2023-09-14 RX ORDER — ONDANSETRON 2 MG/ML
4 INJECTION INTRAMUSCULAR; INTRAVENOUS EVERY 8 HOURS PRN
Status: DISCONTINUED | OUTPATIENT
Start: 2023-09-14 | End: 2023-09-15 | Stop reason: HOSPADM

## 2023-09-14 RX ORDER — MIDAZOLAM HYDROCHLORIDE 1 MG/ML
INJECTION INTRAMUSCULAR; INTRAVENOUS
Status: DISCONTINUED | OUTPATIENT
Start: 2023-09-14 | End: 2023-09-14

## 2023-09-14 RX ORDER — DEXAMETHASONE SODIUM PHOSPHATE 4 MG/ML
INJECTION, SOLUTION INTRA-ARTICULAR; INTRALESIONAL; INTRAMUSCULAR; INTRAVENOUS; SOFT TISSUE
Status: DISCONTINUED | OUTPATIENT
Start: 2023-09-14 | End: 2023-09-14

## 2023-09-14 RX ORDER — SODIUM CHLORIDE 9 MG/ML
INJECTION, SOLUTION INTRAVENOUS CONTINUOUS
Status: DISCONTINUED | OUTPATIENT
Start: 2023-09-14 | End: 2023-09-14

## 2023-09-14 RX ORDER — BUPIVACAINE HYDROCHLORIDE 7.5 MG/ML
INJECTION, SOLUTION EPIDURAL; RETROBULBAR
Status: COMPLETED | OUTPATIENT
Start: 2023-09-14 | End: 2023-09-14

## 2023-09-14 RX ORDER — SODIUM CHLORIDE 9 MG/ML
INJECTION, SOLUTION INTRAVENOUS CONTINUOUS
Status: DISCONTINUED | OUTPATIENT
Start: 2023-09-14 | End: 2023-09-15 | Stop reason: HOSPADM

## 2023-09-14 RX ORDER — ROPIVACAINE HYDROCHLORIDE 7.5 MG/ML
INJECTION, SOLUTION EPIDURAL; PERINEURAL
Status: COMPLETED | OUTPATIENT
Start: 2023-09-14 | End: 2023-09-14

## 2023-09-14 RX ORDER — HYDROCODONE BITARTRATE AND ACETAMINOPHEN 5; 325 MG/1; MG/1
1 TABLET ORAL EVERY 4 HOURS PRN
Status: DISCONTINUED | OUTPATIENT
Start: 2023-09-14 | End: 2023-09-15 | Stop reason: HOSPADM

## 2023-09-14 RX ORDER — EPINEPHRINE 1 MG/ML
INJECTION, SOLUTION, CONCENTRATE INTRAVENOUS
Status: DISCONTINUED | OUTPATIENT
Start: 2023-09-14 | End: 2023-09-14

## 2023-09-14 RX ORDER — FAMOTIDINE 20 MG/1
40 TABLET, FILM COATED ORAL 2 TIMES DAILY
Status: DISCONTINUED | OUTPATIENT
Start: 2023-09-14 | End: 2023-09-15 | Stop reason: HOSPADM

## 2023-09-14 RX ORDER — SODIUM CHLORIDE, SODIUM LACTATE, POTASSIUM CHLORIDE, CALCIUM CHLORIDE 600; 310; 30; 20 MG/100ML; MG/100ML; MG/100ML; MG/100ML
125 INJECTION, SOLUTION INTRAVENOUS CONTINUOUS
Status: DISCONTINUED | OUTPATIENT
Start: 2023-09-14 | End: 2023-09-15 | Stop reason: HOSPADM

## 2023-09-14 RX ORDER — HYDROMORPHONE HYDROCHLORIDE 2 MG/ML
INJECTION, SOLUTION INTRAMUSCULAR; INTRAVENOUS; SUBCUTANEOUS
Status: DISCONTINUED | OUTPATIENT
Start: 2023-09-14 | End: 2023-09-14

## 2023-09-14 RX ORDER — BISACODYL 10 MG
10 SUPPOSITORY, RECTAL RECTAL DAILY PRN
Status: DISCONTINUED | OUTPATIENT
Start: 2023-09-14 | End: 2023-09-15 | Stop reason: HOSPADM

## 2023-09-14 RX ORDER — GABAPENTIN 300 MG/1
600 CAPSULE ORAL 2 TIMES DAILY
Status: DISCONTINUED | OUTPATIENT
Start: 2023-09-14 | End: 2023-09-15 | Stop reason: HOSPADM

## 2023-09-14 RX ORDER — ONDANSETRON 2 MG/ML
4 INJECTION INTRAMUSCULAR; INTRAVENOUS DAILY PRN
Status: DISCONTINUED | OUTPATIENT
Start: 2023-09-14 | End: 2023-09-14 | Stop reason: HOSPADM

## 2023-09-14 RX ORDER — PHENYLEPHRINE HYDROCHLORIDE 10 MG/ML
INJECTION INTRAVENOUS
Status: DISCONTINUED | OUTPATIENT
Start: 2023-09-14 | End: 2023-09-14

## 2023-09-14 RX ORDER — CETIRIZINE HYDROCHLORIDE 10 MG/1
10 TABLET ORAL DAILY
Status: DISCONTINUED | OUTPATIENT
Start: 2023-09-14 | End: 2023-09-15 | Stop reason: HOSPADM

## 2023-09-14 RX ORDER — LIDOCAINE HYDROCHLORIDE 10 MG/ML
1 INJECTION, SOLUTION EPIDURAL; INFILTRATION; INTRACAUDAL; PERINEURAL ONCE
Status: DISCONTINUED | OUTPATIENT
Start: 2023-09-14 | End: 2023-09-14

## 2023-09-14 RX ORDER — HYDROMORPHONE HYDROCHLORIDE 2 MG/ML
0.5 INJECTION, SOLUTION INTRAMUSCULAR; INTRAVENOUS; SUBCUTANEOUS EVERY 5 MIN PRN
Status: DISCONTINUED | OUTPATIENT
Start: 2023-09-14 | End: 2023-09-14 | Stop reason: HOSPADM

## 2023-09-14 RX ORDER — KETOROLAC TROMETHAMINE 30 MG/ML
INJECTION, SOLUTION INTRAMUSCULAR; INTRAVENOUS
Status: DISCONTINUED | OUTPATIENT
Start: 2023-09-14 | End: 2023-09-14

## 2023-09-14 RX ORDER — TRANEXAMIC ACID 100 MG/ML
INJECTION, SOLUTION INTRAVENOUS
Status: DISCONTINUED | OUTPATIENT
Start: 2023-09-14 | End: 2023-09-14

## 2023-09-14 RX ORDER — PROPOFOL 10 MG/ML
VIAL (ML) INTRAVENOUS
Status: DISCONTINUED | OUTPATIENT
Start: 2023-09-14 | End: 2023-09-14

## 2023-09-14 RX ORDER — ONDANSETRON 2 MG/ML
INJECTION INTRAMUSCULAR; INTRAVENOUS
Status: DISCONTINUED | OUTPATIENT
Start: 2023-09-14 | End: 2023-09-14

## 2023-09-14 RX ORDER — DIPHENHYDRAMINE HYDROCHLORIDE 50 MG/ML
25 INJECTION INTRAMUSCULAR; INTRAVENOUS EVERY 6 HOURS PRN
Status: DISCONTINUED | OUTPATIENT
Start: 2023-09-14 | End: 2023-09-14 | Stop reason: HOSPADM

## 2023-09-14 RX ORDER — LIDOCAINE HYDROCHLORIDE 20 MG/ML
INJECTION INTRAVENOUS
Status: DISCONTINUED | OUTPATIENT
Start: 2023-09-14 | End: 2023-09-14

## 2023-09-14 RX ORDER — MEPERIDINE HYDROCHLORIDE 25 MG/ML
25 INJECTION INTRAMUSCULAR; INTRAVENOUS; SUBCUTANEOUS EVERY 10 MIN PRN
Status: DISCONTINUED | OUTPATIENT
Start: 2023-09-14 | End: 2023-09-14 | Stop reason: HOSPADM

## 2023-09-14 RX ORDER — CEFAZOLIN SODIUM 1 G/3ML
INJECTION, POWDER, FOR SOLUTION INTRAMUSCULAR; INTRAVENOUS
Status: DISCONTINUED | OUTPATIENT
Start: 2023-09-14 | End: 2023-09-14

## 2023-09-14 RX ORDER — DOCUSATE SODIUM 100 MG/1
100 CAPSULE, LIQUID FILLED ORAL EVERY 12 HOURS
Status: DISCONTINUED | OUTPATIENT
Start: 2023-09-14 | End: 2023-09-15 | Stop reason: HOSPADM

## 2023-09-14 RX ADMIN — VANCOMYCIN HYDROCHLORIDE 1500 MG: 1 INJECTION, POWDER, LYOPHILIZED, FOR SOLUTION INTRAVENOUS at 08:09

## 2023-09-14 RX ADMIN — HYDROMORPHONE HYDROCHLORIDE 1 MG: 2 INJECTION, SOLUTION INTRAMUSCULAR; INTRAVENOUS; SUBCUTANEOUS at 09:09

## 2023-09-14 RX ADMIN — HYDROCODONE BITARTRATE AND ACETAMINOPHEN 1 TABLET: 5; 325 TABLET ORAL at 05:09

## 2023-09-14 RX ADMIN — GABAPENTIN 600 MG: 300 CAPSULE ORAL at 09:09

## 2023-09-14 RX ADMIN — HYDROCODONE BITARTRATE AND ACETAMINOPHEN 1 TABLET: 5; 325 TABLET ORAL at 10:09

## 2023-09-14 RX ADMIN — ASPIRIN 81 MG: 81 TABLET, COATED ORAL at 04:09

## 2023-09-14 RX ADMIN — SODIUM CHLORIDE: 9 INJECTION, SOLUTION INTRAVENOUS at 08:09

## 2023-09-14 RX ADMIN — DEXAMETHASONE SODIUM PHOSPHATE 8 MG: 4 INJECTION, SOLUTION INTRA-ARTICULAR; INTRALESIONAL; INTRAMUSCULAR; INTRAVENOUS; SOFT TISSUE at 08:09

## 2023-09-14 RX ADMIN — TRANEXAMIC ACID 1000 MG: 100 INJECTION, SOLUTION INTRAVENOUS at 10:09

## 2023-09-14 RX ADMIN — TRANEXAMIC ACID 1000 MG: 100 INJECTION, SOLUTION INTRAVENOUS at 08:09

## 2023-09-14 RX ADMIN — FENTANYL CITRATE 100 MCG: 50 INJECTION INTRAMUSCULAR; INTRAVENOUS at 08:09

## 2023-09-14 RX ADMIN — BUPIVACAINE HYDROCHLORIDE 1.5 ML: 7.5 INJECTION, SOLUTION EPIDURAL; RETROBULBAR at 08:09

## 2023-09-14 RX ADMIN — SODIUM CHLORIDE: 9 INJECTION, SOLUTION INTRAVENOUS at 07:09

## 2023-09-14 RX ADMIN — CETIRIZINE HYDROCHLORIDE 10 MG: 10 TABLET, FILM COATED ORAL at 04:09

## 2023-09-14 RX ADMIN — FAMOTIDINE 40 MG: 20 TABLET ORAL at 09:09

## 2023-09-14 RX ADMIN — GABAPENTIN 600 MG: 300 CAPSULE ORAL at 04:09

## 2023-09-14 RX ADMIN — FAMOTIDINE 40 MG: 20 TABLET ORAL at 04:09

## 2023-09-14 RX ADMIN — DEXAMETHASONE SODIUM PHOSPHATE 4 MG: 4 INJECTION, SOLUTION INTRA-ARTICULAR; INTRALESIONAL; INTRAMUSCULAR; INTRAVENOUS; SOFT TISSUE at 08:09

## 2023-09-14 RX ADMIN — HYDROMORPHONE HYDROCHLORIDE 1 MG: 2 INJECTION, SOLUTION INTRAMUSCULAR; INTRAVENOUS; SUBCUTANEOUS at 05:09

## 2023-09-14 RX ADMIN — CEFAZOLIN 2 G: 2 INJECTION, POWDER, FOR SOLUTION INTRAMUSCULAR; INTRAVENOUS at 11:09

## 2023-09-14 RX ADMIN — CEFAZOLIN 2 G: 1 INJECTION, POWDER, FOR SOLUTION INTRAMUSCULAR; INTRAVENOUS; PARENTERAL at 08:09

## 2023-09-14 RX ADMIN — VANCOMYCIN HYDROCHLORIDE 1000 MG: 1 INJECTION, POWDER, LYOPHILIZED, FOR SOLUTION INTRAVENOUS at 04:09

## 2023-09-14 RX ADMIN — ONDANSETRON 8 MG: 2 INJECTION INTRAMUSCULAR; INTRAVENOUS at 08:09

## 2023-09-14 RX ADMIN — HYDROCODONE BITARTRATE AND ACETAMINOPHEN 1 TABLET: 5; 325 TABLET ORAL at 01:09

## 2023-09-14 RX ADMIN — DOCUSATE SODIUM 100 MG: 100 CAPSULE, LIQUID FILLED ORAL at 04:09

## 2023-09-14 RX ADMIN — KETOROLAC TROMETHAMINE 60 MG: 30 INJECTION, SOLUTION INTRAMUSCULAR at 08:09

## 2023-09-14 RX ADMIN — HYDROMORPHONE HYDROCHLORIDE 1 MG: 2 INJECTION, SOLUTION INTRAMUSCULAR; INTRAVENOUS; SUBCUTANEOUS at 10:09

## 2023-09-14 RX ADMIN — EPHEDRINE SULFATE 25 MG: 50 INJECTION INTRAVENOUS at 08:09

## 2023-09-14 RX ADMIN — PROPOFOL 150 MG: 10 INJECTION, EMULSION INTRAVENOUS at 08:09

## 2023-09-14 RX ADMIN — ROPIVACAINE HYDROCHLORIDE 30 ML: 7.5 INJECTION, SOLUTION EPIDURAL; PERINEURAL at 08:09

## 2023-09-14 RX ADMIN — DOCUSATE SODIUM 100 MG: 100 CAPSULE, LIQUID FILLED ORAL at 09:09

## 2023-09-14 RX ADMIN — HYDROMORPHONE HYDROCHLORIDE 2 MG: 2 TABLET ORAL at 02:09

## 2023-09-14 RX ADMIN — MIDAZOLAM 2 MG: 1 INJECTION INTRAMUSCULAR; INTRAVENOUS at 08:09

## 2023-09-14 RX ADMIN — EPHEDRINE SULFATE 25 MG: 50 INJECTION INTRAVENOUS at 09:09

## 2023-09-14 RX ADMIN — LIDOCAINE HYDROCHLORIDE 100 MG: 20 INJECTION, SOLUTION INTRAVENOUS at 08:09

## 2023-09-14 RX ADMIN — SODIUM CHLORIDE: 9 INJECTION, SOLUTION INTRAVENOUS at 10:09

## 2023-09-14 RX ADMIN — EPINEPHRINE 0.01 MCG: 1 INJECTION, SOLUTION, CONCENTRATE INTRAVENOUS at 08:09

## 2023-09-14 RX ADMIN — CALCIUM CHLORIDE 0.5 G: 100 INJECTION, SOLUTION INTRAVENOUS at 08:09

## 2023-09-14 RX ADMIN — PHENYLEPHRINE HYDROCHLORIDE 100 MCG: 10 INJECTION INTRAVENOUS at 08:09

## 2023-09-14 RX ADMIN — CEFAZOLIN 2 G: 2 INJECTION, POWDER, FOR SOLUTION INTRAMUSCULAR; INTRAVENOUS at 04:09

## 2023-09-14 NOTE — ANESTHESIA PROCEDURE NOTES
Intubation    Date/Time: 9/14/2023 8:33 AM    Performed by: Jose Del Real CRNA  Authorized by: Abdullahi Roman MD    Intubation:     Induction:  Intravenous    Mask Ventilation:  Easy mask    Difficult Airway Encountered?: No      Airway Device:  Supraglottic airway/LMA    Airway Device Size:  4.0    Placement Verified By:  Capnometry    Complicating Factors:  None    Findings Post-Intubation:  BS equal bilateral and atraumatic/condition of teeth unchanged

## 2023-09-14 NOTE — TRANSFER OF CARE
"Anesthesia Transfer of Care Note    Patient: Vandana Price    Procedure(s) Performed: Procedure(s) (LRB):  ARTHROPLASTY, KNEE, TOTAL, USING COMPUTER-ASSISTED NAVIGATION (Right)    Patient location: PACU    Anesthesia Type: general, spinal and regional    Transport from OR: Transported from OR on room air with adequate spontaneous ventilation    Post pain: adequate analgesia    Post assessment: no apparent anesthetic complications    Post vital signs: stable    Level of consciousness: awake, alert and oriented    Nausea/Vomiting: no nausea/vomiting    Complications: none    Transfer of care protocol was followedComments: Refer to nursing note for pain akshat score upon discharge from recovery      Last vitals:   Visit Vitals  /76   Pulse 95   Temp 36.7 °C (98.1 °F)   Resp 16   Ht 5' 6" (1.676 m)   Wt 112.5 kg (248 lb)   LMP 06/08/2020   SpO2 96%   Breastfeeding No   BMI 40.03 kg/m²     "

## 2023-09-14 NOTE — ANESTHESIA POSTPROCEDURE EVALUATION
Anesthesia Post Evaluation    Patient: Vandana Price    Procedure(s) Performed: Procedure(s) (LRB):  ARTHROPLASTY, KNEE, TOTAL, USING COMPUTER-ASSISTED NAVIGATION (Right)    Final Anesthesia Type: general      Patient location during evaluation: PACU  Patient participation: Yes- Able to Participate  Level of consciousness: awake and sedated  Post-procedure vital signs: reviewed and stable  Pain management: adequate  Airway patency: patent    PONV status at discharge: No PONV  Anesthetic complications: no      Cardiovascular status: blood pressure returned to baseline  Respiratory status: unassisted  Hydration status: euvolemic  Follow-up not needed.          Vitals Value Taken Time   /92 09/14/23 1246   Temp 36.7 °C (98.1 °F) 09/14/23 1130   Pulse 90 09/14/23 1316   Resp 18 09/14/23 1312   SpO2 96 % 09/14/23 1316   Vitals shown include unvalidated device data.      No case tracking events are documented in the log.      Pain/Dinorah Score: Pain Rating Prior to Med Admin: 6 (9/14/2023  1:12 PM)  Dinorah Score: 9 (9/14/2023 11:30 AM)

## 2023-09-14 NOTE — PLAN OF CARE
[facility] - Orthopedic Periop Services  Initial Discharge Assessment       Primary Care Provider: Dorinda Harman NP    Admission Diagnosis: Chronic pain of right knee [M25.561, G89.29]  Bilateral primary osteoarthritis of knee [M17.0]    Admission Date: 9/14/2023  Expected Discharge Date:     Transition of Care Barriers: None    Payor: BLUE CROSS [facility] / Plan: [place] OF [place] [employer] EM[employer]YEES / Product Type: Commercial /     Extended Emergency Contact Information  Primary Emergency Contact: SanthoshSurjit  Mobile Phone: [phone]  Relation: Other  Preferred language: English   needed? No    Discharge Plan A: Home with family, Home Health  Discharge Plan B: Home with family, Home Health      [facility] - [place], [place] - [address] [place] [address]  Phone: [phone] Fax: [phone]    [facility] - [place], [place] - [address]  [address]  [place] [address]  Phone: [phone] Fax: [phone]      Initial Assessment (most recent)       Adult Discharge Assessment - 09/14/23 1451          Discharge Assessment    Assessment Type Discharge Planning Assessment     Source of Information patient     Communicated EDER with patient/caregiver Yes     People in Home alone     Facility Arrived From: home     Do you expect to return to your current living situation? Yes     Do you have help at home or someone to help you manage your care at home? Yes     Who are your caregiver(s) and their phone number(s)? Surjit Trevizo (son) [phone]     Prior to hospitilization cognitive status: Unable to Assess     Current cognitive status: Alert/Oriented     Home Accessibility not wheelchair accessible     Home Layout Able to live on 1st floor     Equipment Currently Used at Home walker, rolling;bedside commode     Readmission within 30 days? No     Patient currently being followed by outpatient case management? No     Do you currently have service(s)  that help you manage your care at home? No     Do you take prescription medications? Yes     Do you have prescription coverage? Yes     Do you have any problems affording any of your prescribed medications? No     Who is going to help you get home at discharge? Surjit Trevizo (son) [phone]     How do you get to doctors appointments? car, drives self;family or friend will provide     Are you on dialysis? No     Do you take coumadin? No     DME Needed Upon Discharge  none     Discharge Plan discussed with: Patient;Adult children     Transition of Care Barriers None     Discharge Plan A Home with family;Home Health     Discharge Plan B Home with family;Home Health        Physical Activity    On average, how many days per week do you engage in moderate to strenuous exercise (like a brisk walk)? 0 days     On average, how many minutes do you engage in exercise at this level? 0 min        Financial Resource Strain    How hard is it for you to pay for the very basics like food, housing, medical care, and heating? Not hard at all        Housing Stability    In the last 12 months, was there a time when you were not able to pay the mortgage or rent on time? No     In the last 12 months, how many places have you lived? 1     In the last 12 months, was there a time when you did not have a steady place to sleep or slept in a shelter (including now)? No        Transportation Needs    In the past 12 months, has lack of transportation kept you from medical appointments or from getting medications? No     In the past 12 months, has lack of transportation kept you from meetings, work, or from getting things needed for daily living? No        Food Insecurity    Within the past 12 months, you worried that your food would run out before you got the money to buy more. Never true     Within the past 12 months, the food you bought just didn't last and you didn't have money to get more. Never true        Stress    Do you feel stress -  tense, restless, nervous, or anxious, or unable to sleep at night because your mind is troubled all the time - these days? Rather much        Social Connections    In a typical week, how many times do you talk on the phone with family, friends, or neighbors? More than three times a week     How often do you get together with friends or relatives? Once a week     How often do you attend Roman Catholic or Muslim services? More than 4 times per year     Do you belong to any clubs or organizations such as Roman Catholic groups, unions, fraternal or athletic groups, or school groups? No     How often do you attend meetings of the clubs or organizations you belong to? Never     Are you , , , , never , or living with a partner?         Alcohol Use    Q1: How often do you have a drink containing alcohol? Never     Q2: How many drinks containing alcohol do you have on a typical day when you are drinking? Patient does not drink     Q3: How often do you have six or more drinks on one occasion? Never                   Sw spoke with pt and Surjit Trevizo (son) [phone] at bedside. Pta, pt lived home alone. No hh, has rw and bsc. Dcp is home with [facility] health. Ss following for dc needs and recommendations.

## 2023-09-14 NOTE — PT/OT/SLP EVAL
"Physical Therapy Evaluation     Patient Name: Vandana Price   MRN: [id number]  Recent Surgery: Procedure(s) (LRB):  ARTHROPLASTY, KNEE, TOTAL, USING COMPUTER-ASSISTED NAVIGATION (Right) Day of Surgery    Recommendations:     Discharge Recommendations: home with home health   Discharge Equipment Recommendations: walker, rolling, bedside commode   Barriers to discharge: Increased level of assist and Ongoing medical treatment    Assessment:     Vandana Price is a 58 y.o. female admitted with a medical diagnosis of Bilateral primary osteoarthritis of knee. She presents with the following impairments/functional limitations: weakness, impaired functional mobility, gait instability, impaired balance, pain, decreased ROM.   Pt was alert and eager to participate in evaluation.     Rehab Prognosis: Good; patient would benefit from acute PT services to address these deficits and reach maximum level of function.    Plan:     During this hospitalization, patient to be seen BID (5x/week; x2/daily) to address the above listed problems via gait training, therapeutic activities, therapeutic exercises    Plan of Care Expires: 10/14/23    Subjective     Chief Complaint: R TKA  Patient Comments/Goals: "To get back to work"  Pain/Comfort:  Pain Rating 1: 2/10  Location - Side 1: Right  Location 1: knee  Pain Addressed 1: Reposition, Distraction  Pain Rating Post-Intervention 1: 2/10    Social History:  Living Environment: Patient lives with their son in a single story home with number of outside stair(s): 7 with "metal rail that doesn't do much"  Prior Level of Function: Prior to admission, patient was independent and driving and working  Equipment Used at Home: walker, rolling, bedside commode  DME owned (not currently used): rolling walker and bedside commode  Assistance Upon Discharge: family    Objective:     Communicated with ALANNA Brewer prior to session. Patient found HOB elevated with cryotherapy, knee immobilizer, ortiz catheter, " peripheral IV, pulse ox (continuous) upon PT entry to room.    General Precautions: Standard, fall   Orthopedic Precautions: RLE weight bearing as tolerated   Braces: Knee immobilizer    Respiratory Status: Room air    Exams:  RLE ROM: WFL except knee immobilized   RLE Strength: Deficits: 3+/5 except knee n/t  LLE ROM: WNL  LLE Strength: WNL  Cognitive: Patient is oriented to Person, Place, Time, Situation  Sensation:    -       Intact    Functional Mobility:  Gait belt applied - Yes  Bed Mobility  Supine to Sit: stand by assistance for LE management  Sit to Supine: stand by assistance for LE management  Transfers  Sit to Stand: contact guard assistance and minimum assistance with rolling walker   Gait  Patient ambulated 20ftx2 with rolling walker and stand by assistance and contact guard assistance. Patient required cues for weight bearing status to increase independence and safety. Patient required cues ~ 25% of the time.  Balance  Sitting: GOOD: Maintains balance through MODERATE excursions of active trunk movement  Standing: FAIR: Needs CONTACT GUARD during gait  Stairs were not assessed during evaluation; will be addressed during treatment    Therapeutic Activities and Exercises:  Patient educated on role of acute care PT and PT POC, safety while in hospital including calling nurse for mobility, and call light usage.  Educated about weightbearing precautions and provided cuing for adherence as appropriate during session.  Educated about importance of OOB mobility and remaining up in chair most of the day.      AM-PAC 6 CLICK MOBILITY  Total Score:23    Patient left HOB elevated with all lines intact, call button in reach, RN notified, and son present.    GOALS:   Multidisciplinary Problems       Physical Therapy Goals          Problem: Physical Therapy    Goal Priority Disciplines Outcome Goal Variances Interventions   Physical Therapy Goal     PT, PT/OT Ongoing, Progressing     Description: Short Term Goals to  be met by: 2023    Patient will increase functional independence with mobility by performin. Supine to sit with Modified Shubert  2. Sit to stand transfer with Modified Shubert  3. Bed to chair transfer with Modified Shubert using Rolling Walker, WBAT R LE  4. Gait  x 100 feet with Modified Shubert using Rolling Walker, WBAT R LE.   5. Lower extremity exercise program x30 reps per handout, with assistance as needed  6. Knee ROM 0-90  7. Negotiate 7 steps with modified independence using rolling walker, WBAT RLE    Long Term Goals to be met by: 10/14/2023    Pt will regain full independent functional mobility to return to prior activities of daily living.                        History:     Past Medical History:   Diagnosis Date    Allergy     Bilateral primary osteoarthritis of knee     Chronic pain of both knees 10/14/2021    Eosinophilia     Gangrene of gallbladder in cholecystitis 2021    Hematochezia     History of colonoscopy     Hyperlipidemia     Hypertension     Sleep apnea        Past Surgical History:   Procedure Laterality Date    ARTHROPLASTY, KNEE, TOTAL, USING COMPUTER-ASSISTED NAVIGATION Left 12/15/2022    Procedure: ARTHROPLASTY, KNEE, TOTAL, USING COMPUTER-ASSISTED NAVIGATION;  Surgeon: Jerome Valladares MD;  Location: [facility];  Service: Orthopedics;  Laterality: Left;    Bilateral IA knee injection Bilateral 2021    D Shows    bladder tact       SECTION      CHOLECYSTECTOMY      FRACTURE SURGERY      jaw 1972    INJECTION      back    INJECTION OF ANESTHETIC AGENT AROUND MEDIAL BRANCH NERVES INNERVATING LUMBAR FACET JOINT Bilateral 2023    Procedure: BIlateral L4-5,5-S1 MBB;  Surgeon: Amanda Goetz MD;  Location: [facility];  Service: Pain Management;  Laterality: Bilateral;  LOCAL    INJECTION OF ANESTHETIC AGENT AROUND NERVE Bilateral 2022    Procedure: GNB   BILATERAL;  Surgeon: Amanda Goetz MD;  Location:  [facility];  Service: Pain Management;  Laterality: Bilateral;  PT HAS NOT HAD VAC   STATES WILL BE TESTED AT [facility] IN [place]    knee scope Bilateral     KNEE SURGERY      LAPAROSCOPIC CHOLECYSTECTOMY N/A 03/31/2021    Procedure: LAPAROSCOPIC CHOLECYSTECTOMY CONVERTED TO OPEN;  Surgeon: Darnell Mcgraw MD;  Location: [facility];  Service: General;  Laterality: N/A;  CONVERTED TO OPEN    TUBAL LIGATION  2004       Time Tracking:     PT Received On: 09/14/23  PT Start Time: 1620  PT Stop Time: 1637  PT Total Time (min): 17 min     Billable Minutes: Evaluation low complexity 17    9/14/2023

## 2023-09-14 NOTE — SUBJECTIVE & OBJECTIVE
Past Medical History:   Diagnosis Date    Allergy     Bilateral primary osteoarthritis of knee     Chronic pain of both knees 10/14/2021    Eosinophilia     Gangrene of gallbladder in cholecystitis 2021    Hematochezia     History of colonoscopy     Hyperlipidemia     Hypertension     Sleep apnea        Past Surgical History:   Procedure Laterality Date    ARTHROPLASTY, KNEE, TOTAL, USING COMPUTER-ASSISTED NAVIGATION Left 12/15/2022    Procedure: ARTHROPLASTY, KNEE, TOTAL, USING COMPUTER-ASSISTED NAVIGATION;  Surgeon: Jerome Valladares MD;  Location: [facility];  Service: Orthopedics;  Laterality: Left;    Bilateral IA knee injection Bilateral 2021    D Shows    bladder tact       SECTION      CHOLECYSTECTOMY      FRACTURE SURGERY      jaw     INJECTION      back    INJECTION OF ANESTHETIC AGENT AROUND MEDIAL BRANCH NERVES INNERVATING LUMBAR FACET JOINT Bilateral 2023    Procedure: BIlateral L4-5,5-S1 MBB;  Surgeon: Amanda Goetz MD;  Location: [facility];  Service: Pain Management;  Laterality: Bilateral;  LOCAL    INJECTION OF ANESTHETIC AGENT AROUND NERVE Bilateral 2022    Procedure: GNB   BILATERAL;  Surgeon: Amanda Goetz MD;  Location: [facility];  Service: Pain Management;  Laterality: Bilateral;  PT HAS NOT HAD VAC   STATES WILL BE TESTED AT [facility] IN [place]    knee scope Bilateral     KNEE SURGERY      LAPAROSCOPIC CHOLECYSTECTOMY N/A 2021    Procedure: LAPAROSCOPIC CHOLECYSTECTOMY CONVERTED TO OPEN;  Surgeon: Darnell Mcgraw MD;  Location: [facility] OR;  Service: General;  Laterality: N/A;  CONVERTED TO OPEN    TUBAL LIGATION         Review of patient's allergies indicates:   Allergen Reactions    Opioids - morphine analogues Rash     Development of red rash at site morphine given.        No current facility-administered medications on file prior to encounter.     Current Outpatient Medications on File Prior to Encounter    Medication Sig    adalimumab (HUMIRA,CF, PEN) 40 mg/0.4 mL PnKt Inject 40mg under the skin every other week as directed by physician.    aspirin (ECOTRIN) 81 MG EC tablet Take 81 mg by mouth once daily.    celecoxib (CELEBREX) 200 MG capsule Take 1 capsule (200 mg total) by mouth once daily.    cetirizine (ZYRTEC) 10 MG tablet Take 1 tablet (10 mg total) by mouth once daily.    famotidine (PEPCID) 40 MG tablet Take 1 tablet (40 mg total) by mouth 2 (two) times daily.    gabapentin (NEURONTIN) 300 MG capsule Take 2 capsules (600 mg total) by mouth 2 (two) times daily.    [DISCONTINUED] acetaminophen-codeine 300-30mg (TYLENOL #3) 300-30 mg Tab Take 1 tablet by mouth every 12 (twelve) hours as needed (pain). (Patient not taking: Reported on 2023)     Family History       Problem Relation (Age of Onset)    Alcohol abuse Mother    Heart disease Father    Hypertension Father    Lung cancer Maternal Aunt    Migraines Daughter          Tobacco Use    Smoking status: Former     Current packs/day: 0.00     Average packs/day: 1 pack/day for 33.0 years (33.0 ttl pk-yrs)     Types: Cigarettes     Start date:      Quit date:      Years since quittin.7     Passive exposure: Never    Smokeless tobacco: Never   Substance and Sexual Activity    Alcohol use: Not Currently    Drug use: Not Currently     Types: Methamphetamines    Sexual activity: Not Currently     Review of Systems   Constitutional:  Negative for chills and fever.   HENT:  Negative for sinus pressure and sinus pain.    Eyes:  Negative for pain and visual disturbance.   Respiratory:  Negative for cough and shortness of breath.    Cardiovascular:  Negative for chest pain and palpitations.   Gastrointestinal:  Negative for abdominal pain, nausea and vomiting.   Endocrine: Negative for polydipsia and polyuria.   Genitourinary:  Negative for dysuria and hematuria.   Musculoskeletal:  Positive for arthralgias. Negative for neck stiffness.   Skin:  Negative  for color change and pallor.   Neurological:  Negative for dizziness and weakness.   Hematological:  Negative for adenopathy. Does not bruise/bleed easily.   Psychiatric/Behavioral:  Negative for behavioral problems and confusion.      Objective:     Vital Signs (Most Recent):  Temp: 98.1 °F (36.7 °C) (09/14/23 1130)  Pulse: 96 (09/14/23 1245)  Resp: 18 (09/14/23 1312)  BP: (!) 132/92 (09/14/23 1245)  SpO2: (!) 93 % (09/14/23 1245) Vital Signs (24h Range):  Temp:  [98 °F (36.7 °C)-98.1 °F (36.7 °C)] 98.1 °F (36.7 °C)  Pulse:  [79-96] 96  Resp:  [14-18] 18  SpO2:  [88 %-98 %] 93 %  BP: (102-138)/(61-92) 132/92     Weight: 112.5 kg (248 lb)  Body mass index is 40.03 kg/m².     Physical Exam  Vitals reviewed.   Constitutional:       General: She is not in acute distress.     Appearance: She is not toxic-appearing.   HENT:      Right Ear: External ear normal.      Left Ear: External ear normal.      Nose: Nose normal.   Eyes:      General: No scleral icterus.  Neck:      Vascular: No carotid bruit.   Cardiovascular:      Rate and Rhythm: Normal rate and regular rhythm.      Heart sounds: Normal heart sounds.   Pulmonary:      Effort: Pulmonary effort is normal. No respiratory distress.      Breath sounds: Normal breath sounds. No wheezing.   Abdominal:      General: Abdomen is flat. Bowel sounds are normal. There is no distension.      Palpations: Abdomen is soft.      Tenderness: There is no abdominal tenderness.   Musculoskeletal:      Comments: R leg in knee immobilizer.    Lymphadenopathy:      Cervical: No cervical adenopathy.   Skin:     General: Skin is warm and dry.      Capillary Refill: Capillary refill takes less than 2 seconds.   Neurological:      General: No focal deficit present.      Mental Status: She is alert and oriented to person, place, and time.   Psychiatric:         Mood and Affect: Mood normal.          Significant Labs: All pertinent labs within the past 24 hours have been reviewed.  BMP:    Recent Labs   Lab 09/14/23  1155   *      K 4.4   *   CO2 23   BUN 16   CREATININE 1.14*   CALCIUM 8.7     CBC:   Recent Labs   Lab 09/14/23  1302   WBC 7.46   HGB 12.3   HCT 37.4*          Significant Imaging: I have reviewed all pertinent imaging results/findings within the past 24 hours.

## 2023-09-14 NOTE — CONSULTS
[facility] Orthopedic Periop Services  [facility] Medicine  Consult Note    Patient Name: Vandana Price  MRN: [id number]  Admission Date: 2023  Hospital Length of Stay: 0 days  Attending Physician: Jerome Valladares MD   Primary Care Provider: Dorinda Harman NP           Patient information was obtained from patient and past medical records.     Consults  Subjective:     Principal Problem: Bilateral primary osteoarthritis of knee    Chief Complaint: No chief complaint on file.       HPI: Patient is a very pleasant 59 y/o with end stage osteoarthritis in Bilateral Knees.  Had L TKA in December and admitted today for R TKA.  Procedure uneventful.  Seen in OPS, Patient awake alert and with controlled pain.  Denies any acute complaints.      Past histories reviewed - most notable for Ulcerative Colitis. Controlled with Humira      Past Medical History:   Diagnosis Date    Allergy     Bilateral primary osteoarthritis of knee     Chronic pain of both knees 10/14/2021    Eosinophilia     Gangrene of gallbladder in cholecystitis 2021    Hematochezia     History of colonoscopy     Hyperlipidemia     Hypertension     Sleep apnea        Past Surgical History:   Procedure Laterality Date    ARTHROPLASTY, KNEE, TOTAL, USING COMPUTER-ASSISTED NAVIGATION Left 12/15/2022    Procedure: ARTHROPLASTY, KNEE, TOTAL, USING COMPUTER-ASSISTED NAVIGATION;  Surgeon: Jerome Valladares MD;  Location: [facility];  Service: Orthopedics;  Laterality: Left;    Bilateral IA knee injection Bilateral 2021    D Shows    bladder tact       SECTION      CHOLECYSTECTOMY      FRACTURE SURGERY      jaw     INJECTION      back    INJECTION OF ANESTHETIC AGENT AROUND MEDIAL BRANCH NERVES INNERVATING LUMBAR FACET JOINT Bilateral 2023    Procedure: BIlateral L4-5,5-S1 MBB;  Surgeon: Amanda Goetz MD;  Location: [facility];  Service: Pain Management;  Laterality: Bilateral;  LOCAL     INJECTION OF ANESTHETIC AGENT AROUND NERVE Bilateral 06/28/2022    Procedure: GNB   BILATERAL;  Surgeon: Amanda Goetz MD;  Location: [facility];  Service: Pain Management;  Laterality: Bilateral;  PT HAS NOT HAD VAC   STATES WILL BE TESTED AT [facility] [place]    knee scope Bilateral     KNEE SURGERY      LAPAROSCOPIC CHOLECYSTECTOMY N/A 03/31/2021    Procedure: LAPAROSCOPIC CHOLECYSTECTOMY CONVERTED TO OPEN;  Surgeon: Darnell Mcgraw MD;  Location: [facility];  Service: General;  Laterality: N/A;  CONVERTED TO OPEN    TUBAL LIGATION  2004       Review of patient's allergies indicates:   Allergen Reactions    Opioids - morphine analogues Rash     Development of red rash at site morphine given.        No current facility-administered medications on file prior to encounter.     Current Outpatient Medications on File Prior to Encounter   Medication Sig    adalimumab (HUMIRA,CF, PEN) 40 mg/0.4 mL PnKt Inject 40mg under the skin every other week as directed by physician.    aspirin (ECOTRIN) 81 MG EC tablet Take 81 mg by mouth once daily.    celecoxib (CELEBREX) 200 MG capsule Take 1 capsule (200 mg total) by mouth once daily.    cetirizine (ZYRTEC) 10 MG tablet Take 1 tablet (10 mg total) by mouth once daily.    famotidine (PEPCID) 40 MG tablet Take 1 tablet (40 mg total) by mouth 2 (two) times daily.    gabapentin (NEURONTIN) 300 MG capsule Take 2 capsules (600 mg total) by mouth 2 (two) times daily.    [DISCONTINUED] acetaminophen-codeine 300-30mg (TYLENOL #3) 300-30 mg Tab Take 1 tablet by mouth every 12 (twelve) hours as needed (pain). (Patient not taking: Reported on 9/1/2023)     Family History       Problem Relation (Age of Onset)    Alcohol abuse Mother    Heart disease Father    Hypertension Father    Lung cancer Maternal Aunt    Migraines Daughter          Tobacco Use    Smoking status: Former     Current packs/day: 0.00     Average packs/day: 1 pack/day for 33.0 years (33.0 ttl  pk-yrs)     Types: Cigarettes     Start date:      Quit date: 2010     Years since quittin.7     Passive exposure: Never    Smokeless tobacco: Never   Substance and Sexual Activity    Alcohol use: Not Currently    Drug use: Not Currently     Types: Methamphetamines    Sexual activity: Not Currently     Review of Systems   Constitutional:  Negative for chills and fever.   HENT:  Negative for sinus pressure and sinus pain.    Eyes:  Negative for pain and visual disturbance.   Respiratory:  Negative for cough and shortness of breath.    Cardiovascular:  Negative for chest pain and palpitations.   Gastrointestinal:  Negative for abdominal pain, nausea and vomiting.   Endocrine: Negative for polydipsia and polyuria.   Genitourinary:  Negative for dysuria and hematuria.   Musculoskeletal:  Positive for arthralgias. Negative for neck stiffness.   Skin:  Negative for color change and pallor.   Neurological:  Negative for dizziness and weakness.   Hematological:  Negative for adenopathy. Does not bruise/bleed easily.   Psychiatric/Behavioral:  Negative for behavioral problems and confusion.      Objective:     Vital Signs (Most Recent):  Temp: 98.1 °F (36.7 °C) (23 1130)  Pulse: 96 (23 1245)  Resp: 18 (23 1312)  BP: (!) 132/92 (23 1245)  SpO2: (!) 93 % (23 1245) Vital Signs (24h Range):  Temp:  [98 °F (36.7 °C)-98.1 °F (36.7 °C)] 98.1 °F (36.7 °C)  Pulse:  [79-96] 96  Resp:  [14-18] 18  SpO2:  [88 %-98 %] 93 %  BP: (102-138)/(61-92) 132/92     Weight: 112.5 kg (248 lb)  Body mass index is 40.03 kg/m².     Physical Exam  Vitals reviewed.   Constitutional:       General: She is not in acute distress.     Appearance: She is not toxic-appearing.   HENT:      Right Ear: External ear normal.      Left Ear: External ear normal.      Nose: Nose normal.   Eyes:      General: No scleral icterus.  Neck:      Vascular: No carotid bruit.   Cardiovascular:      Rate and Rhythm: Normal rate and  regular rhythm.      Heart sounds: Normal heart sounds.   Pulmonary:      Effort: Pulmonary effort is normal. No respiratory distress.      Breath sounds: Normal breath sounds. No wheezing.   Abdominal:      General: Abdomen is flat. Bowel sounds are normal. There is no distension.      Palpations: Abdomen is soft.      Tenderness: There is no abdominal tenderness.   Musculoskeletal:      Comments: R leg in knee immobilizer.    Lymphadenopathy:      Cervical: No cervical adenopathy.   Skin:     General: Skin is warm and dry.      Capillary Refill: Capillary refill takes less than 2 seconds.   Neurological:      General: No focal deficit present.      Mental Status: She is alert and oriented to person, place, and time.   Psychiatric:         Mood and Affect: Mood normal.          Significant Labs: All pertinent labs within the past 24 hours have been reviewed.  BMP:   Recent Labs   Lab 09/14/23  1155   *      K 4.4   *   CO2 23   BUN 16   CREATININE 1.14*   CALCIUM 8.7     CBC:   Recent Labs   Lab 09/14/23  1302   WBC 7.46   HGB 12.3   HCT 37.4*          Significant Imaging: I have reviewed all pertinent imaging results/findings within the past 24 hours.    Assessment/Plan:     * Bilateral primary osteoarthritis of knee    Now s/p R TKA.  Pain well controlled. DVT ppx ordered by primary.  Will follow along with you.     Ulcerative colitis    Stable.       VTE Risk Mitigation (From admission, onward)         Ordered     apixaban tablet 2.5 mg  2 times daily         09/14/23 1250     IP VTE HIGH RISK PATIENT  Once         09/14/23 1250     Place CLIFFORD hose  Until discontinued         09/14/23 1250                    Thank you for your consult. I will follow-up with patient. Please contact us if you have any additional questions.    Dallin Conroy Jr, MD  Department of Hospital Medicine   [facility] - Orthopedic Periop Services

## 2023-09-14 NOTE — ANESTHESIA PROCEDURE NOTES
Spinal    Diagnosis: rt tka  Patient location during procedure: OR  Start time: 9/14/2023 8:43 AM  Timeout: 9/14/2023 8:42 AM  End time: 9/14/2023 8:44 AM    Staffing  Authorizing Provider: Abdullahi Roman MD  Performing Provider: Abdullahi Roman MD    Staffing  Performed by: Abdullahi Roman MD  Authorized by: Abdullahi Roman MD    Preanesthetic Checklist  Completed: patient identified, IV checked, risks and benefits discussed, surgical consent, monitors and equipment checked, pre-op evaluation and timeout performed  Spinal Block  Patient position: sitting  Prep: Betadine  Patient monitoring: heart rate, continuous pulse ox, continuous capnometry and frequent blood pressure checks  Approach: midline  Location: L3-4  Injection technique: single shot  CSF Fluid: clear free-flowing CSF  Needle  Needle type: Quincke   Needle gauge: 22 G  Needle length: 4 in  Needle localization: anatomical landmarks  Assessment  Sensory level: T8   Dermatomal levels determined by alcohol wipe  Ease of block: easy  Patient's tolerance of the procedure: comfortable throughout block and no complaints  Medications:    Medications: BUPivacaine (pf) (MARCAINE) injection 0.75% - Intraspinal   1.5 mL - 9/14/2023 8:41:00 AM

## 2023-09-14 NOTE — OR NURSING
1128 REC'D TO PACU AAO X4 IN STABLE CONDITION. VSS. SATS 88% ON RA. O2 @ 3L/NC APPLIED. SATS INCREASED TO 96%. DENIES PAIN AT THIS TIME. R KNEE DSG C/D/I W/ HEMOVAC & IMMOBILIZER NOTED.     1149 UPDATE GIVEN TO SON VIA TEXT MESSAGE.     1210 RETURNED TO ASC #21 AWAKE IN STABLE CONDITION. /70 HR 89 SATS 93% ON 2L/NC. REPORT GIVEN TO RUDDY PICKENS RN. SON AT BEDSIDE.

## 2023-09-14 NOTE — DISCHARGE INSTRUCTIONS
SEE TEACHING SHEETS FOR FURTHER DETAILS      ANKLE: Pumps        Point toes down, then up. Repeat 30 times, 2 sessions per day        Quad Set        With other leg bent, foot flat, slowly tighten muscles on right thigh of straight leg while counting out loud to 5.  Repeat 30 times, 2 sessions per day.          Hip Abduction / Adduction: with Extended Knee (Supine)        Bring right leg out to side and return. Keep knee straight.  Repeat 30 times, 2 sessions per day.         HIP / KNEE: Flexion, Heel Slides - Supine        Slide right heel up toward buttocks, keeping leg in straight line. Repeat 30 times, 2 sessions per day.  Use towel or pillowcase under heel as needed.       Straight Leg Raise        Bend left leg. Raise right leg 8-12 inches with knee locked. Exhale and tighten thigh muscles while raising leg.   Repeat 30 times, 2 sessions per day.           KNEE: Extension, Long Arc Quads - Sitting        Raise right leg until knee is straight.  Repeat 30 times, 2 sessions per day        KNEE: Flexion / Extension - Sitting        Sit at edge of surface, foot on towel or pillowcase. Bend and straighten right knee.  Repeat 30 times, 2 sessions per day.   Use opposite leg to increase knee flexion.    *Keep dressing dry and intact, do not remove dressing, if dressing becomes wet or bloody notify home health staff.  Swingbed/Home Health will remove your drain (if you have one) on post op day #2 and change your dressing on post op day #3 and day #10, give them that special dressing we sent home with you.  *Continue incentive spirometry at least every 2 hours while awake.  *Continue white stockings remove 2 times a day for 1 hour and replace. Once dressing is changed, apply other stocking to surgery leg.   *Continue cool-jet to knee. Do not apply directly to skin.   *Take laxative of choice to have a bowel movement at least by tomorrow and then every other day.  *Increase fluids by mouth.  *Staples will be removed by  home health/swingbed staff 2 weeks from surgery prior to follow up appoinment.  *Elevate surgery leg on pillow at ankle. No pillow under knees.  *Notify swingbed/home health staff of any concerns.

## 2023-09-14 NOTE — ANESTHESIA PREPROCEDURE EVALUATION
09/14/2023  Vandana Price is a 58 y.o., female.      Pre-op Assessment    I have reviewed the Patient Summary Reports.     I have reviewed the Nursing Notes. I have reviewed the NPO Status.   I have reviewed the Medications.     Review of Systems  Anesthesia Hx:  No problems with previous Anesthesia    Social:  Non-Smoker, No Alcohol Use    Hematology/Oncology:  Hematology Normal   Oncology Normal     EENT/Dental:EENT/Dental Normal   Cardiovascular:   Hypertension hyperlipidemia    Pulmonary:   Sleep Apnea    Renal/:  Renal/ Normal     Hepatic/GI:   PUD,    Musculoskeletal:   Arthritis     Neurological:  Neurology Normal    Endocrine:  Endocrine Normal  Obesity / BMI > 30  Dermatological:  Skin Normal    Psych:  Psychiatric Normal           Physical Exam  General: Well nourished    Airway:  Mallampati: III / III  Mouth Opening: Normal  TM Distance: > 6 cm  Tongue: Normal  Neck ROM: Normal ROM    Chest/Lungs:  Clear to auscultation, Normal Respiratory Rate    Heart:  Rate: Normal  Rhythm: Regular Rhythm        Anesthesia Plan  Type of Anesthesia, risks & benefits discussed:    Anesthesia Type: Gen Supraglottic Airway, Spinal, Regional  Intra-op Monitoring Plan: Standard ASA Monitors  Post Op Pain Control Plan: multimodal analgesia  Informed Consent: Informed consent signed with the Patient and all parties understand the risks and agree with anesthesia plan.  All questions answered. Patient consented to blood products? Yes  ASA Score: 3  Day of Surgery Review of History & Physical: H&P Update referred to the surgeon/provider.I have interviewed and examined the patient. I have reviewed the patient's H&P dated: There are no significant changes. H&P completed by Anesthesiologist.    Ready For Surgery From Anesthesia Perspective.     .

## 2023-09-14 NOTE — HPI
Patient is a very pleasant 57 y/o with end stage osteoarthritis in Bilateral Knees.  Had L TKA in December and admitted today for R TKA.  Procedure uneventful.  Seen in OPS, Patient awake alert and with controlled pain.  Denies any acute complaints.      Past histories reviewed - most notable for Ulcerative Colitis. Controlled with Humira

## 2023-09-14 NOTE — OP NOTE
[facility] - Orthopedic Periop Services  General Surgery  Operative Note    SUMMARY     Date of Procedure: 9/14/2023     Procedure: Procedure(s) (LRB):  ARTHROPLASTY, KNEE, TOTAL, USING COMPUTER-ASSISTED NAVIGATION (Right)       Surgeon(s) and Role:     * Jerome Valladares MD - Primary    Assisting Surgeon: None    Pre-Operative Diagnosis: Chronic pain of right knee [M25.561, G89.29]  Bilateral primary osteoarthritis of knee [M17.0]    Post-Operative Diagnosis: Post-Op Diagnosis Codes:     * Chronic pain of right knee [M25.561, G89.29]     * Bilateral primary osteoarthritis of knee [M17.0]    Anesthesia: General    Operative Findings (including complications, if any):         OPERATIVE REPORT    SURGERY DATE:  9/14/2023    PRE-OP DIAGNOSIS:  Chronic pain of right knee [M25.561, G89.29]  Bilateral primary osteoarthritis of knee [M17.0]    POST-OP DIAGNOSIS:  Post-Op Diagnosis Codes:     * Chronic pain of right knee [M25.561, G89.29]     * Bilateral primary osteoarthritis of knee [M17.0]    PROCEDURE:  Procedure(s) (LRB):  ARTHROPLASTY, KNEE, TOTAL, USING COMPUTER-ASSISTED NAVIGATION (Right)    SURGEON:  Jerome Valladares M.D.    Assisting Surgeon:      ANESTHESIA:  General    BLOOD LOSS:  100cc    TOURNIQUET TIME:  79min    COMPLICATIONS:  None.    IMPLANTS PLACED:    Implant Name Type Inv. Item Serial No.  Lot No. LRB No. Used Action   CEMENT BONE SURG SMPLX P RADPQ - [id number]  CEMENT BONE SURG SMPLX P RADPQ  [employer] ALICJA. [id number] Right 1 Implanted   CEMENT BONE SURG SMPLX P RADPQ - [id number]  CEMENT BONE SURG SMPLX P RADPQ  [employer] ALICJA. [id number] Right 1 Implanted   GUIDEPIN 3.20MM 4 [employer]EN S[employer] - [id number]  GUIDEPIN 3.20MM 4 [facility])  Right 1 Implanted and Explanted   GUIDEPIN 3.20MM 4 [employer] - [id number]  GUIDEPIN 3.20MM 4 [facility] ([facility])  Right 1 Implanted and Explanted   PATELLA TRI 32X10 X3 POLYETHYL - [id number]  PATELLA  TRI 32X10 X3 POLYETHYL  SYDNEY Samfind ALICJA. [id number] Right 1 Implanted   FEMORAL CEMENTED #4 RIGHT - [id number]  FEMORAL CEMENTED #4 RIGHT  SYDNEY [employer] ALICJA. [id number] Right 1 Implanted   BASEPLATE TIB SHILO PRIM SZ 4 - [id number]  BASEPLATE TIB SHILO PRIM SZ 4  SYDNEY [employer] ALICJA. [id number] Right 1 Implanted   INSERT TIBIAL CS SIZE 4 9MM - [id number]  INSERT TIBIAL CS SIZE 4 9MM  SYDNEY [employer] ALICJA. [id number] Right 1 Implanted       INDICATIONS:  Patient is a 58 y.o. year old female with severe degenerative joint disease of the right knee needing total knee arthroplasty.    PROCEDURE IN DETAIL:  After having the risks and benefits of the procedure explained at length to the patient and the patient stating that she understands the risks and benefits of the procedure and wishes to proceed with the procedure, written informed consent was obtained.  The patient was taken to the Operating room and placed on the Operative table at which time General was induced per anesthesia.  The patient then had a sandbag taped on the bed so the knee could be held at 90 degrees of flexion.  A tourniquet was placed over cast padding on the proximal right thigh.  The right lower extremity was prepped and draped in sterile fashion.  It was elevated and exsanguinated with an Esmarch bandage.    At this time a 15 centimeter incision was made centered over the patella going from the tibial tubercle mid-line to approximately 4-5 centimeters superior to the superior pole of the patella.  Skin incision was made with a #10 blade and was taken down to the anterior retinaculum of the patella.  At this point a medial flap was elevated up using the #10 blade.  At this point the knee was flexed up and the quadriceps tendon was identified and a medial arthrotomy starting approximately a centimeter superior to the superior pole of the patella and going over the media border of the patella and the medial border of the patella going down to the tibial tubercle was made  using a #10 blade. At this point the joint opened up and there was noted to be severe DJD of the knee.  The knee was then flexed up.  The remnants of the medial and lateral meniscus were removed, leaving minimal meniscus remaining.  The osteophytes off of the femur were removed using a rongeur.    The ASM navigation block was pinned on to the femur.  The cutting block was then positioned and aligned based on the computer alignment.  It was pinned in place and the distal femoral cut was made. Next the tibial guide was placed at the ASM block pinned to the tibia.  The cutting block was in position based on the computer navigation.  Cutting block pin place and the tibial cut was made.  At this point the knee was noted to be balanced with proper flexion and extension gaps.  The sizing block was then placed onto the distal femur.  It was pinned in place setting the 30 degrees of external rotation.  At this point the cutting block was noted to be a size 4 based off the sizer.  The sizer was removed.  The cutting block was placed.  The anterior cut followed by the anterior chamfer posterior cut followed by the posterior chamfer cut was made.  Trial femur was placed.  Trial tibial tray was placed.  It was a size 4 with a trial 9 millimeter poly.  The knee was noted to be balanced at this point.  The tibia was then prepped placing the tibial plate guide onto the proximal tibia.  It was then punched.  It was then punched comparing the size 4 tibial tray.  Once this was done the distal femur  holes were drilled based off of the trial femoral component.  At this point all trial components were removed.  The patella was resurfaced removing 9 millimeter of bone.  A 32 millimeter all polyethylene patella was inserted and peg holes drilled.  The knee was put through range of motion.  The patella was noted to track appropriately with good alignment and was felt to be stable.  The wounds were irrigated out with copious amounts  of normal saline.  The tibia was cemented first removing excess cement followed by the femur.  The cement was allowed to polymerize and the tibial insert was placed.  The knee was irrigated out with copious amounts of normal saline.  The knee was noted to be tracking in the correct position and alignment with good motion.     At this point two medium Hemovac drains were placed in the joint coming out through the skin.  Figure of eight #1 Vicryl interrupted sutures were used to close the arthrotomy. Subcuticular stitches of 2-0 Vicryl followed by skin staples were used to closed the skin.  A sterile occlusive dressing was placed followed by a Cryo-Cuff.  The patient was then taken from the Operative table and taken to the Recovery Room in good condition.  All counts are correct. There were no complications.         Description of Technical Procedures:     Significant Surgical Tasks Conducted by the Assistant(s), if Applicable:     Estimated Blood Loss (EBL): 100 mL           Implants:   Implant Name Type Inv. Item Serial No.  Lot No. LRB No. Used Action   CEMENT BONE SURG SMPLX P RADPQ - [id number]  CEMENT BONE SURG SMPLX P RADPQ  SYDNEY Zolvers ALICJA. [id number] Right 1 Implanted   CEMENT BONE SURG SMPLX P RADPQ - [id number]  CEMENT BONE SURG SMPLX P RADPQ  [employer]NEY [employer] ALICJA. [id number] Right 1 Implanted   GUIDEPIN 3.20MM 4 [employer]EN S[employer]ARE - [id number]  GUIDEPIN 3.20MM 4 [facility]  Right 1 Implanted and Explanted   GUIDEPIN 3.20MM 4 [employer]EN S[employer]ARE - [id number]  GUIDEPIN 3.20MM 4 [facility])  Right 1 Implanted and Explanted   PATELLA TRI 32X10 X3 POLYETHYL - [id number]  PATELLA TRI 32X10 X3 POLYETHYL  SYDNEY Zolvers ALICJA. [id number] Right 1 Implanted   FEMORAL CEMENTED #4 RIGHT - [id number]  FEMORAL CEMENTED #4 RIGHT  SYDNEY Zolvers ALICJA. [id number] Right 1 Implanted   BASEPLATE TIB SHILO PRIM SZ 4 - [id number]  BASEPLATE TIB SHILO PRIM SZ 4  SYDNEY Zolvers ALICJA. [id number] Right 1  Implanted   INSERT TIBIAL CS SIZE 4 9MM - [id number]  INSERT TIBIAL CS SIZE 4 9MM  [employer] ALICJA. [id number] Right 1 Implanted       Specimens:   Specimen (24h ago, onward)      None                    Condition: Good    Disposition: PACU - hemodynamically stable.    Attestation: I was present and scrubbed for the entire procedure.

## 2023-09-14 NOTE — PLAN OF CARE
Problem: Physical Therapy  Goal: Physical Therapy Goal  Description: Short Term Goals to be met by: 2023    Patient will increase functional independence with mobility by performin. Supine to sit with Modified [place]  2. Sit to stand transfer with Modified [place]  3. Bed to chair transfer with Modified [place] using Rolling Walker, WBAT R LE  4. Gait  x 100 feet with Modified [place] using Rolling Walker, WBAT R LE.   5. Lower extremity exercise program x30 reps per handout, with assistance as needed  6. Knee ROM 0-90  7. Negotiate 7 steps with modified independence using rolling walker, WBAT RLE    Long Term Goals to be met by: 10/14/2023    Pt will regain full independent functional mobility to return to prior activities of daily living.   Outcome: Ongoing, Progressing    PT evaluation completed. See evaluation note for details.

## 2023-09-14 NOTE — ANESTHESIA PROCEDURE NOTES
Peripheral Block    Patient location during procedure: OR   Block not for primary anesthetic.  Reason for block: at surgeon's request and post-op pain management   Post-op Pain Location: rt TKA, P OP PAIN   Start time: 9/14/2023 8:44 AM  Timeout: 9/14/2023 8:43 AM   End time: 9/14/2023 8:45 AM    Staffing  Authorizing Provider: Abdullahi Roman MD  Performing Provider: Abdullahi Roman MD    Staffing  Performed by: Abdullahi Roman MD  Authorized by: Abdullahi Roman MD    Preanesthetic Checklist  Completed: patient identified, IV checked, site marked, risks and benefits discussed, surgical consent, monitors and equipment checked, pre-op evaluation and timeout performed  Peripheral Block  Patient position: supine  Prep: ChloraPrep  Patient monitoring: heart rate, cardiac monitor, continuous pulse ox, continuous capnometry and frequent blood pressure checks  Block type: adductor canal  Laterality: right  Injection technique: single shot  Needle  Needle type: Stimuplex   Needle gauge: 20 G  Needle length: 4 in  Needle localization: ultrasound guidance   -ultrasound image captured on disc.  Assessment  Injection assessment: negative aspiration, negative parasthesia and local visualized surrounding nerve  Paresthesia pain: none  Heart rate change: no  Slow fractionated injection: yes  Pain Tolerance: comfortable throughout block and no complaints  Medications:    Medications: ROPIvacaine (NAROPIN) injection 0.75% - Perineural   30 mL - 9/14/2023 8:41:00 AM

## 2023-09-15 VITALS
HEIGHT: 66 IN | TEMPERATURE: 98 F | BODY MASS INDEX: 40.02 KG/M2 | HEART RATE: 64 BPM | SYSTOLIC BLOOD PRESSURE: 113 MMHG | OXYGEN SATURATION: 96 % | WEIGHT: 249 LBS | DIASTOLIC BLOOD PRESSURE: 63 MMHG | RESPIRATION RATE: 18 BRPM

## 2023-09-15 LAB
ALBUMIN SERPL BCP-MCNC: 2.9 G/DL (ref 3.5–5)
ALBUMIN/GLOB SERPL: 0.9 {RATIO}
ALP SERPL-CCNC: 133 U/L (ref 46–118)
ALT SERPL W P-5'-P-CCNC: 29 U/L (ref 13–56)
ANION GAP SERPL CALCULATED.3IONS-SCNC: 9 MMOL/L (ref 7–16)
AST SERPL W P-5'-P-CCNC: 17 U/L (ref 15–37)
BASOPHILS # BLD AUTO: 0.03 K/UL (ref 0–0.2)
BASOPHILS NFR BLD AUTO: 0.3 % (ref 0–1)
BILIRUB SERPL-MCNC: 0.3 MG/DL (ref ?–1.2)
BUN SERPL-MCNC: 18 MG/DL (ref 7–18)
BUN/CREAT SERPL: 18 (ref 6–20)
CALCIUM SERPL-MCNC: 8.4 MG/DL (ref 8.5–10.1)
CHLORIDE SERPL-SCNC: 113 MMOL/L (ref 98–107)
CO2 SERPL-SCNC: 22 MMOL/L (ref 21–32)
CREAT SERPL-MCNC: 1 MG/DL (ref 0.55–1.02)
DIFFERENTIAL METHOD BLD: ABNORMAL
EGFR (NO RACE VARIABLE) (RUSH/TITUS): 65 ML/MIN/1.73M2
EOSINOPHIL # BLD AUTO: 0 K/UL (ref 0–0.5)
EOSINOPHIL NFR BLD AUTO: 0 % (ref 1–4)
ERYTHROCYTE [DISTWIDTH] IN BLOOD BY AUTOMATED COUNT: 12.7 % (ref 11.5–14.5)
GLOBULIN SER-MCNC: 3.3 G/DL (ref 2–4)
GLUCOSE SERPL-MCNC: 172 MG/DL (ref 74–106)
HCT VFR BLD AUTO: 32.2 % (ref 38–47)
HGB BLD-MCNC: 10.7 G/DL (ref 12–16)
IMM GRANULOCYTES # BLD AUTO: 0.05 K/UL (ref 0–0.04)
IMM GRANULOCYTES NFR BLD: 0.5 % (ref 0–0.4)
LYMPHOCYTES # BLD AUTO: 0.89 K/UL (ref 1–4.8)
LYMPHOCYTES NFR BLD AUTO: 9 % (ref 27–41)
MCH RBC QN AUTO: 29.9 PG (ref 27–31)
MCHC RBC AUTO-ENTMCNC: 33.2 G/DL (ref 32–36)
MCV RBC AUTO: 89.9 FL (ref 80–96)
MONOCYTES # BLD AUTO: 0.36 K/UL (ref 0–0.8)
MONOCYTES NFR BLD AUTO: 3.7 % (ref 2–6)
MPC BLD CALC-MCNC: 11.1 FL (ref 9.4–12.4)
NEUTROPHILS # BLD AUTO: 8.51 K/UL (ref 1.8–7.7)
NEUTROPHILS NFR BLD AUTO: 86.5 % (ref 53–65)
NRBC # BLD AUTO: 0 X10E3/UL
NRBC, AUTO (.00): 0 %
PLATELET # BLD AUTO: 209 K/UL (ref 150–400)
POTASSIUM SERPL-SCNC: 4.4 MMOL/L (ref 3.5–5.1)
PROT SERPL-MCNC: 6.2 G/DL (ref 6.4–8.2)
RBC # BLD AUTO: 3.58 M/UL (ref 4.2–5.4)
SODIUM SERPL-SCNC: 140 MMOL/L (ref 136–145)
WBC # BLD AUTO: 9.84 K/UL (ref 4.5–11)

## 2023-09-15 PROCEDURE — 25000003 PHARM REV CODE 250: Performed by: ORTHOPAEDIC SURGERY

## 2023-09-15 PROCEDURE — 85025 COMPLETE CBC W/AUTO DIFF WBC: CPT | Performed by: FAMILY MEDICINE

## 2023-09-15 PROCEDURE — 97530 THERAPEUTIC ACTIVITIES: CPT

## 2023-09-15 PROCEDURE — 25000003 PHARM REV CODE 250: Performed by: FAMILY MEDICINE

## 2023-09-15 PROCEDURE — 97110 THERAPEUTIC EXERCISES: CPT

## 2023-09-15 PROCEDURE — 63600175 PHARM REV CODE 636 W HCPCS: Performed by: FAMILY MEDICINE

## 2023-09-15 PROCEDURE — 80053 COMPREHEN METABOLIC PANEL: CPT | Performed by: FAMILY MEDICINE

## 2023-09-15 PROCEDURE — 97116 GAIT TRAINING THERAPY: CPT

## 2023-09-15 RX ORDER — HYDROMORPHONE HYDROCHLORIDE 4 MG/1
4 TABLET ORAL EVERY 6 HOURS PRN
Qty: 28 TABLET | Refills: 0 | Status: SHIPPED | OUTPATIENT
Start: 2023-09-15 | End: 2023-11-20

## 2023-09-15 RX ADMIN — FAMOTIDINE 40 MG: 20 TABLET ORAL at 08:09

## 2023-09-15 RX ADMIN — HYDROCODONE BITARTRATE AND ACETAMINOPHEN 1 TABLET: 5; 325 TABLET ORAL at 06:09

## 2023-09-15 RX ADMIN — DOCUSATE SODIUM 100 MG: 100 CAPSULE, LIQUID FILLED ORAL at 08:09

## 2023-09-15 RX ADMIN — GABAPENTIN 600 MG: 300 CAPSULE ORAL at 08:09

## 2023-09-15 RX ADMIN — APIXABAN 2.5 MG: 2.5 TABLET, FILM COATED ORAL at 08:09

## 2023-09-15 RX ADMIN — ASPIRIN 81 MG: 81 TABLET, COATED ORAL at 08:09

## 2023-09-15 RX ADMIN — HYDROMORPHONE HYDROCHLORIDE 2 MG: 2 TABLET ORAL at 03:09

## 2023-09-15 RX ADMIN — HYDROCODONE BITARTRATE AND ACETAMINOPHEN 1 TABLET: 5; 325 TABLET ORAL at 11:09

## 2023-09-15 RX ADMIN — HYDROMORPHONE HYDROCHLORIDE 1 MG: 2 INJECTION, SOLUTION INTRAMUSCULAR; INTRAVENOUS; SUBCUTANEOUS at 08:09

## 2023-09-15 RX ADMIN — CETIRIZINE HYDROCHLORIDE 10 MG: 10 TABLET, FILM COATED ORAL at 08:09

## 2023-09-15 NOTE — PLAN OF CARE
[facility] - [facility] Telemetry  Discharge Final Note    Primary Care Provider: Dorinda Harman NP    Expected Discharge Date: 9/15/2023    Final Discharge Note (most recent)       Final Note - 09/15/23 1337          Final Note    Assessment Type Final Discharge Note     Anticipated Discharge Disposition Home-Health Care [facility]     What phone number can be called within the next 1-3 days to see how you are doing after discharge? [phone]        Post-Acute Status    Post-Acute Authorization Home Health     Home Health Status Set-up Complete/Auth obtained     Patient choice form signed by patient/caregiver List with quality metrics by geographic area provided;List from CMS Compare;List from System Post-Acute Care     Discharge Delays None known at this time                      Follow-up providers       Jerome Valladares MD   Specialty: Orthopedic Surgery    [address]   [address]  Jerome Valladares Md, Orthopedic & Sports Medicine, [place] [address]   Phone: [phone]       Next Steps: Follow up on 10/9/2023    Instructions: 8:30 am              After-discharge care                Home Medical Care       [facility]   Service: Home Health Services    [address] [place] [address]  [address]  [facility] [address]   Phone: [phone]                             [facility] Pt had rw and bsc prior to admission. 0 dc needs noted.

## 2023-09-15 NOTE — PT/OT/SLP PROGRESS
"Physical Therapy Treatment    Patient Name:  Vandana Price   MRN:  [id number]    Recommendations:     Discharge Recommendations: home with home health  Discharge Equipment Recommendations: walker, rolling, bedside commode  Barriers to discharge: None    Assessment:     Vandana Price is a 58 y.o. female admitted with a medical diagnosis of Bilateral primary osteoarthritis of knee.  She presents with the following impairments/functional limitations: weakness, impaired functional mobility, gait instability, impaired balance, pain, decreased ROM Pt was alert and eager to participate in treatment. Pt was self-dressed and eager to d/c home.   Pt R knee ROM measured: 5-78 degrees    Rehab Prognosis: Good; patient would benefit from acute skilled PT services to address these deficits and reach maximum level of function.    Recent Surgery: Procedure(s) (LRB):  ARTHROPLASTY, KNEE, TOTAL, USING COMPUTER-ASSISTED NAVIGATION (Right) 1 Day Post-Op    Plan:     During this hospitalization, patient to be seen BID (5x/week; x2/daily) to address the identified rehab impairments via gait training, therapeutic activities, therapeutic exercises and progress toward the following goals:    Plan of Care Expires:  10/14/23    Subjective     Chief Complaint: R TKA  Patient/Family Comments/goals: "to get back to work"  Pain/Comfort:  Pain Rating 1: 8/10  Location - Side 1: Right  Location 1: knee  Pain Addressed 1: Reposition, Distraction, Cessation of Activity  Pain Rating Post-Intervention 1: 8/10      Objective:     Communicated with ALANNA Gomez prior to session.  Patient found HOB elevated with knee immobilizer upon PT entry to room.     General Precautions: Standard, fall  Orthopedic Precautions: RLE weight bearing as tolerated  Braces: Knee immobilizer  Respiratory Status: Room air     Functional Mobility:  Bed Mobility:     Scooting: stand by assistance  Supine to Sit: stand by assistance  Transfers:     Sit to Stand:  x3 stand by assistance " with rolling walker and heavy cues for appropriat AD usage  Gait: pt ambulated 70ft w/RW; SBA, step-to gait pattern; 80% decreased carolyn and step length due to pain per pt report  Balance: good in sitting & fair+ in standing  Stairs:  Pt ascended/descended 7 stair(s) with No Assistive Device with right handrail with Contact Guard Assistance and Minimal Assistance.   EOB sitting independently for 5 minutes in order to participate in exercise and for PT ROM measurement      AM-PAC 6 CLICK MOBILITY  Turning over in bed (including adjusting bedclothes, sheets and blankets)?: 4  Sitting down on and standing up from a chair with arms (e.g., wheelchair, bedside commode, etc.): 4  Moving from lying on back to sitting on the side of the bed?: 3  Moving to and from a bed to a chair (including a wheelchair)?: 4  Need to walk in hospital room?: 4  Climbing 3-5 steps with a railing?: 3  Basic Mobility Total Score: 22       Treatment & Education:  Right lower extremity exercise x 20 reps: Seated: ankle pumps, Quad sets, glut sets, hip abduction/adduction, straight leg raises, heel slides, Long arc quads, and Marching with active assist ROM and verbal cues     Patient left HOB elevated with all lines intact, call button in reach, and ALANNA Gomez notified..    GOALS:   Multidisciplinary Problems       Physical Therapy Goals          Problem: Physical Therapy    Goal Priority Disciplines Outcome Goal Variances Interventions   Physical Therapy Goal     PT, PT/OT Ongoing, Progressing     Description: Short Term Goals to be met by: 2023    Patient will increase functional independence with mobility by performin. Supine to sit with Modified [place]  2. Sit to stand transfer with Modified [place]  3. Bed to chair transfer with Modified [place] using Rolling Walker, WBAT R LE  4. Gait  x 100 feet with Modified [place] using Rolling Walker, WBAT R LE.   5. Lower extremity exercise program x30 reps per  handout, with assistance as needed  6. Knee ROM 0-90  7. Negotiate 7 steps with modified independence using rolling walker, WBAT RLE    Long Term Goals to be met by: 10/14/2023    Pt will regain full independent functional mobility to return to prior activities of daily living.                        Time Tracking:     PT Received On: 09/15/23  PT Start Time: 0953     PT Stop Time: 1035  PT Total Time (min): 42 min     Billable Minutes: Gait Training 22, Therapeutic Activity 10, and Therapeutic Exercise 10    Treatment Type: Treatment  PT/PTA: PT     Number of PTA visits since last PT visit: 0     09/15/2023

## 2023-09-15 NOTE — DISCHARGE SUMMARY
[facility] Tele[facility]  Orthopedics  Discharge Summary      Patient Name: Vandana Price  MRN: [id number]  Admission Date: 9/14/2023  Hospital Length of Stay: 0 days  Discharge Date and Time:  09/15/2023 8:08 AM  Attending Physician: Jerome Valladares MD   Discharging Provider: Jerome Valladares MD  Primary Care Provider: Dorinda Harman NP    HPI:   No notes on file    Procedure(s) (LRB):  ARTHROPLASTY, KNEE, TOTAL, USING COMPUTER-ASSISTED NAVIGATION (Right)      Hospital Course:  No notes on file patient admitted to the hospital on 09/14/2023 underwent a right total knee arthroplasty without complication.  Postop day 1 her drain is removed.  She moves her toes dorsiflexion plantar flexion.  Has sensation to touch in right lower extremity.  Palpable pulses.  She is tolerating p.o. pain meds.  Started on Eliquis for DVT prophylaxis.  Be DC home.  Home health PT.  DC staples 10-14 days.  Follow-up Dr. Valladares in 3 weeks.    Goals of Care Treatment Preferences:  Code Status: Full Code      Consults (From admission, onward)        Status Ordering Provider     Inpatient consult to Hospitalist  Once        Provider:  Vega Huddleston Jr., MD    Acknowledged VEGA HUDDLESTON JR     IP consult case management/social work  Once        Provider:  (Not yet assigned)    Completed VEGA HUDDLESTON JR          Significant Diagnostic Studies: Labs: All labs within the past 24 hours have been reviewed    Pending Diagnostic Studies:     Procedure Component Value Units Date/Time    Comprehensive metabolic panel [[id number]] Collected: 09/15/23 0304    Order Status: Sent Lab Status: In process Updated: 09/15/23 0342    Specimen: Blood         Final Active Diagnoses:    Diagnosis Date Noted POA    PRINCIPAL PROBLEM:  Bilateral primary osteoarthritis of knee [M17.0]  Yes     Chronic    Ulcerative colitis [K51.90] 12/15/2022 Yes     Chronic      Problems Resolved During this Admission:      Discharged Condition:  good    [place] [facility] [place] Up:   Follow-up Information     Jerome Valladares MD Follow up.    Specialty: Orthopedic Surgery  Contact information:  [address]  [address]  Jerome Valladares Md, Orthopedic & Sports Medicine, [facility] [place] [address]  [phone]                       Patient Instructions:   No discharge procedures on file.  Medications:  Reconciled Home Medications:      Medication List      ASK your doctor about these medications    aspirin 81 MG EC tablet  Commonly known as: ECOTRIN  Take 81 mg by mouth once daily.     celecoxib 200 MG capsule  Commonly known as: CeleBREX  Take 1 capsule (200 mg total) by mouth once daily.     cetirizine 10 MG tablet  Commonly known as: ZYRTEC  Take 1 tablet (10 mg total) by mouth once daily.     famotidine 40 MG tablet  Commonly known as: PEPCID  Take 1 tablet (40 mg total) by mouth 2 (two) times daily.     gabapentin 300 MG capsule  Commonly known as: NEURONTIN  Take 2 capsules (600 mg total) by mouth 2 (two) times daily.     HUMIRA(CF) PEN 40 mg/0.4 mL Pnkt  Generic drug: adalimumab  Inject 40mg under the skin every other week as directed by physician.            Jerome Valladares MD  Orthopedics  [facility] - [address] [place] [address]

## 2023-09-20 DIAGNOSIS — Z96.651 STATUS POST TOTAL RIGHT KNEE REPLACEMENT: Primary | ICD-10-CM

## 2023-09-20 RX ORDER — HYDROMORPHONE HYDROCHLORIDE 4 MG/1
4 TABLET ORAL EVERY 6 HOURS PRN
Qty: 28 TABLET | Refills: 0 | Status: SHIPPED | OUTPATIENT
Start: 2023-09-20 | End: 2023-11-20

## 2023-09-20 NOTE — TELEPHONE ENCOUNTER
----- Message from Anne Marie Crowell sent at 9/20/2023  1:05 PM CDT -----  r #PATIENT HAD KNEE REPLACEMENT SURGERY ON 9/14/2023 AND RUNNING OUT PAIN MEDICINE CALL BACK [phone]

## 2023-09-28 DIAGNOSIS — Z96.651 STATUS POST TOTAL KNEE REPLACEMENT, RIGHT: Primary | ICD-10-CM

## 2023-09-29 ENCOUNTER — EXTERNAL HOME HEALTH (OUTPATIENT)
Dept: HOME HEALTH SERVICES | Facility: HOSPITAL | Age: 58
End: 2023-09-29
Payer: COMMERCIAL

## 2023-10-04 ENCOUNTER — CLINICAL SUPPORT (OUTPATIENT)
Dept: REHABILITATION | Facility: HOSPITAL | Age: 58
End: 2023-10-04
Payer: COMMERCIAL

## 2023-10-04 DIAGNOSIS — Z96.651 STATUS POST TOTAL KNEE REPLACEMENT, RIGHT: ICD-10-CM

## 2023-10-04 PROCEDURE — 97162 PT EVAL MOD COMPLEX 30 MIN: CPT

## 2023-10-04 NOTE — PLAN OF CARE
[facility]   Physical Therapy Initial Evaluation      Name: Vandana Price  Clinic Number: [id number]    Therapy Diagnosis:   Encounter Diagnosis   Name Primary?    Status post total knee replacement, right         Physician: Jerome Valladares MD    Physician Orders: PT Eval and Treat   Medical Diagnosis from Referral: Z96.651 (ICD-10-CM) - Status post total knee replacement, right   Evaluation Date: 10/4/2023  Authorization Period Expiration: Patient has 50 visits per year. PT, OT Combined limit   Plan of Care Expiration: 11/4/2023  Progress Note Due: 11/2/2023  Date of Surgery: 9/14/2023  Visit # / Visits authorized: 1/ 8   FOTO: 48/100  GENIE Jr.: 57.1    Precautions: Standard     Time In: 8:00 AM  Time Out: 8:30 AM  Total Billable Time: 30 minutes    Subjective     Date of onset: See surgery date    History of current condition - Tia reports: current pain at 1/10. She was last discharged on 9/29 from home health. She ambulates with use of single point cane and has 5 steps with bilateral railing.     Falls: None    Imaging: none:     Prior Therapy: Home Health 2 weeks  Social History:  lives with their spouse  Occupation: Desk Job  Prior Level of Function: Independent  Current Level of Function: Independent but with mild pain    Pain:  Current 1/10, worst 6/10, best 2/10   Location: right knee    Description: Aching  Aggravating Factors: Standing and flexion of knee  Easing Factors: pain medication    Patients goals: return to PLOF being able to ambulate without use of spc     Medical History:   Past Medical History:   Diagnosis Date    Allergy     Bilateral primary osteoarthritis of knee     Chronic pain of both knees 10/14/2021    Eosinophilia     Gangrene of gallbladder in cholecystitis 04/01/2021    Hematochezia     History of colonoscopy     Hyperlipidemia     Hypertension     Sleep apnea        Surgical History:   Vandana Price  has a past surgical history that includes Laparoscopic  cholecystectomy (N/A, 2021); Cholecystectomy;  section; Knee surgery; Tubal ligation (); Fracture surgery; bladder tact; knee scope (Bilateral); Bilateral IA knee injection (Bilateral, 2021); Injection of anesthetic agent around nerve (Bilateral, 2022); arthroplasty, knee, total, using computer-assisted navigation (Left, 12/15/2022); injection; Injection of anesthetic agent around medial branch nerves innervating lumbar facet joint (Bilateral, 2023); and arthroplasty, knee, total, using computer-assisted navigation (Right, 2023).    Medications:   Tia has a current medication list which includes the following prescription(s): humira(cf) pen, aspirin, celecoxib, cetirizine, famotidine, gabapentin, hydromorphone, and hydromorphone.    Allergies:   Review of patient's allergies indicates:   Allergen Reactions    Opioids - morphine analogues Rash     Development of red rash at site morphine given.         Objective        Incisions: Dry and Intact  Girth Measurements: Right 49 cm Left 47 1/2 cm both mid patella  Comments:      Range of motion:  Motion Right Left    Hip flexion  115  115   Hip extension  10  10   Hip abduction  40  40   Hip adduction  34  35   Internal rotation  28  28   External rotation  35  35   Knee extension  See below  See below   Knee flexion  0-95 Active 0-110 Passive  0-112 Active   Ankle DF  WITHIN FUNCTIONAL LIMITS  WITHIN FUNCTIONAL LIMITS   Ankle PF  WITHIN FUNCTIONAL LIMITS  WITHIN FUNCTIONAL LIMITS   Ankle Inversion  WITHIN FUNCTIONAL LIMITS  WITHIN FUNCTIONAL LIMITS   Ankle Eversion  WITHIN FUNCTIONAL LIMITS  WITHIN FUNCTIONAL LIMITS       Manual muscle test   Muscle Right  Left    Hip flexion  MMT strength: 3+/5  MMT strength: 3+/5   Hip extension  MMT strength: 3/5  MMT strength: 3/5   Hip abduction  MMT strength: 3+/5  MMT strength: 3+/5   Hip adduction  MMT strength: 3+/5  MMT strength: 3+/5   Hip internal rotation  MMT strength: 3/5  MMT  strength: 3/5   Hip external rotation  MMT strength: 3/5  MMT strength: 3/5   Knee extension  MMT strength: 3+/5  MMT strength: 4+/5   Knee flexion  MMT strength: 3/5  MMT strength: 4+/5   Ankle DF  MMT strength: 4+/5  MMT strength: 4+/5   Ankle PF  MMT strength: 5/5  MMT strength: 5/5   Ankle inversion  MMT strength: 4/5  MMT strength: 4/5   Ankle eversion  MMT strength: 4/5  MMT strength: 4/5       Gait:  Weight bearing precautions: WBAT  Assistive device: straight cane  Ambulation distance and deviations:  Stairs:  Comments:    Intake Outcome Measure for FOTO  Survey    Therapist reviewed FOTO scores for Vandana Price on 10/4/2023.   FOTO report - see Media section or FOTO account episode details.    Intake Score: %       Patient Education and Home Exercises     Education provided:   - educated on POC and prognosis of treatment    Written Home Exercises Provided:  Patient will be provided with HEP on initial treatment visit . .    Assessment     Vandana is a 58 y.o. female referred to outpatient Physical Therapy with a medical diagnosis of s/p right knee replacement. Patient presents with limited range of motion, difficulty with ambulation without use of AD, difficulty negotiating steps, and reduce LE strength.    Patient prognosis is Excellent.   Patient will benefit from skilled outpatient Physical Therapy to address the deficits stated above and in the chart below, provide patient /family education, and to maximize patientt's level of independence.     Plan of care discussed with patient: Yes  Patient's spiritual, cultural and educational needs considered and patient is agreeable to the plan of care and goals as stated below:     Anticipated Barriers for therapy: none  Goals:  Short Term Goals: 3 weeks   1. Independent with Home Exercise Program   2. Increase Right Knee Range of Motion to 0 Degrees to 120 Degrees  3. Increase Right Knee Strength to grossly 4+/5  4. Patient will ambulate 500 feet with Normal Gait  pattern without complaints of pain     Long Term Goals: 6 weeks   1. Patient will increase Right Knee Strength to grossly 5/5  2. Patient will ambulate 1000+ feet with no complaints of pain or weakness in Right Knee    Plan     Plan of care Certification: 10/4/2023 to 11/4/2023.    Outpatient Physical Therapy 3 times weekly for 4 weeks to include the following interventions: Electrical Stimulation IFC, Gait Training, Manual Therapy, Moist Heat/ Ice, Neuromuscular Re-ed, Patient Education, Therapeutic Activities, and Therapeutic Exercise.     Aníbal Vera PT        Physician's Signature: _________________________________________ Date: ________________

## 2023-10-06 ENCOUNTER — CLINICAL SUPPORT (OUTPATIENT)
Dept: REHABILITATION | Facility: HOSPITAL | Age: 58
End: 2023-10-06
Payer: COMMERCIAL

## 2023-10-06 DIAGNOSIS — Z96.651 STATUS POST TOTAL KNEE REPLACEMENT, RIGHT: Primary | ICD-10-CM

## 2023-10-06 PROCEDURE — 97110 THERAPEUTIC EXERCISES: CPT

## 2023-10-06 NOTE — PROGRESS NOTES
[facility]   Physical Therapy Treatment Note      Name: Vandana Price  Clinic Number: [id number]    Therapy Diagnosis: No diagnosis found.  Physician: Jerome Valladares MD    Visit Date: 10/6/2023    Physician Orders: PT Eval and Treat   Medical Diagnosis from Referral: Z96.651 (ICD-10-CM) - Status post total knee replacement, right   Evaluation Date: 10/4/2023  Authorization Period Expiration: Patient has 50 visits per year. PT, OT Combined limit   Plan of Care Expiration: 11/4/2023  Progress Note Due: 11/2/2023  Date of Surgery: 9/14/2023  Visit # / Visits authorized: 2/ 8        Precautions: Standard      Time In: 8:32 AM  Time Out: 9:12 AM  Total Billable Time: 40 minutes    PTA Visit #: 0/5       Subjective     Patient reports: mild pain in right knee upon arrival.  She was compliant with home exercise program.    Pain: 4/10  Location: right knee      Objective      Objective Measures updated at progress report unless specified.     Treatment     Vandana received the treatments listed below:      therapeutic exercises to develop strength, ROM, and flexibility for 40 minutes including:  Recumbent Bike 5 mins  Calf Stretch 4x20 secs  Hamstring Stretch 4x20 secs  Standing TKEs  SAQ x30 5#  HS Curl  x30 DL  Prone Quad Stretch 4x20 secs  Sidelying Hip AB x30  Sidelying Hip AD x30  Hamstring Curl x30  Knee Flexion (wall) x20    cold pack for  minutes to .    Patient Education and Home Exercises       Education provided:   - on correct performance of therapeutic interventions and HEP    Written Home Exercises Provided: yes. Exercises were reviewed and Vandana was able to demonstrate them prior to the end of the session.  Vandana demonstrated good  understanding of the education provided. See Electronic Medical Record under Patient Instructions for exercises provided during therapy sessions    Assessment     Patient tolerated tx well. Therapist provided cues for sequencing and safety for all interventions  prescribed.    Tia Is progressing well towards her goals.   Patient prognosis is Excellent.     Patient will continue to benefit from skilled outpatient physical therapy to address the deficits listed in the problem list box on initial evaluation, provide pt/family education and to maximize pt's level of independence in the home and community environment.     Patient's spiritual, cultural and educational needs considered and pt agreeable to plan of care and goals.     Anticipated barriers to physical therapy: None    Goals:  Short Term Goals: 3 weeks   1. Independent with Home Exercise Program   2. Increase Right Knee Range of Motion to 0 Degrees to 120 Degrees  3. Increase Right Knee Strength to grossly 4+/5  4. Patient will ambulate 500 feet with Normal Gait pattern without complaints of pain      Long Term Goals: 6 weeks   1. Patient will increase Right Knee Strength to grossly 5/5  2. Patient will ambulate 1000+ feet with no complaints of pain or weakness in Right Knee       Plan     Continue with POC as indicated    Aníbal Vera, PT

## 2023-10-09 ENCOUNTER — HOSPITAL ENCOUNTER (OUTPATIENT)
Dept: RADIOLOGY | Facility: HOSPITAL | Age: 58
Discharge: HOME | End: 2023-10-09
Attending: ORTHOPAEDIC SURGERY
Payer: COMMERCIAL

## 2023-10-09 ENCOUNTER — OFFICE VISIT (OUTPATIENT)
Dept: ORTHOPEDICS | Facility: CLINIC | Age: 58
End: 2023-10-09
Payer: COMMERCIAL

## 2023-10-09 VITALS — BODY MASS INDEX: 40.02 KG/M2 | WEIGHT: 249 LBS | HEIGHT: 66 IN

## 2023-10-09 DIAGNOSIS — Z96.651 STATUS POST TOTAL KNEE REPLACEMENT, RIGHT: ICD-10-CM

## 2023-10-09 DIAGNOSIS — Z96.652 STATUS POST TOTAL LEFT KNEE REPLACEMENT: ICD-10-CM

## 2023-10-09 DIAGNOSIS — Z96.651 STATUS POST TOTAL KNEE REPLACEMENT, RIGHT: Primary | ICD-10-CM

## 2023-10-09 PROCEDURE — 99213 OFFICE O/P EST LOW 20 MIN: CPT | Mod: PBBFAC | Performed by: ORTHOPAEDIC SURGERY

## 2023-10-09 PROCEDURE — 73560 X-RAY EXAM OF KNEE 1 OR 2: CPT | Mod: 26,RT,, | Performed by: ORTHOPAEDIC SURGERY

## 2023-10-09 PROCEDURE — 73560 X-RAY EXAM OF KNEE 1 OR 2: CPT | Mod: TC,RT

## 2023-10-09 PROCEDURE — 99024 POSTOP FOLLOW-UP VISIT: CPT | Mod: S$GLB,,, | Performed by: ORTHOPAEDIC SURGERY

## 2023-10-09 PROCEDURE — 99024 PR POST-OP FOLLOW-UP VISIT: ICD-10-PCS | Mod: S$GLB,,, | Performed by: ORTHOPAEDIC SURGERY

## 2023-10-09 PROCEDURE — 1159F PR MEDICATION LIST DOCUMENTED IN MEDICAL RECORD: ICD-10-PCS | Mod: S$GLB,,, | Performed by: ORTHOPAEDIC SURGERY

## 2023-10-09 PROCEDURE — 1159F MED LIST DOCD IN RCRD: CPT | Mod: S$GLB,,, | Performed by: ORTHOPAEDIC SURGERY

## 2023-10-09 PROCEDURE — 73560 XR KNEE 1 OR 2 VIEW RIGHT: ICD-10-PCS | Mod: 26,RT,, | Performed by: ORTHOPAEDIC SURGERY

## 2023-10-09 NOTE — PROGRESS NOTES
Radiology Interpretation        Patient Name: Vandana Price  Date: 10/9/2023  YOB: 1965  MRN# [id number]        ORDERING DIAGNOSIS:    Encounter Diagnoses   Name Primary?    Status post total knee replacement, right Yes    Status post total left knee replacement         Two views AP lateral right knee skeletally mature individual there is normal mineralization.  Total knee 50 in place.  No loosening.  No bony lesions.  Impression total some place right knee no loosening            Jerome Valladares MD

## 2023-10-09 NOTE — PROGRESS NOTES
Patient is here for follow-up of her right knee total knee arthroplasty.  Neurovascularly she is unchanged distally.  Has some slight decreased sensation on lateral aspect of the wound.  Overall doing well.  Motion comes into full extension.  We are going to work on her flexion.  I will follow back up in 6 weeks.  Staples are out.  Wounds are clean and dry.

## 2023-10-10 ENCOUNTER — CLINICAL SUPPORT (OUTPATIENT)
Dept: REHABILITATION | Facility: HOSPITAL | Age: 58
End: 2023-10-10
Payer: COMMERCIAL

## 2023-10-10 DIAGNOSIS — Z96.651 STATUS POST TOTAL KNEE REPLACEMENT, RIGHT: Primary | ICD-10-CM

## 2023-10-10 PROCEDURE — 97110 THERAPEUTIC EXERCISES: CPT

## 2023-10-10 NOTE — PROGRESS NOTES
[facility]   Physical Therapy Treatment Note      Name: Vandana Price  Clinic Number: [id number]    Therapy Diagnosis:   Encounter Diagnosis   Name Primary?    Status post total knee replacement, right Yes     Physician: Jerome Valladares MD    Visit Date: 10/10/2023    Physician Orders: PT Eval and Treat   Medical Diagnosis from Referral: Z96.651 (ICD-10-CM) - Status post total knee replacement, right   Evaluation Date: 10/4/2023  Authorization Period Expiration: Patient has 50 visits per year. PT, OT Combined limit   Plan of Care Expiration: 11/4/2023  Progress Note Due: 11/2/2023  Date of Surgery: 9/14/2023  Visit # / Visits authorized: 3/ 8        Precautions: Standard      Time In: 8:28 AM  Time Out: 9:14 AM  Total Billable Time: 47 minutes    PTA Visit #: 0/5       Subjective     Patient reports: mild pain in right knee upon arrival in the back near the medial hamstring insertion point  She was compliant with home exercise program.    Pain: 4/10  Location: right knee      Objective      Objective Measures updated at progress report unless specified.     Treatment     Tia received the treatments listed below:      therapeutic exercises to develop strength, ROM, and flexibility for 40 minutes including:  Recumbent Bike 5 mins  Calf Stretch 4x20 secs  Hamstring Stretch 4x20 secs  Standing TKEs  SAQ x30 5#  HS Curl  x30 DL  Prone Quad Stretch 4x20 secs  Sidelying Hip AB x30  Sidelying Hip AD x30  Hamstring Curl x30  Knee Flexion (wall) x20    cold pack for  minutes to .    Patient Education and Home Exercises       Education provided:   - on correct performance of therapeutic interventions and HEP    Written Home Exercises Provided: yes. Exercises were reviewed and Vandana was able to demonstrate them prior to the end of the session.  Tia demonstrated good  understanding of the education provided. See Electronic Medical Record under Patient Instructions for exercises provided during therapy  sessions    Assessment     Patient tolerated tx well. Therapist provided cues for sequencing and safety for all interventions prescribed.  Current PROM 0-114  Tia Is progressing well towards her goals.   Patient prognosis is Excellent.     Patient will continue to benefit from skilled outpatient physical therapy to address the deficits listed in the problem list box on initial evaluation, provide pt/family education and to maximize pt's level of independence in the home and community environment.     Patient's spiritual, cultural and educational needs considered and pt agreeable to plan of care and goals.     Anticipated barriers to physical therapy: None    Goals:  Short Term Goals: 3 weeks   1. Independent with Home Exercise Program   2. Increase Right Knee Range of Motion to 0 Degrees to 120 Degrees  3. Increase Right Knee Strength to grossly 4+/5  4. Patient will ambulate 500 feet with Normal Gait pattern without complaints of pain      Long Term Goals: 6 weeks   1. Patient will increase Right Knee Strength to grossly 5/5  2. Patient will ambulate 1000+ feet with no complaints of pain or weakness in Right Knee       Plan     Continue with POC as indicated    Aníbal Vera, PT

## 2023-10-11 ENCOUNTER — CLINICAL SUPPORT (OUTPATIENT)
Dept: REHABILITATION | Facility: HOSPITAL | Age: 58
End: 2023-10-11
Payer: COMMERCIAL

## 2023-10-11 DIAGNOSIS — Z96.651 STATUS POST TOTAL KNEE REPLACEMENT, RIGHT: Primary | ICD-10-CM

## 2023-10-11 PROCEDURE — 97110 THERAPEUTIC EXERCISES: CPT | Mod: CQ

## 2023-10-11 NOTE — PROGRESS NOTES
[facility]   Physical Therapy Treatment Note      Name: Vandana Price  Clinic Number: [id number]    Therapy Diagnosis:   Encounter Diagnosis   Name Primary?    Status post total knee replacement, right Yes     Physician: Jerome Valladares MD    Visit Date: 10/11/2023    Physician Orders: PT Eval and Treat   Medical Diagnosis from Referral: Z96.651 (ICD-10-CM) - Status post total knee replacement, right   Evaluation Date: 10/4/2023  Authorization Period Expiration: Patient has 50 visits per year. PT, OT Combined limit   Plan of Care Expiration: 11/4/2023  Progress Note Due: 11/2/2023  Date of Surgery: 9/14/2023  Visit # / Visits authorized: 4/ 8        Precautions: Standard      Time In: 0930 AM  Time Out:  1010   AM  Total Billable Time: 40 minutes    PTA Visit #: 1/5       Subjective     Patient reports: pain in R knee  She was compliant with home exercise program.    Pain: 7/10  Location: right knee      Objective      Objective Measures updated at progress report unless specified.     Treatment     Tia received the treatments listed below:      therapeutic exercises to develop strength, ROM, and flexibility for 40 minutes including:  Recumbent Bike 5 mins  Calf Stretch 4x20 secs  Hamstring Stretch 4x20 secs  Standing TKEs  Standing lunges at stairs 10x10 sec holds  SAQ x30 5#  Prone Quad Stretch 4x20 secs  Sidelying Hip AB x30   Sidelying Hip AD x30  Hamstring Curl x30  Knee Flexion (wall) x20    cold pack for  minutes to .    Patient Education and Home Exercises       Education provided:   - on correct performance of therapeutic interventions and HEP    Written Home Exercises Provided: yes. Exercises were reviewed and Vandana was able to demonstrate them prior to the end of the session.  Tia demonstrated good  understanding of the education provided. See Electronic Medical Record under Patient Instructions for exercises provided during therapy sessions    Assessment     Patient tolerated tx  well. Therapist provided cues for sequencing and safety for all interventions prescribed.  Current PROM 0-114  Tia Is progressing well towards her goals.   Patient prognosis is Excellent.     Patient will continue to benefit from skilled outpatient physical therapy to address the deficits listed in the problem list box on initial evaluation, provide pt/family education and to maximize pt's level of independence in the home and community environment.     Patient's spiritual, cultural and educational needs considered and pt agreeable to plan of care and goals.     Anticipated barriers to physical therapy: None    Goals:  Short Term Goals: 3 weeks   1. Independent with Home Exercise Program   2. Increase Right Knee Range of Motion to 0 Degrees to 120 Degrees  3. Increase Right Knee Strength to grossly 4+/5  4. Patient will ambulate 500 feet with Normal Gait pattern without complaints of pain      Long Term Goals: 6 weeks   1. Patient will increase Right Knee Strength to grossly 5/5  2. Patient will ambulate 1000+ feet with no complaints of pain or weakness in Right Knee       Plan     Continue with POC as indicated    Venus Potter, PTA

## 2023-10-13 ENCOUNTER — CLINICAL SUPPORT (OUTPATIENT)
Dept: REHABILITATION | Facility: HOSPITAL | Age: 58
End: 2023-10-13
Payer: COMMERCIAL

## 2023-10-13 DIAGNOSIS — Z96.651 STATUS POST TOTAL KNEE REPLACEMENT, RIGHT: Primary | ICD-10-CM

## 2023-10-13 PROCEDURE — 97110 THERAPEUTIC EXERCISES: CPT

## 2023-10-13 NOTE — PROGRESS NOTES
[facility]   Physical Therapy Treatment Note      Name: Vandana Price  Clinic Number: [id number]    Therapy Diagnosis:   Encounter Diagnosis   Name Primary?    Status post total knee replacement, right Yes     Physician: Jerome Valladares MD    Visit Date: 10/13/2023    Physician Orders: PT Eval and Treat   Medical Diagnosis from Referral: Z96.651 (ICD-10-CM) - Status post total knee replacement, right   Evaluation Date: 10/4/2023  Authorization Period Expiration: Patient has 50 visits per year. PT, OT Combined limit   Plan of Care Expiration: 11/4/2023  Progress Note Due: 11/2/2023  Date of Surgery: 9/14/2023  Visit # / Visits authorized: 5/ 8      Precautions: Standard      Time In: 8:35 AM  Time Out:  9:16 AM  Total Billable Time: 45 minutes    PTA Visit #: 0/5       Subjective     Patient reports: pain in R knee when walking  She was compliant with home exercise program.    Pain: 2/10  Location: right knee      Objective      Objective Measures updated at progress report unless specified.     Treatment     Tia received the treatments listed below:      therapeutic exercises to develop strength, ROM, and flexibility for 45 minutes including:  Recumbent Bike 5 mins  Calf Stretch 4x20 secs  Hamstring Stretch 4x20 secs  Standing TKEs  Standing lunges at stairs 10x10 sec holds  SAQ x30 5#  Prone Quad Stretch 4x20 secs  Sidelying Hip AB x30   Sidelying Hip AD x30  Hamstring Curl x30  Knee Flexion (wall) x20    cold pack for  minutes to .    Patient Education and Home Exercises       Education provided:   - on correct performance of therapeutic interventions and HEP    Written Home Exercises Provided: yes. Exercises were reviewed and Vandana was able to demonstrate them prior to the end of the session.  Tia demonstrated good  understanding of the education provided. See Electronic Medical Record under Patient Instructions for exercises provided during therapy sessions    Assessment     Patient  tolerated tx well. Therapist provided cues for sequencing and safety for all interventions prescribed.  Current PROM 0-114  Tia Is progressing well towards her goals.   Patient prognosis is Excellent.     Patient will continue to benefit from skilled outpatient physical therapy to address the deficits listed in the problem list box on initial evaluation, provide pt/family education and to maximize pt's level of independence in the home and community environment.     Patient's spiritual, cultural and educational needs considered and pt agreeable to plan of care and goals.     Anticipated barriers to physical therapy: None    Goals:  Short Term Goals: 3 weeks   1. Independent with Home Exercise Program   2. Increase Right Knee Range of Motion to 0 Degrees to 120 Degrees  3. Increase Right Knee Strength to grossly 4+/5  4. Patient will ambulate 500 feet with Normal Gait pattern without complaints of pain      Long Term Goals: 6 weeks   1. Patient will increase Right Knee Strength to grossly 5/5  2. Patient will ambulate 1000+ feet with no complaints of pain or weakness in Right Knee       Plan     Continue with POC as indicated    Aníbal Vera, PT

## 2023-10-16 ENCOUNTER — CLINICAL SUPPORT (OUTPATIENT)
Dept: REHABILITATION | Facility: HOSPITAL | Age: 58
End: 2023-10-16
Payer: COMMERCIAL

## 2023-10-16 DIAGNOSIS — Z96.651 STATUS POST TOTAL KNEE REPLACEMENT, RIGHT: Primary | ICD-10-CM

## 2023-10-16 PROCEDURE — 97110 THERAPEUTIC EXERCISES: CPT | Mod: CQ

## 2023-10-16 NOTE — PROGRESS NOTES
[facility]   Physical Therapy Treatment Note      Name: Vandana Price  Clinic Number: [id number]    Therapy Diagnosis:   Encounter Diagnosis   Name Primary?    Status post total knee replacement, right Yes     Physician: Jerome Valladares MD    Visit Date: 10/16/2023    Physician Orders: PT Eval and Treat   Medical Diagnosis from Referral: Z96.651 (ICD-10-CM) - Status post total knee replacement, right   Evaluation Date: 10/4/2023  Authorization Period Expiration: Patient has 50 visits per year. PT, OT Combined limit   Plan of Care Expiration: 11/4/2023  Progress Note Due: 11/2/2023  Date of Surgery: 9/14/2023  Visit # / Visits authorized: 6/ 8      Precautions: Standard      Time In: 1530 PM  Time Out: 1605  PM  Total Billable Time:   35  minutes    PTA Visit #: 1/5       Subjective     Patient reports: pain in R knee when walking  She was compliant with home exercise program.    Pain: 3/10  Location: right knee      Objective      Objective Measures updated at progress report unless specified.     Treatment     Tia received the treatments listed below:      therapeutic exercises to develop strength, ROM, and flexibility for 35  minutes including:  Recumbent Bike 5 mins  Calf Stretch 4x20 secs  Hamstring Stretch 4x20 secs  Standing TKEs   Standing lunges at stairs 10x10 sec holds  SAQ x30 5# with 3 sec holds  Prone Quad Stretch 4x20 secs  Sidelying Hip AB with 2#    Sidelying Hip AD x30  Hamstring Curl x30 with BTB   Knee Flexion (wall) x20    cold pack for  minutes to .    Patient Education and Home Exercises       Education provided:   - on correct performance of therapeutic interventions and HEP    Written Home Exercises Provided: yes. Exercises were reviewed and Vandana was able to demonstrate them prior to the end of the session.  Tia demonstrated good  understanding of the education provided. See Electronic Medical Record under Patient Instructions for exercises provided during therapy  sessions    Assessment     Patient tolerated tx well. Therapist provided cues for sequencing and safety for all interventions prescribed.  Current PROM 0-114  Tia Is progressing well towards her goals.   Patient prognosis is Excellent.     Patient will continue to benefit from skilled outpatient physical therapy to address the deficits listed in the problem list box on initial evaluation, provide pt/family education and to maximize pt's level of independence in the home and community environment.     Patient's spiritual, cultural and educational needs considered and pt agreeable to plan of care and goals.     Anticipated barriers to physical therapy: None    Goals:  Short Term Goals: 3 weeks   1. Independent with Home Exercise Program   2. Increase Right Knee Range of Motion to 0 Degrees to 120 Degrees  3. Increase Right Knee Strength to grossly 4+/5  4. Patient will ambulate 500 feet with Normal Gait pattern without complaints of pain      Long Term Goals: 6 weeks   1. Patient will increase Right Knee Strength to grossly 5/5  2. Patient will ambulate 1000+ feet with no complaints of pain or weakness in Right Knee       Plan     Continue with POC as indicated    Venus Potter, PTA

## 2023-10-18 ENCOUNTER — CLINICAL SUPPORT (OUTPATIENT)
Dept: REHABILITATION | Facility: HOSPITAL | Age: 58
End: 2023-10-18
Payer: COMMERCIAL

## 2023-10-18 DIAGNOSIS — Z96.651 STATUS POST TOTAL KNEE REPLACEMENT, RIGHT: Primary | ICD-10-CM

## 2023-10-18 PROCEDURE — 97110 THERAPEUTIC EXERCISES: CPT

## 2023-10-18 NOTE — PROGRESS NOTES
[facility]   Physical Therapy Treatment Note      Name: Vandana Price  Clinic Number: [id number]    Therapy Diagnosis:   Encounter Diagnosis   Name Primary?    Status post total knee replacement, right Yes     Physician: Jerome Valladares MD    Visit Date: 10/18/2023    Physician Orders: PT Eval and Treat   Medical Diagnosis from Referral: Z96.651 (ICD-10-CM) - Status post total knee replacement, right   Evaluation Date: 10/4/2023  Authorization Period Expiration: Patient has 50 visits per year. PT, OT Combined limit   Plan of Care Expiration: 11/4/2023  Progress Note Due: 11/2/2023  Date of Surgery: 9/14/2023  Visit # / Visits authorized: 7/ 8      Precautions: Standard      Time In: 1530 PM  Time Out: 1615  PM  Total Billable Time:   45  minutes    PTA Visit #: 0/5       Subjective     Patient reports: pain in R knee when walking  She was compliant with home exercise program.    Pain: 3/10  Location: right knee      Objective      Objective Measures updated at progress report unless specified.     Treatment     Tia received the treatments listed below:      therapeutic exercises to develop strength, ROM, and flexibility for 45  minutes including:  Recumbent Bike 5 mins  Calf Stretch 4x20 secs  Hamstring Stretch 4x20 secs  Standing TKEs   Standing lunges at stairs 10x10 sec holds  SAQ x30 5# with 3 sec holds  Prone Quad Stretch 4x20 secs  Sidelying Hip AB with 2#    Sidelying Hip AD x30  Hamstring Curl x30 with BTB   Knee Flexion (wall) x20    cold pack for  minutes to .    Patient Education and Home Exercises       Education provided:   - on correct performance of therapeutic interventions and HEP    Written Home Exercises Provided: yes. Exercises were reviewed and Vandana was able to demonstrate them prior to the end of the session.  Tia demonstrated good  understanding of the education provided. See Electronic Medical Record under Patient Instructions for exercises provided during therapy  sessions    Assessment     Patient tolerated tx well. Therapist provided cues for sequencing and safety for all interventions prescribed.  Current PROM 0-115  Tia Is progressing well towards her goals.   Patient prognosis is Excellent.     Patient will continue to benefit from skilled outpatient physical therapy to address the deficits listed in the problem list box on initial evaluation, provide pt/family education and to maximize pt's level of independence in the home and community environment.     Patient's spiritual, cultural and educational needs considered and pt agreeable to plan of care and goals.     Anticipated barriers to physical therapy: None    Goals:  Short Term Goals: 3 weeks   1. Independent with Home Exercise Program   2. Increase Right Knee Range of Motion to 0 Degrees to 120 Degrees  3. Increase Right Knee Strength to grossly 4+/5  4. Patient will ambulate 500 feet with Normal Gait pattern without complaints of pain      Long Term Goals: 6 weeks   1. Patient will increase Right Knee Strength to grossly 5/5  2. Patient will ambulate 1000+ feet with no complaints of pain or weakness in Right Knee       Plan     Continue with POC as indicated    Aníbal Vera, PT

## 2023-10-20 ENCOUNTER — CLINICAL SUPPORT (OUTPATIENT)
Dept: REHABILITATION | Facility: HOSPITAL | Age: 58
End: 2023-10-20
Payer: COMMERCIAL

## 2023-10-20 DIAGNOSIS — Z96.651 STATUS POST TOTAL KNEE REPLACEMENT, RIGHT: Primary | ICD-10-CM

## 2023-10-20 PROCEDURE — 97110 THERAPEUTIC EXERCISES: CPT

## 2023-10-20 NOTE — PROGRESS NOTES
[facility]   Physical Therapy Treatment Note      Name: Vandana Price  Clinic Number: [id number]    Therapy Diagnosis:   No diagnosis found.    Physician: Jerome Valladares MD    Visit Date: 10/20/2023    Physician Orders: PT Eval and Treat   Medical Diagnosis from Referral: Z96.651 (ICD-10-CM) - Status post total knee replacement, right   Evaluation Date: 10/4/2023  Authorization Period Expiration: Patient has 50 visits per year. PT, OT Combined limit   Plan of Care Expiration: 11/4/2023  Progress Note Due: 11/2/2023  Date of Surgery: 9/14/2023  Visit # / Visits authorized: 7/ 12      Precautions: Standard      Time In: 3:30 PM  Time Out:4:15  PM  Total Billable Time:   45  minutes    PTA Visit #: 0/5       Subjective     Patient reports: pain in R knee when walking  She was compliant with home exercise program.    Pain: 3/10  Location: right knee      Objective      Objective Measures updated at progress report unless specified.     Treatment     Tia received the treatments listed below:      therapeutic exercises to develop strength, ROM, and flexibility for 45  minutes including:  Recumbent Bike 5 mins  Calf Stretch 4x20 secs  Hamstring Stretch 4x20 secs  Standing TKEs   Standing lunges at stairs 10x10 sec holds  SAQ x30 5# with 3 sec holds  Prone Quad Stretch 4x20 secs  Sidelying Hip AB with 2#    Sidelying Hip AD x30  Hamstring Curl x30 with BTB   Knee Flexion (wall) x20    cold pack for  minutes to .    Patient Education and Home Exercises       Education provided:   - on correct performance of therapeutic interventions and HEP    Written Home Exercises Provided: yes. Exercises were reviewed and Vandana was able to demonstrate them prior to the end of the session.  Tia demonstrated good  understanding of the education provided. See Electronic Medical Record under Patient Instructions for exercises provided during therapy sessions    Assessment     Patient tolerated tx well. Therapist  provided cues for sequencing and safety for all interventions prescribed.  Current PROM 0-115  Tia Is progressing well towards her goals.   Patient prognosis is Excellent.     Patient will continue to benefit from skilled outpatient physical therapy to address the deficits listed in the problem list box on initial evaluation, provide pt/family education and to maximize pt's level of independence in the home and community environment.     Patient's spiritual, cultural and educational needs considered and pt agreeable to plan of care and goals.     Anticipated barriers to physical therapy: None    Goals:  Short Term Goals: 3 weeks   1. Independent with Home Exercise Program   2. Increase Right Knee Range of Motion to 0 Degrees to 120 Degrees  3. Increase Right Knee Strength to grossly 4+/5  4. Patient will ambulate 500 feet with Normal Gait pattern without complaints of pain      Long Term Goals: 6 weeks   1. Patient will increase Right Knee Strength to grossly 5/5  2. Patient will ambulate 1000+ feet with no complaints of pain or weakness in Right Knee       Plan     Continue with POC as indicated    Aníbal Vera, PT

## 2023-10-23 ENCOUNTER — CLINICAL SUPPORT (OUTPATIENT)
Dept: REHABILITATION | Facility: HOSPITAL | Age: 58
End: 2023-10-23
Payer: COMMERCIAL

## 2023-10-23 DIAGNOSIS — Z96.651 STATUS POST TOTAL KNEE REPLACEMENT, RIGHT: Primary | ICD-10-CM

## 2023-10-23 PROCEDURE — 97110 THERAPEUTIC EXERCISES: CPT | Mod: CQ

## 2023-10-23 NOTE — PROGRESS NOTES
[facility]   Physical Therapy Treatment Note      Name: Vandana Price  Clinic Number: [id number]    Therapy Diagnosis:   Encounter Diagnosis   Name Primary?    Status post total knee replacement, right Yes       Physician: Jerome Valladares MD    Visit Date: 10/23/2023    Physician Orders: PT Eval and Treat   Medical Diagnosis from Referral: Z96.651 (ICD-10-CM) - Status post total knee replacement, right   Evaluation Date: 10/4/2023  Authorization Period Expiration: Patient has 50 visits per year. PT, OT Combined limit   Plan of Care Expiration: 11/4/2023  Progress Note Due: 11/2/2023  Date of Surgery: 9/14/2023  Visit # / Visits authorized: 9/ 12      Precautions: Standard      Time In: 3:30 PM  Time Out:4:15  PM  Total Billable Time:   45  minutes    PTA Visit #: 1/5       Subjective     Patient reports: pain in R knee when walking  She was compliant with home exercise program.    Pain: 3/10  Location: right knee      Objective      Objective Measures updated at progress report unless specified.     Treatment     Tia received the treatments listed below:      therapeutic exercises to develop strength, ROM, and flexibility for 45  minutes including:    Recumbent Bike 5 mins  Calf Stretch 4x20 secs  Hamstring Stretch 4x20 secs  Standing TKEs   Standing lunges at stairs 10x10 sec holds     SAQ x30 5# with 3 sec holds  Prone Quad Stretch 4x20 secs  Sidelying Hip AB 3x10 with 3#    Sidelying Hip AD x30  Hamstring Curl x30 with BTB   Knee Flexion (wall) x20    cold pack for  minutes to .    Patient Education and Home Exercises       Education provided:   - on correct performance of therapeutic interventions and HEP    Written Home Exercises Provided: yes. Exercises were reviewed and Vandana was able to demonstrate them prior to the end of the session.  Tia demonstrated good  understanding of the education provided. See Electronic Medical Record under Patient Instructions for exercises provided during  therapy sessions    Assessment     Patient tolerated tx well. Therapist provided cues for sequencing and safety for all interventions prescribed.    Current PROM 0-115  Tia Is progressing well towards her goals.   Patient prognosis is Excellent.     Patient will continue to benefit from skilled outpatient physical therapy to address the deficits listed in the problem list box on initial evaluation, provide pt/family education and to maximize pt's level of independence in the home and community environment.     Patient's spiritual, cultural and educational needs considered and pt agreeable to plan of care and goals.     Anticipated barriers to physical therapy: None    Goals:  Short Term Goals: 3 weeks   1. Independent with Home Exercise Program   2. Increase Right Knee Range of Motion to 0 Degrees to 120 Degrees  3. Increase Right Knee Strength to grossly 4+/5  4. Patient will ambulate 500 feet with Normal Gait pattern without complaints of pain      Long Term Goals: 6 weeks   1. Patient will increase Right Knee Strength to grossly 5/5  2. Patient will ambulate 1000+ feet with no complaints of pain or weakness in Right Knee       Plan     Continue with POC as indicated    Venus Potter, PTA

## 2023-10-25 ENCOUNTER — CLINICAL SUPPORT (OUTPATIENT)
Dept: REHABILITATION | Facility: HOSPITAL | Age: 58
End: 2023-10-25
Payer: COMMERCIAL

## 2023-10-25 DIAGNOSIS — Z96.651 STATUS POST TOTAL KNEE REPLACEMENT, RIGHT: Primary | ICD-10-CM

## 2023-10-25 PROCEDURE — 97110 THERAPEUTIC EXERCISES: CPT

## 2023-10-25 NOTE — PROGRESS NOTES
[facility]   Physical Therapy Treatment Note      Name: Vandana Price  Clinic Number: [id number]    Therapy Diagnosis:   No diagnosis found.      Physician: Jerome Valladares MD    Visit Date: 10/25/2023    Physician Orders: PT Eval and Treat   Medical Diagnosis from Referral: Z96.651 (ICD-10-CM) - Status post total knee replacement, right   Evaluation Date: 10/4/2023  Authorization Period Expiration: Patient has 50 visits per year. PT, OT Combined limit   Plan of Care Expiration: 11/4/2023  Progress Note Due: 11/2/2023  Date of Surgery: 9/14/2023  Visit # / Visits authorized: 9/ 12      Precautions: Standard      Time In: 3:30 PM  Time Out:4:10  PM  Total Billable Time:   40  minutes    PTA Visit #: 1/5       Subjective     Patient reports: pain in R knee when walking  She was compliant with home exercise program.    Pain: 3/10  Location: right knee      Objective      Objective Measures updated at progress report unless specified.     Treatment     Tia received the treatments listed below:      therapeutic exercises to develop strength, ROM, and flexibility for 45  minutes including:    Recumbent Bike 5 mins  Calf Stretch 4x20 secs  Hamstring Stretch 4x20 secs  Standing TKEs   Standing lunges at stairs 10x10 sec holds     SAQ x30 5# with 3 sec holds  Prone Quad Stretch 4x20 secs  Sidelying Hip AB 3x10 with 3#    Sidelying Hip AD x30  Hamstring Curl x30 with BTB   Knee Flexion (wall) x20    cold pack for  minutes to .    Patient Education and Home Exercises       Education provided:   - on correct performance of therapeutic interventions and HEP    Written Home Exercises Provided: yes. Exercises were reviewed and Vandana was able to demonstrate them prior to the end of the session.  Tia demonstrated good  understanding of the education provided. See Electronic Medical Record under Patient Instructions for exercises provided during therapy sessions    Assessment     Patient tolerated tx well.  Therapist provided cues for sequencing and safety for all interventions prescribed.    Current PROM 0-115  Tia Is progressing well towards her goals.   Patient prognosis is Excellent.     Patient will continue to benefit from skilled outpatient physical therapy to address the deficits listed in the problem list box on initial evaluation, provide pt/family education and to maximize pt's level of independence in the home and community environment.     Patient's spiritual, cultural and educational needs considered and pt agreeable to plan of care and goals.     Anticipated barriers to physical therapy: None    Goals:  Short Term Goals: 3 weeks   1. Independent with Home Exercise Program   2. Increase Right Knee Range of Motion to 0 Degrees to 120 Degrees  3. Increase Right Knee Strength to grossly 4+/5  4. Patient will ambulate 500 feet with Normal Gait pattern without complaints of pain      Long Term Goals: 6 weeks   1. Patient will increase Right Knee Strength to grossly 5/5  2. Patient will ambulate 1000+ feet with no complaints of pain or weakness in Right Knee       Plan     Continue with POC as indicated    Aníbal Vera, PT

## 2023-10-27 ENCOUNTER — CLINICAL SUPPORT (OUTPATIENT)
Dept: REHABILITATION | Facility: HOSPITAL | Age: 58
End: 2023-10-27
Payer: COMMERCIAL

## 2023-10-27 DIAGNOSIS — Z96.651 STATUS POST TOTAL KNEE REPLACEMENT, RIGHT: Primary | ICD-10-CM

## 2023-10-27 PROCEDURE — 97110 THERAPEUTIC EXERCISES: CPT | Mod: CQ

## 2023-10-27 NOTE — PROGRESS NOTES
[facility]   Physical Therapy Treatment Note      Name: Vandana Price  Clinic Number: [id number]    Therapy Diagnosis:   Encounter Diagnosis   Name Primary?    Status post total knee replacement, right Yes         Physician: Jerome Valladares MD    Visit Date: 10/27/2023    Physician Orders: PT Eval and Treat   Medical Diagnosis from Referral: Z96.651 (ICD-10-CM) - Status post total knee replacement, right   Evaluation Date: 10/4/2023  Authorization Period Expiration: Patient has 50 visits per year. PT, OT Combined limit   Plan of Care Expiration: 11/4/2023  Progress Note Due: 11/2/2023  Date of Surgery: 9/14/2023  Visit # / Visits authorized: 11/12      Precautions: Standard      Time In: 1518 PM  Time Out: 1550 PM  Total Billable Time: 32 minutes    PTA Visit #: 1/5       Subjective     Patient reports: pain in R knee when walking  She was compliant with home exercise program.    Pain: 3/10  Location: right knee      Objective      Objective Measures updated at progress report unless specified.     Treatment     Tia received the treatments listed below:      therapeutic exercises to develop strength, ROM, and flexibility for 32  minutes including:    Recumbent Bike 5 mins  Calf Stretch 4x20 secs  Hamstring Stretch 4x20 secs  Standing TKEs (not this visit)  Standing lunges at stairs 10x10 sec holds     SAQ x30 5# with 3 sec holds  Seated knee extension 20# 2x10  Seated HS curls 30# 2x10  Leg press 50# 2x10  Prone Quad Stretch 4x20 secs  Sidelying Hip AB 3x10 with 3#    Sidelying Hip AD x30  Hamstring Curl x30 with BTB (not this visit)  Knee Flexion (wall) x20    cold pack for  minutes to .    Patient Education and Home Exercises       Education provided:   - on correct performance of therapeutic interventions and HEP    Written Home Exercises Provided: yes. Exercises were reviewed and Tia was able to demonstrate them prior to the end of the session.  Tia demonstrated good  understanding of the  education provided. See Electronic Medical Record under Patient Instructions for exercises provided during therapy sessions    Assessment     Patient tolerated tx well. Therapist provided cues for sequencing and safety for all interventions prescribed.    Current PROM 0-115  Tia Is progressing well towards her goals.   Patient prognosis is Excellent.     Patient will continue to benefit from skilled outpatient physical therapy to address the deficits listed in the problem list box on initial evaluation, provide pt/family education and to maximize pt's level of independence in the home and community environment.     Patient's spiritual, cultural and educational needs considered and pt agreeable to plan of care and goals.     Anticipated barriers to physical therapy: None    Goals:  Short Term Goals: 3 weeks   1. Independent with Home Exercise Program   2. Increase Right Knee Range of Motion to 0 Degrees to 120 Degrees  3. Increase Right Knee Strength to grossly 4+/5  4. Patient will ambulate 500 feet with Normal Gait pattern without complaints of pain      Long Term Goals: 6 weeks   1. Patient will increase Right Knee Strength to grossly 5/5  2. Patient will ambulate 1000+ feet with no complaints of pain or weakness in Right Knee       Plan     Continue with POC as indicated    Venus Potter, PTA

## 2023-10-31 ENCOUNTER — CLINICAL SUPPORT (OUTPATIENT)
Dept: REHABILITATION | Facility: HOSPITAL | Age: 58
End: 2023-10-31
Payer: COMMERCIAL

## 2023-10-31 ENCOUNTER — PATIENT MESSAGE (OUTPATIENT)
Dept: GASTROENTEROLOGY | Facility: CLINIC | Age: 58
End: 2023-10-31
Payer: COMMERCIAL

## 2023-10-31 DIAGNOSIS — Z96.651 STATUS POST TOTAL KNEE REPLACEMENT, RIGHT: Primary | ICD-10-CM

## 2023-10-31 PROCEDURE — 97110 THERAPEUTIC EXERCISES: CPT

## 2023-10-31 NOTE — PLAN OF CARE
Outpatient Therapy Discharge Summary     Name: Vandana Price  [facility] Number: [id number]    Therapy Diagnosis:   Encounter Diagnosis   Name Primary?    Status post total knee replacement, right Yes     Physician: Jerome Valladares MD    Physician Orders: PT Eval and Treat   Medical Diagnosis from Referral: Z96.651 (ICD-10-CM) - Status post total knee replacement, right   Evaluation Date: 10/4/2023    Date of Last visit: 10/31/2023  Total Visits Received: 12  Cancelled Visits: 0  No Show Visits: 0    Assessment      Goals:  Short Term Goals: 3 weeks   1. Independent with Home Exercise Program   2. Increase Right Knee Range of Motion to 0 Degrees to 120 Degrees  3. Increase Right Knee Strength to grossly 4+/5  4. Patient will ambulate 500 feet with Normal Gait pattern without complaints of pain      Long Term Goals: 6 weeks   1. Patient will increase Right Knee Strength to grossly 5/5  2. Patient will ambulate 1000+ feet with no complaints of pain or weakness in Right Knee       Discharge reason: Patient has met all of his/her goals    Plan   This patient is discharged from Physical Therapy.

## 2023-10-31 NOTE — PROGRESS NOTES
[facility]   Physical Therapy Treatment Note      Name: Vandana Price  Clinic Number: [id number]    Therapy Diagnosis:   Encounter Diagnosis   Name Primary?    Status post total knee replacement, right Yes         Physician: Jerome Valladares MD    Visit Date: 10/31/2023    Physician Orders: PT Eval and Treat   Medical Diagnosis from Referral: Z96.651 (ICD-10-CM) - Status post total knee replacement, right   Evaluation Date: 10/4/2023  Authorization Period Expiration: Patient has 50 visits per year. PT, OT Combined limit   Plan of Care Expiration: 11/4/2023  Progress Note Due: 11/2/2023  Date of Surgery: 9/14/2023  Visit # / Visits authorized: 10/ 12      Precautions: Standard      Time In: 3:30 PM  Time Out:4:0  PM  Total Billable Time:   30  minutes    PTA Visit #: 0/5       Subjective     Patient reports: pain in R knee when walking  She was compliant with home exercise program.    Pain: 3/10  Location: right knee      Objective      Objective Measures updated at progress report unless specified.     Treatment     Tia received the treatments listed below:      therapeutic exercises to develop strength, ROM, and flexibility for 30  minutes including:    Recumbent Bike 5 mins  Calf Stretch 4x20 secs  Hamstring Stretch 4x20 secs  Standing TKEs   Standing lunges at stairs 10x10 sec holds     SAQ x30 5# with 3 sec holds  Prone Quad Stretch 4x20 secs  Sidelying Hip AB 3x10 with 3#    Sidelying Hip AD x30  Hamstring Curl x30 with BTB   Knee Flexion (wall) x20    cold pack for  minutes to .    Patient Education and Home Exercises       Education provided:   - on correct performance of therapeutic interventions and HEP    Written Home Exercises Provided: yes. Exercises were reviewed and Vandana was able to demonstrate them prior to the end of the session.  Tia demonstrated good  understanding of the education provided. See Electronic Medical Record under Patient Instructions for exercises provided during  therapy sessions    Assessment     Patient tolerated tx well and will be assessed for all set goals in preparation for discharge  Goals:  Short Term Goals: 3 weeks   1. Independent with Home Exercise Program   2. Increase Right Knee Range of Motion to 0 Degrees to 120 Degrees  3. Increase Right Knee Strength to grossly 4+/5  4. Patient will ambulate 500 feet with Normal Gait pattern without complaints of pain      Long Term Goals: 6 weeks   1. Patient will increase Right Knee Strength to grossly 5/5  2. Patient will ambulate 1000+ feet with no complaints of pain or weakness in Right Knee       Plan     Discharge    Aníbal Vera, PT

## 2023-10-31 NOTE — TELEPHONE ENCOUNTER
I spoke with patient on telephone regarding her concerns with the Humira injections. She does her injections on Friday evenings. She will experience a headache with nausea on Saturday. The nausea generally resolves by Sunday and the headache will improve and be resolved by Monday. She is in a support group on facebook where others have recommended hydration prior to injection to help with these symptoms. She reports that she does forget to hydrate before injection but is going to try to start doing this. I instructed her to pre-medicate with Tylenol prior to injection. She does not feel her symptoms are severe enough to change current treatment regimen at this time. I instructed patient to notify my office with any change or worsening in symptoms. She voiced understanding.

## 2023-11-20 ENCOUNTER — OFFICE VISIT (OUTPATIENT)
Dept: PAIN MEDICINE | Facility: CLINIC | Age: 58
End: 2023-11-20
Payer: COMMERCIAL

## 2023-11-20 ENCOUNTER — PATIENT MESSAGE (OUTPATIENT)
Dept: PRIMARY CARE CLINIC | Facility: CLINIC | Age: 58
End: 2023-11-20
Payer: COMMERCIAL

## 2023-11-20 ENCOUNTER — PATIENT MESSAGE (OUTPATIENT)
Dept: GASTROENTEROLOGY | Facility: CLINIC | Age: 58
End: 2023-11-20
Payer: COMMERCIAL

## 2023-11-20 VITALS
BODY MASS INDEX: 41.95 KG/M2 | SYSTOLIC BLOOD PRESSURE: 142 MMHG | HEART RATE: 88 BPM | RESPIRATION RATE: 18 BRPM | DIASTOLIC BLOOD PRESSURE: 75 MMHG | WEIGHT: 261 LBS | HEIGHT: 66 IN

## 2023-11-20 DIAGNOSIS — M47.817 SPONDYLOSIS OF LUMBOSACRAL REGION WITHOUT MYELOPATHY OR RADICULOPATHY: ICD-10-CM

## 2023-11-20 DIAGNOSIS — Z79.899 ENCOUNTER FOR LONG-TERM (CURRENT) USE OF OTHER MEDICATIONS: Primary | ICD-10-CM

## 2023-11-20 PROCEDURE — 3077F PR MOST RECENT SYSTOLIC BLOOD PRESSURE >= 140 MM HG: ICD-10-PCS | Mod: S$GLB,,, | Performed by: PAIN MEDICINE

## 2023-11-20 PROCEDURE — 1159F PR MEDICATION LIST DOCUMENTED IN MEDICAL RECORD: ICD-10-PCS | Mod: S$GLB,,, | Performed by: PAIN MEDICINE

## 2023-11-20 PROCEDURE — 3078F DIAST BP <80 MM HG: CPT | Mod: S$GLB,,, | Performed by: PAIN MEDICINE

## 2023-11-20 PROCEDURE — 80305 POCT URINE DRUG SCREEN PRESUMP: ICD-10-PCS | Mod: QW,S$GLB,, | Performed by: PAIN MEDICINE

## 2023-11-20 PROCEDURE — 3077F SYST BP >= 140 MM HG: CPT | Mod: S$GLB,,, | Performed by: PAIN MEDICINE

## 2023-11-20 PROCEDURE — 1159F MED LIST DOCD IN RCRD: CPT | Mod: S$GLB,,, | Performed by: PAIN MEDICINE

## 2023-11-20 PROCEDURE — 3078F PR MOST RECENT DIASTOLIC BLOOD PRESSURE < 80 MM HG: ICD-10-PCS | Mod: S$GLB,,, | Performed by: PAIN MEDICINE

## 2023-11-20 PROCEDURE — 99215 OFFICE O/P EST HI 40 MIN: CPT | Mod: PBBFAC | Performed by: PAIN MEDICINE

## 2023-11-20 PROCEDURE — 99212 OFFICE O/P EST SF 10 MIN: CPT | Mod: S$GLB,,, | Performed by: PAIN MEDICINE

## 2023-11-20 PROCEDURE — 3008F PR BODY MASS INDEX (BMI) DOCUMENTED: ICD-10-PCS | Mod: S$GLB,,, | Performed by: PAIN MEDICINE

## 2023-11-20 PROCEDURE — 99212 PR OFFICE/OUTPT VISIT, EST, LEVL II, 10-19 MIN: ICD-10-PCS | Mod: S$GLB,,, | Performed by: PAIN MEDICINE

## 2023-11-20 PROCEDURE — 3008F BODY MASS INDEX DOCD: CPT | Mod: S$GLB,,, | Performed by: PAIN MEDICINE

## 2023-11-20 PROCEDURE — 80305 DRUG TEST PRSMV DIR OPT OBS: CPT | Mod: QW,S$GLB,, | Performed by: PAIN MEDICINE

## 2023-11-20 RX ORDER — ACETAMINOPHEN AND CODEINE PHOSPHATE 300; 30 MG/1; MG/1
1 TABLET ORAL EVERY 12 HOURS PRN
COMMUNITY
Start: 2023-11-09

## 2023-11-20 NOTE — H&P (VIEW-ONLY)
She Disclaimer: This note has been generated using voice-recognition software. There may be typographical errors that have been missed during proof-reading        Patient ID: Vandana Price is a 58 y.o. female.      Chief Complaint: Low-back Pain      58-year-old female returns for re-evaluation of chronic lower back and lumbar sacral spondylosis.  She received a right knee replacement September 14, 2023 and feels significantly better but unfortunately the lower back pain remains  intractable.  She was scheduled for lumbar medial branch block # 2 August 28, 2023 but the procedure was canceled prior to the anticipated knee replaced.  She returns today to reschedule the lumbar medial branch block for lower back pain and spondylosis. Tylenol No. 3 provides partial relief.                Pain Assessment  Pain Assessment: 0-10  Pain Score:   4  Pain Location: Back  Pain Descriptors: Nagging  Pain Frequency: Intermittent  Pain Onset: Awakened from sleep  Clinical Progression: Not changed  Aggravating Factors: Standing  Pain Intervention(s): Medication (See eMAR), Home medication      A's of Opioid Risk Assessment  Activity:Patient can perform ADL.   Analgesia:Patients pain is partially controlled by current medication.   Adverse Effects: Patient denies constipation or sedation.  Aberrant Behavior:  reviewed with no aberrant drug seeking/taking behavior.      Patient denies any suicidal or homicidal ideations    Physical Therapy/Home Exercise:  Not for lower back pain          Review of Systems   Constitutional: Negative.    HENT: Negative.     Eyes: Negative.    Respiratory: Negative.     Cardiovascular: Negative.    Gastrointestinal: Negative.    Endocrine: Negative.    Genitourinary: Negative.    Musculoskeletal:  Positive for arthralgias (Bilateral knees), back pain and gait problem.   Integumentary:  Negative.   Hematological: Negative.              Past Medical History:   Diagnosis Date    Allergy     Bilateral primary  osteoarthritis of knee     Chronic pain of both knees 10/14/2021    Eosinophilia     Gangrene of gallbladder in cholecystitis 2021    Hematochezia     History of colonoscopy     Hyperlipidemia     Hypertension     Sleep apnea      Past Surgical History:   Procedure Laterality Date    ARTHROPLASTY, KNEE, TOTAL, USING COMPUTER-ASSISTED NAVIGATION Left 12/15/2022    Procedure: ARTHROPLASTY, KNEE, TOTAL, USING COMPUTER-ASSISTED NAVIGATION;  Surgeon: Jerome Valladares MD;  Location: [facility] OR;  Service: Orthopedics;  Laterality: Left;    ARTHROPLASTY, KNEE, TOTAL, USING COMPUTER-ASSISTED NAVIGATION Right 2023    Procedure: ARTHROPLASTY, KNEE, TOTAL, USING COMPUTER-ASSISTED NAVIGATION;  Surgeon: Jerome Valladares MD;  Location: [facility]THO OR;  Service: Orthopedics;  Laterality: Right;    Bilateral IA knee injection Bilateral 2021    D Shows    bladder tact       SECTION      CHOLECYSTECTOMY      FRACTURE SURGERY      jaw     INJECTION      back    INJECTION OF ANESTHETIC AGENT AROUND MEDIAL BRANCH NERVES INNERVATING LUMBAR FACET JOINT Bilateral 2023    Procedure: BIlateral L4-5,5-S1 MBB;  Surgeon: Amanda Goetz MD;  Location: [facility]IN [facility]MT;  Service: Pain Management;  Laterality: Bilateral;  LOCAL    INJECTION OF ANESTHETIC AGENT AROUND NERVE Bilateral 2022    Procedure: GNB   BILATERAL;  Surgeon: Amanda Goetz MD;  Location: [facility];  Service: Pain Management;  Laterality: Bilateral;  PT HAS NOT HAD VAC   STATES WILL BE TESTED AT [facility] IN [place]    knee scope Bilateral     KNEE SURGERY      LAPAROSCOPIC CHOLECYSTECTOMY N/A 2021    Procedure: LAPAROSCOPIC CHOLECYSTECTOMY CONVERTED TO OPEN;  Surgeon: Darnell Mcgraw MD;  Location: [facility] OR;  Service: General;  Laterality: N/A;  CONVERTED TO OPEN    TUBAL LIGATION       Social History     Socioeconomic History    Marital status:     Number of children: 2   Occupational History     Occupation:  at [employer]   Tobacco Use    Smoking status: Former     Current packs/day: 0.00     Average packs/day: 1 pack/day for 33.0 years (33.0 ttl pk-yrs)     Types: Cigarettes     Start date:      Quit date: 2010     Years since quittin.9     Passive exposure: Never    Smokeless tobacco: Never   Substance and Sexual Activity    Alcohol use: Not Currently    Drug use: Not Currently     Types: Methamphetamines    Sexual activity: Not Currently     Social Determinants of Health     Financial Resource Strain: Low Risk  (2023)    Overall Financial Resource Strain (CARDIA)     Difficulty of Paying Living Expenses: Not hard at all   Food Insecurity: No Food Insecurity (2023)    Hunger Vital Sign     Worried About Running Out of Food in the Last Year: Never true     Ran Out of Food in the Last Year: Never true   Transportation Needs: No Transportation Needs (2023)    PRAPARE - Transportation     Lack of Transportation (Medical): No     Lack of Transportation (Non-Medical): No   Physical Activity: Inactive (2023)    Exercise Vital Sign     Days of Exercise per Week: 0 days     Minutes of Exercise per Session: 0 min   Stress: Stress Concern Present (2023)    [employer] - Occupational Stress Questionnaire     Feeling of Stress : Rather much   Social Connections: Moderately Isolated (2023)    Social Connection and Isolation Panel [NHANES]     Frequency of Communication with Friends and Family: More than three times a week     Frequency of Social Gatherings with Friends and Family: Once a week     Attends Yazdanism Services: More than 4 times per year     Active Member of Clubs or Organizations: No     Attends Club or Organization Meetings: Never     Marital Status:    Housing Stability: Low Risk  (2023)    Housing Stability Vital Sign     Unable to Pay for Housing in the Last Year: No     Number of Places Lived in the Last Year: 1      Unstable Housing in the Last Year: No     Family History   Problem Relation Age of Onset    Hypertension Father     Heart disease Father     Alcohol abuse Mother     Migraines Daughter     Lung cancer Maternal Aunt      Review of patient's allergies indicates:   Allergen Reactions    Opioids - morphine analogues Rash     Development of red rash at site morphine given.      has a current medication list which includes the following prescription(s): acetaminophen-codeine 300-30mg, humira(cf) pen, aspirin, celecoxib, cetirizine, famotidine, gabapentin, and oseltamivir.      Objective:  Vitals:    11/20/23 1431   BP: (!) 142/75   Pulse: 88   Resp: 18        Physical Exam  Vitals and nursing note reviewed.   Constitutional:       General: She is not in acute distress.     Appearance: Normal appearance. She is obese. She is not ill-appearing, toxic-appearing or diaphoretic.   HENT:      Head: Normocephalic and atraumatic.      Nose: Nose normal.      Mouth/Throat:      Mouth: Mucous membranes are moist.   Eyes:      Extraocular Movements: Extraocular movements intact.      Pupils: Pupils are equal, round, and reactive to light.   Cardiovascular:      Rate and Rhythm: Normal rate and regular rhythm.      Heart sounds: Normal heart sounds.   Pulmonary:      Effort: Pulmonary effort is normal. No respiratory distress.      Breath sounds: Normal breath sounds. No stridor. No wheezing or rhonchi.   Abdominal:      General: Bowel sounds are normal.      Palpations: Abdomen is soft.   Musculoskeletal:         General: No swelling or deformity.      Cervical back: Normal and normal range of motion. No spasms or tenderness. No pain with movement. Normal range of motion.      Thoracic back: Normal.      Lumbar back: No spasms. Negative right straight leg raise test and negative left straight leg raise test. No scoliosis.      Right knee: Crepitus present. Decreased range of motion. Tenderness present.      Left knee: Crepitus  present. Decreased range of motion. Tenderness present.      Right lower leg: No edema.      Left lower leg: No edema.   Skin:     General: Skin is warm.   Neurological:      General: No focal deficit present.      Mental Status: She is alert and oriented to person, place, and time. Mental status is at baseline.      Cranial Nerves: No cranial nerve deficit.      Sensory: Sensation is intact. No sensory deficit.      Motor: No weakness.      Coordination: Coordination normal.      Gait: Gait normal.      Deep Tendon Reflexes: Reflexes are normal and symmetric.   Psychiatric:         Mood and Affect: Mood normal.         Behavior: Behavior normal.           Assessment:      1. Encounter for long-term (current) use of other medications    2. Spondylosis of lumbosacral region without myelopathy or radiculopathy            Plan:  1. reviewed  2.Urine drug screen and confirmation testing was ordered as documented on the requisition form in order to verify medication compliance, test for illicit substances.  3. Indication: The patient has clinical and radiologic findings suggestive of facet mediated pain and is s/p 1st diagnostic bilateral lumbar L4/5 and L5/S1 medial branch blocks with at least 80% relief of their axial back pain lasting the duration of the local anesthetic utilized.    Orders Placed This Encounter   Procedures    POCT Urine Drug Screen Presump     Interpretive Information:     Negative:  No drug detected at the cut off level.   Positive:  This result represents presumptive positive for the   tested drug, other substances may yield a positive response other   than the analyte of interest. This result should be utilized for   diagnostic purpose only. Confirmation testing will be performed upon physician request only.       Case Request Operating Room: BIlateral L4-5,5-S1 MBB     Order Specific Question:   Medical Necessity:     Answer:   Medically Non-Urgent [100]     Order Specific Question:   CPT  Code:     Answer:   MO INJ DX/THER AGNT PARAVERT FACET JOINT,IMG GUIDE,LUMBAR/SAC,1ST LVL [48408]     Order Specific Question:   CPT Code:     Answer:   MO INJ DX/THER AGNT PARAVERT FACET JOINT,IMG GUIDE,LUMBAR/SAC, 2ND LEVEL [64578]     Order Specific Question:   Post-Procedure Disposition:     Answer:   PACU [1]     Order Specific Question:   Estimated Length of Stay:     Answer:   0 midnight     Order Specific Question:   Implant Required:     Answer:   No [1001]     Order Specific Question:   Is an on-site pathologist required for this procedure?     Answer:   N/A      4.Indications for this procedure for this specific patient include the following   - Pt has had symptoms for three months with moderate to severe pain with functional impairment rated of 7/10 pain.   - Pain non-responsive to conservative care.    - Pain predominately axial and not associated with radiculopathy or claudication.    - No non-facet pathology as source of pain.    - Clinical assessment implicates facet joint as putative pain source.    - Pain is exacerbated by extension or prolonged sitting/standing and relieved by rest.    - No unexplained neurologic deficit.    - No history of coagulopathy, infection or unstable medical conditions.    - Pain is causing significant functional limitation resulting in diminished quality of life and impaired age appropriate ADL's.   - Clinical assessment implicates facet joint as putative source of pain  - Repeat injections not done prior to 7 days   - no more than 2 levels will be done per side      5.Monitored Anesthesia Care medical necessity authorization request:    Monitor anesthesia request is medically indicated for the scheduled nerve block procedure due to:  - needle phobia and anxiety, placing  the patient at risk during the provided service.  -patient has a BMI greater than 40  -patient has severe sleep apnea for which BiPAP and oxygen are needed while sleeping  -patient has an ASA class  greater than 3 and requires constant presence of an anesthesiologist during the procedure:  -patient has severe problems with muscles and muscle spasticity that makes it hard to lie still  -patient suffers from chronic pain and is unable to function due to  diminished ADLs    6.The planned medically necessary  surgical procedure is performed in a hospital outpatient department and not in an ambulatory surgical center due to:     -there is no geographically assessable ambulatory surgery center that has the  necessary equipment and fluoroscopy needed for the procedure     -there is no geographically assessable ambulatory surgical center available at which the physician has privileges     -an ASC's  specific  guideline regarding the individuals weight or health conditions that prevent the use of an ASC       report:  Reviewed and consistent with medication use as prescribed.      The total time spent for evaluation and management on 11/26/2023 including reviewing separately obtained history, performing a medically appropriate exam and evaluation, documenting clinical information in the health record, independently interpreting results and communicating them to the patient/family/caregiver, and ordering medications/tests/procedures was between 15-29 minutes.    The above plan and management options were discussed at length with patient. Patient is in agreement with the above and verbalized understanding. It will be communicated with the referring physician via electronic record, fax, or mail.

## 2023-11-20 NOTE — PROGRESS NOTES
She Disclaimer: This note has been generated using voice-recognition software. There may be typographical errors that have been missed during proof-reading        Patient ID: Vandana Price is a 58 y.o. female.      Chief Complaint: Low-back Pain      58-year-old female returns for re-evaluation of chronic lower back and lumbar sacral spondylosis.  She received a right knee replacement September 14, 2023 and feels significantly better but unfortunately the lower back pain remains  intractable.  She was scheduled for lumbar medial branch block # 2 August 28, 2023 but the procedure was canceled prior to the anticipated knee replaced.  She returns today to reschedule the lumbar medial branch block for lower back pain and spondylosis. Tylenol No. 3 provides partial relief.                Pain Assessment  Pain Assessment: 0-10  Pain Score:   4  Pain Location: Back  Pain Descriptors: Nagging  Pain Frequency: Intermittent  Pain Onset: Awakened from sleep  Clinical Progression: Not changed  Aggravating Factors: Standing  Pain Intervention(s): Medication (See eMAR), Home medication      A's of Opioid Risk Assessment  Activity:Patient can perform ADL.   Analgesia:Patients pain is partially controlled by current medication.   Adverse Effects: Patient denies constipation or sedation.  Aberrant Behavior:  reviewed with no aberrant drug seeking/taking behavior.      Patient denies any suicidal or homicidal ideations    Physical Therapy/Home Exercise:  Not for lower back pain          Review of Systems   Constitutional: Negative.    HENT: Negative.     Eyes: Negative.    Respiratory: Negative.     Cardiovascular: Negative.    Gastrointestinal: Negative.    Endocrine: Negative.    Genitourinary: Negative.    Musculoskeletal:  Positive for arthralgias (Bilateral knees), back pain and gait problem.   Integumentary:  Negative.   Hematological: Negative.              Past Medical History:   Diagnosis Date    Allergy     Bilateral primary  osteoarthritis of knee     Chronic pain of both knees 10/14/2021    Eosinophilia     Gangrene of gallbladder in cholecystitis 2021    Hematochezia     History of colonoscopy     Hyperlipidemia     Hypertension     Sleep apnea      Past Surgical History:   Procedure Laterality Date    ARTHROPLASTY, KNEE, TOTAL, USING COMPUTER-ASSISTED NAVIGATION Left 12/15/2022    Procedure: ARTHROPLASTY, KNEE, TOTAL, USING COMPUTER-ASSISTED NAVIGATION;  Surgeon: Jerome Valladares MD;  Location: [facility] OR;  Service: Orthopedics;  Laterality: Left;    ARTHROPLASTY, KNEE, TOTAL, USING COMPUTER-ASSISTED NAVIGATION Right 2023    Procedure: ARTHROPLASTY, KNEE, TOTAL, USING COMPUTER-ASSISTED NAVIGATION;  Surgeon: Jerome Valladares MD;  Location: [facility] OR;  Service: Orthopedics;  Laterality: Right;    Bilateral IA knee injection Bilateral 2021    D Shows    bladder tact       SECTION      CHOLECYSTECTOMY      FRACTURE SURGERY      jaw     INJECTION      back    INJECTION OF ANESTHETIC AGENT AROUND MEDIAL BRANCH NERVES INNERVATING LUMBAR FACET JOINT Bilateral 2023    Procedure: BIlateral L4-5,5-S1 MBB;  Surgeon: Amanda Goetz MD;  Location: [facility];  Service: Pain Management;  Laterality: Bilateral;  LOCAL    INJECTION OF ANESTHETIC AGENT AROUND NERVE Bilateral 2022    Procedure: GNB   BILATERAL;  Surgeon: Amanda Goetz MD;  Location: [facility];  Service: Pain Management;  Laterality: Bilateral;  PT HAS NOT HAD VAC   STATES WILL BE TESTED AT [facility] IN [place]    knee scope Bilateral     KNEE SURGERY      LAPAROSCOPIC CHOLECYSTECTOMY N/A 2021    Procedure: LAPAROSCOPIC CHOLECYSTECTOMY CONVERTED TO OPEN;  Surgeon: Darnell Mcgraw MD;  Location: [facility] OR;  Service: General;  Laterality: N/A;  CONVERTED TO OPEN    TUBAL LIGATION       Social History     Socioeconomic History    Marital status:     Number of children: 2   Occupational History     Occupation:  at [employer]   Tobacco Use    Smoking status: Former     Current packs/day: 0.00     Average packs/day: 1 pack/day for 33.0 years (33.0 ttl pk-yrs)     Types: Cigarettes     Start date:      Quit date: 2010     Years since quittin.9     Passive exposure: Never    Smokeless tobacco: Never   Substance and Sexual Activity    Alcohol use: Not Currently    Drug use: Not Currently     Types: Methamphetamines    Sexual activity: Not Currently     Social Determinants of Health     Financial Resource Strain: Low Risk  (2023)    Overall Financial Resource Strain (CARDIA)     Difficulty of Paying Living Expenses: Not hard at all   Food Insecurity: No Food Insecurity (2023)    Hunger Vital Sign     Worried About Running Out of Food in the Last Year: Never true     Ran Out of Food in the Last Year: Never true   Transportation Needs: No Transportation Needs (2023)    PRAPARE - Transportation     Lack of Transportation (Medical): No     Lack of Transportation (Non-Medical): No   Physical Activity: Inactive (2023)    Exercise Vital Sign     Days of Exercise per Week: 0 days     Minutes of Exercise per Session: 0 min   Stress: Stress Concern Present (2023)    [employer] - Occupational Stress Questionnaire     Feeling of Stress : Rather much   Social Connections: Moderately Isolated (2023)    Social Connection and Isolation Panel [NHANES]     Frequency of Communication with Friends and Family: More than three times a week     Frequency of Social Gatherings with Friends and Family: Once a week     Attends Church Services: More than 4 times per year     Active Member of Clubs or Organizations: No     Attends Club or Organization Meetings: Never     Marital Status:    Housing Stability: Low Risk  (2023)    Housing Stability Vital Sign     Unable to Pay for Housing in the Last Year: No     Number of Places Lived in the Last Year: 1      Unstable Housing in the Last Year: No     Family History   Problem Relation Age of Onset    Hypertension Father     Heart disease Father     Alcohol abuse Mother     Migraines Daughter     Lung cancer Maternal Aunt      Review of patient's allergies indicates:   Allergen Reactions    Opioids - morphine analogues Rash     Development of red rash at site morphine given.      has a current medication list which includes the following prescription(s): acetaminophen-codeine 300-30mg, humira(cf) pen, aspirin, celecoxib, cetirizine, famotidine, gabapentin, and oseltamivir.      Objective:  Vitals:    11/20/23 1431   BP: (!) 142/75   Pulse: 88   Resp: 18        Physical Exam  Vitals and nursing note reviewed.   Constitutional:       General: She is not in acute distress.     Appearance: Normal appearance. She is obese. She is not ill-appearing, toxic-appearing or diaphoretic.   HENT:      Head: Normocephalic and atraumatic.      Nose: Nose normal.      Mouth/Throat:      Mouth: Mucous membranes are moist.   Eyes:      Extraocular Movements: Extraocular movements intact.      Pupils: Pupils are equal, round, and reactive to light.   Cardiovascular:      Rate and Rhythm: Normal rate and regular rhythm.      Heart sounds: Normal heart sounds.   Pulmonary:      Effort: Pulmonary effort is normal. No respiratory distress.      Breath sounds: Normal breath sounds. No stridor. No wheezing or rhonchi.   Abdominal:      General: Bowel sounds are normal.      Palpations: Abdomen is soft.   Musculoskeletal:         General: No swelling or deformity.      Cervical back: Normal and normal range of motion. No spasms or tenderness. No pain with movement. Normal range of motion.      Thoracic back: Normal.      Lumbar back: No spasms. Negative right straight leg raise test and negative left straight leg raise test. No scoliosis.      Right knee: Crepitus present. Decreased range of motion. Tenderness present.      Left knee: Crepitus  present. Decreased range of motion. Tenderness present.      Right lower leg: No edema.      Left lower leg: No edema.   Skin:     General: Skin is warm.   Neurological:      General: No focal deficit present.      Mental Status: She is alert and oriented to person, place, and time. Mental status is at baseline.      Cranial Nerves: No cranial nerve deficit.      Sensory: Sensation is intact. No sensory deficit.      Motor: No weakness.      Coordination: Coordination normal.      Gait: Gait normal.      Deep Tendon Reflexes: Reflexes are normal and symmetric.   Psychiatric:         Mood and Affect: Mood normal.         Behavior: Behavior normal.           Assessment:      1. Encounter for long-term (current) use of other medications    2. Spondylosis of lumbosacral region without myelopathy or radiculopathy            Plan:  1. reviewed  2.Urine drug screen and confirmation testing was ordered as documented on the requisition form in order to verify medication compliance, test for illicit substances.  3. Indication: The patient has clinical and radiologic findings suggestive of facet mediated pain and is s/p 1st diagnostic bilateral lumbar L4/5 and L5/S1 medial branch blocks with at least 80% relief of their axial back pain lasting the duration of the local anesthetic utilized.    Orders Placed This Encounter   Procedures    POCT Urine Drug Screen Presump     Interpretive Information:     Negative:  No drug detected at the cut off level.   Positive:  This result represents presumptive positive for the   tested drug, other substances may yield a positive response other   than the analyte of interest. This result should be utilized for   diagnostic purpose only. Confirmation testing will be performed upon physician request only.       Case Request Operating Room: BIlateral L4-5,5-S1 MBB     Order Specific Question:   Medical Necessity:     Answer:   Medically Non-Urgent [100]     Order Specific Question:   CPT  Code:     Answer:   VA INJ DX/THER AGNT PARAVERT FACET JOINT,IMG GUIDE,LUMBAR/SAC,1ST LVL [91603]     Order Specific Question:   CPT Code:     Answer:   VA INJ DX/THER AGNT PARAVERT FACET JOINT,IMG GUIDE,LUMBAR/SAC, 2ND LEVEL [42803]     Order Specific Question:   Post-Procedure Disposition:     Answer:   PACU [1]     Order Specific Question:   Estimated Length of Stay:     Answer:   0 midnight     Order Specific Question:   Implant Required:     Answer:   No [1001]     Order Specific Question:   Is an on-site pathologist required for this procedure?     Answer:   N/A      4.Indications for this procedure for this specific patient include the following   - Pt has had symptoms for three months with moderate to severe pain with functional impairment rated of 7/10 pain.   - Pain non-responsive to conservative care.    - Pain predominately axial and not associated with radiculopathy or claudication.    - No non-facet pathology as source of pain.    - Clinical assessment implicates facet joint as putative pain source.    - Pain is exacerbated by extension or prolonged sitting/standing and relieved by rest.    - No unexplained neurologic deficit.    - No history of coagulopathy, infection or unstable medical conditions.    - Pain is causing significant functional limitation resulting in diminished quality of life and impaired age appropriate ADL's.   - Clinical assessment implicates facet joint as putative source of pain  - Repeat injections not done prior to 7 days   - no more than 2 levels will be done per side      5.Monitored Anesthesia Care medical necessity authorization request:    Monitor anesthesia request is medically indicated for the scheduled nerve block procedure due to:  - needle phobia and anxiety, placing  the patient at risk during the provided service.  -patient has a BMI greater than 40  -patient has severe sleep apnea for which BiPAP and oxygen are needed while sleeping  -patient has an ASA class  greater than 3 and requires constant presence of an anesthesiologist during the procedure:  -patient has severe problems with muscles and muscle spasticity that makes it hard to lie still  -patient suffers from chronic pain and is unable to function due to  diminished ADLs    6.The planned medically necessary  surgical procedure is performed in a hospital outpatient department and not in an ambulatory surgical center due to:     -there is no geographically assessable ambulatory surgery center that has the  necessary equipment and fluoroscopy needed for the procedure     -there is no geographically assessable ambulatory surgical center available at which the physician has privileges     -an ASC's  specific  guideline regarding the individuals weight or health conditions that prevent the use of an ASC       report:  Reviewed and consistent with medication use as prescribed.      The total time spent for evaluation and management on 11/26/2023 including reviewing separately obtained history, performing a medically appropriate exam and evaluation, documenting clinical information in the health record, independently interpreting results and communicating them to the patient/family/caregiver, and ordering medications/tests/procedures was between 15-29 minutes.    The above plan and management options were discussed at length with patient. Patient is in agreement with the above and verbalized understanding. It will be communicated with the referring physician via electronic record, fax, or mail.

## 2023-11-20 NOTE — PATIENT INSTRUCTIONS
Bilateral L4- S1 Medial Branch Block scheduled for Dec 12, 2023 @ 0800    Procedure Instructions:    Nothing to eat or drink for 8 hours or after midnight including gum, candy, mints, or tobacco products.  If you are scheduled for 1:30 or later nothing to eat or drink after 5 a.m. the morning of the procedure, including gum, candy, mints, or tobacco products.  Must have a  at least 18 yrs of age to stay present at all times  No Diabetic medications the morning of procedure, check blood sugar the morning of procedure, if it is greater than 200 call the office at [phone]  If you are started on antibiotics or have been prescribed antibiotics, have a fever, or have any other type of infection call to reschedule procedure.  If you take blood pressure medications you can take it at your regular scheduled time with a small sip of WATER!  Dentures are to be removed prior to procedure or we can provide you with a denture cup to remove them prior to being taken back for procedure.   False eyelashes are to be removed before the morning of procedure.    Contacts will have to be removed prior to procedure.  No jewelry is to be worn to the procedure.     HOLD ASPIRIN AND ASPIRIN PRODUCTS  (ASPIRIN, BC POWDER ETC. ) FOR 7 DAYS  PRIOR TO PROCEDURE  HOLD NSAIDS( ibuprofen, mobic, meloxicam, advil, diclofenac, naproxen, relafen, celebrex,  methotrexate, aleve etc....)  FOR 3 DAYS   PRIOR TO PROCEDURE

## 2023-11-21 DIAGNOSIS — Z20.828 EXPOSURE TO INFLUENZA: Primary | ICD-10-CM

## 2023-11-21 RX ORDER — OSELTAMIVIR PHOSPHATE 75 MG/1
75 CAPSULE ORAL DAILY
Qty: 5 CAPSULE | Refills: 0 | Status: SHIPPED | OUTPATIENT
Start: 2023-11-21 | End: 2023-11-26

## 2023-11-21 NOTE — PROGRESS NOTES
"Patient message 11/20/2023 1:12 pm - "Hello, my son was diagnosed yesterday with flu a. What are the chances of me catching it being on Humira? I was wondering if you could possibly send me and a prescription for Tamiflu so I can nip this in the bud. Thank you [facility] [place]"    Rx for Tamiflu sent to requested pharmacy.   "

## 2023-11-22 DIAGNOSIS — Z96.651 STATUS POST TOTAL KNEE REPLACEMENT, RIGHT: Primary | ICD-10-CM

## 2023-11-29 ENCOUNTER — HOSPITAL ENCOUNTER (OUTPATIENT)
Dept: RADIOLOGY | Facility: HOSPITAL | Age: 58
Discharge: HOME | End: 2023-11-29
Attending: ORTHOPAEDIC SURGERY
Payer: COMMERCIAL

## 2023-11-29 ENCOUNTER — OFFICE VISIT (OUTPATIENT)
Dept: ORTHOPEDICS | Facility: CLINIC | Age: 58
End: 2023-11-29
Payer: COMMERCIAL

## 2023-11-29 VITALS — BODY MASS INDEX: 41.95 KG/M2 | WEIGHT: 261 LBS | HEIGHT: 66 IN

## 2023-11-29 DIAGNOSIS — Z96.651 STATUS POST TOTAL KNEE REPLACEMENT, RIGHT: ICD-10-CM

## 2023-11-29 DIAGNOSIS — Z96.651 STATUS POST TOTAL KNEE REPLACEMENT, RIGHT: Primary | ICD-10-CM

## 2023-11-29 PROCEDURE — 1159F PR MEDICATION LIST DOCUMENTED IN MEDICAL RECORD: ICD-10-PCS | Mod: S$GLB,,, | Performed by: ORTHOPAEDIC SURGERY

## 2023-11-29 PROCEDURE — 73560 XR KNEE 1 OR 2 VIEW RIGHT: ICD-10-PCS | Mod: 26,RT,, | Performed by: ORTHOPAEDIC SURGERY

## 2023-11-29 PROCEDURE — 1159F MED LIST DOCD IN RCRD: CPT | Mod: S$GLB,,, | Performed by: ORTHOPAEDIC SURGERY

## 2023-11-29 PROCEDURE — 99024 PR POST-OP FOLLOW-UP VISIT: ICD-10-PCS | Mod: S$GLB,,, | Performed by: ORTHOPAEDIC SURGERY

## 2023-11-29 PROCEDURE — 73560 X-RAY EXAM OF KNEE 1 OR 2: CPT | Mod: 26,RT,, | Performed by: ORTHOPAEDIC SURGERY

## 2023-11-29 PROCEDURE — 73560 X-RAY EXAM OF KNEE 1 OR 2: CPT | Mod: TC,RT

## 2023-11-29 PROCEDURE — 99024 POSTOP FOLLOW-UP VISIT: CPT | Mod: S$GLB,,, | Performed by: ORTHOPAEDIC SURGERY

## 2023-11-29 PROCEDURE — 99213 OFFICE O/P EST LOW 20 MIN: CPT | Mod: PBBFAC | Performed by: ORTHOPAEDIC SURGERY

## 2023-11-29 NOTE — PROGRESS NOTES
Patient is here for follow-up of her right total knee arthroplasty.  Her x-rays show no loosening.  She has good motion equal opposite side.  She is having no complaints.  Full extension flexion past 105° let her weightbear as tolerates I will follow up 3 months she is doing well

## 2023-11-29 NOTE — PROGRESS NOTES
Radiology Interpretation        Patient Name: Vandana Price  Date: 11/29/2023  YOB: 1965  MRN# [id number]        ORDERING DIAGNOSIS:    Encounter Diagnosis   Name Primary?    Status post total knee replacement, right Yes           Two views AP lateral right knee skeletally mature individual total knee arthroplasty in place no loosening fractures or subluxations no shift alignment impression total knee arthroplasty in place right knee no loosening            Jerome Valladares MD

## 2023-12-01 ENCOUNTER — PATIENT MESSAGE (OUTPATIENT)
Dept: GASTROENTEROLOGY | Facility: CLINIC | Age: 58
End: 2023-12-01
Payer: COMMERCIAL

## 2023-12-04 ENCOUNTER — PATIENT MESSAGE (OUTPATIENT)
Dept: GASTROENTEROLOGY | Facility: CLINIC | Age: 58
End: 2023-12-04
Payer: COMMERCIAL

## 2023-12-07 ENCOUNTER — ANESTHESIA (OUTPATIENT)
Dept: GASTROENTEROLOGY | Facility: HOSPITAL | Age: 58
End: 2023-12-07
Payer: COMMERCIAL

## 2023-12-07 ENCOUNTER — HOSPITAL ENCOUNTER (OUTPATIENT)
Dept: GASTROENTEROLOGY | Facility: HOSPITAL | Age: 58
Discharge: HOME | End: 2023-12-07
Payer: COMMERCIAL

## 2023-12-07 ENCOUNTER — ANESTHESIA EVENT (OUTPATIENT)
Dept: GASTROENTEROLOGY | Facility: HOSPITAL | Age: 58
End: 2023-12-07
Payer: COMMERCIAL

## 2023-12-07 VITALS
TEMPERATURE: 98 F | DIASTOLIC BLOOD PRESSURE: 83 MMHG | SYSTOLIC BLOOD PRESSURE: 128 MMHG | RESPIRATION RATE: 31 BRPM | OXYGEN SATURATION: 96 % | HEART RATE: 94 BPM

## 2023-12-07 DIAGNOSIS — K51.811 OTHER ULCERATIVE COLITIS WITH RECTAL BLEEDING: Primary | ICD-10-CM

## 2023-12-07 DIAGNOSIS — K51.918 ULCERATIVE COLITIS WITH OTHER COMPLICATION, UNSPECIFIED LOCATION: ICD-10-CM

## 2023-12-07 PROCEDURE — D9220A PRA ANESTHESIA: Mod: ,,, | Performed by: NURSE ANESTHETIST, CERTIFIED REGISTERED

## 2023-12-07 PROCEDURE — 45385 PR COLONOSCOPY,REMV LESN,SNARE: ICD-10-PCS | Mod: ,,, | Performed by: INTERNAL MEDICINE

## 2023-12-07 PROCEDURE — 88305 TISSUE EXAM BY PATHOLOGIST: CPT | Mod: 26,,, | Performed by: PATHOLOGY

## 2023-12-07 PROCEDURE — 88305 SURGICAL PATHOLOGY: ICD-10-PCS | Mod: 26,,, | Performed by: PATHOLOGY

## 2023-12-07 PROCEDURE — 25000003 PHARM REV CODE 250: Performed by: NURSE ANESTHETIST, CERTIFIED REGISTERED

## 2023-12-07 PROCEDURE — 88305 TISSUE EXAM BY PATHOLOGIST: CPT | Mod: TC,SUR | Performed by: INTERNAL MEDICINE

## 2023-12-07 PROCEDURE — 37000008 HC ANESTHESIA 1ST 15 MINUTES

## 2023-12-07 PROCEDURE — 45385 COLONOSCOPY W/LESION REMOVAL: CPT | Mod: ,,, | Performed by: INTERNAL MEDICINE

## 2023-12-07 PROCEDURE — 27201423 OPTIME MED/SURG SUP & DEVICES STERILE SUPPLY

## 2023-12-07 PROCEDURE — 37000009 HC ANESTHESIA EA ADD 15 MINS

## 2023-12-07 PROCEDURE — 45385 COLONOSCOPY W/LESION REMOVAL: CPT | Performed by: INTERNAL MEDICINE

## 2023-12-07 PROCEDURE — 63600175 PHARM REV CODE 636 W HCPCS: Performed by: NURSE ANESTHETIST, CERTIFIED REGISTERED

## 2023-12-07 PROCEDURE — D9220A PRA ANESTHESIA: ICD-10-PCS | Mod: ,,, | Performed by: NURSE ANESTHETIST, CERTIFIED REGISTERED

## 2023-12-07 RX ORDER — SODIUM CHLORIDE 0.9 % (FLUSH) 0.9 %
10 SYRINGE (ML) INJECTION
Status: CANCELLED | OUTPATIENT
Start: 2023-12-07

## 2023-12-07 RX ORDER — PROPOFOL 10 MG/ML
VIAL (ML) INTRAVENOUS
Status: DISCONTINUED | OUTPATIENT
Start: 2023-12-07 | End: 2023-12-07

## 2023-12-07 RX ORDER — LIDOCAINE HYDROCHLORIDE 20 MG/ML
INJECTION, SOLUTION EPIDURAL; INFILTRATION; INTRACAUDAL; PERINEURAL
Status: DISCONTINUED | OUTPATIENT
Start: 2023-12-07 | End: 2023-12-07

## 2023-12-07 RX ADMIN — SODIUM CHLORIDE: 9 INJECTION, SOLUTION INTRAVENOUS at 11:12

## 2023-12-07 RX ADMIN — PROPOFOL 25 MG: 10 INJECTION, EMULSION INTRAVENOUS at 11:12

## 2023-12-07 RX ADMIN — LIDOCAINE HYDROCHLORIDE 25 MG: 20 INJECTION, SOLUTION INTRAVENOUS at 11:12

## 2023-12-07 NOTE — ANESTHESIA POSTPROCEDURE EVALUATION
Anesthesia Post Evaluation    Patient: Vandana Albert    Procedure(s) Performed: Colonoscopy    Final Anesthesia Type: general      Patient location during evaluation: GI PACU  Patient participation: Yes- Able to Participate  Level of consciousness: awake and alert  Post-procedure vital signs: reviewed and stable  Pain management: adequate  Airway patency: patent    PONV status at discharge: No PONV  Anesthetic complications: no      Cardiovascular status: blood pressure returned to baseline and hemodynamically stable  Respiratory status: spontaneous ventilation, unassisted and room air  Hydration status: euvolemic  Follow-up not needed.              Vitals Value Taken Time   /87 12/07/23 1154   Temp 97.8f 12/07/23 1438   Pulse 83 12/07/23 1154   Resp 43 12/07/23 1154   SpO2 96 % 12/07/23 1154   Vitals shown include unvalidated device data.      Event Time   Out of Recovery 12:18:10         Pain/Dinorah Score: Dinorah Score: 9 (12/7/2023 11:37 AM)

## 2023-12-07 NOTE — TRANSFER OF CARE
Anesthesia Transfer of Care Note    Patient: Vandana Albert    Procedure(s) Performed: colonoscopy    Patient location: PACU    Anesthesia Type: general    Transport from OR: Transported from OR on room air with adequate spontaneous ventilation. Continuous ECG monitoring in transport. Continuous SpO2 monitoring in transport    Post pain: adequate analgesia    Post assessment: no apparent anesthetic complications    Post vital signs: stable    Level of consciousness: responds to stimulation    Nausea/Vomiting: no nausea/vomiting    Complications: none    Transfer of care protocol was followed      Last vitals: Visit Vitals  BP 99/65 (Patient Position: Lying)   Pulse 92   Temp 36.7 °C (98 °F) (Skin)   Resp 18   LMP 06/08/2020   SpO2 99%   Breastfeeding No

## 2023-12-07 NOTE — H&P
Gastroenterology Pre-procedure H&P    Chief Complaint: Other ulcerative colitis with rectal bleeding    History of Present Illness    Vandana Price is a 58 y.o. female that  has a past medical history of Allergy, Bilateral primary osteoarthritis of knee, Chronic pain of both knees (10/14/2021), Eosinophilia, Gangrene of gallbladder in cholecystitis (2021), Hematochezia, History of colonoscopy, Hyperlipidemia, Hypertension, and Sleep apnea.     Patient with UC on humira here for disease assessment. Dx in the last 3 years.     Patient denies anticoagulants, FMHx of GI related malignancies.      Past Medical History:   Diagnosis Date    Allergy     Bilateral primary osteoarthritis of knee     Chronic pain of both knees 10/14/2021    Eosinophilia     Gangrene of gallbladder in cholecystitis 2021    Hematochezia     History of colonoscopy     Hyperlipidemia     Hypertension     Sleep apnea        Past Surgical History:   Procedure Laterality Date    ARTHROPLASTY, KNEE, TOTAL, USING COMPUTER-ASSISTED NAVIGATION Left 12/15/2022    Procedure: ARTHROPLASTY, KNEE, TOTAL, USING COMPUTER-ASSISTED NAVIGATION;  Surgeon: Jerome Valladares MD;  Location: [facility];  Service: Orthopedics;  Laterality: Left;    ARTHROPLASTY, KNEE, TOTAL, USING COMPUTER-ASSISTED NAVIGATION Right 2023    Procedure: ARTHROPLASTY, KNEE, TOTAL, USING COMPUTER-ASSISTED NAVIGATION;  Surgeon: Jerome Valladares MD;  Location: [facility];  Service: Orthopedics;  Laterality: Right;    Bilateral IA knee injection Bilateral 2021    D Shows    bladder tact       SECTION      CHOLECYSTECTOMY      FRACTURE SURGERY      jaw     INJECTION      back    INJECTION OF ANESTHETIC AGENT AROUND MEDIAL BRANCH NERVES INNERVATING LUMBAR FACET JOINT Bilateral 2023    Procedure: BIlateral L4-5,5-S1 MBB;  Surgeon: Amanda Goetz MD;  Location: [facility];  Service: Pain Management;  Laterality: Bilateral;  LOCAL     INJECTION OF ANESTHETIC AGENT AROUND NERVE Bilateral 2022    Procedure: GNB   BILATERAL;  Surgeon: Amanda Goetz MD;  Location: [facility];  Service: Pain Management;  Laterality: Bilateral;  PT HAS NOT HAD VAC   STATES WILL BE TESTED AT [facility] [place]    knee scope Bilateral     KNEE SURGERY      LAPAROSCOPIC CHOLECYSTECTOMY N/A 2021    Procedure: LAPAROSCOPIC CHOLECYSTECTOMY CONVERTED TO OPEN;  Surgeon: Darnell Mcgraw MD;  Location: [facility];  Service: General;  Laterality: N/A;  CONVERTED TO OPEN    TUBAL LIGATION         Family History   Problem Relation Age of Onset    Hypertension Father     Heart disease Father     Alcohol abuse Mother     Migraines Daughter     Lung cancer Maternal Aunt        Social History     Socioeconomic History    Marital status:     Number of children: 2   Occupational History    Occupation:  at [employer]   Tobacco Use    Smoking status: Former     Current packs/day: 0.00     Average packs/day: 1 pack/day for 33.0 years (33.0 ttl pk-yrs)     Types: Cigarettes     Start date:      Quit date:      Years since quittin.9     Passive exposure: Never    Smokeless tobacco: Never   Substance and Sexual Activity    Alcohol use: Not Currently    Drug use: Not Currently     Types: Methamphetamines    Sexual activity: Not Currently     Social Determinants of Health     Financial Resource Strain: Low Risk  (2023)    Overall Financial Resource Strain (CARDIA)     Difficulty of Paying Living Expenses: Not hard at all   Food Insecurity: No Food Insecurity (2023)    Hunger Vital Sign     Worried About Running Out of Food in the Last Year: Never true     Ran Out of Food in the Last Year: Never true   Transportation Needs: No Transportation Needs (2023)    PRAPARE - Transportation     Lack of Transportation (Medical): No     Lack of Transportation (Non-Medical): No   Physical Activity: Inactive (2023)    Exercise Vital Sign      Days of Exercise per Week: 0 days     Minutes of Exercise per Session: 0 min   Stress: Stress Concern Present (9/14/2023)    [employer] [place] [employer] - Occupational Stress Questionnaire     Feeling of Stress : Rather much   Social Connections: Moderately Isolated (9/14/2023)    Social Connection and Isolation Panel [NHANES]     Frequency of Communication with Friends and Family: More than three times a week     Frequency of Social Gatherings with Friends and Family: Once a week     Attends Denominational Services: More than 4 times per year     Active Member of Clubs or Organizations: No     Attends Club or Organization Meetings: Never     Marital Status:    Housing Stability: Low Risk  (9/14/2023)    Housing Stability Vital Sign     Unable to Pay for Housing in the Last Year: No     Number of Places Lived in the Last Year: 1     Unstable Housing in the Last Year: No       Current Outpatient Medications   Medication Sig Dispense Refill    acetaminophen-codeine 300-30mg (TYLENOL #3) 300-30 mg Tab Take 1 tablet by mouth every 12 (twelve) hours as needed.      adalimumab (HUMIRA,CF, PEN) 40 mg/0.4 mL PnKt Inject 40mg under the skin every other week as directed by physician. 2 pen 6    aspirin (ECOTRIN) 81 MG EC tablet Take 81 mg by mouth once daily.      celecoxib (CELEBREX) 200 MG capsule Take 1 capsule (200 mg total) by mouth once daily. 90 capsule 1    cetirizine (ZYRTEC) 10 MG tablet Take 1 tablet (10 mg total) by mouth once daily. 90 tablet 1    famotidine (PEPCID) 40 MG tablet Take 1 tablet (40 mg total) by mouth 2 (two) times daily. 180 tablet 1    gabapentin (NEURONTIN) 300 MG capsule Take 2 capsules (600 mg total) by mouth 2 (two) times daily. 360 capsule 1     No current facility-administered medications for this encounter.       Review of patient's allergies indicates:   Allergen Reactions    Opioids - morphine analogues Rash     Development of red rash at site morphine given.         Objective:  Vitals:    12/07/23 1056   BP: 123/85   Pulse: 80   Resp: 18   SpO2: 97%        GEN: normal appearing, NAD, AAO x3  HENT: NCAT, anicteric, OP benign  CV: normal rate, regular rhythm  RESP: NABS, symmetric rise, unlabored  ABD: soft, ND, no guarding or TTP  SKIN: warm and dry  NEURO: grossly afocal    Assessment and Plan:    Proceed with:    Colonoscopy for ulcerative colitis    Brenton Palacios MD  Gastroenterology

## 2023-12-07 NOTE — ANESTHESIA PREPROCEDURE EVALUATION
12/07/2023  Vandana Price is a 58 y.o., female.  Current Outpatient Medications   Medication Sig    acetaminophen-codeine 300-30mg (TYLENOL #3) 300-30 mg Tab Take 1 tablet by mouth every 12 (twelve) hours as needed.    adalimumab (HUMIRA,CF, PEN) 40 mg/0.4 mL PnKt Inject 40mg under the skin every other week as directed by physician.    aspirin (ECOTRIN) 81 MG EC tablet Take 81 mg by mouth once daily.    celecoxib (CELEBREX) 200 MG capsule Take 1 capsule (200 mg total) by mouth once daily.    cetirizine (ZYRTEC) 10 MG tablet Take 1 tablet (10 mg total) by mouth once daily.    famotidine (PEPCID) 40 MG tablet Take 1 tablet (40 mg total) by mouth 2 (two) times daily.    gabapentin (NEURONTIN) 300 MG capsule Take 2 capsules (600 mg total) by mouth 2 (two) times daily.     No current facility-administered medications for this encounter.     Active Ambulatory Problems     Diagnosis Date Noted    Allergic rhinitis due to pollen 06/22/2021    Abscess 08/09/2021    Anemia 09/30/2021    RLS (restless legs syndrome) 09/30/2021    Daytime sleepiness 09/30/2021    Other long term (current) drug therapy 09/30/2021    Elevated glucose 09/30/2021    Arthritis 10/14/2021    Deep vein thrombosis (DVT) of femoral vein of left lower extremity 10/14/2021    Chronic pain of both knees 10/14/2021    Surgery, elective 10/26/2021    Bilateral primary osteoarthritis of knee     Lumbosacral spondylosis without myelopathy     PLMD (periodic limb movement disorder) 12/01/2021    KYRIE on CPAP 10/05/2022    Class 3 severe obesity due to excess calories with body mass index (BMI) of 40.0 to 44.9 in adult 10/05/2022    Ulcerative colitis 12/15/2022    Severe obesity (BMI >= 40) 12/15/2022    Contact dermatitis 04/13/2023    Pre-op evaluation 09/01/2023     Resolved Ambulatory Problems     Diagnosis Date Noted    Empyema of gallbladder 03/31/2021     Gangrene of gallbladder in cholecystitis 2021    Screening for diabetes mellitus 2021    Screening for lipoid disorders 2021     Past Medical History:   Diagnosis Date    Allergy     Eosinophilia     Hematochezia     History of colonoscopy     Hyperlipidemia     Hypertension     Sleep apnea      Past Surgical History:   Procedure Laterality Date    ARTHROPLASTY, KNEE, TOTAL, USING COMPUTER-ASSISTED NAVIGATION Left 12/15/2022    Procedure: ARTHROPLASTY, KNEE, TOTAL, USING COMPUTER-ASSISTED NAVIGATION;  Surgeon: Jerome Valladares MD;  Location: [facility] OR;  Service: Orthopedics;  Laterality: Left;    ARTHROPLASTY, KNEE, TOTAL, USING COMPUTER-ASSISTED NAVIGATION Right 2023    Procedure: ARTHROPLASTY, KNEE, TOTAL, USING COMPUTER-ASSISTED NAVIGATION;  Surgeon: Jerome Valladares MD;  Location: [facility] OR;  Service: Orthopedics;  Laterality: Right;    Bilateral IA knee injection Bilateral 2021    D Shows    bladder tact       SECTION      CHOLECYSTECTOMY      FRACTURE SURGERY      jaw     INJECTION      back    INJECTION OF ANESTHETIC AGENT AROUND MEDIAL BRANCH NERVES INNERVATING LUMBAR FACET JOINT Bilateral 2023    Procedure: BIlateral L4-5,5-S1 MBB;  Surgeon: Amanda Goetz MD;  Location: [facility];  Service: Pain Management;  Laterality: Bilateral;  LOCAL    INJECTION OF ANESTHETIC AGENT AROUND NERVE Bilateral 2022    Procedure: GNB   BILATERAL;  Surgeon: Amanda Goetz MD;  Location: [facility];  Service: Pain Management;  Laterality: Bilateral;  PT HAS NOT HAD VAC   STATES WILL BE TESTED AT [facility] [place]    knee scope Bilateral     KNEE SURGERY      LAPAROSCOPIC CHOLECYSTECTOMY N/A 2021    Procedure: LAPAROSCOPIC CHOLECYSTECTOMY CONVERTED TO OPEN;  Surgeon: Darnell Mcgraw MD;  [facility];  Service: General;  Laterality: N/A;  CONVERTED TO OPEN    TUBAL LIGATION           Pre-op Assessment    I have  reviewed the Patient Summary Reports.     I have reviewed the Nursing Notes. I have reviewed the NPO Status.   I have reviewed the Medications.     Review of Systems  Anesthesia Hx:  No problems with previous Anesthesia             Denies Family Hx of Anesthesia complications.    Denies Personal Hx of Anesthesia complications.                    Social:  Former Smoker, No Alcohol Use       Hematology/Oncology:  Hematology Normal   Oncology Normal                                   EENT/Dental:  EENT/Dental Normal           Cardiovascular:  Exercise tolerance: good   Hypertension, well controlled           hyperlipidemia  NYHA Classification II                           Pulmonary:        Sleep Apnea           Education provided regarding risk of obstructive sleep apnea            Renal/:  Renal/ Normal                 Hepatic/GI:  Bowel Prep. PUD,  GERD, well controlled             Musculoskeletal:  Arthritis               Neurological:  Neurology Normal                                      Endocrine:  Endocrine Normal          Morbid Obesity / BMI > 40  Dermatological:  Skin Normal    Psych:  Psychiatric Normal                      Physical Exam  General: Well nourished, Cooperative, Alert and Oriented    Airway:  Mallampati: II   Mouth Opening: Normal  TM Distance: Normal  Tongue: Normal  Neck ROM: Normal ROM    Dental:  Dentures    Chest/Lungs:  Normal Respiratory Rate        Anesthesia Plan  Type of Anesthesia, risks & benefits discussed:    Anesthesia Type: Gen Natural Airway  Intra-op Monitoring Plan: Standard ASA Monitors  Post Op Pain Control Plan: multimodal analgesia  Induction:  IV  Informed Consent: Informed consent signed with the Patient and all parties understand the risks and agree with anesthesia plan.  All questions answered. Patient consented to blood products? Yes  ASA Score: 3  Day of Surgery Review of History & Physical: H&P Update referred to the surgeon/provider.I have interviewed and  examined the patient. I have reviewed the patient's H&P dated: There are no significant changes.     Ready For Surgery From Anesthesia Perspective.     .

## 2023-12-07 NOTE — DISCHARGE INSTRUCTIONS
Procedure Date  12/7/23     Impression  Overall Impression:   2 subcentimeter polyps were removed with cold snare  The terminal ileum, ileocecal valve, ascending colon, transverse colon and sigmoid colon appeared normal.  (grade 2) hemorrhoids     Recommendation    Await pathology results  Due to  error, pictures are not available to attach to the report. The colonic mucosa and TI appeared normal with no evidence of active inflammation throughout the exam      Patient has achieved mucosal healing on Humira therapy; continue humira  Repeat colonoscopy in 5 years for h/o polyps, and will go ahead and start dysplasia screening at that time  Keep follow up in clinic as scheduled       Outcome of procedure: successful Colonoscopy  Disposition: patient to recovery following procedure; discharge to home when appropriate parameters met  Provisions for follow up: please call my office for any unexpected symptoms like chest or abdominal pain or bleeding following your procedure.  Final Diagnosis: colon polyps      Indication  57 yo WF with UC involving the rectosigmoid colon (distal 20-cm from prior report) diagnosed in 2021 by colonoscopy that presents for disease assessment after being on Humira q2 weeks for the last year - did have a brief interruption, but has now been on therapy for more than 6 months. Currently asymptomatic clinically.     THE NURSE WILL CALL YOU WITH YOUR BIOPSY RESULTS IN A FEW DAYS.    NO DRIVING, OPERATING EQUIPMENT, OR SIGNING LEGAL DOCUMENTS FOR 24 HOURS.

## 2023-12-08 NOTE — PROGRESS NOTES
Harman, thank you for referring this patient to us. I recommend repeat colonoscopy in 5 years. Please let me know if you have any questions regarding this patient.

## 2023-12-12 ENCOUNTER — TELEPHONE (OUTPATIENT)
Dept: GASTROENTEROLOGY | Facility: CLINIC | Age: 58
End: 2023-12-12
Payer: COMMERCIAL

## 2023-12-12 ENCOUNTER — HOSPITAL ENCOUNTER (OUTPATIENT)
Facility: HOSPITAL | Age: 58
Discharge: HOME | End: 2023-12-12
Attending: PAIN MEDICINE | Admitting: PAIN MEDICINE
Payer: COMMERCIAL

## 2023-12-12 VITALS
WEIGHT: 256 LBS | HEART RATE: 70 BPM | RESPIRATION RATE: 12 BRPM | TEMPERATURE: 98 F | HEIGHT: 66 IN | OXYGEN SATURATION: 99 % | SYSTOLIC BLOOD PRESSURE: 126 MMHG | DIASTOLIC BLOOD PRESSURE: 76 MMHG | BODY MASS INDEX: 41.14 KG/M2

## 2023-12-12 DIAGNOSIS — M47.817 SPONDYLOSIS OF LUMBOSACRAL REGION WITHOUT MYELOPATHY OR RADICULOPATHY: ICD-10-CM

## 2023-12-12 PROCEDURE — 63600175 PHARM REV CODE 636 W HCPCS: Performed by: PAIN MEDICINE

## 2023-12-12 PROCEDURE — 64494 INJ PARAVERT F JNT L/S 2 LEV: CPT | Mod: ,,, | Performed by: PAIN MEDICINE

## 2023-12-12 PROCEDURE — 64494 PR INJ DX/THER AGNT PARAVERT FACET JOINT,IMG GUIDE,LUMBAR/SAC, 2ND LEVEL: ICD-10-PCS | Mod: 50,BC50,, | Performed by: PAIN MEDICINE

## 2023-12-12 PROCEDURE — 64493 INJ PARAVERT F JNT L/S 1 LEV: CPT | Mod: 50 | Performed by: PAIN MEDICINE

## 2023-12-12 PROCEDURE — 64494 INJ PARAVERT F JNT L/S 2 LEV: CPT | Mod: 50 | Performed by: PAIN MEDICINE

## 2023-12-12 PROCEDURE — 64493 INJ PARAVERT F JNT L/S 1 LEV: CPT | Mod: ,,, | Performed by: PAIN MEDICINE

## 2023-12-12 PROCEDURE — 64493 PR INJ DX/THER AGNT PARAVERT FACET JOINT,IMG GUIDE,LUMBAR/SAC,1ST LVL: ICD-10-PCS | Mod: ,,, | Performed by: PAIN MEDICINE

## 2023-12-12 RX ORDER — TRIAMCINOLONE ACETONIDE 40 MG/ML
INJECTION, SUSPENSION INTRA-ARTICULAR; INTRAMUSCULAR CODE/TRAUMA/SEDATION MEDICATION
Status: DISCONTINUED | OUTPATIENT
Start: 2023-12-12 | End: 2023-12-12 | Stop reason: HOSPADM

## 2023-12-12 RX ORDER — BUPIVACAINE HYDROCHLORIDE 2.5 MG/ML
INJECTION, SOLUTION INFILTRATION; PERINEURAL CODE/TRAUMA/SEDATION MEDICATION
Status: DISCONTINUED | OUTPATIENT
Start: 2023-12-12 | End: 2023-12-12 | Stop reason: HOSPADM

## 2023-12-12 RX ORDER — SODIUM CHLORIDE 9 MG/ML
INJECTION, SOLUTION INTRAVENOUS CONTINUOUS
Status: DISCONTINUED | OUTPATIENT
Start: 2023-12-12 | End: 2023-12-12 | Stop reason: HOSPADM

## 2023-12-12 NOTE — DISCHARGE INSTRUCTIONS
D/C PT VIA WHEELCHAIR AT   INFORMED PT IF DOES NOT VOID IN 8 HOURS TO GO TO ER. NOTIFY IF REDDNESS, DRAINAGE, FROM INJECTION SITE OR FEVER OVER NEXT 3-4 DAYS, REST AND DRINK PLENTY OF FLUIDS FOR THE REMINDER OF THE DAY , NO LIFTING OVER 5 LBS FOR THE REMAINDER OF THE DAY. CONTINUE REGULAR MEDICATIONS AS PRESCRIBED. MAY TAKE PAIN MEDS AS PRESCRIBED.REFER TO WRITTEN

## 2023-12-12 NOTE — LETTER
December 12, 2023      [facility]  [address] [place] [id number]  Phone: [phone]       Patient: Vandana Price   YOB: 1965  Date of Visit: 12/12/2023    To Whom It May Concern:    Enrike Price  was at [facility] on 12/07/2023. The patient may return to work/school on 12/08/2023 with no restrictions. If you have any questions or concerns, or if I can be of further assistance, please do not hesitate to contact me.    Sincerely,    Nikki Quiñones RN      Pain contract 6/2020 Urine for drug screen  1/3/2020 Patient has a bladder infection per patient, symptoms for 2-3 days, frequency and blood in urine, with pain.

## 2023-12-12 NOTE — DISCHARGE SUMMARY
[facility] - Pain Management  Discharge Note  Short Stay    Procedure(s) (LRB):  Bilateral L4-5, 5-S1 medial branch block # 2 (Bilateral)      OUTCOME: Patient tolerated treatment/procedure well without complication and is now ready for discharge.    DISPOSITION: Home or Self Care    FINAL DIAGNOSIS:  Lumbosacral spondylosis without myelopathy    FOLLOWUP: In clinic    DISCHARGE INSTRUCTIONS:  See nurse's notes     TIME SPENT ON DISCHARGE: 5 minutes

## 2023-12-12 NOTE — BRIEF OP NOTE
The  Discharge Note  Short Stay    Admit Date: 12/12/2023    Discharge Date: 12/12/2023    Attending Physician: Amanda Goetz     Discharge Provider: Amanda Goetz    Diagnosis: Lumbosacral spondylosis without myelopathy    Procedure performed: Bilateral lumbar MBB at L4-5,5-S1 under fluoroscopy    Findings: Procedure tolerated well and without complications. Consistent with diagnosis.    EBL: 0cc    Specimens: None    Discharged Condition: Good    Final Diagnoses: Lumbosacral spondylosis without myelopathy [M47.817]    Disposition: Home or Self Care    Hospital Course: No complications, uneventful    Outcome of Hospitalization, Treatment, Procedure, or Surgery:  Patient was admitted for outpatient interventional pain management procedure. The patient tolerated the procedure well with no complications.    Follow up/Patient Instructions:  Follow up as scheduled in Pain Management office in 3-4 weeks.  Patient has received instructions and follow up date and time.    Medications:  Continue previous medications

## 2023-12-12 NOTE — PLAN OF CARE
INFORMED PT IF DOES NOT VOID IN 8 HOURS TO GO TO ER. NOTIFY IF REDDNESS, DRAINAGE, FROM INJECTION SITE OR FEVER OVER NEXT 3-4 DAYS, REST AND DRINK PLENTY OF FLUIDS FOR THE REMINDER OF THE DAY , NO LIFTING OVER 5 LBS FOR THE REMAINDER OF THE DAY. CONTINUE REGULAR MEDICATIONS AS PRESCRIBED. MAY TAKE PAIN MEDS AS PRESCRIBED.REFER TO WRITTEN DOCUMENT AND RECOVERY INFORMATION

## 2023-12-12 NOTE — OP NOTE
Procedure Note    Procedure Date: 12/12/2023    Procedure Performed:  Bilateral  Lumbar Facet  Medial Branch Block @ L4-5,5-S1 with Fluoroscopic Guidance    Indications: Patient has failed conservative therapy.      Pre-op diagnosis: Lumbar Spondylosis    Post-op diagnosis: same    Physician: TATI Goetz MD    Anesthesia: MAC    Estimated Blood Loss: Less than 1cc    IVF: Per Anesthesia    Complications: None    Technique:  The patient was interviewed in the holding area and Risks/Benefits were discussed, including but not limited to  the possibility of new or different pain, bleeding or infection.   All questions were answered.  The patient understood and accepted risks.  Consent was reviewed and signed.  A time out was taken to identify the patient, procedure and side of the procedure. The patient was placed in a prone position, then prepped and draped in the usual sterile fashion using ChloraPrep and sterile towels.  The levels were determined under fluoroscopic guidance and then marked.  1% Lidocaine was given by raising a skin wheal at the skin over each site and then infiltrated.  A 22-gauge 4 inch needle was introduced to the anatomic location of the bilateral L4-5,5-S1 medial branch nerves.  Then, after negative aspiration, 1 cc from a  mixture of Bupivacaine 0.25% 3cc  and  40mg kenalog ) was injected @ each level.The patient tolerated the procedure well and was transferred to the [facility] in stable condition.  The patient was monitored after the procedure.  Patient was given post procedure and discharge instructions to follow at home.  The patient was discharged in a stable condition with an adult .     
None

## 2024-01-03 ENCOUNTER — PATIENT MESSAGE (OUTPATIENT)
Dept: PAIN MEDICINE | Facility: CLINIC | Age: 59
End: 2024-01-03
Payer: COMMERCIAL

## 2024-01-05 ENCOUNTER — OFFICE VISIT (OUTPATIENT)
Dept: FAMILY MEDICINE | Facility: CLINIC | Age: 59
End: 2024-01-05
Payer: COMMERCIAL

## 2024-01-05 VITALS
DIASTOLIC BLOOD PRESSURE: 76 MMHG | RESPIRATION RATE: 18 BRPM | SYSTOLIC BLOOD PRESSURE: 115 MMHG | HEIGHT: 66 IN | TEMPERATURE: 98 F | OXYGEN SATURATION: 95 % | WEIGHT: 255.19 LBS | BODY MASS INDEX: 41.01 KG/M2 | HEART RATE: 88 BPM

## 2024-01-05 DIAGNOSIS — L29.9 PRURITUS: Primary | ICD-10-CM

## 2024-01-05 DIAGNOSIS — N95.1 MENOPAUSAL SYMPTOMS: ICD-10-CM

## 2024-01-05 DIAGNOSIS — K21.9 GASTROESOPHAGEAL REFLUX DISEASE, UNSPECIFIED WHETHER ESOPHAGITIS PRESENT: ICD-10-CM

## 2024-01-05 DIAGNOSIS — M25.561 CHRONIC PAIN OF BOTH KNEES: Chronic | ICD-10-CM

## 2024-01-05 DIAGNOSIS — M25.562 CHRONIC PAIN OF BOTH KNEES: Chronic | ICD-10-CM

## 2024-01-05 DIAGNOSIS — G89.29 CHRONIC PAIN OF BOTH KNEES: Chronic | ICD-10-CM

## 2024-01-05 DIAGNOSIS — Z79.899 OTHER LONG TERM (CURRENT) DRUG THERAPY: ICD-10-CM

## 2024-01-05 LAB
ALBUMIN SERPL BCP-MCNC: 3.9 G/DL (ref 3.5–5)
ALBUMIN/GLOB SERPL: 1 {RATIO}
ALP SERPL-CCNC: 157 U/L (ref 46–118)
ALT SERPL W P-5'-P-CCNC: 23 U/L (ref 13–56)
ANION GAP SERPL CALCULATED.3IONS-SCNC: 10 MMOL/L (ref 7–16)
AST SERPL W P-5'-P-CCNC: 15 U/L (ref 15–37)
BASOPHILS # BLD AUTO: 0.07 K/UL (ref 0–0.2)
BASOPHILS NFR BLD AUTO: 1.2 % (ref 0–1)
BILIRUB SERPL-MCNC: 0.3 MG/DL (ref ?–1.2)
BUN SERPL-MCNC: 18 MG/DL (ref 7–18)
BUN/CREAT SERPL: 18 (ref 6–20)
CALCIUM SERPL-MCNC: 9.4 MG/DL (ref 8.5–10.1)
CHLORIDE SERPL-SCNC: 108 MMOL/L (ref 98–107)
CO2 SERPL-SCNC: 28 MMOL/L (ref 21–32)
CREAT SERPL-MCNC: 1.02 MG/DL (ref 0.55–1.02)
DIFFERENTIAL METHOD BLD: ABNORMAL
EGFR (NO RACE VARIABLE) (RUSH/TITUS): 64 ML/MIN/1.73M2
EOSINOPHIL # BLD AUTO: 0.67 K/UL (ref 0–0.5)
EOSINOPHIL NFR BLD AUTO: 11.1 % (ref 1–4)
ERYTHROCYTE [DISTWIDTH] IN BLOOD BY AUTOMATED COUNT: 14.1 % (ref 11.5–14.5)
GLOBULIN SER-MCNC: 3.9 G/DL (ref 2–4)
GLUCOSE SERPL-MCNC: 95 MG/DL (ref 74–106)
HCT VFR BLD AUTO: 39.5 % (ref 38–47)
HGB BLD-MCNC: 12.9 G/DL (ref 12–16)
IMM GRANULOCYTES # BLD AUTO: 0.01 K/UL (ref 0–0.04)
IMM GRANULOCYTES NFR BLD: 0.2 % (ref 0–0.4)
LYMPHOCYTES # BLD AUTO: 2.01 K/UL (ref 1–4.8)
LYMPHOCYTES NFR BLD AUTO: 33.2 % (ref 27–41)
MCH RBC QN AUTO: 28.6 PG (ref 27–31)
MCHC RBC AUTO-ENTMCNC: 32.7 G/DL (ref 32–36)
MCV RBC AUTO: 87.6 FL (ref 80–96)
MONOCYTES # BLD AUTO: 0.41 K/UL (ref 0–0.8)
MONOCYTES NFR BLD AUTO: 6.8 % (ref 2–6)
MPC BLD CALC-MCNC: 11.4 FL (ref 9.4–12.4)
NEUTROPHILS # BLD AUTO: 2.88 K/UL (ref 1.8–7.7)
NEUTROPHILS NFR BLD AUTO: 47.5 % (ref 53–65)
NRBC # BLD AUTO: 0 X10E3/UL
NRBC, AUTO (.00): 0 %
PLATELET # BLD AUTO: 234 K/UL (ref 150–400)
POTASSIUM SERPL-SCNC: 3.8 MMOL/L (ref 3.5–5.1)
PROT SERPL-MCNC: 7.8 G/DL (ref 6.4–8.2)
RBC # BLD AUTO: 4.51 M/UL (ref 4.2–5.4)
SODIUM SERPL-SCNC: 142 MMOL/L (ref 136–145)
WBC # BLD AUTO: 6.05 K/UL (ref 4.5–11)

## 2024-01-05 PROCEDURE — 99214 OFFICE O/P EST MOD 30 MIN: CPT | Mod: ,,, | Performed by: NURSE PRACTITIONER

## 2024-01-05 PROCEDURE — 84443 ASSAY THYROID STIM HORMONE: CPT | Mod: ,,, | Performed by: CLINICAL MEDICAL LABORATORY

## 2024-01-05 PROCEDURE — 83001 ASSAY OF GONADOTROPIN (FSH): CPT | Mod: ,,, | Performed by: CLINICAL MEDICAL LABORATORY

## 2024-01-05 PROCEDURE — 1159F MED LIST DOCD IN RCRD: CPT | Mod: ,,, | Performed by: NURSE PRACTITIONER

## 2024-01-05 PROCEDURE — 82306 VITAMIN D 25 HYDROXY: CPT | Mod: ,,, | Performed by: CLINICAL MEDICAL LABORATORY

## 2024-01-05 PROCEDURE — 3008F BODY MASS INDEX DOCD: CPT | Mod: ,,, | Performed by: NURSE PRACTITIONER

## 2024-01-05 PROCEDURE — 82533 TOTAL CORTISOL: CPT | Mod: ,,, | Performed by: CLINICAL MEDICAL LABORATORY

## 2024-01-05 PROCEDURE — 84144 ASSAY OF PROGESTERONE: CPT | Mod: ,,, | Performed by: CLINICAL MEDICAL LABORATORY

## 2024-01-05 PROCEDURE — 3078F DIAST BP <80 MM HG: CPT | Mod: ,,, | Performed by: NURSE PRACTITIONER

## 2024-01-05 PROCEDURE — 1160F RVW MEDS BY RX/DR IN RCRD: CPT | Mod: ,,, | Performed by: NURSE PRACTITIONER

## 2024-01-05 PROCEDURE — 84403 ASSAY OF TOTAL TESTOSTERONE: CPT | Mod: ,,, | Performed by: CLINICAL MEDICAL LABORATORY

## 2024-01-05 PROCEDURE — 82746 ASSAY OF FOLIC ACID SERUM: CPT | Mod: ,,, | Performed by: CLINICAL MEDICAL LABORATORY

## 2024-01-05 PROCEDURE — 3074F SYST BP LT 130 MM HG: CPT | Mod: ,,, | Performed by: NURSE PRACTITIONER

## 2024-01-05 PROCEDURE — 83002 ASSAY OF GONADOTROPIN (LH): CPT | Mod: ,,, | Performed by: CLINICAL MEDICAL LABORATORY

## 2024-01-05 PROCEDURE — 85025 COMPLETE CBC W/AUTO DIFF WBC: CPT | Mod: ,,, | Performed by: CLINICAL MEDICAL LABORATORY

## 2024-01-05 PROCEDURE — 80053 COMPREHEN METABOLIC PANEL: CPT | Mod: ,,, | Performed by: CLINICAL MEDICAL LABORATORY

## 2024-01-05 PROCEDURE — 82607 VITAMIN B-12: CPT | Mod: ,,, | Performed by: CLINICAL MEDICAL LABORATORY

## 2024-01-05 PROCEDURE — 82671 ASSAY OF ESTROGENS: CPT | Mod: 90,,, | Performed by: CLINICAL MEDICAL LABORATORY

## 2024-01-05 RX ORDER — IBUPROFEN 800 MG/1
800 TABLET ORAL EVERY 6 HOURS PRN
Status: CANCELLED | OUTPATIENT
Start: 2024-01-05

## 2024-01-05 RX ORDER — GABAPENTIN 300 MG/1
600 CAPSULE ORAL 2 TIMES DAILY
Qty: 360 CAPSULE | Refills: 1 | Status: SHIPPED | OUTPATIENT
Start: 2024-01-05

## 2024-01-05 RX ORDER — CETIRIZINE HYDROCHLORIDE 10 MG/1
10 TABLET ORAL DAILY
Qty: 90 TABLET | Refills: 1 | Status: SHIPPED | OUTPATIENT
Start: 2024-01-05 | End: 2024-07-03

## 2024-01-05 RX ORDER — CELECOXIB 200 MG/1
200 CAPSULE ORAL
COMMUNITY
End: 2024-01-05 | Stop reason: SDUPTHER

## 2024-01-05 RX ORDER — CELECOXIB 200 MG/1
200 CAPSULE ORAL DAILY
Qty: 90 CAPSULE | Refills: 1 | Status: SHIPPED | OUTPATIENT
Start: 2024-01-05 | End: 2024-07-03

## 2024-01-05 RX ORDER — IBUPROFEN 800 MG/1
800 TABLET ORAL EVERY 6 HOURS PRN
COMMUNITY
Start: 2023-08-28 | End: 2024-01-05

## 2024-01-05 RX ORDER — HYDROXYZINE PAMOATE 25 MG/1
25 CAPSULE ORAL EVERY 8 HOURS PRN
Qty: 30 CAPSULE | Refills: 2 | Status: SHIPPED | OUTPATIENT
Start: 2024-01-05

## 2024-01-05 RX ORDER — FAMOTIDINE 40 MG/1
40 TABLET, FILM COATED ORAL 2 TIMES DAILY
Qty: 180 TABLET | Refills: 1 | Status: SHIPPED | OUTPATIENT
Start: 2024-01-05 | End: 2024-07-03

## 2024-01-05 RX ORDER — METHYLPREDNISOLONE 4 MG/1
TABLET ORAL
COMMUNITY
Start: 2023-08-28 | End: 2024-01-05

## 2024-01-05 RX ORDER — CHLORHEXIDINE GLUCONATE ORAL RINSE 1.2 MG/ML
SOLUTION DENTAL
COMMUNITY
Start: 2023-08-28

## 2024-01-05 NOTE — PROGRESS NOTES
"   KIMBERLEE Bradley   [facility] [place]  [address]  [place] [address]     PATIENT NAME: Vandana Price  : 1965  DATE: 24  MRN: [id number]      Billing Provider: KIMBERLEE Bradley  Level of Service:   Patient PCP Information       Provider PCP Type    IZABELLA GONZALEZ NP General            Reason for Visit / Chief Complaint: Follow-up (Med refills /Pt states she has been itching for years and they sent her to allergy clinic and they only told her to take zyrtec in morning and night. She has areas of her skin that is all broken out because she scratches so much. She states that her abdomen the back of her legs and breasts are the only places that itc. She has used Sarna lotion in the past but it did not help either )   Health Maintenance Due   Topic Date Due    TETANUS VACCINE  Never done    LDCT Lung Screen  Never done    Shingles Vaccine (1 of 2) Never done    Mammogram  2021    Cervical Cancer Screening  2023          History of Present Illness / Problem Focused Workflow     Vanadna Price presents to the clinic for med refills and labs. She states she also has itching for approx 8 years to her trunk mostly, her abdomen/back that she scratches so much causing a "rash" on her skin. She states she does not have the itching anywhere on her extremities. She thinks it may be due to her hormones and is requesting to have these checked. She has seen allergy specialist but did not have any testing done. She states she was just told to increase the allergy medication she was already taking. She has taken Vistaril before but has been years ago but believes she tolerated it well. She is also on humira for ulcerative colitis. She has not seen a Dermatologist for this. Pt does see Pain Management and will have her "nerves burned" on her next visit as she has been getting injections to her back. She works at [employer] and is a member of the [employer] in her community. She  denies any other needs at " this time. NAD noted.     Hemoglobin A1C   Date Value Ref Range Status   11/03/2022 5.7 4.5 - 6.6 % Final     Comment:       Normal:               <5.7%  Pre-Diabetic:       5.7% to 6.4%  Diabetic:             >6.4%  Diabetic Goal:     <7%   09/23/2021 5.8 4.5 - 6.6 % Final     Comment:       Normal:               <5.7%  Pre-Diabetic:       5.7% to 6.4%  Diabetic:             >6.4%  Diabetic Goal:     <7%        CMP  Sodium   Date Value Ref Range Status   01/05/2024 142 136 - 145 mmol/L Final     Potassium   Date Value Ref Range Status   01/05/2024 3.8 3.5 - 5.1 mmol/L Final     Chloride   Date Value Ref Range Status   01/05/2024 108 (H) 98 - 107 mmol/L Final     CO2   Date Value Ref Range Status   01/05/2024 28 21 - 32 mmol/L Final     Glucose   Date Value Ref Range Status   01/05/2024 95 74 - 106 mg/dL Final     BUN   Date Value Ref Range Status   01/05/2024 18 7 - 18 mg/dL Final     Creatinine   Date Value Ref Range Status   01/05/2024 1.02 0.55 - 1.02 mg/dL Final     Calcium   Date Value Ref Range Status   01/05/2024 9.4 8.5 - 10.1 mg/dL Final     Total Protein   Date Value Ref Range Status   01/05/2024 7.8 6.4 - 8.2 g/dL Final     Albumin   Date Value Ref Range Status   01/05/2024 3.9 3.5 - 5.0 g/dL Final     Bilirubin, Total   Date Value Ref Range Status   01/05/2024 0.3 >0.0 - 1.2 mg/dL Final     Alk Phos   Date Value Ref Range Status   01/05/2024 157 (H) 46 - 118 U/L Final     AST   Date Value Ref Range Status   01/05/2024 15 15 - 37 U/L Final     ALT   Date Value Ref Range Status   01/05/2024 23 13 - 56 U/L Final     Anion Gap   Date Value Ref Range Status   01/05/2024 10 7 - 16 mmol/L Final     eGFR   Date Value Ref Range Status   01/05/2024 64 >=60 mL/min/1.73m2 Final        Lab Results   Component Value Date    WBC 6.05 01/05/2024    RBC 4.51 01/05/2024    HGB 12.9 01/05/2024    HCT 39.5 01/05/2024    MCV 87.6 01/05/2024    MCH 28.6 01/05/2024    MCHC 32.7 01/05/2024    RDW 14.1 01/05/2024      01/05/2024    MPV 11.4 01/05/2024    LYMPH 33.2 01/05/2024    LYMPH 2.01 01/05/2024    MONO 6.8 (H) 01/05/2024    EOS 0.67 (H) 01/05/2024    BASO 0.07 01/05/2024    EOSINOPHIL 11.1 (H) 01/05/2024    BASOPHIL 1.2 (H) 01/05/2024        Lab Results   Component Value Date    CHOL 188 11/03/2022    CHOL 172 09/23/2021     Lab Results   Component Value Date    HDL 61 (H) 11/03/2022    HDL 73 (H) 09/23/2021     Lab Results   Component Value Date    LDLCALC 106 11/03/2022    LDLCALC 81 09/23/2021     Lab Results   Component Value Date    TRIG 105 11/03/2022    TRIG 91 09/23/2021     Lab Results   Component Value Date    CHOLHDL 3.1 11/03/2022    CHOLHDL 2.4 09/23/2021        Wt Readings from Last 3 Encounters:   01/05/24 1628 115.8 kg (255 lb 3.2 oz)   12/12/23 0816 116.1 kg (256 lb)   11/29/23 0902 118.4 kg (261 lb)        BP Readings from Last 3 Encounters:   01/05/24 115/76   12/12/23 126/76   12/07/23 128/83        Review of Systems     Review of Systems   Constitutional: Negative.    HENT: Negative.     Eyes: Negative.    Respiratory: Negative.     Cardiovascular: Negative.    Gastrointestinal: Negative.    Endocrine: Negative.    Genitourinary: Negative.    Musculoskeletal:  Positive for arthralgias and back pain.   Integumentary:         itching   Allergic/Immunologic: Positive for environmental allergies.   Neurological: Negative.    Hematological: Negative.    Psychiatric/Behavioral: Negative.          Medical / Social / Family History     Past Medical History:   Diagnosis Date    Allergy     Bilateral primary osteoarthritis of knee     Chronic pain of both knees 10/14/2021    Eosinophilia     Gangrene of gallbladder in cholecystitis 04/01/2021    Hematochezia     History of colonoscopy     Hyperlipidemia     Hypertension     Sleep apnea        Past Surgical History:   Procedure Laterality Date    ARTHROPLASTY, KNEE, TOTAL, USING COMPUTER-ASSISTED NAVIGATION Left 12/15/2022    Procedure: ARTHROPLASTY, KNEE, TOTAL,  USING COMPUTER-ASSISTED NAVIGATION;  Surgeon: Jerome Valladares MD;  Location: [facility];  Service: Orthopedics;  Laterality: Left;    ARTHROPLASTY, KNEE, TOTAL, USING COMPUTER-ASSISTED NAVIGATION Right 2023    Procedure: ARTHROPLASTY, KNEE, TOTAL, USING COMPUTER-ASSISTED NAVIGATION;  Surgeon: Jerome Valladares MD;  Location: [facility] OR;  Service: Orthopedics;  Laterality: Right;    Bilateral IA knee injection Bilateral 2021    D Shows    bladder tact       SECTION      CHOLECYSTECTOMY      FRACTURE SURGERY      jaw 1972    INJECTION      back    INJECTION OF ANESTHETIC AGENT AROUND MEDIAL BRANCH NERVES INNERVATING LUMBAR FACET JOINT Bilateral 2023    Procedure: BIlateral L4-5,5-S1 MBB;  Surgeon: Amanda Goetz MD;  Location: [facility];  Service: Pain Management;  Laterality: Bilateral;  LOCAL    INJECTION OF ANESTHETIC AGENT AROUND MEDIAL BRANCH NERVES INNERVATING LUMBAR FACET JOINT Bilateral 2023    Procedure: Bilateral L4-5, 5-S1 medial branch block # 2;  Surgeon: Amanda Goetz MD;  Location: [facility];  Service: Pain Management;  Laterality: Bilateral;    INJECTION OF ANESTHETIC AGENT AROUND NERVE Bilateral 2022    Procedure: GNB   BILATERAL;  Surgeon: Amanda Goetz MD;  Location: [facility];  Service: Pain Management;  Laterality: Bilateral;  PT HAS NOT HAD VAC   [facility] [place]    knee scope Bilateral     KNEE SURGERY      LAPAROSCOPIC CHOLECYSTECTOMY N/A 2021    Procedure: LAPAROSCOPIC CHOLECYSTECTOMY CONVERTED TO OPEN;  Surgeon: Darnell Mcgraw MD;  Location: [facility] OR;  Service: General;  Laterality: N/A;  CONVERTED TO OPEN    TUBAL LIGATION         Social History  Ms.  reports that she quit smoking about 14 years ago. Her smoking use included cigarettes. She started smoking about 47 years ago. She has a 33.0 pack-year smoking history. She has never been exposed to tobacco smoke. She  "has never used smokeless tobacco. She reports that she does not currently use alcohol. She reports that she does not currently use drugs after having used the following drugs: Methamphetamines.    Family History  Ms.'s family history includes Alcohol abuse in her mother; Heart disease in her father; Hypertension in her father; Lung cancer in her maternal aunt; Migraines in her daughter.    Medications and Allergies     Medications  Outpatient Medications Marked as Taking for the 1/5/24 encounter (Office Visit) with Faby Boyle FNP   Medication Sig Dispense Refill    adalimumab (HUMIRA,CF, PEN) 40 mg/0.4 mL PnKt Inject 40mg under the skin every other week as directed by physician. 2 pen 6    aspirin (ECOTRIN) 81 MG EC tablet Take 81 mg by mouth once daily.      [DISCONTINUED] celecoxib (CELEBREX) 200 MG capsule Take 200 mg by mouth.      [DISCONTINUED] gabapentin (NEURONTIN) 300 MG capsule Take 2 capsules (600 mg total) by mouth 2 (two) times daily. 360 capsule 1       Allergies  Review of patient's allergies indicates:   Allergen Reactions    Opioids - morphine analogues Rash     Development of red rash at site morphine given.        Physical Examination     Vitals:    01/05/24 1628   BP: 115/76   BP Location: Left arm   Patient Position: Sitting   BP Method: Large (Automatic)   Pulse: 88   Resp: 18   Temp: 97.8 °F (36.6 °C)   TempSrc: Oral   SpO2: 95%   Weight: 115.8 kg (255 lb 3.2 oz)   Height: 5' 6" (1.676 m)      Physical Exam  Constitutional:       Appearance: Normal appearance. She is obese.   HENT:      Head: Normocephalic.   Eyes:      Extraocular Movements: Extraocular movements intact.   Cardiovascular:      Rate and Rhythm: Normal rate and regular rhythm.      Pulses: Normal pulses.      Heart sounds: Normal heart sounds.   Pulmonary:      Effort: Pulmonary effort is normal.      Breath sounds: Normal breath sounds.   Skin:     General: Skin is warm and dry.      Capillary Refill: Capillary refill takes " less than 2 seconds.      Findings: Abrasion present.      Comments: Areas noted to abdomen, lower back from pt scratching; no ssx of infection noted   Neurological:      General: No focal deficit present.      Mental Status: She is alert and oriented to person, place, and time.   Psychiatric:         Mood and Affect: Mood normal.         Behavior: Behavior normal.          Assessment and Plan (including Health Maintenance)   :    Plan:     There are no Patient Instructions on file for this visit.       Health Maintenance Due   Topic Date Due    TETANUS VACCINE  Never done    LDCT Lung Screen  Never done    Shingles Vaccine (1 of 2) Never done    Mammogram  02/24/2021    Cervical Cancer Screening  11/02/2023       Problem List Items Addressed This Visit       Chronic pain of both knees (Chronic)    Current Assessment & Plan     Knee pain  is controlled. Current medical regimen is effective;   Will adjust according to results and monitor.          Relevant Medications    celecoxib (CELEBREX) 200 MG capsule    Gastroesophageal reflux disease    Current Assessment & Plan     GERD  is controlled. Current medical regimen is effective;   Will adjust according to results and monitor.          Relevant Medications    famotidine (PEPCID) 40 MG tablet    Menopausal symptoms    Current Assessment & Plan     Labs pending. Will inform of results when available.          Relevant Orders    Cortisol    Folate    Estrogens, fractionated    Follicle Stimulating Hormone    Luteinizing Hormone    Progesterone    Testosterone    TSH    Other long term (current) drug therapy    Current Assessment & Plan     Med  is controlled. Current medical regimen is effective;   Will adjust according to results and monitor.          Relevant Medications    gabapentin (NEURONTIN) 300 MG capsule    Pruritus - Primary    Current Assessment & Plan     Vistaril prescribed to take as needed. Instructed to take first dose at home to make sure it does not  make her too drowsy. Pt arias.   Labs pending. Will inform of results when available.   Instructed if labs are normal and vistaril does not help, may need referral to Dermatology. Pt vu.   Instructed to RTC for any new/worsening/persisting ssx.           Relevant Medications    cetirizine (ZYRTEC) 10 MG tablet    hydrOXYzine pamoate (VISTARIL) 25 MG Cap    Other Relevant Orders    Comprehensive Metabolic Panel (Completed)    CBC Auto Differential (Completed)    Vitamin D    Vitamin B12    TSH     Pruritus  -     cetirizine (ZYRTEC) 10 MG tablet; Take 1 tablet (10 mg total) by mouth once daily.  Dispense: 90 tablet; Refill: 1  -     Comprehensive Metabolic Panel; Future; Expected date: 01/05/2024  -     CBC Auto Differential; Future; Expected date: 01/05/2024  -     hydrOXYzine pamoate (VISTARIL) 25 MG Cap; Take 1 capsule (25 mg total) by mouth every 8 (eight) hours as needed (for itching).  Dispense: 30 capsule; Refill: 2  -     Vitamin D; Future; Expected date: 01/05/2024  -     Vitamin B12; Future; Expected date: 01/05/2024  -     TSH; Future; Expected date: 01/05/2024    Other long term (current) drug therapy  -     gabapentin (NEURONTIN) 300 MG capsule; Take 2 capsules (600 mg total) by mouth 2 (two) times daily.  Dispense: 360 capsule; Refill: 1    Gastroesophageal reflux disease, unspecified whether esophagitis present  -     famotidine (PEPCID) 40 MG tablet; Take 1 tablet (40 mg total) by mouth 2 (two) times daily.  Dispense: 180 tablet; Refill: 1    Chronic pain of both knees  -     celecoxib (CELEBREX) 200 MG capsule; Take 1 capsule (200 mg total) by mouth once daily.  Dispense: 90 capsule; Refill: 1    Menopausal symptoms  -     Cortisol; Future; Expected date: 01/05/2024  -     Folate; Future; Expected date: 01/05/2024  -     Estrogens, fractionated; Future; Expected date: 01/05/2024  -     Follicle Stimulating Hormone; Future; Expected date: 01/05/2024  -     Luteinizing Hormone; Future; Expected date:  01/05/2024  -     Progesterone; Future; Expected date: 01/05/2024  -     Testosterone; Future; Expected date: 01/05/2024  -     TSH; Future; Expected date: 01/05/2024       Health Maintenance Topics with due status: Not Due       Topic Last Completion Date    Hemoglobin A1c (Diabetic Prevention Screening) 11/03/2022    Lipid Panel 11/03/2022    Colorectal Cancer Screening 12/07/2023         Future Appointments   Date Time Provider Department Center   1/10/2024  8:30 AM Florencio Ricardo PA [facility]   2/19/2024  8:40 AM [facility]   3/4/2024  8:40 AM Jerome Valladares MD [facility]   3/11/2024  1:40 PM Dorinda Harman NP [facility]   7/9/2024  4:00 PM Dorinda Harman NP [facility]        No follow-ups on file.    Health Maintenance Due   Topic Date Due    TETANUS VACCINE  Never done    LDCT Lung Screen  Never done    Shingles Vaccine (1 of 2) Never done    Mammogram  02/24/2021    Cervical Cancer Screening  11/02/2023        Signature:  KIMBERLEE Bradley    Date of encounter: 1/5/24

## 2024-01-07 PROBLEM — L29.9 PRURITUS: Status: ACTIVE | Noted: 2024-01-07

## 2024-01-07 PROBLEM — N95.1 MENOPAUSAL SYMPTOMS: Status: ACTIVE | Noted: 2024-01-07

## 2024-01-07 PROBLEM — K21.9 GASTROESOPHAGEAL REFLUX DISEASE: Status: ACTIVE | Noted: 2024-01-07

## 2024-01-08 ENCOUNTER — PATIENT MESSAGE (OUTPATIENT)
Dept: PAIN MEDICINE | Facility: CLINIC | Age: 59
End: 2024-01-08
Payer: COMMERCIAL

## 2024-01-08 LAB — 25(OH)D3 SERPL-MCNC: 30.5 NG/ML

## 2024-01-08 NOTE — ASSESSMENT & PLAN NOTE
Med  is controlled. Current medical regimen is effective;   Will adjust according to results and monitor.

## 2024-01-08 NOTE — ASSESSMENT & PLAN NOTE
Knee pain  is controlled. Current medical regimen is effective;   Will adjust according to results and monitor.

## 2024-01-08 NOTE — ASSESSMENT & PLAN NOTE
GERD  is controlled. Current medical regimen is effective;   Will adjust according to results and monitor.

## 2024-01-08 NOTE — ASSESSMENT & PLAN NOTE
Vistaril prescribed to take as needed. Instructed to take first dose at home to make sure it does not make her too drowsy. Pt vu.   Labs pending. Will inform of results when available.   Instructed if labs are normal and vistaril does not help, may need referral to Dermatology. Pt vu.   Instructed to RTC for any new/worsening/persisting ssx.

## 2024-01-09 LAB
FOLATE SERPL-MCNC: 16.9 NG/ML (ref 3.1–17.5)
FSH SERPL-ACNC: 47.7 MIU/ML (ref 1.7–134.8)
LH SERPL-ACNC: 26.3 MIU/ML
PROGEST SERPL-MCNC: <0.2 NG/ML
TESTOST SERPL-MCNC: <7 NG/DL (ref 8–60)
TSH SERPL DL<=0.005 MIU/L-ACNC: 1.46 UIU/ML (ref 0.36–3.74)
VIT B12 SERPL-MCNC: 403 PG/ML (ref 193–986)

## 2024-01-09 NOTE — PROGRESS NOTES
Please let pt know so far the only thing abnormal on her labs is her testosterone level is low. We can go ahead and do referral to hormone replacement therapy if she would like. I believe Dr. Singer does this in [place] or she can go to [place] whichever she prefers. I do not know how long it will take to get in with either of them. I feel like Dr. Singer is pretty busy. She still has estrogen and cortisol levels pending so if those are abnormal we will let her know. Thanks!

## 2024-01-10 LAB — CORTIS SERPL-MCNC: 3.6 ΜG/DL

## 2024-01-12 LAB
ESTRADIOL SERPL-MCNC: <10 PG/ML
ESTRONE SERPL-MCNC: 15 PG/ML

## 2024-01-12 NOTE — PROGRESS NOTES
Please let her know her other hormone lab was normal and if she would like for us to send her to Dermatology just let us know where she would like to go. Thanks!

## 2024-01-18 ENCOUNTER — TELEPHONE (OUTPATIENT)
Dept: ORTHOPEDICS | Facility: CLINIC | Age: 59
End: 2024-01-18
Payer: COMMERCIAL

## 2024-01-18 ENCOUNTER — PATIENT MESSAGE (OUTPATIENT)
Dept: GASTROENTEROLOGY | Facility: CLINIC | Age: 59
End: 2024-01-18
Payer: COMMERCIAL

## 2024-01-18 DIAGNOSIS — Z20.828 EXPOSURE TO INFLUENZA: Primary | ICD-10-CM

## 2024-01-18 RX ORDER — OSELTAMIVIR PHOSPHATE 75 MG/1
75 CAPSULE ORAL 2 TIMES DAILY
Qty: 10 CAPSULE | Refills: 0 | Status: SHIPPED | OUTPATIENT
Start: 2024-01-18 | End: 2024-01-23

## 2024-01-18 NOTE — TELEPHONE ENCOUNTER
----- Message from Anne Marie Crowell sent at 1/18/2024  3:17 PM CST -----  PATIENT SLIP AND FELL ON ICE TODAYSHE WANT TO KNOW DO SHE NEED TO HAVE XRAY DONE,[phone]

## 2024-01-18 NOTE — TELEPHONE ENCOUNTER
Called and spoke to patient. She stated that she fell on some ice but didn't fall directly on her knees.  She wanted to know if she needed to come in for x-rays. I explained to her that I would ask Dr. Valladares and let her know. Patient agrees.

## 2024-01-31 ENCOUNTER — OFFICE VISIT (OUTPATIENT)
Dept: PAIN MEDICINE | Facility: CLINIC | Age: 59
End: 2024-01-31
Payer: COMMERCIAL

## 2024-01-31 VITALS
SYSTOLIC BLOOD PRESSURE: 142 MMHG | BODY MASS INDEX: 41.14 KG/M2 | HEIGHT: 66 IN | HEART RATE: 90 BPM | DIASTOLIC BLOOD PRESSURE: 79 MMHG | WEIGHT: 256 LBS | RESPIRATION RATE: 18 BRPM

## 2024-01-31 DIAGNOSIS — G89.29 CHRONIC BILATERAL LOW BACK PAIN WITHOUT SCIATICA: Chronic | ICD-10-CM

## 2024-01-31 DIAGNOSIS — M54.50 CHRONIC BILATERAL LOW BACK PAIN WITHOUT SCIATICA: Chronic | ICD-10-CM

## 2024-01-31 DIAGNOSIS — M47.817 LUMBOSACRAL SPONDYLOSIS WITHOUT MYELOPATHY: Primary | Chronic | ICD-10-CM

## 2024-01-31 PROCEDURE — 1159F MED LIST DOCD IN RCRD: CPT | Mod: S$GLB,,, | Performed by: PAIN MEDICINE

## 2024-01-31 PROCEDURE — 3078F DIAST BP <80 MM HG: CPT | Mod: S$GLB,,, | Performed by: PAIN MEDICINE

## 2024-01-31 PROCEDURE — 99215 OFFICE O/P EST HI 40 MIN: CPT | Mod: PBBFAC | Performed by: PAIN MEDICINE

## 2024-01-31 PROCEDURE — 3008F BODY MASS INDEX DOCD: CPT | Mod: S$GLB,,, | Performed by: PAIN MEDICINE

## 2024-01-31 PROCEDURE — 99214 OFFICE O/P EST MOD 30 MIN: CPT | Mod: S$GLB,,, | Performed by: PAIN MEDICINE

## 2024-01-31 PROCEDURE — 3077F SYST BP >= 140 MM HG: CPT | Mod: S$GLB,,, | Performed by: PAIN MEDICINE

## 2024-01-31 NOTE — PROGRESS NOTES
She Disclaimer: This note has been generated using voice-recognition software. There may be typographical errors that have been missed during proof-reading        Patient ID: Vandana Price is a 58 y.o. female.      Chief Complaint: Low-back Pain and Elbow Pain (right)      58-year-old female returns for re-evaluation of chronic lower back pain and spondylosis.  She received lumbar medial branch block # 2,  December 12, 2023 and experienced 100% pain relief for the duration of the local anesthetic and greater than 80% pain relief for several weeks.  The pain remains axial bilaterally without a radicular component.   She returns today to discuss scheduling a radiofrequency procedure for lower back pain and spondylosis.  The last  radiofrequency procedure was canceled due to a knee replacement.                    Pain Assessment  Pain Assessment: 0-10  Pain Score:   3  Pain Location: Back  Pain Descriptors: Constant, Sharp  Aggravating Factors: Other (Comment) (twisting and turning, lifting)  Pain Intervention(s): Medication (See eMAR)      A's of Opioid Risk Assessment  Activity:Patient has difficulty performing ADL.   Analgesia:Patients pain is partially controlled by current medication.   Adverse Effects: Patient denies constipation or sedation.  Aberrant Behavior:  reviewed with no aberrant drug seeking/taking behavior.      Patient denies any suicidal or homicidal ideations    Physical Therapy/Home Exercise:  Not for lower back pain          Review of Systems   Constitutional: Negative.    HENT: Negative.     Eyes: Negative.    Respiratory: Negative.     Cardiovascular: Negative.    Gastrointestinal: Negative.    Endocrine: Negative.    Genitourinary: Negative.    Musculoskeletal:  Positive for arthralgias (Bilateral knees), back pain and gait problem.   Integumentary:  Negative.   Hematological: Negative.              Past Medical History:   Diagnosis Date    Allergy     Bilateral primary osteoarthritis of knee      Chronic pain of both knees 10/14/2021    Eosinophilia     Gangrene of gallbladder in cholecystitis 2021    Hematochezia     History of colonoscopy     Hyperlipidemia     Hypertension     Sleep apnea      Past Surgical History:   Procedure Laterality Date    ARTHROPLASTY, KNEE, TOTAL, USING COMPUTER-ASSISTED NAVIGATION Left 12/15/2022    Procedure: ARTHROPLASTY, KNEE, TOTAL, USING COMPUTER-ASSISTED NAVIGATION;  Surgeon: Jerome Valladares MD;  Location: [facility];  Service: Orthopedics;  Laterality: Left;    ARTHROPLASTY, KNEE, TOTAL, USING COMPUTER-ASSISTED NAVIGATION Right 2023    Procedure: ARTHROPLASTY, KNEE, TOTAL, USING COMPUTER-ASSISTED NAVIGATION;  Surgeon: Jerome Valladares MD;  Location: [facility];  Service: Orthopedics;  Laterality: Right;    Bilateral IA knee injection Bilateral 2021    D Shows    bladder tact       SECTION      CHOLECYSTECTOMY      FRACTURE SURGERY      jaw     INJECTION      back    INJECTION OF ANESTHETIC AGENT AROUND MEDIAL BRANCH NERVES INNERVATING LUMBAR FACET JOINT Bilateral 2023    Procedure: BIlateral L4-5,5-S1 MBB;  Surgeon: Amanda Goetz MD;  Location: [facility];  Service: Pain Management;  Laterality: Bilateral;  LOCAL    INJECTION OF ANESTHETIC AGENT AROUND MEDIAL BRANCH NERVES INNERVATING LUMBAR FACET JOINT Bilateral 2023    Procedure: Bilateral L4-5, 5-S1 medial branch block # 2;  Surgeon: Amanda Goetz MD;  Location: [facility];  Service: Pain Management;  Laterality: Bilateral;    INJECTION OF ANESTHETIC AGENT AROUND NERVE Bilateral 2022    Procedure: GNB   BILATERAL;  Surgeon: Amanda Goetz MD;  Location: [facility];  Service: Pain Management;  Laterality: Bilateral;  PT HAS NOT HAD VAC   STATES WILL BE TESTED AT [facility] IN [place]    knee scope Bilateral     KNEE SURGERY      LAPAROSCOPIC CHOLECYSTECTOMY N/A 2021    Procedure: LAPAROSCOPIC CHOLECYSTECTOMY CONVERTED  TO OPEN;  Surgeon: Darnell Mcgraw MD;  Location: [facility];  Service: General;  Laterality: N/A;  CONVERTED TO OPEN    TUBAL LIGATION       Social History     Socioeconomic History    Marital status:     Number of children: 2   Occupational History    Occupation:  at [employer]   Tobacco Use    Smoking status: Former     Current packs/day: 0.00     Average packs/day: 1 pack/day for 33.0 years (33.0 ttl pk-yrs)     Types: Cigarettes     Start date:      Quit date:      Years since quittin.1     Passive exposure: Never    Smokeless tobacco: Never   Substance and Sexual Activity    Alcohol use: Not Currently    Drug use: Not Currently     Types: Methamphetamines    Sexual activity: Not Currently     Social Determinants of Health     Financial Resource Strain: Low Risk  (2023)    Overall Financial Resource Strain (CARDIA)     Difficulty of Paying Living Expenses: Not hard at all   Food Insecurity: No Food Insecurity (2023)    Hunger Vital Sign     Worried About Running Out of Food in the Last Year: Never true     Ran Out of Food in the Last Year: Never true   Transportation Needs: No Transportation Needs (2023)    PRAPARE - Transportation     Lack of Transportation (Medical): No     Lack of Transportation (Non-Medical): No   Physical Activity: Inactive (2023)    Exercise Vital Sign     Days of Exercise per Week: 0 days     Minutes of Exercise per Session: 0 min   Stress: Stress Concern Present (2023)    [place] [employer] - Occupational Stress Questionnaire     Feeling of Stress : Rather much   Social Connections: Moderately Isolated (2023)    Social Connection and Isolation Panel [NHANES]     Frequency of Communication with Friends and Family: More than three times a week     Frequency of Social Gatherings with Friends and Family: Once a week     Attends Rastafarian Services: More than 4 times per year     Active Member of Clubs or  Organizations: No     Attends Club or Organization Meetings: Never     Marital Status:    Housing Stability: Low Risk  (9/14/2023)    Housing Stability Vital Sign     Unable to Pay for Housing in the Last Year: No     Number of Places Lived in the Last Year: 1     Unstable Housing in the Last Year: No     Family History   Problem Relation Age of Onset    Hypertension Father     Heart disease Father     Alcohol abuse Mother     Migraines Daughter     Lung cancer Maternal Aunt      Review of patient's allergies indicates:   Allergen Reactions    Opioids - morphine analogues Rash     Development of red rash at site morphine given.      has a current medication list which includes the following prescription(s): humira(cf) pen, aspirin, celecoxib, cetirizine, famotidine, gabapentin, hydroxyzine pamoate, acetaminophen-codeine 300-30mg, and chlorhexidine.      Objective:  Vitals:    01/31/24 1325   BP: (!) 142/79   Pulse: 90   Resp: 18        Physical Exam  Vitals and nursing note reviewed.   Constitutional:       General: She is not in acute distress.     Appearance: Normal appearance. She is obese. She is not ill-appearing, toxic-appearing or diaphoretic.   HENT:      Head: Normocephalic and atraumatic.      Nose: Nose normal.      Mouth/Throat:      Mouth: Mucous membranes are moist.   Eyes:      Extraocular Movements: Extraocular movements intact.      Pupils: Pupils are equal, round, and reactive to light.   Cardiovascular:      Rate and Rhythm: Normal rate and regular rhythm.      Heart sounds: Normal heart sounds.   Pulmonary:      Effort: Pulmonary effort is normal. No respiratory distress.      Breath sounds: Normal breath sounds. No stridor. No wheezing or rhonchi.   Abdominal:      General: Bowel sounds are normal.      Palpations: Abdomen is soft.   Musculoskeletal:         General: No swelling or deformity.      Cervical back: Normal and normal range of motion. No spasms or tenderness. No pain with  movement. Normal range of motion.      Thoracic back: Normal.      Lumbar back: Spasms, tenderness and bony tenderness present. Decreased range of motion. Negative right straight leg raise test and negative left straight leg raise test. No scoliosis.      Right knee: Crepitus present. Decreased range of motion. Tenderness present.      Left knee: Crepitus present. Decreased range of motion. Tenderness present.      Right lower leg: No edema.      Left lower leg: No edema.      Comments: Lumbar pain with flexion,extension and lateral rotation. Lumbar facet tenderness to palpation from L3-5   Skin:     General: Skin is warm.   Neurological:      General: No focal deficit present.      Mental Status: She is alert and oriented to person, place, and time. Mental status is at baseline.      Cranial Nerves: No cranial nerve deficit.      Sensory: Sensation is intact. No sensory deficit.      Motor: No weakness.      Coordination: Coordination normal.      Gait: Gait normal.      Deep Tendon Reflexes: Reflexes are normal and symmetric.   Psychiatric:         Mood and Affect: Mood normal.         Behavior: Behavior normal.           Assessment:      1. Lumbosacral spondylosis without myelopathy    2. Chronic bilateral low back pain without sciatica            Plan:  1. reviewed  2.Urine drug screen and confirmation testing was ordered as documented on the requisition form in order to verify medication compliance, test for illicit substances.  3. Indication: The patient has clinical and radiologic findings suggestive of facet mediated pain and is s/p 1st diagnostic bilateral lumbar L4/5 and L5/S1 medial branch blocks with at least 80% relief of their axial back pain lasting the duration of the local anesthetic utilized.    Orders Placed This Encounter   Procedures    Case Request Operating Room: Bilateral L4-5,5-S1 MB RFTC     Order Specific Question:   Medical Necessity:     Answer:   Medically Non-Urgent [100]     Order  Specific Question:   CPT Code:     Answer:   SC DESTROY LUMB/SAC FACET JNT [31630]     Order Specific Question:   Positioning:     Answer:   Prone [1003]     Order Specific Question:   Post-Procedure Disposition:     Answer:   PACU [1]     Order Specific Question:   Estimated Length of Stay:     Answer:   0 midnight     Order Specific Question:   Implant Required:     Answer:   No [1001]     Order Specific Question:   Is an on-site pathologist required for this procedure?     Answer:   N/A      4.Indications for this procedure for this specific patient include the following   - Pt has had symptoms for three months with moderate to severe pain with functional impairment rated of 7/10 pain.   - Pain non-responsive to conservative care.    - Pain predominately axial and not associated with radiculopathy or claudication.    - No non-facet pathology as source of pain.    - Clinical assessment implicates facet joint as putative pain source.    - Pain is exacerbated by extension or prolonged sitting/standing and relieved by rest.    - No unexplained neurologic deficit.    - No history of coagulopathy, infection or unstable medical conditions.    - Pain is causing significant functional limitation resulting in diminished quality of life and impaired age appropriate ADL's.   - Clinical assessment implicates facet joint as putative source of pain  - Repeat injections not done prior to 7 days   - no more than 2 levels will be done per side      5.Monitored Anesthesia Care medical necessity authorization request:    Monitor anesthesia request is medically indicated for the scheduled nerve block procedure due to:  - needle phobia and anxiety, placing  the patient at risk during the provided service.  -patient has a BMI greater than 40  -patient has severe sleep apnea for which BiPAP and oxygen are needed while sleeping  -patient has an ASA class greater than 3 and requires constant presence of an anesthesiologist during the  procedure:  -patient has severe problems with muscles and muscle spasticity that makes it hard to lie still  -patient suffers from chronic pain and is unable to function due to  diminished ADLs    6.The planned medically necessary  surgical procedure is performed in a hospital outpatient department and not in an ambulatory surgical center due to:     -there is no geographically assessable ambulatory surgery center that has the  necessary equipment and fluoroscopy needed for the procedure     -there is no geographically assessable ambulatory surgical center available at which the physician has privileges     -an ASC's  specific  guideline regarding the individuals weight or health conditions that prevent the use of an ASC       report:  Reviewed and consistent with medication use as prescribed.      The total time spent for evaluation and management on 02/04/2024 including reviewing separately obtained history, performing a medically appropriate exam and evaluation, documenting clinical information in the health record, independently interpreting results and communicating them to the patient/family/caregiver, and ordering medications/tests/procedures was between 15-29 minutes.    The above plan and management options were discussed at length with patient. Patient is in agreement with the above and verbalized understanding. It will be communicated with the referring physician via electronic record, fax, or mail.

## 2024-01-31 NOTE — PATIENT INSTRUCTIONS
YOU ARE SCHEDULED ON 2/22/24 AT 8:30 AM FOR  [facility]    STOP ASPIRIN FOR  7 DAYS BEFORE PROCEDURE    STOP CELEBREX FOR  3 DAYS BEFORE PROCEDURE    Procedure Instructions:    Nothing to eat or drink for 8 hours or after midnight including gum, candy, mints, or tobacco products.  If you are scheduled for 1:30 or later nothing to eat or drink after 5 a.m. the morning of the procedure, including gum, candy, mints, or tobacco products.  Must have a  at least 18 yrs of age to stay present at all times  No Diabetic medications the morning of procedure, check blood sugar the morning of procedure, if it is greater than 200 call the office at [phone]  If you are started on antibiotics or have been prescribed antibiotics, have a fever, or have any other type of infection call to reschedule procedure.  If you take blood pressure medications you can take it at your regular scheduled time with a small sip of WATER!  Dentures are to be removed prior to procedure or we can provide you with a denture cup to remove them prior to being taken back for procedure.   False eyelashes are to be removed before the morning of procedure.    Contacts will have to be removed prior to procedure.  No jewelry is to be worn to the procedure.     HOLD ASPIRIN AND ASPIRIN PRODUCTS  (ASPIRIN, BC POWDER ETC. ) FOR 7 DAYS  PRIOR TO PROCEDURE  HOLD NSAIDS( ibuprofen, mobic, meloxicam, advil, diclofenac, naproxen, relafen, celebrex,  methotrexate, aleve etc....)  FOR 3 DAYS   PRIOR TO PROCEDURE    SIGNATURE__________________________________________________________

## 2024-01-31 NOTE — H&P (VIEW-ONLY)
She Disclaimer: This note has been generated using voice-recognition software. There may be typographical errors that have been missed during proof-reading        Patient ID: Vandana Price is a 58 y.o. female.      Chief Complaint: Low-back Pain and Elbow Pain (right)      58-year-old female returns for re-evaluation of chronic lower back pain and spondylosis.  She received lumbar medial branch block # 2,  December 12, 2023 and experienced 100% pain relief for the duration of the local anesthetic and greater than 80% pain relief for several weeks.  The pain remains axial bilaterally without a radicular component.   She returns today to discuss scheduling a radiofrequency procedure for lower back pain and spondylosis.  The last  radiofrequency procedure was canceled due to a knee replacement.                    Pain Assessment  Pain Assessment: 0-10  Pain Score:   3  Pain Location: Back  Pain Descriptors: Constant, Sharp  Aggravating Factors: Other (Comment) (twisting and turning, lifting)  Pain Intervention(s): Medication (See eMAR)      A's of Opioid Risk Assessment  Activity:Patient has difficulty performing ADL.   Analgesia:Patients pain is partially controlled by current medication.   Adverse Effects: Patient denies constipation or sedation.  Aberrant Behavior:  reviewed with no aberrant drug seeking/taking behavior.      Patient denies any suicidal or homicidal ideations    Physical Therapy/Home Exercise:  Not for lower back pain          Review of Systems   Constitutional: Negative.    HENT: Negative.     Eyes: Negative.    Respiratory: Negative.     Cardiovascular: Negative.    Gastrointestinal: Negative.    Endocrine: Negative.    Genitourinary: Negative.    Musculoskeletal:  Positive for arthralgias (Bilateral knees), back pain and gait problem.   Integumentary:  Negative.   Hematological: Negative.              Past Medical History:   Diagnosis Date    Allergy     Bilateral primary osteoarthritis of knee      Chronic pain of both knees 10/14/2021    Eosinophilia     Gangrene of gallbladder in cholecystitis 2021    Hematochezia     History of colonoscopy     Hyperlipidemia     Hypertension     Sleep apnea      Past Surgical History:   Procedure Laterality Date    ARTHROPLASTY, KNEE, TOTAL, USING COMPUTER-ASSISTED NAVIGATION Left 12/15/2022    Procedure: ARTHROPLASTY, KNEE, TOTAL, USING COMPUTER-ASSISTED NAVIGATION;  Surgeon: Jerome Valladares MD;  Location: [facility]THO OR;  Service: Orthopedics;  Laterality: Left;    ARTHROPLASTY, KNEE, TOTAL, USING COMPUTER-ASSISTED NAVIGATION Right 2023    Procedure: ARTHROPLASTY, KNEE, TOTAL, USING COMPUTER-ASSISTED NAVIGATION;  Surgeon: Jerome Valladares MD;  Location: [facility]O OR;  Service: Orthopedics;  Laterality: Right;    Bilateral IA knee injection Bilateral 2021    D Shows    bladder tact       SECTION      CHOLECYSTECTOMY      FRACTURE SURGERY      jaw     INJECTION      back    INJECTION OF ANESTHETIC AGENT AROUND MEDIAL BRANCH NERVES INNERVATING LUMBAR FACET JOINT Bilateral 2023    Procedure: BIlateral L4-5,5-S1 MBB;  Surgeon: Amanda Goetz MD;  Location: [facility] PAIN MGMT;  Service: Pain Management;  Laterality: Bilateral;  LOCAL    INJECTION OF ANESTHETIC AGENT AROUND MEDIAL BRANCH NERVES INNERVATING LUMBAR FACET JOINT Bilateral 2023    Procedure: Bilateral L4-5, 5-S1 medial branch block # 2;  Surgeon: Amanda Goetz MD;  Location: [facility];  Service: Pain Management;  Laterality: Bilateral;    INJECTION OF ANESTHETIC AGENT AROUND NERVE Bilateral 2022    Procedure: GNB   BILATERAL;  Surgeon: Amanda Goetz MD;  Location: [facility];  Service: Pain Management;  Laterality: Bilateral;  PT HAS NOT HAD VAC   STATES WILL BE TESTED AT [facility] IN [place]    knee scope Bilateral     KNEE SURGERY      LAPAROSCOPIC CHOLECYSTECTOMY N/A 2021    Procedure: LAPAROSCOPIC CHOLECYSTECTOMY CONVERTED  TO OPEN;  Surgeon: Darnell Mcgraw MD;  Location: [facility];  Service: General;  Laterality: N/A;  CONVERTED TO OPEN    TUBAL LIGATION       Social History     Socioeconomic History    Marital status:     Number of children: 2   Occupational History    Occupation:  at [employer]   Tobacco Use    Smoking status: Former     Current packs/day: 0.00     Average packs/day: 1 pack/day for 33.0 years (33.0 ttl pk-yrs)     Types: Cigarettes     Start date:      Quit date:      Years since quittin.1     Passive exposure: Never    Smokeless tobacco: Never   Substance and Sexual Activity    Alcohol use: Not Currently    Drug use: Not Currently     Types: Methamphetamines    Sexual activity: Not Currently     Social Determinants of Health     Financial Resource Strain: Low Risk  (2023)    Overall Financial Resource Strain (CARDIA)     Difficulty of Paying Living Expenses: Not hard at all   Food Insecurity: No Food Insecurity (2023)    Hunger Vital Sign     Worried About Running Out of Food in the Last Year: Never true     Ran Out of Food in the Last Year: Never true   Transportation Needs: No Transportation Needs (2023)    PRAPARE - Transportation     Lack of Transportation (Medical): No     Lack of Transportation (Non-Medical): No   Physical Activity: Inactive (2023)    Exercise Vital Sign     Days of Exercise per Week: 0 days     Minutes of Exercise per Session: 0 min   Stress: Stress Concern Present (2023)    Citizen of [place] of Occupational Health - Occupational Stress Questionnaire     Feeling of Stress : Rather much   Social Connections: Moderately Isolated (2023)    Social Connection and Isolation Panel [NHANES]     Frequency of Communication with Friends and Family: More than three times a week     Frequency of Social Gatherings with Friends and Family: Once a week     Attends [employer] Services: More than 4 times per year     Active Member of Clubs or  Organizations: No     Attends Club or Organization Meetings: Never     Marital Status:    Housing Stability: Low Risk  (9/14/2023)    Housing Stability Vital Sign     Unable to Pay for Housing in the Last Year: No     Number of Places Lived in the Last Year: 1     Unstable Housing in the Last Year: No     Family History   Problem Relation Age of Onset    Hypertension Father     Heart disease Father     Alcohol abuse Mother     Migraines Daughter     Lung cancer Maternal Aunt      Review of patient's allergies indicates:   Allergen Reactions    Opioids - morphine analogues Rash     Development of red rash at site morphine given.      has a current medication list which includes the following prescription(s): humira(cf) pen, aspirin, celecoxib, cetirizine, famotidine, gabapentin, hydroxyzine pamoate, acetaminophen-codeine 300-30mg, and chlorhexidine.      Objective:  Vitals:    01/31/24 1325   BP: (!) 142/79   Pulse: 90   Resp: 18        Physical Exam  Vitals and nursing note reviewed.   Constitutional:       General: She is not in acute distress.     Appearance: Normal appearance. She is obese. She is not ill-appearing, toxic-appearing or diaphoretic.   HENT:      Head: Normocephalic and atraumatic.      Nose: Nose normal.      Mouth/Throat:      Mouth: Mucous membranes are moist.   Eyes:      Extraocular Movements: Extraocular movements intact.      Pupils: Pupils are equal, round, and reactive to light.   Cardiovascular:      Rate and Rhythm: Normal rate and regular rhythm.      Heart sounds: Normal heart sounds.   Pulmonary:      Effort: Pulmonary effort is normal. No respiratory distress.      Breath sounds: Normal breath sounds. No stridor. No wheezing or rhonchi.   Abdominal:      General: Bowel sounds are normal.      Palpations: Abdomen is soft.   Musculoskeletal:         General: No swelling or deformity.      Cervical back: Normal and normal range of motion. No spasms or tenderness. No pain with  movement. Normal range of motion.      Thoracic back: Normal.      Lumbar back: Spasms, tenderness and bony tenderness present. Decreased range of motion. Negative right straight leg raise test and negative left straight leg raise test. No scoliosis.      Right knee: Crepitus present. Decreased range of motion. Tenderness present.      Left knee: Crepitus present. Decreased range of motion. Tenderness present.      Right lower leg: No edema.      Left lower leg: No edema.      Comments: Lumbar pain with flexion,extension and lateral rotation. Lumbar facet tenderness to palpation from L3-5   Skin:     General: Skin is warm.   Neurological:      General: No focal deficit present.      Mental Status: She is alert and oriented to person, place, and time. Mental status is at baseline.      Cranial Nerves: No cranial nerve deficit.      Sensory: Sensation is intact. No sensory deficit.      Motor: No weakness.      Coordination: Coordination normal.      Gait: Gait normal.      Deep Tendon Reflexes: Reflexes are normal and symmetric.   Psychiatric:         Mood and Affect: Mood normal.         Behavior: Behavior normal.           Assessment:      1. Lumbosacral spondylosis without myelopathy    2. Chronic bilateral low back pain without sciatica            Plan:  1. reviewed  2.Urine drug screen and confirmation testing was ordered as documented on the requisition form in order to verify medication compliance, test for illicit substances.  3. Indication: The patient has clinical and radiologic findings suggestive of facet mediated pain and is s/p 1st diagnostic bilateral lumbar L4/5 and L5/S1 medial branch blocks with at least 80% relief of their axial back pain lasting the duration of the local anesthetic utilized.    Orders Placed This Encounter   Procedures    Case Request Operating Room: Bilateral L4-5,5-S1 MB RFTC     Order Specific Question:   Medical Necessity:     Answer:   Medically Non-Urgent [100]     Order  Specific Question:   CPT Code:     Answer:   VT DESTROY LUMB/SAC FACET JNT [25736]     Order Specific Question:   Positioning:     Answer:   Prone [1003]     Order Specific Question:   Post-Procedure Disposition:     Answer:   PACU [1]     Order Specific Question:   Estimated Length of Stay:     Answer:   0 midnight     Order Specific Question:   Implant Required:     Answer:   No [1001]     Order Specific Question:   Is an on-site pathologist required for this procedure?     Answer:   N/A      4.Indications for this procedure for this specific patient include the following   - Pt has had symptoms for three months with moderate to severe pain with functional impairment rated of 7/10 pain.   - Pain non-responsive to conservative care.    - Pain predominately axial and not associated with radiculopathy or claudication.    - No non-facet pathology as source of pain.    - Clinical assessment implicates facet joint as putative pain source.    - Pain is exacerbated by extension or prolonged sitting/standing and relieved by rest.    - No unexplained neurologic deficit.    - No history of coagulopathy, infection or unstable medical conditions.    - Pain is causing significant functional limitation resulting in diminished quality of life and impaired age appropriate ADL's.   - Clinical assessment implicates facet joint as putative source of pain  - Repeat injections not done prior to 7 days   - no more than 2 levels will be done per side      5.Monitored Anesthesia Care medical necessity authorization request:    Monitor anesthesia request is medically indicated for the scheduled nerve block procedure due to:  - needle phobia and anxiety, placing  the patient at risk during the provided service.  -patient has a BMI greater than 40  -patient has severe sleep apnea for which BiPAP and oxygen are needed while sleeping  -patient has an ASA class greater than 3 and requires constant presence of an anesthesiologist during the  procedure:  -patient has severe problems with muscles and muscle spasticity that makes it hard to lie still  -patient suffers from chronic pain and is unable to function due to  diminished ADLs    6.The planned medically necessary  surgical procedure is performed in a hospital outpatient department and not in an ambulatory surgical center due to:     -there is no geographically assessable ambulatory surgery center that has the  necessary equipment and fluoroscopy needed for the procedure     -there is no geographically assessable ambulatory surgical center available at which the physician has privileges     -an ASC's  specific  guideline regarding the individuals weight or health conditions that prevent the use of an ASC       report:  Reviewed and consistent with medication use as prescribed.      The total time spent for evaluation and management on 02/04/2024 including reviewing separately obtained history, performing a medically appropriate exam and evaluation, documenting clinical information in the health record, independently interpreting results and communicating them to the patient/family/caregiver, and ordering medications/tests/procedures was between 15-29 minutes.    The above plan and management options were discussed at length with patient. Patient is in agreement with the above and verbalized understanding. It will be communicated with the referring physician via electronic record, fax, or mail.

## 2024-02-13 ENCOUNTER — TELEPHONE (OUTPATIENT)
Dept: PAIN MEDICINE | Facility: CLINIC | Age: 59
End: 2024-02-13
Payer: COMMERCIAL

## 2024-02-13 NOTE — TELEPHONE ENCOUNTER
BILATERAL L4-5, 5-S1 MB RFTC is rescheduled for 2/15/24 at 7:20 am.  Patient is notified and voices understanding..

## 2024-02-15 ENCOUNTER — HOSPITAL ENCOUNTER (OUTPATIENT)
Facility: HOSPITAL | Age: 59
Discharge: HOME | End: 2024-02-15
Attending: PAIN MEDICINE | Admitting: PAIN MEDICINE
Payer: COMMERCIAL

## 2024-02-15 ENCOUNTER — ANESTHESIA EVENT (OUTPATIENT)
Dept: PAIN MEDICINE | Facility: HOSPITAL | Age: 59
End: 2024-02-15
Payer: COMMERCIAL

## 2024-02-15 ENCOUNTER — ANESTHESIA (OUTPATIENT)
Dept: PAIN MEDICINE | Facility: HOSPITAL | Age: 59
End: 2024-02-15
Payer: COMMERCIAL

## 2024-02-15 VITALS
SYSTOLIC BLOOD PRESSURE: 169 MMHG | HEIGHT: 66 IN | TEMPERATURE: 98 F | WEIGHT: 258.63 LBS | RESPIRATION RATE: 16 BRPM | OXYGEN SATURATION: 96 % | DIASTOLIC BLOOD PRESSURE: 100 MMHG | HEART RATE: 82 BPM | BODY MASS INDEX: 41.56 KG/M2

## 2024-02-15 DIAGNOSIS — M47.817 SPONDYLOSIS OF LUMBOSACRAL REGION WITHOUT MYELOPATHY OR RADICULOPATHY: ICD-10-CM

## 2024-02-15 PROCEDURE — 37000009 HC ANESTHESIA EA ADD 15 MINS: Performed by: PAIN MEDICINE

## 2024-02-15 PROCEDURE — 64635 DESTROY LUMB/SAC FACET JNT: CPT | Mod: 50 | Performed by: PAIN MEDICINE

## 2024-02-15 PROCEDURE — 27000284 HC CANNULA NASAL: Performed by: NURSE ANESTHETIST, CERTIFIED REGISTERED

## 2024-02-15 PROCEDURE — 64635 DESTROY LUMB/SAC FACET JNT: CPT | Mod: ,,, | Performed by: PAIN MEDICINE

## 2024-02-15 PROCEDURE — 63600175 PHARM REV CODE 636 W HCPCS: Mod: JG | Performed by: PAIN MEDICINE

## 2024-02-15 PROCEDURE — 25000003 PHARM REV CODE 250: Performed by: NURSE ANESTHETIST, CERTIFIED REGISTERED

## 2024-02-15 PROCEDURE — D9220A PRA ANESTHESIA: Mod: ,,, | Performed by: NURSE ANESTHETIST, CERTIFIED REGISTERED

## 2024-02-15 PROCEDURE — 64636 DESTROY L/S FACET JNT ADDL: CPT | Mod: 50 | Performed by: PAIN MEDICINE

## 2024-02-15 PROCEDURE — 25000003 PHARM REV CODE 250: Performed by: PAIN MEDICINE

## 2024-02-15 PROCEDURE — 27201423 OPTIME MED/SURG SUP & DEVICES STERILE SUPPLY: Performed by: PAIN MEDICINE

## 2024-02-15 PROCEDURE — 63600175 PHARM REV CODE 636 W HCPCS: Performed by: NURSE ANESTHETIST, CERTIFIED REGISTERED

## 2024-02-15 PROCEDURE — 37000008 HC ANESTHESIA 1ST 15 MINUTES: Performed by: PAIN MEDICINE

## 2024-02-15 PROCEDURE — 64636 DESTROY L/S FACET JNT ADDL: CPT | Mod: 50,BC50,, | Performed by: PAIN MEDICINE

## 2024-02-15 RX ORDER — ORPHENADRINE CITRATE 30 MG/ML
INJECTION INTRAMUSCULAR; INTRAVENOUS
Status: DISCONTINUED | OUTPATIENT
Start: 2024-02-15 | End: 2024-02-15

## 2024-02-15 RX ORDER — TRIAMCINOLONE ACETONIDE 40 MG/ML
INJECTION, SUSPENSION INTRA-ARTICULAR; INTRAMUSCULAR CODE/TRAUMA/SEDATION MEDICATION
Status: DISCONTINUED | OUTPATIENT
Start: 2024-02-15 | End: 2024-02-15 | Stop reason: HOSPADM

## 2024-02-15 RX ORDER — PROPOFOL 10 MG/ML
VIAL (ML) INTRAVENOUS
Status: DISCONTINUED | OUTPATIENT
Start: 2024-02-15 | End: 2024-02-15

## 2024-02-15 RX ORDER — LIDOCAINE HYDROCHLORIDE 20 MG/ML
INJECTION, SOLUTION EPIDURAL; INFILTRATION; INTRACAUDAL; PERINEURAL
Status: DISCONTINUED | OUTPATIENT
Start: 2024-02-15 | End: 2024-02-15

## 2024-02-15 RX ORDER — BUPIVACAINE HYDROCHLORIDE 2.5 MG/ML
INJECTION, SOLUTION INFILTRATION; PERINEURAL CODE/TRAUMA/SEDATION MEDICATION
Status: DISCONTINUED | OUTPATIENT
Start: 2024-02-15 | End: 2024-02-15 | Stop reason: HOSPADM

## 2024-02-15 RX ORDER — SODIUM CHLORIDE 9 MG/ML
INJECTION, SOLUTION INTRAVENOUS CONTINUOUS
Status: DISCONTINUED | OUTPATIENT
Start: 2024-02-15 | End: 2024-02-15 | Stop reason: HOSPADM

## 2024-02-15 RX ADMIN — PROPOFOL 50 MG: 10 INJECTION, EMULSION INTRAVENOUS at 08:02

## 2024-02-15 RX ADMIN — SODIUM CHLORIDE: 9 INJECTION, SOLUTION INTRAVENOUS at 08:02

## 2024-02-15 RX ADMIN — PROPOFOL 100 MG: 10 INJECTION, EMULSION INTRAVENOUS at 08:02

## 2024-02-15 RX ADMIN — ORPHENADRINE CITRATE 60 MG: 30 INJECTION INTRAMUSCULAR; INTRAVENOUS at 08:02

## 2024-02-15 RX ADMIN — LIDOCAINE HYDROCHLORIDE 50 MG: 20 INJECTION, SOLUTION INTRAVENOUS at 08:02

## 2024-02-15 NOTE — ANESTHESIA PREPROCEDURE EVALUATION
Past Medical History:   Diagnosis Date    Allergy     Bilateral primary osteoarthritis of knee     Chronic pain of both knees 10/14/2021    Eosinophilia     Gangrene of gallbladder in cholecystitis 2021    Hematochezia     History of colonoscopy     Hyperlipidemia     Hypertension     Sleep apnea        Past Surgical History:   Procedure Laterality Date    ARTHROPLASTY, KNEE, TOTAL, USING COMPUTER-ASSISTED NAVIGATION Left 12/15/2022    Procedure: ARTHROPLASTY, KNEE, TOTAL, USING COMPUTER-ASSISTED NAVIGATION;  Surgeon: Jerome Valladares MD;  Location: [facility] OR;  Service: Orthopedics;  Laterality: Left;    ARTHROPLASTY, KNEE, TOTAL, USING COMPUTER-ASSISTED NAVIGATION Right 2023    Procedure: ARTHROPLASTY, KNEE, TOTAL, USING COMPUTER-ASSISTED NAVIGATION;  Surgeon: Jerome Valladares MD;  Location: [facility] OR;  Service: Orthopedics;  Laterality: Right;    Bilateral IA knee injection Bilateral 2021    D Shows    bladder tact       SECTION      CHOLECYSTECTOMY      FRACTURE SURGERY      jaw     INJECTION      back    INJECTION OF ANESTHETIC AGENT AROUND MEDIAL BRANCH NERVES INNERVATING LUMBAR FACET JOINT Bilateral 2023    Procedure: BIlateral L4-5,5-S1 MBB;  Surgeon: Amanda Goetz MD;  Location: [facility];  Service: Pain Management;  Laterality: Bilateral;  LOCAL    INJECTION OF ANESTHETIC AGENT AROUND MEDIAL BRANCH NERVES INNERVATING LUMBAR FACET JOINT Bilateral 2023    Procedure: Bilateral L4-5, 5-S1 medial branch block # 2;  Surgeon: Amanda Goetz MD;  Location: [facility];  Service: Pain Management;  Laterality: Bilateral;    INJECTION OF ANESTHETIC AGENT AROUND NERVE Bilateral 2022    Procedure: GNB   BILATERAL;  Surgeon: Amanda Goetz MD;  Location: [facility];  Service: Pain Management;  Laterality: Bilateral;  PT HAS NOT HAD VAC   STATES WILL BE TESTED AT [facility] [place]    knee scope Bilateral     KNEE SURGERY       LAPAROSCOPIC CHOLECYSTECTOMY N/A 2021    Procedure: LAPAROSCOPIC CHOLECYSTECTOMY CONVERTED TO OPEN;  Surgeon: Darnell Mcgraw MD;  Location: [facility];  Service: General;  Laterality: N/A;  CONVERTED TO OPEN    TUBAL LIGATION         Family History   Problem Relation Age of Onset    Hypertension Father     Heart disease Father     Alcohol abuse Mother     Migraines Daughter     Lung cancer Maternal Aunt        Social History     Socioeconomic History    Marital status:     Number of children: 2   Occupational History    Occupation:  at [employer]   Tobacco Use    Smoking status: Former     Current packs/day: 0.00     Average packs/day: 1 pack/day for 33.0 years (33.0 ttl pk-yrs)     Types: Cigarettes     Start date:      Quit date:      Years since quittin.1     Passive exposure: Never    Smokeless tobacco: Never   Substance and Sexual Activity    Alcohol use: Not Currently    Drug use: Not Currently     Types: Methamphetamines    Sexual activity: Not Currently     Social Determinants of Health     Financial Resource Strain: Low Risk  (2023)    Overall Financial Resource Strain (CARDIA)     Difficulty of Paying Living Expenses: Not hard at all   Food Insecurity: No Food Insecurity (2023)    Hunger Vital Sign     Worried About Running Out of Food in the Last Year: Never true     Ran Out of Food in the Last Year: Never true   Transportation Needs: No Transportation Needs (2023)    PRAPARE - Transportation     Lack of Transportation (Medical): No     Lack of Transportation (Non-Medical): No   Physical Activity: Inactive (2023)    Exercise Vital Sign     Days of Exercise per Week: 0 days     Minutes of Exercise per Session: 0 min   Stress: Stress Concern Present (2023)    [place] [employer] Occupational [employer] - Occupational Stress Questionnaire     Feeling of Stress : Rather much   Social Connections: Moderately Isolated (2023)    Social Connection and  Isolation Panel [NHANES]     Frequency of Communication with Friends and Family: More than three times a week     Frequency of Social Gatherings with Friends and Family: Once a week     Attends [employer] Services: More than 4 times per year     Active Member of Clubs or Organizations: No     Attends Club or Organization Meetings: Never     Marital Status:    Housing Stability: Low Risk  (9/14/2023)    Housing Stability Vital Sign     Unable to Pay for Housing in the Last Year: No     Number of Places Lived in the Last Year: 1     Unstable Housing in the Last Year: No       Current Facility-Administered Medications   Medication Dose Route Frequency Provider Last Rate Last Admin    0.9%  NaCl infusion   Intravenous Continuous Amanda Goetz MD           Review of patient's allergies indicates:   Allergen Reactions    Opioids - morphine analogues Rash     Development of red rash at site morphine given.       Patient Active Problem List   Diagnosis    Allergic rhinitis due to pollen    Abscess    Anemia    RLS (restless legs syndrome)    Daytime sleepiness    Other long term (current) drug therapy    Elevated glucose    Arthritis    Deep vein thrombosis (DVT) of femoral vein of left lower extremity    Chronic pain of both knees    Surgery, elective    Bilateral primary osteoarthritis of knee    Lumbosacral spondylosis without myelopathy    PLMD (periodic limb movement disorder)    KYRIE on CPAP    Class 3 severe obesity due to excess calories with body mass index (BMI) of 40.0 to 44.9 in adult    Ulcerative colitis    Severe obesity (BMI >= 40)    Contact dermatitis    Pre-op evaluation    Other ulcerative colitis with rectal bleeding    Pruritus    Gastroesophageal reflux disease    Menopausal symptoms                                                                                                                 02/15/2024  Vandana Price is a 58 y.o., female.      Pre-op Assessment    I have reviewed the Patient  Summary Reports.     I have reviewed the Nursing Notes. I have reviewed the NPO Status.   I have reviewed the Medications.     Review of Systems  Anesthesia Hx:  No problems with previous Anesthesia                    Physical Exam  General: Well nourished, Cooperative, Alert and Oriented    Airway:  Mallampati: II   Mouth Opening: Normal  TM Distance: Normal  Tongue: Normal  Neck ROM: Normal ROM    Dental:  Intact        Anesthesia Plan  Type of Anesthesia, risks & benefits discussed:    Anesthesia Type: Gen Natural Airway  Intra-op Monitoring Plan: Standard ASA Monitors  Post Op Pain Control Plan: multimodal analgesia  Induction:  IV  Informed Consent: Informed consent signed with the Patient and all parties understand the risks and agree with anesthesia plan.  All questions answered. Patient consented to blood products? Yes  ASA Score: 3  Day of Surgery Review of History & Physical: I have interviewed and examined the patient. I have reviewed the patient's H&P dated: There are no significant changes.     Ready For Surgery From Anesthesia Perspective.     .

## 2024-02-15 NOTE — ANESTHESIA POSTPROCEDURE EVALUATION
Anesthesia Post Evaluation    Patient: Vandana Albert    Procedure(s) Performed: Procedure(s) (LRB):  Bilateral L4-5,5-S1 MB RFTC (Bilateral)    Final Anesthesia Type: general      Patient participation: Yes- Able to Participate  Level of consciousness: awake and alert  Post-procedure vital signs: reviewed and stable  Pain management: adequate  Airway patency: patent    PONV status at discharge: No PONV  Anesthetic complications: no      Cardiovascular status: blood pressure returned to baseline, hemodynamically stable and stable  Respiratory status: unassisted  Hydration status: euvolemic  Follow-up not needed.  Comments: See nursing note for post op pain/akshat score.              Vitals Value Taken Time   /86 02/15/24 0928   Temp 36.8 °C (98.2 °F) 02/15/24 0852   Pulse 91 02/15/24 0933   Resp 20 02/15/24 0933   SpO2 96 % 02/15/24 0930   Vitals shown include unvalidated device data.      Event Time   Out of Recovery 09:10:00         Pain/Akshat Score: Akshat Score: 10 (2/15/2024  9:05 AM)

## 2024-02-15 NOTE — BRIEF OP NOTE
The  Discharge Note  Short Stay    Admit Date: 2/15/2024    Discharge Date: 2/15/2024    Attending Physician: Amanda Goetz     Discharge Provider: Amanda Goetz    Diagnosis: Bilateral lumbosacral spondylosis    Procedure performed: Bilateral lumbar medial branch RFTC at L4-5,5-S1    Findings: Procedure tolerated well and without complications. Consistent with diagnosis.    EBL: 0cc    Specimens: None    Discharged Condition: Good    Final Diagnoses: Lumbosacral spondylosis without myelopathy [M47.817]    Disposition: Home or Self Care    Hospital Course: No complications, uneventful    Outcome of Hospitalization, Treatment, Procedure, or Surgery:  Patient was admitted for outpatient interventional pain management procedure. The patient tolerated the procedure well with no complications.    Follow up/Patient Instructions:  Follow up as scheduled in Pain Management office in 3-4 weeks.  Patient has received instructions and follow up date and time.    Medications:  Continue previous medications

## 2024-02-15 NOTE — OP NOTE
Procedure Note    Procedure Date: 2/15/2024    Procedure Performed:  Radiofrequency ablation of the bilateral  L4-5,5-S1 medial branch nerves utilizing fluoroscopy    Indications: Patient has failed conservative therapy.      Pre-op diagnosis: Lumbosacral Spondylosis    Post-op diagnosis: same    Physician: TATI Goetz MD    Anesthesia:MAC    Medications injected:  Pre-lesion, 2ml of 1% lidocaine at each level.  From a mixture of  0.25% bupivacaine and  40mg of kenalog 1ml of this solution was injected at each level post-lesion.    Local anesthetic used: 1% Lidocaine, 10 ml     Estimated Blood Loss:Less than 1cc    IVF:Per Anesthesia    Complications: None    Technique:  The patient was interviewed in the holding area and Risks/Benefits were discussed, including, but not limited to  the possibility of new or different pain, bleeding or infection.   All questions were answered.  The patient understood and accepted risks.  The area of treatment was marked. Consent was reviewed and signed.  A time out was taken to identify the patient, procedure and side of the procedure. The patient was placed in a prone position, then prepped and draped in the usual sterile fashion using ChloraPrep and sterile towels.  The levels were determined under fluoroscopic guidance.   AP and oblique fluoroscopy were used to identify and karin the junctions between the superior articular processes and transverse processes at  bilateral L4-5,5-S1.  The bilateral  sacral ala was also identified and marked.  The skin and subcutaneous tissues in these identified areas were anesthetized with 1% lidocaine. A 20-gauge 100 mm [employer] RF needle was advanced towards each of these points under fluoroscopic guidance such that the tip of the needle was positioned posterior to the Neuro-foramen on lateral fluoroscopic view. Each needle was positioned such that, on stimulation, the patient had an appropriate pain response between 0.3-0.5 V, with no motor  response, other than Lumbar Paraspinal Muscles up to 2.0V.  One mL of 2% lidocaine was then injected at each level prior to lesioning, which was performed for 90 seconds at 80°C.  Once the lesion was complete, 1 mL of a solution from a  mixture of 0.25% bupivacaine 3 cc and 40mg kenalog  was injected through each probe. The probes were removed and a sterile Band-Aid dressing was applied to the puncture site.  The patient tolerated the procedure well and was monitored after the procedure.  Patient was given post procedure and discharge instructions to follow at home.  The patient was discharged in a stable condition and accompanied by an adult.

## 2024-02-15 NOTE — PLAN OF CARE
Plan:  D/c pt via wheelchair at 0940  Informed pt if does not void in 8 hours to go to ER. Notify if redness, drainage, from injection site or fever over next 3-4 days. Rest and drink plenty of fluids for the remainder of the day. No lifting over 5 lbs. For the remainder of the day. Continue regular medications as prescribed. May take pain medications as prescribed.     Pain improved 100%

## 2024-02-15 NOTE — DISCHARGE SUMMARY
[facility] - Pain Management  Discharge Note  Short Stay    Procedure(s) (LRB):  Bilateral L4-5,5-S1 MB RFTC (Bilateral)      OUTCOME: Patient tolerated treatment/procedure well without complication and is now ready for discharge.    DISPOSITION: Home or Self Care    FINAL DIAGNOSIS:  Bilateral Lumbosacral spondylosis    FOLLOWUP: In clinic    DISCHARGE INSTRUCTIONS:  See nurse' s notes     TIME SPENT ON DISCHARGE: 5 minutes

## 2024-02-15 NOTE — TRANSFER OF CARE
"Anesthesia Transfer of Care Note    Patient: Vandana Price    Procedure(s) Performed: Procedure(s) (LRB):  Bilateral L4-5,5-S1 MB RFTC (Bilateral)    Patient location: Other:    Anesthesia Type: general    Transport from OR: Transported from OR on room air with adequate spontaneous ventilation    Post pain: adequate analgesia    Post assessment: no apparent anesthetic complications    Post vital signs: stable    Level of consciousness: responds to stimulation    Nausea/Vomiting: no nausea/vomiting    Complications: none    Transfer of care protocol was followed      Last vitals: Visit Vitals  BP (!) 158/98   Pulse 88   Temp 36.8 °C (98.2 °F) (Oral)   Resp 14   Ht 5' 6" (1.676 m)   Wt 117.3 kg (258 lb 9.6 oz)   LMP 06/08/2020   SpO2 95%   BMI 41.74 kg/m²     "

## 2024-02-19 ENCOUNTER — OFFICE VISIT (OUTPATIENT)
Dept: GASTROENTEROLOGY | Facility: CLINIC | Age: 59
End: 2024-02-19
Payer: COMMERCIAL

## 2024-02-19 VITALS
SYSTOLIC BLOOD PRESSURE: 151 MMHG | HEART RATE: 105 BPM | DIASTOLIC BLOOD PRESSURE: 82 MMHG | HEIGHT: 66 IN | WEIGHT: 260 LBS | BODY MASS INDEX: 41.78 KG/M2

## 2024-02-19 DIAGNOSIS — K51.211 ULCERATIVE PROCTITIS WITH RECTAL BLEEDING: Primary | ICD-10-CM

## 2024-02-19 PROCEDURE — 99214 OFFICE O/P EST MOD 30 MIN: CPT | Mod: S$GLB,,,

## 2024-02-19 PROCEDURE — 1160F RVW MEDS BY RX/DR IN RCRD: CPT | Mod: S$GLB,,,

## 2024-02-19 PROCEDURE — 3077F SYST BP >= 140 MM HG: CPT | Mod: S$GLB,,,

## 2024-02-19 PROCEDURE — 1159F MED LIST DOCD IN RCRD: CPT | Mod: S$GLB,,,

## 2024-02-19 PROCEDURE — 3008F BODY MASS INDEX DOCD: CPT | Mod: S$GLB,,,

## 2024-02-19 PROCEDURE — 3079F DIAST BP 80-89 MM HG: CPT | Mod: S$GLB,,,

## 2024-02-19 PROCEDURE — 99213 OFFICE O/P EST LOW 20 MIN: CPT | Mod: PBBFAC

## 2024-02-19 NOTE — PROGRESS NOTES
Gastroenterology Clinic Note    Patient ID: [id number]   Referring MD: No ref. provider found   Chief Complaint:   Chief Complaint   Patient presents with    Follow-up       History of Present Illness     Vandana Price is an 58 y.o.  female who is referred for follow-up of ulcerative colitis. Diagnosis was made by colonoscopy. Onset was 2021. Disease has involved rectum. The patient has had no surgery for treatment of their IBD. No reported family history of IBD or colon cancer.      This historical paragraph has been reviewed and updated as necessary from prior clinic notes.     The patient is currently having 1-2 bowel movements per day which are semisolid. The patient currently denies significant abdominal pain or discomfort.  The patient does not have fever and chills The patient does not have night sweats and nocturnal awakening. The patient does not have weight loss. The patient does not have nausea, vomiting, or heartburn.  The patient does not have extraintestinal symptoms of oral ulcers, joint pain or rash.     Last colonoscopy was 12/07/2023    Overall Impression:   2 subcentimeter polyps were removed with cold snare  The terminal ileum, ileocecal valve, ascending colon, transverse colon and sigmoid colon appeared normal.  (grade 2) hemorrhoids    - The colonic mucosa and TI appeared normal with no evidence of active inflammation throughout the exam     Final Diagnosis   A. Cecum polyp, biopsy:  - Tubular adenoma     B. Descending colon polyp, biopsy:  - Hyperplastic polyp         The patient has not been on corticosteroids > or = 10mg prednisone (or equivalent) for 60 or more days.    The patient has been treated with the following medications for IBD: Mesalamine    Patient's current therapy is: Humira     Interval   - Doing well overall  - Continues to experience headaches for the 1-2 days after injection   - Reports intermittent days with multiple episodes of diarrhea; denies any hematochezia or  melena. These days are occurring once per week on average. Otherwise having a regular stool     Review of Systems   Constitutional:  Negative for weight loss.   Gastrointestinal:  Positive for diarrhea. Negative for abdominal pain, blood in stool, constipation, heartburn, melena, nausea and vomiting.       Past Medical History      Past Medical History:   Diagnosis Date    Allergy     Bilateral primary osteoarthritis of knee     Chronic pain of both knees 10/14/2021    Eosinophilia     Gangrene of gallbladder in cholecystitis 2021    Hematochezia     History of colonoscopy     Hyperlipidemia     Hypertension     Sleep apnea        Past Surgical History     Past Surgical History:   Procedure Laterality Date    ARTHROPLASTY, KNEE, TOTAL, USING COMPUTER-ASSISTED NAVIGATION Left 12/15/2022    Procedure: ARTHROPLASTY, KNEE, TOTAL, USING COMPUTER-ASSISTED NAVIGATION;  Surgeon: Jerome Valladares MD;  Location: [facility] OR;  Service: Orthopedics;  Laterality: Left;    ARTHROPLASTY, KNEE, TOTAL, USING COMPUTER-ASSISTED NAVIGATION Right 2023    Procedure: ARTHROPLASTY, KNEE, TOTAL, USING COMPUTER-ASSISTED NAVIGATION;  Surgeon: Jerome Valladares MD;  Location: [facility] ORTHO OR;  Service: Orthopedics;  Laterality: Right;    Bilateral IA knee injection Bilateral 2021    D Shows    bladder tact       SECTION      CHOLECYSTECTOMY      FRACTURE SURGERY      jaw     INJECTION      back    INJECTION OF ANESTHETIC AGENT AROUND MEDIAL BRANCH NERVES INNERVATING LUMBAR FACET JOINT Bilateral 2023    Procedure: BIlateral L4-5,5-S1 MBB;  Surgeon: Amanda Geotz MD;  Location: [facility] PAIN [facility];  Service: Pain Management;  Laterality: Bilateral;  LOCAL    INJECTION OF ANESTHETIC AGENT AROUND MEDIAL BRANCH NERVES INNERVATING LUMBAR FACET JOINT Bilateral 2023    Procedure: Bilateral L4-5, 5-S1 medial branch block # 2;  Surgeon: Amanda Goetz MD;  Location: [facility] PAIN [facility];  Service: Pain  Management;  Laterality: Bilateral;    INJECTION OF ANESTHETIC AGENT AROUND NERVE Bilateral 2022    Procedure: GNB   BILATERAL;  Surgeon: Amanda Goetz MD;  Location: [facility] PAIN MGMT;  Service: Pain Management;  Laterality: Bilateral;  PT HAS NOT HAD VAC   STATES WILL BE TESTED AT [facility] IN [place]    knee scope Bilateral     KNEE SURGERY      LAPAROSCOPIC CHOLECYSTECTOMY N/A 2021    Procedure: LAPAROSCOPIC CHOLECYSTECTOMY CONVERTED TO OPEN;  Surgeon: Darnell Mcgraw MD;  Location: [facility] OR;  Service: General;  Laterality: N/A;  CONVERTED TO OPEN    RADIOFREQUENCY ABLATION OF LUMBAR MEDIAL BRANCH NERVE AT SINGLE LEVEL Bilateral 2/15/2024    Procedure: Bilateral L4-5,5-S1 MB RFTC;  Surgeon: Amanda Goetz MD;  Location: [facility] PAIN [facility]MT;  Service: Pain Management;  Laterality: Bilateral;    TUBAL LIGATION         Allergies     Review of patient's allergies indicates:   Allergen Reactions    Opioids - morphine analogues Rash     Development of red rash at site morphine given.        Immunization History     There is no immunization history on file for this patient.    Past Family History      Family History   Problem Relation Age of Onset    Hypertension Father     Heart disease Father     Alcohol abuse Mother     Migraines Daughter     Lung cancer Maternal Aunt        Past Social History      Social History     Socioeconomic History    Marital status:     Number of children: 2   Occupational History    Occupation:  at [employer]   Tobacco Use    Smoking status: Former     Current packs/day: 0.00     Average packs/day: 1 pack/day for 33.0 years (33.0 ttl pk-yrs)     Types: Cigarettes     Start date:      Quit date:      Years since quittin.1     Passive exposure: Never    Smokeless tobacco: Never   Substance and Sexual Activity    Alcohol use: Not Currently    Drug use: Not Currently     Types: Methamphetamines    Sexual activity: Not Currently     Social Determinants  of Health     Financial Resource Strain: Low Risk  (9/14/2023)    Overall Financial Resource Strain (CARDIA)     Difficulty of Paying Living Expenses: Not hard at all   Food Insecurity: No Food Insecurity (9/14/2023)    Hunger Vital Sign     Worried About Running Out of Food in the Last Year: Never true     Ran Out of Food in the Last Year: Never true   Transportation Needs: No Transportation Needs (9/14/2023)    PRAPARE - Transportation     Lack of Transportation (Medical): No     Lack of Transportation (Non-Medical): No   Physical Activity: Inactive (9/14/2023)    Exercise Vital Sign     Days of Exercise per Week: 0 days     Minutes of Exercise per Session: 0 min   Stress: Stress Concern Present (9/14/2023)    [employer] [place] [employer] - Occupational Stress Questionnaire     Feeling of Stress : Rather much   Social Connections: Moderately Isolated (9/14/2023)    Social Connection and Isolation Panel [NHANES]     Frequency of Communication with Friends and Family: More than three times a week     Frequency of Social Gatherings with Friends and Family: Once a week     Attends [employer] Services: More than 4 times per year     Active Member of Clubs or Organizations: No     Attends Club or Organization Meetings: Never     Marital Status:    Housing Stability: Low Risk  (9/14/2023)    Housing Stability Vital Sign     Unable to Pay for Housing in the Last Year: No     Number of Places Lived in the Last Year: 1     Unstable Housing in the Last Year: No       Current Medications     Outpatient Medications Marked as Taking for the 2/19/24 encounter (Office Visit) with Bessy Álvarez FNP   Medication Sig Dispense Refill    acetaminophen-codeine 300-30mg (TYLENOL #3) 300-30 mg Tab Take 1 tablet by mouth every 12 (twelve) hours as needed.      adalimumab (HUMIRA,CF, PEN) 40 mg/0.4 mL PnKt Inject 40mg under the skin every other week as directed by physician. 2 pen 6    aspirin (ECOTRIN) 81 MG EC tablet  "Take 81 mg by mouth once daily.      celecoxib (CELEBREX) 200 MG capsule Take 1 capsule (200 mg total) by mouth once daily. 90 capsule 1    cetirizine (ZYRTEC) 10 MG tablet Take 1 tablet (10 mg total) by mouth once daily. 90 tablet 1    chlorhexidine (PERIDEX) 0.12 % solution SMARTSIG:By Mouth      famotidine (PEPCID) 40 MG tablet Take 1 tablet (40 mg total) by mouth 2 (two) times daily. 180 tablet 1    gabapentin (NEURONTIN) 300 MG capsule Take 2 capsules (600 mg total) by mouth 2 (two) times daily. 360 capsule 1    hydrOXYzine pamoate (VISTARIL) 25 MG Cap Take 1 capsule (25 mg total) by mouth every 8 (eight) hours as needed (for itching). 30 capsule 2        I have reviewed the current medications, allergies, vital signs, past medical and surgical history, family medical history, and social history for this encounter and agree with all findings.    OBJECTIVE    Physical Exam    BP (!) 151/82   Pulse 105   Ht 5' 6" (1.676 m)   Wt 117.9 kg (260 lb)   LMP 06/08/2020 Comment: tubal ligation   BMI 41.97 kg/m²   GEN: Well appearing, cooperative, NAD  NECK: Supple, no LAD  CV: Normal rate  RESP: Unlabored  ABD: ND, no guarding  EXT: No clubbing, cyanosis, or edema  SKIN: Warm and dry  NEURO: AAO x4.     LABS    CBC (with or without Differential):   Lab Results   Component Value Date    WBC 6.05 01/05/2024    HGB 12.9 01/05/2024    HCT 39.5 01/05/2024    MCV 87.6 01/05/2024    MCH 28.6 01/05/2024    MCHC 32.7 01/05/2024    RDW 14.1 01/05/2024     01/05/2024    MPV 11.4 01/05/2024    NEUTOPHILPCT 47.5 (L) 01/05/2024    DIFFTYPE Auto 01/05/2024     BMP/CMP:   Lab Results   Component Value Date     01/05/2024    K 3.8 01/05/2024     (H) 01/05/2024    CO2 28 01/05/2024    BUN 18 01/05/2024    CREATININE 1.02 01/05/2024    GLU 95 01/05/2024    CALCIUM 9.4 01/05/2024    ALBUMIN 3.9 01/05/2024    AST 15 01/05/2024    ALT 23 01/05/2024    ALKPHOS 157 (H) 01/05/2024        IMAGING  No pertinent " imaging.    ASSESSMENT  Vandana Price is a 58 y.o. WF with history of HTN, Hyperlipidemia, and UC who is referred to clinic for follow-up.    1. Ulcerative proctitis with rectal bleeding           PLAN    - Labs are UTD; will be due for annual screening labs at follow-up   - Continue Humira therapy     Immunosuppression: The patient is currently immunosuppressed due to treatment with Humira. The patient denies fever, chills, or other signs or symptoms of infection. The patient reports no medication-related issues and they has been medication adherent. We will continue to monitor labs per protocol. Benefits of the medication outweigh the risks and this will continually be assessed.      I have reviewed the IBD health maintenance as below.      IBD Health Maintenance:  -Prevnar 13: Recommend   -Pneumovax 23: Recommend 8 weeks after Prevnar 13  -Flu Shot: Recommend annually  -Shingrix: Recommend    -Hepatitis A/Hepatitis B: Check antibody and if not immune will vaccinate  -Annual pap smear: Recommend    -Annual skin exam: Recommend   -Colon cancer screening: Repeat colonoscopy in 5 years (12/2028) for h/o polyps, and will go ahead and start dysplasia screening at that time   -DEXA scan: Recommend  -Tobacco: Avoid  -Should avoid NSAIDs and narcotics, use only Tylenol for pain relief.     There are no Patient Instructions on file for this visit.      No orders of the defined types were placed in this encounter.        The risks and benefits of my recommendations, as well as other treatment options were discussed with the patient today. All questions were answered.    35 minutes of total time spent on the encounter, which includes face to face time and non-face to face time preparing to see the patient (eg, review of tests), obtaining and/or reviewing separately obtained history, documenting clinical information in the electronic or other health record, Independently interpreting results (not separately reported) and  communicating results to the patient/family/caregiver, or care coordination (not separately reported).        Bessy Álvarez, AINSLEYP/ACNP  [facility]

## 2024-02-21 ENCOUNTER — PATIENT MESSAGE (OUTPATIENT)
Dept: GASTROENTEROLOGY | Facility: CLINIC | Age: 59
End: 2024-02-21
Payer: COMMERCIAL

## 2024-03-05 DIAGNOSIS — K51.918 ULCERATIVE COLITIS WITH OTHER COMPLICATION, UNSPECIFIED LOCATION: ICD-10-CM

## 2024-03-06 RX ORDER — ADALIMUMAB 40MG/0.4ML
KIT SUBCUTANEOUS
Qty: 2 PEN | Refills: 6 | Status: SHIPPED | OUTPATIENT
Start: 2024-03-06 | End: 2024-04-12 | Stop reason: ALTCHOICE

## 2024-03-11 PROBLEM — K51.811 OTHER ULCERATIVE COLITIS WITH RECTAL BLEEDING: Status: RESOLVED | Noted: 2023-12-07 | Resolved: 2024-03-11

## 2024-03-18 ENCOUNTER — PATIENT MESSAGE (OUTPATIENT)
Dept: GASTROENTEROLOGY | Facility: CLINIC | Age: 59
End: 2024-03-18
Payer: COMMERCIAL

## 2024-03-28 ENCOUNTER — PATIENT MESSAGE (OUTPATIENT)
Dept: GASTROENTEROLOGY | Facility: CLINIC | Age: 59
End: 2024-03-28
Payer: COMMERCIAL

## 2024-04-12 DIAGNOSIS — K51.211 ULCERATIVE PROCTITIS WITH RECTAL BLEEDING: Primary | ICD-10-CM

## 2024-04-12 RX ORDER — ADALIMUMAB-ADAZ 40 MG/.4ML
INJECTION, SOLUTION SUBCUTANEOUS
Qty: 0.8 ML | Refills: 11 | Status: SHIPPED | OUTPATIENT
Start: 2024-04-12 | End: 2024-06-11

## 2024-04-17 ENCOUNTER — PATIENT MESSAGE (OUTPATIENT)
Dept: GASTROENTEROLOGY | Facility: CLINIC | Age: 59
End: 2024-04-17
Payer: COMMERCIAL

## 2024-04-29 DIAGNOSIS — K51.211 ULCERATIVE PROCTITIS WITH RECTAL BLEEDING: Primary | ICD-10-CM

## 2024-05-21 ENCOUNTER — OFFICE VISIT (OUTPATIENT)
Dept: FAMILY MEDICINE | Facility: CLINIC | Age: 59
End: 2024-05-21
Payer: COMMERCIAL

## 2024-05-21 ENCOUNTER — TELEPHONE (OUTPATIENT)
Dept: FAMILY MEDICINE | Facility: CLINIC | Age: 59
End: 2024-05-21
Payer: COMMERCIAL

## 2024-05-21 VITALS
HEIGHT: 66 IN | TEMPERATURE: 98 F | WEIGHT: 248.19 LBS | OXYGEN SATURATION: 96 % | RESPIRATION RATE: 16 BRPM | SYSTOLIC BLOOD PRESSURE: 138 MMHG | DIASTOLIC BLOOD PRESSURE: 88 MMHG | HEART RATE: 76 BPM | BODY MASS INDEX: 39.89 KG/M2

## 2024-05-21 DIAGNOSIS — L29.9 PRURITUS: Chronic | ICD-10-CM

## 2024-05-21 DIAGNOSIS — M47.817 LUMBOSACRAL SPONDYLOSIS WITHOUT MYELOPATHY: Primary | Chronic | ICD-10-CM

## 2024-05-21 DIAGNOSIS — E53.8 VITAMIN B12 DEFICIENCY: Chronic | ICD-10-CM

## 2024-05-21 DIAGNOSIS — G47.61 PLMD (PERIODIC LIMB MOVEMENT DISORDER): Chronic | ICD-10-CM

## 2024-05-21 DIAGNOSIS — G47.33 OSA ON CPAP: Chronic | ICD-10-CM

## 2024-05-21 DIAGNOSIS — K51.819 OTHER ULCERATIVE COLITIS WITH COMPLICATION: Chronic | ICD-10-CM

## 2024-05-21 DIAGNOSIS — E66.01 CLASS 3 SEVERE OBESITY DUE TO EXCESS CALORIES WITH SERIOUS COMORBIDITY AND BODY MASS INDEX (BMI) OF 40.0 TO 44.9 IN ADULT: Chronic | ICD-10-CM

## 2024-05-21 PROBLEM — J30.1 ALLERGIC RHINITIS DUE TO POLLEN: Chronic | Status: ACTIVE | Noted: 2021-06-22

## 2024-05-21 PROBLEM — E66.813 CLASS 3 SEVERE OBESITY DUE TO EXCESS CALORIES WITH BODY MASS INDEX (BMI) OF 40.0 TO 44.9 IN ADULT: Chronic | Status: ACTIVE | Noted: 2022-10-05

## 2024-05-21 PROCEDURE — 1159F MED LIST DOCD IN RCRD: CPT | Mod: ,,, | Performed by: FAMILY MEDICINE

## 2024-05-21 PROCEDURE — 96372 THER/PROPH/DIAG INJ SC/IM: CPT | Mod: ,,, | Performed by: FAMILY MEDICINE

## 2024-05-21 PROCEDURE — 1160F RVW MEDS BY RX/DR IN RCRD: CPT | Mod: ,,, | Performed by: FAMILY MEDICINE

## 2024-05-21 PROCEDURE — 3008F BODY MASS INDEX DOCD: CPT | Mod: ,,, | Performed by: FAMILY MEDICINE

## 2024-05-21 PROCEDURE — 3075F SYST BP GE 130 - 139MM HG: CPT | Mod: ,,, | Performed by: FAMILY MEDICINE

## 2024-05-21 PROCEDURE — 99214 OFFICE O/P EST MOD 30 MIN: CPT | Mod: 25,,, | Performed by: FAMILY MEDICINE

## 2024-05-21 PROCEDURE — 3079F DIAST BP 80-89 MM HG: CPT | Mod: ,,, | Performed by: FAMILY MEDICINE

## 2024-05-21 RX ORDER — CYANOCOBALAMIN 1000 UG/ML
INJECTION, SOLUTION INTRAMUSCULAR; SUBCUTANEOUS
Qty: 10 ML | Refills: 1 | Status: SHIPPED | OUTPATIENT
Start: 2024-05-21

## 2024-05-21 RX ORDER — METHYLPREDNISOLONE ACETATE 40 MG/ML
80 INJECTION, SUSPENSION INTRA-ARTICULAR; INTRALESIONAL; INTRAMUSCULAR; SOFT TISSUE
Status: COMPLETED | OUTPATIENT
Start: 2024-05-21 | End: 2024-05-21

## 2024-05-21 RX ADMIN — METHYLPREDNISOLONE ACETATE 80 MG: 40 INJECTION, SUSPENSION INTRA-ARTICULAR; INTRALESIONAL; INTRAMUSCULAR; SOFT TISSUE at 09:05

## 2024-05-21 NOTE — PROGRESS NOTES
Claude Oneil MD    [address] [place] [address]     PATIENT NAME: Vandana Price  : 1965  DATE: 24  MRN: [id number]      Billing Provider: Claude Oneil MD  Level of Service: [place] OFFICE/OUTPT VISIT, EST, LEVL IV, 30-39 MIN  Patient PCP Information       Provider PCP Type    Primary Doctor No General            Reason for Visit / Chief Complaint: Back Pain (Neck pain and lower back pain. This is a chronic problem. PCP is Faby Slade in [place]. She states  Goetz has burned nerves in her back but she is still in pain.) and Itching (All over her body, breaks out in hives when she scratches.)       Update PCP  Update Chief Complaint         History of Present Illness / Problem Focused Workflow     Vandana Price presents to the clinic with Back Pain (Neck pain and lower back pain. This is a chronic problem. PCP is Faby Slade in [place]. She states Dr. Goetz has burned nerves in her back but she is still in pain.) and Itching (All over her body, breaks out in hives when she scratches.)     Neck and lower back pain - she had an MRI about a year ago, the results are in EP[facility], she has stopped seeing Pain Management because she did not get better from their treatment plan. Worse first thing in the morning, takes 10-20 minutes every morning to get moving well, a lot of stiffness and pain first thing. Once she is loosened up she tends to do fairly well the rest of the day    Both knees have been replaced     Itching - this is chronic, recurrent ordeal. Zyrtec does help it some, also pepcid has helped some. Still has a flare up every few months that is severe, she will scratch until she has sores     Does not use CPAP, could not get used to the mask    B12 deficiency - wants to restart injection        HPI    Review of Systems     Review of Systems   Constitutional:  Negative for activity change, appetite change, chills, fatigue and fever.   HENT:  Negative for  nasal congestion, ear pain, hearing loss, postnasal drip and sore throat.    Respiratory:  Negative for cough, chest tightness, shortness of breath and wheezing.    Cardiovascular:  Negative for chest pain, palpitations, leg swelling and claudication.   Gastrointestinal:  Negative for abdominal pain, change in bowel habit, constipation, diarrhea, nausea and vomiting.   Genitourinary:  Negative for dysuria.   Musculoskeletal:  Positive for arthralgias, back pain and neck pain. Negative for gait problem and myalgias.   Integumentary:  Negative for rash.   Neurological:  Negative for weakness and headaches.   Psychiatric/Behavioral:  Negative for suicidal ideas. The patient is not nervous/anxious.         Medical / Social / Family History     Past Medical History:   Diagnosis Date    Allergy     Bilateral primary osteoarthritis of knee     Chronic pain of both knees 10/14/2021    Eosinophilia     Gangrene of gallbladder in cholecystitis 2021    Hematochezia     History of colonoscopy     Hyperlipidemia     Hypertension     Sleep apnea        Past Surgical History:   Procedure Laterality Date    ARTHROPLASTY, KNEE, TOTAL, USING COMPUTER-ASSISTED NAVIGATION Left 12/15/2022    Procedure: ARTHROPLASTY, KNEE, TOTAL, USING COMPUTER-ASSISTED NAVIGATION;  Surgeon: Jerome Valladares MD;  Location: [facility];  Service: Orthopedics;  Laterality: Left;    ARTHROPLASTY, KNEE, TOTAL, USING COMPUTER-ASSISTED NAVIGATION Right 2023    Procedure: ARTHROPLASTY, KNEE, TOTAL, USING COMPUTER-ASSISTED NAVIGATION;  Surgeon: Jerome Valladares MD;  Location: [facility];  Service: Orthopedics;  Laterality: Right;    Bilateral IA knee injection Bilateral 2021    D Shows    bladder tact       SECTION      CHOLECYSTECTOMY      FRACTURE SURGERY      jaw 1972    INJECTION      back    INJECTION OF ANESTHETIC AGENT AROUND MEDIAL BRANCH NERVES INNERVATING LUMBAR FACET JOINT Bilateral 2023    Procedure:  BIlateral L4-5,5-S1 MBB;  Surgeon: Amanda Goetz MD;  Location: [facility];  Service: Pain Management;  Laterality: Bilateral;  LOCAL    INJECTION OF ANESTHETIC AGENT AROUND MEDIAL BRANCH NERVES INNERVATING LUMBAR FACET JOINT Bilateral 12/12/2023    Procedure: Bilateral L4-5, 5-S1 medial branch block # 2;  Surgeon: Amanda Goetz MD;  Location: [facility];  Service: Pain Management;  Laterality: Bilateral;    INJECTION OF ANESTHETIC AGENT AROUND NERVE Bilateral 06/28/2022    Procedure: GNB   BILATERAL;  Surgeon: Amanda Goetz MD;  Location: [facility];  Service: Pain Management;  Laterality: Bilateral;  PT HAS NOT HAD VAC   STATES WILL BE TESTED AT [facility] IN [place]    knee scope Bilateral     KNEE SURGERY      LAPAROSCOPIC CHOLECYSTECTOMY N/A 03/31/2021    Procedure: LAPAROSCOPIC CHOLECYSTECTOMY CONVERTED TO OPEN;  Surgeon: Darnell Mcgraw MD;  Location: [facility] OR;  Service: General;  Laterality: N/A;  CONVERTED TO OPEN    RADIOFREQUENCY ABLATION OF LUMBAR MEDIAL BRANCH NERVE AT SINGLE LEVEL Bilateral 2/15/2024    Procedure: Bilateral L4-5,5-S1 MB RFTC;  Surgeon: Amanda Goetz MD;  Location: [facility];  Service: Pain Management;  Laterality: Bilateral;    TUBAL LIGATION  2004       Social History  Ms.  reports that she quit smoking about 14 years ago. Her smoking use included cigarettes. She started smoking about 47 years ago. She has a 33 pack-year smoking history. She has never been exposed to tobacco smoke. She has never used smokeless tobacco. She reports that she does not currently use alcohol. She reports that she does not currently use drugs after having used the following drugs: Methamphetamines.    Family History  Ms.'s family history includes Alcohol abuse in her mother; Heart disease in her father; Hypertension in her father; Lung cancer in her maternal aunt; Migraines in her daughter.    Medications and Allergies     Medications  Outpatient Medications  Marked as Taking for the 5/21/24 encounter (Office Visit) with Claude Oneil MD   Medication Sig Dispense Refill    adalimumab-adaz (HYRIMOZ,CF, PEN) 40 mg/0.4 mL PnIj Inject 1 pen (40mg) every 14 days as directed by physician. 0.8 mL 11    aspirin (ECOTRIN) 81 MG EC tablet Take 81 mg by mouth once daily.      celecoxib (CELEBREX) 200 MG capsule Take 1 capsule (200 mg total) by mouth once daily. 90 capsule 1    cetirizine (ZYRTEC) 10 MG tablet Take 1 tablet (10 mg total) by mouth once daily. 90 tablet 1    famotidine (PEPCID) 40 MG tablet Take 1 tablet (40 mg total) by mouth 2 (two) times daily. 180 tablet 1    gabapentin (NEURONTIN) 300 MG capsule Take 2 capsules (600 mg total) by mouth 2 (two) times daily. 360 capsule 1    hydrOXYzine pamoate (VISTARIL) 25 MG Cap Take 1 capsule (25 mg total) by mouth every 8 (eight) hours as needed (for itching). 30 capsule 2     Current Facility-Administered Medications for the 5/21/24 encounter (Office Visit) with Claude Oneil MD   Medication Dose Route Frequency Provider Last Rate Last Admin    [COMPLETED] methylPREDNISolone acetate injection 80 mg  80 mg Intramuscular 1 time in Clinic/HOD Claude Oneil MD   80 mg at 05/21/24 0904       Allergies  Review of patient's allergies indicates:   Allergen Reactions    Opioids - morphine analogues Rash     Development of red rash at site morphine given.        Physical Examination     Vitals:    05/21/24 0800   BP: 138/88   Pulse: 76   Resp: 16   Temp: 97.8 °F (36.6 °C)     Physical Exam  Vitals and nursing note reviewed.   Constitutional:       General: She is not in acute distress.     Appearance: Normal appearance. She is not ill-appearing.   Eyes:      Extraocular Movements: Extraocular movements intact.      Pupils: Pupils are equal, round, and reactive to light.   Cardiovascular:      Rate and Rhythm: Normal rate and regular rhythm.      Heart sounds: Normal heart sounds.   Pulmonary:      Effort: Pulmonary  effort is normal.      Breath sounds: Normal breath sounds.   Abdominal:      General: Bowel sounds are normal.      Palpations: Abdomen is soft.   Musculoskeletal:         General: Normal range of motion.   Skin:     Findings: No rash.   Neurological:      General: No focal deficit present.      Mental Status: She is alert and oriented to person, place, and time. Mental status is at baseline.   Psychiatric:         Mood and Affect: Mood normal.         Behavior: Behavior normal.            Assessment and Plan (including Health Maintenance)      Problem List  Smart Sets  Document Outside HM   :    Plan:     B12 injections for low B12    Low back stretches - discussed this as one part of management of lower back pain     Depomedrol for back and pruritus - will see if this offers some benefit         Health Maintenance Due   Topic Date Due    TETANUS VACCINE  Never done    LDCT Lung Screen  Never done    Shingles Vaccine (1 of 2) Never done    Mammogram  02/24/2021    Cervical Cancer Screening  11/02/2023       Problem List Items Addressed This Visit          Neuro    Lumbosacral spondylosis without myelopathy - Primary (Chronic)    Relevant Medications    methylPREDNISolone acetate injection 80 mg (Completed)       Derm    Pruritus (Chronic)    Relevant Medications    methylPREDNISolone acetate injection 80 mg (Completed)       Endocrine    Class 3 severe obesity due to excess calories with body mass index (BMI) of 40.0 to 44.9 in adult (Chronic)    Vitamin B12 deficiency (Chronic)    Relevant Medications    cyanocobalamin 1,000 mcg/mL injection       GI    Ulcerative colitis (Chronic)       Other    PLMD (periodic limb movement disorder) (Chronic)    KYRIE on CPAP (Chronic)       Health Maintenance Topics with due status: Not Due       Topic Last Completion Date    Hemoglobin A1c (Diabetic Prevention Screening) 11/03/2022    Lipid Panel 11/03/2022    Colorectal Cancer Screening 12/07/2023       Procedures     Future  Appointments   Date Time Provider Department Center   7/25/2024  8:30 AM Claude Oneil MD [facility] [place]   8/19/2024  9:00 AM Bessy Álvarez FNP [facility]        Follow up in about 2 months (around 7/21/2024) for chronic health problems.     Signature:  Claude Oneil MD  [address] [place] [address]  Phone #: [phone]  Fax #: [phone]    Date of encounter: 5/21/24    There are no Patient Instructions on file for this visit.

## 2024-05-21 NOTE — LETTER
May 21, 2024      [facility] [address] [place] [address]  Phone: [phone]  Fax: [phone]       Patient: Vandana Price   YOB: 1965  Date of Visit: 05/21/2024    To Whom It May Concern:    Enrike Price  was at [facility] on 05/21/2024. The patient may return to work/school on 05/21/2024 with no restrictions. If you have any questions or concerns, or if I can be of further assistance, please do not hesitate to contact me.    Sincerely,    Claude Oneil MD

## 2024-05-21 NOTE — TELEPHONE ENCOUNTER
----- Message from Mariana Oropeza sent at 5/21/2024 10:41 AM CDT -----  Pt is asking for a work excuse.

## 2024-05-28 ENCOUNTER — PATIENT MESSAGE (OUTPATIENT)
Dept: GASTROENTEROLOGY | Facility: CLINIC | Age: 59
End: 2024-05-28
Payer: COMMERCIAL

## 2024-05-29 ENCOUNTER — PATIENT MESSAGE (OUTPATIENT)
Dept: GASTROENTEROLOGY | Facility: CLINIC | Age: 59
End: 2024-05-29
Payer: COMMERCIAL

## 2024-05-29 DIAGNOSIS — K51.211 ULCERATIVE PROCTITIS WITH RECTAL BLEEDING: Primary | ICD-10-CM

## 2024-05-30 ENCOUNTER — TELEPHONE (OUTPATIENT)
Dept: GASTROENTEROLOGY | Facility: CLINIC | Age: 59
End: 2024-05-30
Payer: COMMERCIAL

## 2024-05-30 NOTE — TELEPHONE ENCOUNTER
Called patient in regards to stool specimen bag that has not been picked up and she stated that she did the stool study at the [facility] and will be returning to them tomorrow for blood work.

## 2024-06-11 ENCOUNTER — PATIENT MESSAGE (OUTPATIENT)
Dept: GASTROENTEROLOGY | Facility: CLINIC | Age: 59
End: 2024-06-11
Payer: COMMERCIAL

## 2024-06-11 DIAGNOSIS — K51.319 ULCERATIVE RECTOSIGMOIDITIS WITH COMPLICATION: Primary | ICD-10-CM

## 2024-06-11 PROBLEM — K51.019 ULCERATIVE PANCOLITIS WITH COMPLICATION: Status: ACTIVE | Noted: 2022-12-15

## 2024-06-11 RX ORDER — SODIUM CHLORIDE 0.9 % (FLUSH) 0.9 %
10 SYRINGE (ML) INJECTION
OUTPATIENT
Start: 2024-06-11

## 2024-06-11 RX ORDER — ACETAMINOPHEN 325 MG/1
650 TABLET ORAL
OUTPATIENT
Start: 2024-06-11

## 2024-06-11 RX ORDER — HEPARIN 100 UNIT/ML
500 SYRINGE INTRAVENOUS
OUTPATIENT
Start: 2024-06-11

## 2024-06-11 RX ORDER — IPRATROPIUM BROMIDE AND ALBUTEROL SULFATE 2.5; .5 MG/3ML; MG/3ML
3 SOLUTION RESPIRATORY (INHALATION)
OUTPATIENT
Start: 2024-06-11

## 2024-06-11 RX ORDER — METHYLPREDNISOLONE SOD SUCC 125 MG
40 VIAL (EA) INJECTION
OUTPATIENT
Start: 2024-06-11

## 2024-06-11 RX ORDER — DIPHENHYDRAMINE HYDROCHLORIDE 50 MG/ML
25 INJECTION INTRAMUSCULAR; INTRAVENOUS
OUTPATIENT
Start: 2024-06-11

## 2024-06-11 RX ORDER — EPINEPHRINE 1 MG/ML
0.3 INJECTION, SOLUTION, CONCENTRATE INTRAVENOUS
OUTPATIENT
Start: 2024-06-11

## 2024-06-11 NOTE — PROGRESS NOTES
"Patient's adalimumab trough level was low and antibodies were detected.  Patient continues to experience "flare" with diarrhea and abdominal discomfort.  I discussed with Dr. Palacios the decision to change the patient's therapy was made.  I contacted the patient by phone and we discussed Entyvio versus Stelara.  The patient wishes to proceed with Stelara and this is listed on the patient's insurance formulary.  I have ordered of the initial infusion and sent the prescription to [facility] for the maintenance dose.    I discussed the risks of Stelara including infusion or injection site reactions, allergic reactions, immunologic reactions, nasopharyngitis, infections, risk of cancers, reversible posterior leukoencephalopathy syndrome, other CNS side effects like headaches, etc.    All questions/concerns were addressed.    KIMBERLEE Gilmore/KINGS  [facility]        "

## 2024-06-25 ENCOUNTER — INFUSION (OUTPATIENT)
Dept: INFUSION THERAPY | Facility: HOSPITAL | Age: 59
End: 2024-06-25
Payer: COMMERCIAL

## 2024-06-25 VITALS
DIASTOLIC BLOOD PRESSURE: 68 MMHG | BODY MASS INDEX: 38.9 KG/M2 | HEART RATE: 68 BPM | WEIGHT: 241 LBS | TEMPERATURE: 99 F | SYSTOLIC BLOOD PRESSURE: 120 MMHG | RESPIRATION RATE: 17 BRPM | OXYGEN SATURATION: 95 %

## 2024-06-25 DIAGNOSIS — K51.319 ULCERATIVE RECTOSIGMOIDITIS WITH COMPLICATION: Primary | ICD-10-CM

## 2024-06-25 DIAGNOSIS — D64.9 ANEMIA, UNSPECIFIED TYPE: ICD-10-CM

## 2024-06-25 PROCEDURE — 96365 THER/PROPH/DIAG IV INF INIT: CPT

## 2024-06-25 PROCEDURE — 25000003 PHARM REV CODE 250

## 2024-06-25 RX ORDER — IPRATROPIUM BROMIDE AND ALBUTEROL SULFATE 2.5; .5 MG/3ML; MG/3ML
3 SOLUTION RESPIRATORY (INHALATION)
OUTPATIENT
Start: 2024-06-25

## 2024-06-25 RX ORDER — ACETAMINOPHEN 325 MG/1
650 TABLET ORAL
OUTPATIENT
Start: 2024-06-25

## 2024-06-25 RX ORDER — EPINEPHRINE 1 MG/ML
0.3 INJECTION, SOLUTION, CONCENTRATE INTRAVENOUS
OUTPATIENT
Start: 2024-06-25

## 2024-06-25 RX ORDER — SODIUM CHLORIDE 0.9 % (FLUSH) 0.9 %
10 SYRINGE (ML) INJECTION
Status: CANCELLED | OUTPATIENT
Start: 2024-06-25

## 2024-06-25 RX ORDER — HEPARIN 100 UNIT/ML
500 SYRINGE INTRAVENOUS
Status: CANCELLED | OUTPATIENT
Start: 2024-06-25

## 2024-06-25 RX ORDER — DIPHENHYDRAMINE HYDROCHLORIDE 50 MG/ML
25 INJECTION INTRAMUSCULAR; INTRAVENOUS
OUTPATIENT
Start: 2024-06-25

## 2024-06-25 RX ORDER — METHYLPREDNISOLONE SOD SUCC 125 MG
40 VIAL (EA) INJECTION
OUTPATIENT
Start: 2024-06-25

## 2024-06-25 RX ORDER — HEPARIN 100 UNIT/ML
500 SYRINGE INTRAVENOUS
Status: DISCONTINUED | OUTPATIENT
Start: 2024-06-25 | End: 2024-06-25 | Stop reason: HOSPADM

## 2024-06-25 RX ORDER — SODIUM CHLORIDE 0.9 % (FLUSH) 0.9 %
10 SYRINGE (ML) INJECTION
Status: DISCONTINUED | OUTPATIENT
Start: 2024-06-25 | End: 2024-06-25 | Stop reason: HOSPADM

## 2024-06-25 RX ADMIN — SODIUM CHLORIDE 520 MG: 9 INJECTION, SOLUTION INTRAVENOUS at 01:06

## 2024-06-25 NOTE — PROGRESS NOTES
1330 Pt here for Stelara infusion, resting in recliner, TP released and pharmacy notified    1359 Stelara infusion started to infuse over 1 hr with filter applied  Stelara infusion complete, tolerated well, will continue to monitor  Care continued by LAURITA Curran RN  pt discharged ambulatory with no adverse reaction noted, pt notified to go to ER with any adverse reactions, AVS given along with delayed transfusion reaction information, pt verbalized understanding  This completes pt therapy plan, pt will get home injections after initial infusion

## 2024-06-28 ENCOUNTER — TELEPHONE (OUTPATIENT)
Dept: ORTHOPEDICS | Facility: CLINIC | Age: 59
End: 2024-06-28
Payer: COMMERCIAL

## 2024-06-28 NOTE — TELEPHONE ENCOUNTER
----- Message from Nkechi Blake sent at 6/28/2024  8:07 AM CDT -----  Regarding: FOLLOW UP  Who Called: Vandana Price        Who Left Message for Patient:  Does the patient know what this is regarding?:FOLLOW UP      Preferred Method of Contact: Phone Call  Patient's Preferred Phone Number on File: [phone]   Best Call Back Number, if different:  Additional Information: PATIENT WANTS TO KNOW IF SHE NEEDED A 6 MO FU

## 2024-07-01 DIAGNOSIS — Z96.651 HISTORY OF TOTAL KNEE ARTHROPLASTY, RIGHT: Primary | ICD-10-CM

## 2024-07-03 ENCOUNTER — HOSPITAL ENCOUNTER (OUTPATIENT)
Dept: RADIOLOGY | Facility: HOSPITAL | Age: 59
Discharge: HOME | End: 2024-07-03
Attending: ORTHOPAEDIC SURGERY
Payer: COMMERCIAL

## 2024-07-03 ENCOUNTER — OFFICE VISIT (OUTPATIENT)
Dept: ORTHOPEDICS | Facility: CLINIC | Age: 59
End: 2024-07-03
Payer: COMMERCIAL

## 2024-07-03 DIAGNOSIS — Z96.651 HISTORY OF TOTAL KNEE ARTHROPLASTY, RIGHT: Primary | ICD-10-CM

## 2024-07-03 DIAGNOSIS — Z96.651 HISTORY OF TOTAL KNEE ARTHROPLASTY, RIGHT: ICD-10-CM

## 2024-07-03 PROCEDURE — 1159F MED LIST DOCD IN RCRD: CPT | Mod: S$GLB,,, | Performed by: ORTHOPAEDIC SURGERY

## 2024-07-03 PROCEDURE — 99999 PR PBB SHADOW E&M-EST. PATIENT-LVL III: CPT | Mod: PBBFAC,,, | Performed by: ORTHOPAEDIC SURGERY

## 2024-07-03 PROCEDURE — 99213 OFFICE O/P EST LOW 20 MIN: CPT | Mod: S$GLB,,, | Performed by: ORTHOPAEDIC SURGERY

## 2024-07-03 PROCEDURE — 73560 X-RAY EXAM OF KNEE 1 OR 2: CPT | Mod: TC,RT

## 2024-07-03 NOTE — PROGRESS NOTES
Radiology Interpretation        Patient Name: Vandana Price  Date: 7/3/2024  YOB: 1965  MRN# [id number]        ORDERING DIAGNOSIS:    Encounter Diagnosis   Name Primary?    History of total knee arthroplasty, right Yes      AP lateral right knee two views skeletally mature individual total knee arthroplasty in place no loosening no fractures impression total knee arthroplasty in place right knee no loosening                 Jerome Valladares MD

## 2024-07-03 NOTE — PROGRESS NOTES
Patient is here for follow-up of her right knee total knee arthroplasty.  At this time she is having some tenderness over pes anserine bursa.  I taught her stretching and strengthening exercises for the pes.  She was using some Voltaren gel.  Otherwise she has good motion of the knee she is 0 to past 110° of flexion no instability in the knee is noted I will follow back up in 3 months she does not resolve her pes bursitis symptoms with the therapy.  If she does resolve that we are going to a yearly follow-up.

## 2024-07-09 ENCOUNTER — PATIENT MESSAGE (OUTPATIENT)
Dept: GASTROENTEROLOGY | Facility: CLINIC | Age: 59
End: 2024-07-09
Payer: COMMERCIAL

## 2024-07-09 ENCOUNTER — HOSPITAL ENCOUNTER (EMERGENCY)
Facility: HOSPITAL | Age: 59
Discharge: HOME | End: 2024-07-09
Payer: COMMERCIAL

## 2024-07-09 VITALS
DIASTOLIC BLOOD PRESSURE: 94 MMHG | SYSTOLIC BLOOD PRESSURE: 156 MMHG | TEMPERATURE: 98 F | OXYGEN SATURATION: 96 % | BODY MASS INDEX: 38.57 KG/M2 | HEIGHT: 66 IN | WEIGHT: 240 LBS | RESPIRATION RATE: 18 BRPM | HEART RATE: 97 BPM

## 2024-07-09 DIAGNOSIS — R51.9 ACUTE NONINTRACTABLE HEADACHE, UNSPECIFIED HEADACHE TYPE: Primary | ICD-10-CM

## 2024-07-09 PROCEDURE — 99284 EMERGENCY DEPT VISIT MOD MDM: CPT | Mod: ,,, | Performed by: NURSE PRACTITIONER

## 2024-07-09 PROCEDURE — 63600175 PHARM REV CODE 636 W HCPCS: Performed by: NURSE PRACTITIONER

## 2024-07-09 PROCEDURE — 99284 EMERGENCY DEPT VISIT MOD MDM: CPT | Mod: 25

## 2024-07-09 PROCEDURE — 96372 THER/PROPH/DIAG INJ SC/IM: CPT | Performed by: NURSE PRACTITIONER

## 2024-07-09 RX ORDER — KETOROLAC TROMETHAMINE 30 MG/ML
60 INJECTION, SOLUTION INTRAMUSCULAR; INTRAVENOUS
Status: COMPLETED | OUTPATIENT
Start: 2024-07-09 | End: 2024-07-09

## 2024-07-09 RX ORDER — DIPHENHYDRAMINE HYDROCHLORIDE 50 MG/ML
25 INJECTION INTRAMUSCULAR; INTRAVENOUS
Status: COMPLETED | OUTPATIENT
Start: 2024-07-09 | End: 2024-07-09

## 2024-07-09 RX ORDER — ONDANSETRON HYDROCHLORIDE 2 MG/ML
4 INJECTION, SOLUTION INTRAVENOUS
Status: COMPLETED | OUTPATIENT
Start: 2024-07-09 | End: 2024-07-09

## 2024-07-09 RX ADMIN — DIPHENHYDRAMINE HYDROCHLORIDE 25 MG: 50 INJECTION, SOLUTION INTRAMUSCULAR; INTRAVENOUS at 06:07

## 2024-07-09 RX ADMIN — ONDANSETRON 4 MG: 2 INJECTION INTRAMUSCULAR; INTRAVENOUS at 06:07

## 2024-07-09 RX ADMIN — KETOROLAC TROMETHAMINE 60 MG: 30 INJECTION, SOLUTION INTRAMUSCULAR at 06:07

## 2024-07-09 NOTE — ED TRIAGE NOTES
58 year old female presents to ed with c/o headache, bilateral ear pain, nasal congestion and n/v x2 days.  Patient reports symptoms continue to worsen.   Patient reports she received Stelera infusion on 6/25/2024.

## 2024-07-09 NOTE — ED PROVIDER NOTES
Encounter Date: 2024       History     Chief Complaint   Patient presents with    Headache    Nasal Congestion    Otalgia     bilateral     Has complaint of headache for the past couple of days, not being relieved with OTC medications, has photophobia and nausea as well    The history is provided by the patient. No  was used.     Review of patient's allergies indicates:   Allergen Reactions    Opioids - morphine analogues Rash     Development of red rash at site morphine given.      Past Medical History:   Diagnosis Date    Allergy     Bilateral primary osteoarthritis of knee     Chronic pain of both knees 10/14/2021    Eosinophilia     Gangrene of gallbladder in cholecystitis 2021    Hematochezia     History of colonoscopy     Hyperlipidemia     Hypertension     Sleep apnea      Past Surgical History:   Procedure Laterality Date    ARTHROPLASTY, KNEE, TOTAL, USING COMPUTER-ASSISTED NAVIGATION Left 12/15/2022    Procedure: ARTHROPLASTY, KNEE, TOTAL, USING COMPUTER-ASSISTED NAVIGATION;  Surgeon: Jerome Valladares MD;  Location: [facility] OR;  Service: Orthopedics;  Laterality: Left;    ARTHROPLASTY, KNEE, TOTAL, USING COMPUTER-ASSISTED NAVIGATION Right 2023    Procedure: ARTHROPLASTY, KNEE, TOTAL, USING COMPUTER-ASSISTED NAVIGATION;  Surgeon: Jerome Valladares MD;  Location: [facility] OR;  Service: Orthopedics;  Laterality: Right;    Bilateral IA knee injection Bilateral 2021    D Shows    bladder tact       SECTION      CHOLECYSTECTOMY      FRACTURE SURGERY      jaw 1972    INJECTION      back    INJECTION OF ANESTHETIC AGENT AROUND MEDIAL BRANCH NERVES INNERVATING LUMBAR FACET JOINT Bilateral 2023    Procedure: BIlateral L4-5,5-S1 MBB;  Surgeon: Amanda Goetz MD;  Location: [facility];  Service: Pain Management;  Laterality: Bilateral;  LOCAL    INJECTION OF ANESTHETIC AGENT AROUND MEDIAL BRANCH NERVES INNERVATING LUMBAR FACET JOINT Bilateral  2023    Procedure: Bilateral L4-5, 5-S1 medial branch block # 2;  Surgeon: Amanda Goetz MD;  Location: [facility];  Service: Pain Management;  Laterality: Bilateral;    INJECTION OF ANESTHETIC AGENT AROUND NERVE Bilateral 2022    Procedure: GNB   BILATERAL;  Surgeon: Amanda Goetz MD;  Location: [facility];  Service: Pain Management;  Laterality: Bilateral;  PT HAS NOT HAD VAC   STATES WILL BE TESTED AT [facility] IN [place]    knee scope Bilateral     KNEE SURGERY      LAPAROSCOPIC CHOLECYSTECTOMY N/A 2021    Procedure: LAPAROSCOPIC CHOLECYSTECTOMY CONVERTED TO OPEN;  Surgeon: Darnell Mcgraw MD;  Location: [facility] OR;  Service: General;  Laterality: N/A;  CONVERTED TO OPEN    RADIOFREQUENCY ABLATION OF LUMBAR MEDIAL BRANCH NERVE AT SINGLE LEVEL Bilateral 2/15/2024    Procedure: Bilateral L4-5,5-S1 MB RFTC;  Surgeon: Amanda Goetz MD;  Location: [facility];  Service: Pain Management;  Laterality: Bilateral;    TUBAL LIGATION       Family History   Problem Relation Name Age of Onset    Hypertension Father      Heart disease Father      Alcohol abuse Mother      Migraines Daughter      Lung cancer Maternal Aunt       Social History     Tobacco Use    Smoking status: Former     Current packs/day: 0.00     Average packs/day: 1 pack/day for 33.0 years (33.0 ttl pk-yrs)     Types: Cigarettes     Start date:      Quit date:      Years since quittin.5     Passive exposure: Never    Smokeless tobacco: Never   Substance Use Topics    Alcohol use: Not Currently    Drug use: Not Currently     Types: Methamphetamines     Review of Systems   Constitutional:  Positive for fatigue.   Gastrointestinal:  Positive for nausea. Negative for vomiting.   Neurological:  Positive for headaches.   All other systems reviewed and are negative.      Physical Exam     Initial Vitals [24 1753]   BP Pulse Resp Temp SpO2   (!) 143/96 110 18 98.1 °F (36.7 °C) 95 %      MAP        --         Physical Exam    Nursing note and vitals reviewed.  Constitutional: She appears well-developed and well-nourished.   HENT:   Head: Normocephalic and atraumatic.   Right Ear: External ear normal.   Left Ear: External ear normal.   Mouth/Throat: Oropharynx is clear and moist.   Eyes: Conjunctivae are normal. Pupils are equal, round, and reactive to light.   Bilateral photophobia   Neck: Neck supple.   Normal range of motion.  Cardiovascular:  Normal rate, regular rhythm, normal heart sounds and intact distal pulses.           Pulmonary/Chest: Breath sounds normal.   Abdominal: Abdomen is soft. Bowel sounds are normal.   Musculoskeletal:         General: Normal range of motion.      Cervical back: Normal range of motion and neck supple.     Neurological: She is alert and oriented to person, place, and time.   Skin: Skin is warm and dry.   Psychiatric: She has a normal mood and affect.         Medical Screening Exam   See Full Note    ED Course   Procedures  Labs Reviewed - No data to display       Imaging Results    None          Medications   ketorolac injection 60 mg (60 mg Intramuscular Given 7/9/24 1833)   ondansetron injection 4 mg (4 mg Intramuscular Given 7/9/24 1832)   diphenhydrAMINE injection 25 mg (25 mg Intramuscular Given 7/9/24 1832)     Medical Decision Making  Risk  Prescription drug management.               ED Course as of 07/09/24 1903 Tue Jul 09, 2024 1900 States continues to have headache, has medication at home that may relieve headache, discussed need to follow-up with regular provider for further evaluation of possible migraine headaches, has family member ready to drive home [BP]      ED Course User Index  [BP] Nuno Brown NP                           Clinical Impression:   Final diagnoses:  [R51.9] Acute nonintractable headache, unspecified headache type (Primary)        ED Disposition Condition    Discharge Stable          ED Prescriptions    None       Follow-up  Information       Follow up With Specialties Details Why Contact Info    Claude Oneil MD Family Medicine, Emergency Medicine In 2 days If symptoms worsen or persist [address]  [facility] [place] [address]  [phone]               Nuno Brown NP  07/09/24 2953

## 2024-07-11 ENCOUNTER — TELEPHONE (OUTPATIENT)
Dept: EMERGENCY MEDICINE | Facility: HOSPITAL | Age: 59
End: 2024-07-11
Payer: COMMERCIAL

## 2024-07-12 ENCOUNTER — OFFICE VISIT (OUTPATIENT)
Dept: FAMILY MEDICINE | Facility: CLINIC | Age: 59
End: 2024-07-12
Payer: COMMERCIAL

## 2024-07-12 ENCOUNTER — TELEPHONE (OUTPATIENT)
Dept: FAMILY MEDICINE | Facility: CLINIC | Age: 59
End: 2024-07-12
Payer: COMMERCIAL

## 2024-07-12 VITALS
HEIGHT: 66 IN | OXYGEN SATURATION: 96 % | WEIGHT: 246 LBS | BODY MASS INDEX: 39.53 KG/M2 | TEMPERATURE: 98 F | DIASTOLIC BLOOD PRESSURE: 85 MMHG | HEART RATE: 73 BPM | SYSTOLIC BLOOD PRESSURE: 131 MMHG | RESPIRATION RATE: 18 BRPM

## 2024-07-12 DIAGNOSIS — J34.89 POSTERIOR RHINORRHEA: ICD-10-CM

## 2024-07-12 DIAGNOSIS — R09.81 NASAL CONGESTION: Primary | ICD-10-CM

## 2024-07-12 RX ORDER — ADALIMUMAB-ADAZ 40 MG/.4ML
INJECTION, SOLUTION SUBCUTANEOUS
COMMUNITY
Start: 2024-06-11 | End: 2024-07-12

## 2024-07-12 RX ORDER — FEXOFENADINE HCL AND PSEUDOEPHEDRINE HCI 60; 120 MG/1; MG/1
1 TABLET, EXTENDED RELEASE ORAL 2 TIMES DAILY
Qty: 20 TABLET | Refills: 0 | Status: SHIPPED | OUTPATIENT
Start: 2024-07-12 | End: 2024-07-22

## 2024-07-12 RX ORDER — DEXAMETHASONE SODIUM PHOSPHATE 4 MG/ML
8 INJECTION, SOLUTION INTRA-ARTICULAR; INTRALESIONAL; INTRAMUSCULAR; INTRAVENOUS; SOFT TISSUE
Status: COMPLETED | OUTPATIENT
Start: 2024-07-12 | End: 2024-07-12

## 2024-07-12 RX ORDER — FLUTICASONE PROPIONATE 50 MCG
2 SPRAY, SUSPENSION (ML) NASAL DAILY
Qty: 16 G | Refills: 0 | Status: SHIPPED | OUTPATIENT
Start: 2024-07-12

## 2024-07-12 RX ADMIN — DEXAMETHASONE SODIUM PHOSPHATE 8 MG: 4 INJECTION, SOLUTION INTRA-ARTICULAR; INTRALESIONAL; INTRAMUSCULAR; INTRAVENOUS; SOFT TISSUE at 09:07

## 2024-07-12 NOTE — PROGRESS NOTES
Dennise Serrano DNP, KIMBERLEE    [address] [place] [address]     PATIENT NAME: Vandana Price  : 1965  DATE: 24  MRN: [id number]      Billing Provider: Dennise Serrano DNP, FNP  Level of Service:   Patient PCP Information       Provider PCP Type    Claude Oneil MD General            Reason for Visit / Chief Complaint: Nasal Congestion (Symptoms started 24  Denies fever//She went to ER 7/10/24 due to severe HA. She was given Bendadryl, toradol and Zofran.  She started a new med for ulcerative colitis in , an infusion, that she says can have sinus side effects.  She's been taking Dayquil and Nyquil capsules as well as Tussin and they're not helping.  ), Sore Throat, Cough, Headache, and Eye Drainage (She says her L eye is watery with clear drainage)       Update PCP  Update Chief Complaint         History of Present Illness / Problem Focused Workflow     Vandana Price presents to the clinic with Nasal Congestion (Symptoms started 24  Denies fever//She went to ER 7/10/24 due to severe HA. She was given Bendadryl, toradol and Zofran.  She started a new med for ulcerative colitis in , an infusion, that she says can have sinus side effects.  She's been taking Dayquil and Nyquil capsules as well as Tussin and they're not helping.  ), Sore Throat, Cough, Headache, and Eye Drainage (She says her L eye is watery with clear drainage)     Sore Throat   Associated symptoms include congestion, coughing and headaches. Pertinent negatives include no abdominal pain, diarrhea, ear pain, shortness of breath or vomiting.   Cough  Associated symptoms include headaches and a sore throat. Pertinent negatives include no chest pain, chills, ear pain, fever, myalgias, postnasal drip, rash, shortness of breath or wheezing.   Headache   Associated symptoms include coughing, sinus pressure and a sore throat. Pertinent negatives include no abdominal pain, back pain, ear pain,  fever, hearing loss, nausea, vomiting or weakness.       Review of Systems     Review of Systems   Constitutional:  Negative for activity change, appetite change, chills, fatigue and fever.   HENT:  Positive for nasal congestion, sinus pressure/congestion and sore throat. Negative for ear pain, hearing loss and postnasal drip.    Respiratory:  Positive for cough. Negative for chest tightness, shortness of breath and wheezing.    Cardiovascular:  Negative for chest pain, palpitations, leg swelling and claudication.   Gastrointestinal:  Negative for abdominal pain, change in bowel habit, constipation, diarrhea, nausea and vomiting.   Genitourinary:  Negative for dysuria.   Musculoskeletal:  Negative for arthralgias, back pain, gait problem and myalgias.   Integumentary:  Negative for rash.   Neurological:  Positive for headaches. Negative for weakness.   Psychiatric/Behavioral:  Negative for suicidal ideas. The patient is not nervous/anxious.         Medical / Social / Family History     Past Medical History:   Diagnosis Date    Allergy     Bilateral primary osteoarthritis of knee     Chronic pain of both knees 10/14/2021    Eosinophilia     Gangrene of gallbladder in cholecystitis 04/01/2021    Hematochezia     History of colonoscopy     Hyperlipidemia     Hypertension     Sleep apnea     Ulcerative colitis        Past Surgical History:   Procedure Laterality Date    ARTHROPLASTY, KNEE, TOTAL, USING COMPUTER-ASSISTED NAVIGATION Left 12/15/2022    Procedure: ARTHROPLASTY, KNEE, TOTAL, USING COMPUTER-ASSISTED NAVIGATION;  Surgeon: Jerome Valladares MD;  Location: [facility];  Service: Orthopedics;  Laterality: Left;    ARTHROPLASTY, KNEE, TOTAL, USING COMPUTER-ASSISTED NAVIGATION Right 09/14/2023    Procedure: ARTHROPLASTY, KNEE, TOTAL, USING COMPUTER-ASSISTED NAVIGATION;  Surgeon: Jerome Valladares MD;  Location: [facility];  Service: Orthopedics;  Laterality: Right;    Bilateral IA knee injection  Bilateral 2021    D Shows    bladder tact       SECTION      CHOLECYSTECTOMY      FRACTURE SURGERY      jaw 1972    INJECTION      back    INJECTION OF ANESTHETIC AGENT AROUND MEDIAL BRANCH NERVES INNERVATING LUMBAR FACET JOINT Bilateral 2023    Procedure: BIlateral L4-5,5-S1 MBB;  Surgeon: Amanda Goetz MD;  Location: [facility];  Service: Pain Management;  Laterality: Bilateral;  LOCAL    INJECTION OF ANESTHETIC AGENT AROUND MEDIAL BRANCH NERVES INNERVATING LUMBAR FACET JOINT Bilateral 2023    Procedure: Bilateral L4-5, 5-S1 medial branch block # 2;  Surgeon: Amanda Goetz MD;  Location: [facility];  Service: Pain Management;  Laterality: Bilateral;    INJECTION OF ANESTHETIC AGENT AROUND NERVE Bilateral 2022    Procedure: GNB   BILATERAL;  Surgeon: Amanda Goetz MD;  Location: [facility];  Service: Pain Management;  Laterality: Bilateral;  PT HAS NOT HAD VAC   STATES WILL BE TESTED AT [facility] IN [place]    JOINT REPLACEMENT   both knees done    knee scope Bilateral     KNEE SURGERY      LAPAROSCOPIC CHOLECYSTECTOMY N/A 2021    Procedure: LAPAROSCOPIC CHOLECYSTECTOMY CONVERTED TO OPEN;  Surgeon: Darnell Mcgraw MD;  Location: [facility];  Service: General;  Laterality: N/A;  CONVERTED TO OPEN    RADIOFREQUENCY ABLATION OF LUMBAR MEDIAL BRANCH NERVE AT SINGLE LEVEL Bilateral 02/15/2024    Procedure: Bilateral L4-5,5-S1 MB RFTC;  Surgeon: Amanda Goetz MD;  Location: [facility];  Service: Pain Management;  Laterality: Bilateral;    TUBAL LIGATION         Social History  Ms. Vandana Price  reports that she quit smoking about 14 years ago. Her smoking use included cigarettes. She started smoking about 47 years ago. She has a 33 pack-year smoking history. She has never been exposed to tobacco smoke. She has never used smokeless tobacco. She reports current alcohol use of about 1.0 standard drink of alcohol per week. She  reports that she does not currently use drugs after having used the following drugs: Amphetamines and Methamphetamines.    Family History  Ms. Vandana Price's family history includes Alcohol abuse in her mother; Heart disease in her father; Hypertension in her father; Lung cancer in her maternal aunt; Migraines in her daughter.    Medications and Allergies     Medications  Outpatient Medications Marked as Taking for the 7/12/24 encounter (Office Visit) with Dennise Serrano DNP, FNP   Medication Sig Dispense Refill    adalimumab-adaz 40 mg/0.4 mL PnIj       aspirin (ECOTRIN) 81 MG EC tablet Take 81 mg by mouth once daily.      cetirizine (ZYRTEC) 10 MG tablet Take 1 tablet (10 mg total) by mouth once daily. 90 tablet 1    cyanocobalamin 1,000 mcg/mL injection Inject 1 ml Im every 2 weeks for 8 weeks then inject 1 ml IM monthly to finish out a year. 10 mL 1    famotidine (PEPCID) 40 MG tablet Take 1 tablet (40 mg total) by mouth 2 (two) times daily. 180 tablet 1    gabapentin (NEURONTIN) 300 MG capsule Take 2 capsules (600 mg total) by mouth 2 (two) times daily. 360 capsule 1    hydrOXYzine pamoate (VISTARIL) 25 MG Cap Take 1 capsule (25 mg total) by mouth every 8 (eight) hours as needed (for itching). 30 capsule 2    ustekinumab (STELARA) 90 mg/mL Syrg syringe Inject 1 mL (90 mg total) into the skin every 8 weeks. 1 mL 5     Current Facility-Administered Medications for the 7/12/24 encounter (Office Visit) with Dennise Serrano DNP, FNP   Medication Dose Route Frequency Provider Last Rate Last Admin    [COMPLETED] dexAMETHasone injection 8 mg  8 mg Intramuscular 1 time in Clinic/HOD Dennise Serrano DNP, FNP   8 mg at 07/12/24 0925       Allergies  Review of patient's allergies indicates:   Allergen Reactions    Opioids - morphine analogues Rash     Development of red rash at site morphine given.        Physical Examination     Vitals:    07/12/24 0848   BP: 131/85   BP Location: Left arm   Patient Position: Sitting   BP  "Method: Large (Automatic)   Pulse: 73   Resp: 18   Temp: 97.9 °F (36.6 °C)   TempSrc: Oral   SpO2: 96%   Weight: 111.6 kg (246 lb)   Height: 5' 6" (1.676 m)     Physical Exam  Vitals and nursing note reviewed.   Constitutional:       General: She is not in acute distress.  HENT:      Nose: Congestion and rhinorrhea present.      Mouth/Throat:      Mouth: Mucous membranes are moist.   Eyes:      Pupils: Pupils are equal, round, and reactive to light.   Cardiovascular:      Rate and Rhythm: Normal rate and regular rhythm.      Pulses: Normal pulses.      Heart sounds: Normal heart sounds. No murmur heard.  Pulmonary:      Effort: Pulmonary effort is normal. No respiratory distress.      Breath sounds: Normal breath sounds. No wheezing, rhonchi or rales.   Chest:      Chest wall: No tenderness.   Abdominal:      General: Bowel sounds are normal.      Palpations: Abdomen is soft.   Musculoskeletal:         General: Normal range of motion.      Cervical back: Normal range of motion and neck supple.      Right lower leg: No edema.      Left lower leg: No edema.   Skin:     General: Skin is warm and dry.   Neurological:      General: No focal deficit present.      Mental Status: She is alert and oriented to person, place, and time.          Assessment and Plan (including Health Maintenance)      Problem List  Smart Sets  Document Outside HM   :    Plan:         Health Maintenance Due   Topic Date Due    Pneumococcal Vaccines (Age 0-64) (1 of 2 - PCV) Never done    TETANUS VACCINE  Never done    Shingles Vaccine (1 of 2) Never done    LDCT Lung Screen  Never done    Mammogram  02/24/2021    Cervical Cancer Screening  11/02/2023       Problem List Items Addressed This Visit    None  Visit Diagnoses       Nasal congestion    -  Primary    Relevant Medications    dexAMETHasone injection 8 mg (Completed)    fexofenadine-pseudoephedrine  mg (ALLEGRA-D)  mg per tablet    fluticasone propionate (FLONASE) 50 " mcg/actuation nasal spray    Posterior rhinorrhea        Relevant Medications    fexofenadine-pseudoephedrine  mg (ALLEGRA-D)  mg per tablet    fluticasone propionate (FLONASE) 50 mcg/actuation nasal spray          Nasal congestion  -     dexAMETHasone injection 8 mg  -     fexofenadine-pseudoephedrine  mg (ALLEGRA-D)  mg per tablet; Take 1 tablet by mouth 2 (two) times daily. for 10 days  Dispense: 20 tablet; Refill: 0  -     fluticasone propionate (FLONASE) 50 mcg/actuation nasal spray; 2 sprays (100 mcg total) by Each Nostril route once daily.  Dispense: 16 g; Refill: 0    Posterior rhinorrhea  -     fexofenadine-pseudoephedrine  mg (ALLEGRA-D)  mg per tablet; Take 1 tablet by mouth 2 (two) times daily. for 10 days  Dispense: 20 tablet; Refill: 0  -     fluticasone propionate (FLONASE) 50 mcg/actuation nasal spray; 2 sprays (100 mcg total) by Each Nostril route once daily.  Dispense: 16 g; Refill: 0       Health Maintenance Topics with due status: Not Due       Topic Last Completion Date    Hemoglobin A1c (Diabetic Prevention Screening) 11/03/2022    Lipid Panel 11/03/2022    Colorectal Cancer Screening 12/07/2023    Influenza Vaccine 01/05/2024           Future Appointments   Date Time Provider Department Center   7/25/2024  8:30 AM Claude Oneil MD [facility] ROSELINE [place]   8/19/2024  9:00 AM Bessy Álvarez FNP [facility]   9/12/2024  8:40 AM [facility] MAMMO1 [facility] M[facility]MO [facility]   10/9/2024  8:30 AM Jerome Valladares MD [facility]        Follow up if symptoms worsen or fail to improve.     Signature:  Dennise Serrano DNP, FNP  [address] [place] [address]  Phone #: [phone]  Fax #: [phone]    Date of encounter: 7/12/24    Patient Instructions   Increase fluid intake. Take meds as prescribed. Follow up if no improvement in 5-7 days.  Instructed patient on proper use of nasal spray.

## 2024-07-12 NOTE — PATIENT INSTRUCTIONS
Increase fluid intake. Take meds as prescribed. Follow up if no improvement in 5-7 days.  Instructed patient on proper use of nasal spray.

## 2024-07-12 NOTE — TELEPHONE ENCOUNTER
----- Message from Amanda Lemus sent at 7/12/2024 12:44 PM CDT -----  Patient requests a call back in regards to prescriptions being too expensive. Patient is at work. Phone number is [phone].

## 2024-07-12 NOTE — TELEPHONE ENCOUNTER
"Returned pt call due to message below. Pt says that Allegra D and Flonase spray were not covered by insurance and were expensive.  Notified pt that Flonase is an OTC med and is often not covered and has out of pocket cost but Allegra D does require a prescription.  Told pt that she could get an OTC sinus med is that is more affordable and offered to put her on hold to speak to KIMBERLEE Majano, for a recommendation. Pt said, "Never mind.  I just hate I wasted an hour of my time coming to see y'all", said goodbye and then hung up.    "

## 2024-07-22 ENCOUNTER — PATIENT MESSAGE (OUTPATIENT)
Dept: ORTHOPEDICS | Facility: CLINIC | Age: 59
End: 2024-07-22
Payer: COMMERCIAL

## 2024-07-25 ENCOUNTER — OFFICE VISIT (OUTPATIENT)
Dept: FAMILY MEDICINE | Facility: CLINIC | Age: 59
End: 2024-07-25
Payer: COMMERCIAL

## 2024-07-25 ENCOUNTER — PATIENT MESSAGE (OUTPATIENT)
Dept: ADMINISTRATIVE | Facility: HOSPITAL | Age: 59
End: 2024-07-25

## 2024-07-25 VITALS
RESPIRATION RATE: 16 BRPM | DIASTOLIC BLOOD PRESSURE: 83 MMHG | HEART RATE: 80 BPM | TEMPERATURE: 98 F | WEIGHT: 246.63 LBS | BODY MASS INDEX: 39.64 KG/M2 | OXYGEN SATURATION: 97 % | HEIGHT: 66 IN | SYSTOLIC BLOOD PRESSURE: 139 MMHG

## 2024-07-25 DIAGNOSIS — Z13.1 SCREENING FOR DIABETES MELLITUS: ICD-10-CM

## 2024-07-25 DIAGNOSIS — Z79.899 OTHER LONG TERM (CURRENT) DRUG THERAPY: Chronic | ICD-10-CM

## 2024-07-25 DIAGNOSIS — M25.562 CHRONIC PAIN OF BOTH KNEES: Chronic | ICD-10-CM

## 2024-07-25 DIAGNOSIS — M25.561 CHRONIC PAIN OF BOTH KNEES: Chronic | ICD-10-CM

## 2024-07-25 DIAGNOSIS — E66.01 CLASS 3 SEVERE OBESITY DUE TO EXCESS CALORIES WITH SERIOUS COMORBIDITY AND BODY MASS INDEX (BMI) OF 40.0 TO 44.9 IN ADULT: Chronic | ICD-10-CM

## 2024-07-25 DIAGNOSIS — Z13.220 SCREENING FOR LIPOID DISORDERS: ICD-10-CM

## 2024-07-25 DIAGNOSIS — G89.29 CHRONIC PAIN OF BOTH KNEES: Chronic | ICD-10-CM

## 2024-07-25 DIAGNOSIS — Z00.01 ENCOUNTER FOR ANNUAL GENERAL MEDICAL EXAMINATION WITH ABNORMAL FINDINGS IN ADULT: Primary | ICD-10-CM

## 2024-07-25 PROBLEM — N95.1 MENOPAUSAL SYMPTOMS: Chronic | Status: ACTIVE | Noted: 2024-01-07

## 2024-07-25 LAB
CHOLEST SERPL-MCNC: 161 MG/DL (ref 0–200)
CHOLEST/HDLC SERPL: 3.2 {RATIO}
EST. AVERAGE GLUCOSE BLD GHB EST-MCNC: 105 MG/DL
GLUCOSE SERPL-MCNC: 89 MG/DL (ref 74–106)
HBA1C MFR BLD HPLC: 5.3 % (ref 4.5–6.6)
HDLC SERPL-MCNC: 51 MG/DL (ref 40–60)
LDLC SERPL CALC-MCNC: 85 MG/DL
LDLC/HDLC SERPL: 1.7 {RATIO}
NONHDLC SERPL-MCNC: 110 MG/DL
TRIGL SERPL-MCNC: 124 MG/DL (ref 35–150)
VLDLC SERPL-MCNC: 25 MG/DL

## 2024-07-25 PROCEDURE — 82947 ASSAY GLUCOSE BLOOD QUANT: CPT | Mod: ,,, | Performed by: CLINICAL MEDICAL LABORATORY

## 2024-07-25 PROCEDURE — 80061 LIPID PANEL: CPT | Mod: ,,, | Performed by: CLINICAL MEDICAL LABORATORY

## 2024-07-25 PROCEDURE — 83036 HEMOGLOBIN GLYCOSYLATED A1C: CPT | Mod: ,,, | Performed by: CLINICAL MEDICAL LABORATORY

## 2024-07-25 RX ORDER — SEMAGLUTIDE 0.5 MG/.5ML
0.5 INJECTION, SOLUTION SUBCUTANEOUS
Qty: 2 ML | Refills: 5 | Status: SHIPPED | OUTPATIENT
Start: 2024-07-25

## 2024-07-25 RX ORDER — CELECOXIB 200 MG/1
200 CAPSULE ORAL DAILY
COMMUNITY

## 2024-07-25 RX ORDER — GABAPENTIN 300 MG/1
600 CAPSULE ORAL 2 TIMES DAILY
Qty: 360 CAPSULE | Refills: 1 | Status: SHIPPED | OUTPATIENT
Start: 2024-07-25

## 2024-07-25 RX ORDER — CELECOXIB 200 MG/1
200 CAPSULE ORAL DAILY
Qty: 90 CAPSULE | Refills: 1 | Status: SHIPPED | OUTPATIENT
Start: 2024-07-25 | End: 2025-01-21

## 2024-07-25 NOTE — LETTER
July 25, 2024      [facility] [address] [place] [address]  Phone: [phone]  Fax: [phone]       Patient: Vandana Price   YOB: 1965  Date of Visit: 07/25/2024    To Whom It May Concern:    Enrike Price  was at [facility] on 07/25/2024. The patient may return to work/school on 07/25/2024  restrictions. If you have any questions or concerns, or if I can be of further assistance, please do not hesitate to contact me.    Sincerely,    Claude Oneil MD

## 2024-07-25 NOTE — PROGRESS NOTES
Subjective     Patient ID: Vandana Price is a 58 y.o. female.    Chief Complaint: Healthy You ([employer] Healthy You)    She has lost some weight, but has kind of slowed down and wonders about some possible assistance with that.     HPI  Review of Systems   Constitutional:  Negative for activity change, appetite change, chills, fatigue and fever.   HENT:  Negative for nasal congestion, ear pain, hearing loss, postnasal drip and sore throat.    Respiratory:  Negative for cough, chest tightness, shortness of breath and wheezing.    Cardiovascular:  Negative for chest pain, palpitations, leg swelling and claudication.   Gastrointestinal:  Negative for abdominal pain, change in bowel habit, constipation, diarrhea, nausea and vomiting.   Genitourinary:  Negative for dysuria.   Musculoskeletal:  Negative for arthralgias, back pain, gait problem and myalgias.   Integumentary:  Negative for rash.   Neurological:  Negative for weakness and headaches.   Psychiatric/Behavioral:  Negative for suicidal ideas. The patient is not nervous/anxious.        Tobacco Use: Medium Risk (7/25/2024)    Patient History     Smoking Tobacco Use: Former     Smokeless Tobacco Use: Never     Passive Exposure: Never     Review of patient's allergies indicates:   Allergen Reactions    Opioids - morphine analogues Rash     Development of red rash at site morphine given.      Current Outpatient Medications   Medication Instructions    aspirin (ECOTRIN) 81 mg, Oral, Daily    celecoxib (CELEBREX) 200 mg, Oral, Daily    celecoxib (CELEBREX) 200 mg, Oral, Daily    cyanocobalamin 1,000 mcg/mL injection Inject 1 ml Im every 2 weeks for 8 weeks then inject 1 ml IM monthly to finish out a year.    famotidine (PEPCID) 40 mg, Oral, 2 times daily    gabapentin (NEURONTIN) 600 mg, Oral, 2 times daily    hydrOXYzine pamoate (VISTARIL) 25 mg, Oral, Every 8 hours PRN    ustekinumab (STELARA) 90 mg, Subcutaneous, Every 8 weeks    WEGOVY 0.5 mg, Subcutaneous, Every 7 days  "    Medications Discontinued During This Encounter   Medication Reason    celecoxib (CELEBREX) 200 MG capsule Reorder    gabapentin (NEURONTIN) 300 MG capsule Reorder    cetirizine (ZYRTEC) 10 MG tablet Patient no longer taking    fluticasone propionate (FLONASE) 50 mcg/actuation nasal spray Patient no longer taking       Past Medical History:   Diagnosis Date    Allergy     Bilateral primary osteoarthritis of knee     Chronic pain of both knees 10/14/2021    Eosinophilia     Gangrene of gallbladder in cholecystitis 04/01/2021    Hematochezia     History of colonoscopy     Hyperlipidemia     Hypertension     Sleep apnea     Ulcerative colitis      Health Maintenance Topics with due status: Not Due       Topic Last Completion Date    Hemoglobin A1c (Diabetic Prevention Screening) 11/03/2022    Lipid Panel 11/03/2022    Colorectal Cancer Screening 12/07/2023    Influenza Vaccine 01/05/2024       There is no immunization history on file for this patient.    Objective     Body mass index is 39.8 kg/m².  Wt Readings from Last 3 Encounters:   07/25/24 111.9 kg (246 lb 9.6 oz)   07/12/24 111.6 kg (246 lb)   07/09/24 108.9 kg (240 lb)     Ht Readings from Last 3 Encounters:   07/25/24 5' 6" (1.676 m)   07/12/24 5' 6" (1.676 m)   07/09/24 5' 6" (1.676 m)     BP Readings from Last 3 Encounters:   07/25/24 139/83   07/12/24 131/85   07/09/24 (!) 156/94     Temp Readings from Last 3 Encounters:   07/25/24 97.5 °F (36.4 °C) (Oral)   07/12/24 97.9 °F (36.6 °C) (Oral)   07/09/24 98.1 °F (36.7 °C) (Oral)     Pulse Readings from Last 3 Encounters:   07/25/24 80   07/12/24 73   07/09/24 97     Resp Readings from Last 3 Encounters:   07/25/24 16   07/12/24 18   07/09/24 18     PF Readings from Last 3 Encounters:   No data found for PF       Physical Exam  Constitutional:       Appearance: Normal appearance.   HENT:      Head: Normocephalic and atraumatic.   Eyes:      Extraocular Movements: Extraocular movements intact. "   Cardiovascular:      Rate and Rhythm: Normal rate and regular rhythm.   Pulmonary:      Effort: Pulmonary effort is normal.      Breath sounds: Normal breath sounds.   Skin:     General: Skin is warm.   Neurological:      Mental Status: She is alert and oriented to person, place, and time. Mental status is at baseline.         Assessment and Plan     Problem List Items Addressed This Visit          Endocrine    Class 3 severe obesity due to excess calories with body mass index (BMI) of 40.0 to 44.9 in adult (Chronic)    Relevant Medications    semaglutide, weight loss, (WEGOVY) 0.5 mg/0.5 mL PnIj       Orthopedic    Chronic pain of both knees (Chronic)    Relevant Medications    celecoxib (CELEBREX) 200 MG capsule       Palliative Care    Other long term (current) drug therapy    Relevant Medications    gabapentin (NEURONTIN) 300 MG capsule     Other Visit Diagnoses       Encounter for annual general medical examination with abnormal findings in adult    -  Primary    Screening for diabetes mellitus        Relevant Orders    Hemoglobin A1C    Glucose, Fasting    Screening for lipoid disorders        Relevant Orders    Lipid Panel            Plan:       Wegovy prescribed.     Medications refilled, labs for Healthy You today    I have reviewed the medications, allergies, and problem list.     Goal Actions:    What type of visit is the patient here for today?: Healthy You  Does the patient consent to enroll in [facility] Healthy?: Yes  Is this a Wellness Follow Up?: Yes  What is your overall wellness goal? (select at least one): Improve overall health  Choose 3: Lifestyle, Nutrition, Exercise  Lifestyle Actions : Take medications as prescribed, Develop good support system, BMD if applicable  Nurtrition Actions: Recommend weight loss, Eat a well-balanced diet, Eat heart healthy diet, Decrease intake of fast food  Exercise Actions: Recommend physical activity 30 minutes per day 3-5 times/week

## 2024-07-26 ENCOUNTER — PATIENT OUTREACH (OUTPATIENT)
Facility: HOSPITAL | Age: 59
End: 2024-07-26
Payer: COMMERCIAL

## 2024-07-26 NOTE — PROGRESS NOTES
Population Health Chart Review & Patient Outreach Details      Further Action Needed If Patient Returns Outreach:            Updates Requested / Reviewed:     []  Care Everywhere    []     []  External Sources ([facility], [facility], DIS, etc.)    [] [facility]   [] [facility]   [] Other:    []  Care Team Updated   []  Removed  or Duplicate Orders   []  Immunization Reconciliation Completed / Queried    [] [place]   [] [place]   [] [place]   [] [place]      Health Maintenance Topics Addressed and Outreach Outcomes / Actions Taken:             Breast Cancer Screening []  Mammogram Order Placed    []  Mammogram Screening Scheduled    []  External Records Requested & Care Team Updated if Applicable    []  External Records Uploaded & Care Team Updated if Applicable    []  Pt Declined Scheduling Mammogram    []  Pt Will Schedule with External Provider / Order Routed & Care Team Updated if Applicable              Cervical Cancer Screening []  Pap Smear Scheduled in Primary Care or OBGYN    []  External Records Requested & Care Team Updated if Applicable       []  External Records Uploaded, Care Team Updated, & History Updated if Applicable    []  Patient Declined Scheduling Pap Smear    []  Patient Will Schedule with External Provider & Care Team Updated if Applicable                  Colorectal Cancer Screening []  Colonoscopy Case Request / Referral / Home Test Order Placed    []  External Records Requested & Care Team Updated if Applicable    []  External Records Uploaded, Care Team Updated, & History Updated if Applicable    []  Patient Declined Completing Colon Cancer Screening    []  Patient Will Schedule with External Provider & Care Team Updated if Applicable    []  Fit Kit Mailed (add the SmartPhrase under additional notes)    []  Reminded Patient to Complete Home Test                Diabetic Eye Exam []  Eye Exam Screening Order Placed    []  Eye Camera Scheduled or Optometry/Ophthalmology Referral  Placed    []  External Records Requested & Care Team Updated if Applicable    []  External Records Uploaded, Care Team Updated, & History Updated if Applicable    []  Patient Declined Scheduling Eye Exam    []  Patient Will Schedule with External Provider & Care Team Updated if Applicable             Blood Pressure Control []  Primary Care Follow Up Visit Scheduled     []  Remote Blood Pressure Reading Captured    []  Patient Declined Remote Reading or Scheduling Appt - Escalated to PCP    []  Patient Will Call Back or Send Portal Message with Reading                 HbA1c & Other Labs []  Overdue Lab(s) Ordered    []  Overdue Lab(s) Scheduled    []  External Records Uploaded & Care Team Updated if Applicable    []  Primary Care Follow Up Visit Scheduled     []  Reminded Patient to Complete A1c Home Test    []  Patient Declined Scheduling Labs or Will Call Back to Schedule    []  Patient Will Schedule with External Provider / Order Routed, & Care Team Updated if Applicable           Primary Care Appointment []  Primary Care Appt Scheduled    []  Patient Declined Scheduling or Will Call Back to Schedule    []  Pt Established with External Provider, Updated Care Team, & Informed Pt to Notify Payor if Applicable           Medication Adherence /    Statin Use []  Primary Care Appointment Scheduled    []  Patient Reminded to  Prescription    []  Patient Declined, Provider Notified if Needed    []  Sent Provider Message to Review to Evaluate Pt for Statin, Add Exclusion Dx Codes, Document   Exclusion in Problem List, Change Statin Intensity Level to Moderate or High Intensity if Applicable                Osteoporosis Screening []  Dexa Order Placed    []  Dexa Appointment Scheduled    []  External Records Requested & Care Team Updated    []  External Records Uploaded, Care Team Updated, & History Updated if Applicable    []  Patient Declined Scheduling Dexa or Will Call Back to Schedule    []  Patient Will Schedule  with External Provider / Order Routed & Care Team Updated if Applicable       Additional Notes:.  Post visit Population Health review of encounter with date of service  7/26/24 with Oneil.  All required HY components in encounter.    Followup appt for: 7/24/25 HY

## 2024-08-02 ENCOUNTER — CLINICAL SUPPORT (OUTPATIENT)
Dept: REHABILITATION | Facility: HOSPITAL | Age: 59
End: 2024-08-02
Payer: COMMERCIAL

## 2024-08-02 DIAGNOSIS — Z96.651 HISTORY OF TOTAL KNEE ARTHROPLASTY, RIGHT: ICD-10-CM

## 2024-08-02 DIAGNOSIS — M70.51 PES ANSERINUS BURSITIS OF RIGHT KNEE: ICD-10-CM

## 2024-08-02 PROCEDURE — 97162 PT EVAL MOD COMPLEX 30 MIN: CPT

## 2024-08-02 NOTE — PLAN OF CARE
[facility]   Physical Therapy Initial Evaluation      Name: Vandana Price  Clinic Number: [id number]    Therapy Diagnosis:   Encounter Diagnoses   Name Primary?    History of total knee arthroplasty, right     Pes anserinus bursitis of right knee         Physician: Jerome Valladares MD    Physician Orders: PT Eval and Treat   Medical Diagnosis from Referral: Z96.651 (ICD-10-CM) - History of total knee arthroplasty, right M70.51 (ICD-10-CM) - Pes anserinus bursitis of right knee  Evaluation Date: 2024  Authorization Period Expiration: Patient has 50 visits per year. PT, OT Combined limit   Visit # / Visits authorized:        Precautions: Standard     Time In: 10:15 AM   Time Out: 11:00 AM  Total Billable Time: 45 minutes    Subjective     Date of onset: See surgery date    History of current condition - Vandana reports: current pain at 1/10. She states her pain only occurs when she's been sitting down over 20 minutes. She states that when she stands and walks a bit she's fine.     Falls: None    Imaging: none:     Prior Therapy: HEP provided by referring physician  Social History:  lives with their spouse  Occupation: Desk Job  Prior Level of Function: Independent  Current Level of Function: Independent but with mild pain    Pain:  Current 1/10, worst 1/10, best 2/10   Location: right knee    Description: Aching  Aggravating Factors: Standing and flexion of knee  Easing Factors: pain medication      Medical History:   Past Medical History:   Diagnosis Date    Allergy     Bilateral primary osteoarthritis of knee     Chronic pain of both knees 10/14/2021    Eosinophilia     Gangrene of gallbladder in cholecystitis 2021    Hematochezia     History of colonoscopy     Hyperlipidemia     Hypertension     Sleep apnea     Ulcerative colitis        Surgical History:   Vandana Price  has a past surgical history that includes Laparoscopic cholecystectomy (N/A, 2021); Cholecystectomy;   section; Knee surgery; Tubal ligation (2004); Fracture surgery; bladder tact; knee scope (Bilateral); Bilateral IA knee injection (Bilateral, 03/03/2021); Injection of anesthetic agent around nerve (Bilateral, 06/28/2022); arthroplasty, knee, total, using computer-assisted navigation (Left, 12/15/2022); injection; Injection of anesthetic agent around medial branch nerves innervating lumbar facet joint (Bilateral, 05/18/2023); arthroplasty, knee, total, using computer-assisted navigation (Right, 09/14/2023); Injection of anesthetic agent around medial branch nerves innervating lumbar facet joint (Bilateral, 12/12/2023); Radiofrequency ablation of lumbar medial branch nerve at single level (Bilateral, 02/15/2024); and Joint replacement (12/22).    Medications:   Tia has a current medication list which includes the following prescription(s): aspirin, celecoxib, celecoxib, cyanocobalamin, famotidine, gabapentin, hydroxyzine pamoate, wegovy, and ustekinumab.    Allergies:   Review of patient's allergies indicates:   Allergen Reactions    Opioids - morphine analogues Rash     Development of red rash at site morphine given.         Objective          Range of motion:  Motion Right Left    Hip flexion  115  115   Hip extension  10  10   Hip abduction  40  40   Hip adduction  34  35   Internal rotation  28  28   External rotation  35  35   Knee extension  See below  See below   Knee flexion  0-125 Active   0-125 Active   Ankle DF  WITHIN FUNCTIONAL LIMITS  WITHIN FUNCTIONAL LIMITS   Ankle PF  WITHIN FUNCTIONAL LIMITS  WITHIN FUNCTIONAL LIMITS   Ankle Inversion  WITHIN FUNCTIONAL LIMITS  WITHIN FUNCTIONAL LIMITS   Ankle Eversion  WITHIN FUNCTIONAL LIMITS  WITHIN FUNCTIONAL LIMITS       Manual muscle test   Muscle Right  Left    Hip flexion  MMT strength: 4+/5  MMT strength: 4+/5   Hip extension  MMT strength: 4/5  MMT strength: 4/5   Hip abduction  MMT strength: 4+/5  MMT strength: 5/5   Hip adduction  MMT strength: 4+/5  MMT  strength: 4+/5   Hip internal rotation  MMT strength: 4/5  MMT strength: 4/5   Hip external rotation  MMT strength: 4/5  MMT strength: 5/5   Knee extension  MMT strength: 4+/5  MMT strength: 4+/5   Knee flexion  MMT strength: 4/5  MMT strength: 4+/5   Ankle DF  MMT strength: 4+/5  MMT strength: 4+/5   Ankle PF  MMT strength: 5/5  MMT strength: 5/5   Ankle inversion  MMT strength: 4/5  MMT strength: 4/5   Ankle eversion  MMT strength: 4/5  MMT strength: 4/5         Patient Education and Home Exercises     Education provided:   - educated on POC and prognosis of treatment    Written Home Exercises Provided:  Patient will be provided with HEP on initial treatment visit . .    Assessment     Tia is a 58 y.o. female referred to outpatient Physical Therapy with a medical diagnosis of right knee pain. Patient instructed on HEP with skilled PT intervention not indicated at this time.    Plan     Evaluation Only      Aníbal Vera PT        Physician's Signature: _________________________________________ Date: ________________

## 2024-08-19 ENCOUNTER — OFFICE VISIT (OUTPATIENT)
Dept: GASTROENTEROLOGY | Facility: CLINIC | Age: 59
End: 2024-08-19
Payer: COMMERCIAL

## 2024-08-19 VITALS
SYSTOLIC BLOOD PRESSURE: 133 MMHG | DIASTOLIC BLOOD PRESSURE: 91 MMHG | HEART RATE: 78 BPM | WEIGHT: 244.63 LBS | BODY MASS INDEX: 39.31 KG/M2 | HEIGHT: 66 IN

## 2024-08-19 DIAGNOSIS — R10.84 GENERALIZED ABDOMINAL PAIN: Primary | ICD-10-CM

## 2024-08-19 DIAGNOSIS — K51.319 ULCERATIVE RECTOSIGMOIDITIS WITH COMPLICATION: ICD-10-CM

## 2024-08-19 PROCEDURE — 3008F BODY MASS INDEX DOCD: CPT | Mod: S$GLB,,,

## 2024-08-19 PROCEDURE — 3075F SYST BP GE 130 - 139MM HG: CPT | Mod: S$GLB,,,

## 2024-08-19 PROCEDURE — 3080F DIAST BP >= 90 MM HG: CPT | Mod: S$GLB,,,

## 2024-08-19 PROCEDURE — 1160F RVW MEDS BY RX/DR IN RCRD: CPT | Mod: S$GLB,,,

## 2024-08-19 PROCEDURE — 99999 PR PBB SHADOW E&M-EST. PATIENT-LVL IV: CPT | Mod: PBBFAC,,,

## 2024-08-19 PROCEDURE — 3044F HG A1C LEVEL LT 7.0%: CPT | Mod: S$GLB,,,

## 2024-08-19 PROCEDURE — 99214 OFFICE O/P EST MOD 30 MIN: CPT | Mod: S$GLB,,,

## 2024-08-19 PROCEDURE — 1159F MED LIST DOCD IN RCRD: CPT | Mod: S$GLB,,,

## 2024-08-19 NOTE — LETTER
August 19, 2024      [facility]  [address] [place] [id number]  Phone: [phone]       Patient: Vandana Price   YOB: 1965  Date of Visit: 08/19/2024    To Whom It May Concern:    Enrike Price  was at [facility] on 08/19/2024. The patient may return to work/school on 08/19/2024 with no restrictions. If you have any questions or concerns, or if I can be of further assistance, please do not hesitate to contact me.    Sincerely,    Caitie Aparicio LPN

## 2024-08-21 ENCOUNTER — PATIENT MESSAGE (OUTPATIENT)
Dept: GASTROENTEROLOGY | Facility: CLINIC | Age: 59
End: 2024-08-21
Payer: COMMERCIAL

## 2024-08-22 ENCOUNTER — LAB VISIT (OUTPATIENT)
Dept: LAB | Facility: HOSPITAL | Age: 59
End: 2024-08-22
Payer: COMMERCIAL

## 2024-08-22 ENCOUNTER — HOSPITAL ENCOUNTER (OUTPATIENT)
Dept: RADIOLOGY | Facility: HOSPITAL | Age: 59
Discharge: HOME | End: 2024-08-22
Payer: COMMERCIAL

## 2024-08-22 DIAGNOSIS — R10.84 GENERALIZED ABDOMINAL PAIN: ICD-10-CM

## 2024-08-22 DIAGNOSIS — K51.319 ULCERATIVE RECTOSIGMOIDITIS WITH COMPLICATION: ICD-10-CM

## 2024-08-22 LAB
25(OH)D3 SERPL-MCNC: 31.7 NG/ML
ALBUMIN SERPL BCP-MCNC: 3.7 G/DL (ref 3.5–5)
ALBUMIN/GLOB SERPL: 1.1 {RATIO}
ALP SERPL-CCNC: 242 U/L (ref 46–118)
ALT SERPL W P-5'-P-CCNC: 21 U/L (ref 13–56)
ANION GAP SERPL CALCULATED.3IONS-SCNC: 12 MMOL/L (ref 7–16)
AST SERPL W P-5'-P-CCNC: 15 U/L (ref 15–37)
BASOPHILS # BLD AUTO: 0.07 K/UL (ref 0–0.2)
BASOPHILS NFR BLD AUTO: 1.1 % (ref 0–1)
BILIRUB SERPL-MCNC: 0.4 MG/DL (ref ?–1.2)
BUN SERPL-MCNC: 21 MG/DL (ref 7–18)
BUN/CREAT SERPL: 20 (ref 6–20)
CALCIUM SERPL-MCNC: 8.7 MG/DL (ref 8.5–10.1)
CHLORIDE SERPL-SCNC: 109 MMOL/L (ref 98–107)
CO2 SERPL-SCNC: 33 MMOL/L (ref 21–32)
CREAT SERPL-MCNC: 1.07 MG/DL (ref 0.55–1.02)
DIFFERENTIAL METHOD BLD: ABNORMAL
EGFR (NO RACE VARIABLE) (RUSH/TITUS): 60 ML/MIN/1.73M2
EOSINOPHIL # BLD AUTO: 1.09 K/UL (ref 0–0.5)
EOSINOPHIL NFR BLD AUTO: 17.4 % (ref 1–4)
EOSINOPHIL NFR BLD MANUAL: 12 % (ref 1–4)
ERYTHROCYTE [DISTWIDTH] IN BLOOD BY AUTOMATED COUNT: 12.4 % (ref 11.5–14.5)
FERRITIN SERPL-MCNC: 21 NG/ML (ref 8–252)
FOLATE SERPL-MCNC: 17.7 NG/ML (ref 3.1–17.5)
GLOBULIN SER-MCNC: 3.5 G/DL (ref 2–4)
GLUCOSE SERPL-MCNC: 98 MG/DL (ref 74–106)
HAV IGM SER QL: NORMAL
HBV CORE IGM SER QL: NORMAL
HBV SURFACE AG SERPL QL IA: NORMAL
HCT VFR BLD AUTO: 38.3 % (ref 38–47)
HCV AB SER QL: NORMAL
HGB BLD-MCNC: 12.4 G/DL (ref 12–16)
IRON SATN MFR SERPL: 16 % (ref 14–50)
IRON SERPL-MCNC: 56 ΜG/DL (ref 50–170)
LYMPHOCYTES # BLD AUTO: 1.67 K/UL (ref 1–4.8)
LYMPHOCYTES NFR BLD AUTO: 26.6 % (ref 27–41)
LYMPHOCYTES NFR BLD MANUAL: 30 % (ref 27–41)
MCH RBC QN AUTO: 30.4 PG (ref 27–31)
MCHC RBC AUTO-ENTMCNC: 32.4 G/DL (ref 32–36)
MCV RBC AUTO: 93.9 FL (ref 80–96)
MONOCYTES # BLD AUTO: 0.37 K/UL (ref 0–0.8)
MONOCYTES NFR BLD AUTO: 5.9 % (ref 2–6)
MPC BLD CALC-MCNC: 10.7 FL (ref 9.4–12.4)
NEUTROPHILS # BLD AUTO: 3.07 K/UL (ref 1.8–7.7)
NEUTROPHILS NFR BLD AUTO: 49 % (ref 53–65)
NEUTS SEG NFR BLD MANUAL: 58 % (ref 50–62)
NRBC BLD MANUAL-RTO: ABNORMAL %
PLATELET # BLD AUTO: 222 K/UL (ref 150–400)
PLATELET MORPHOLOGY: ABNORMAL
POTASSIUM SERPL-SCNC: 4.7 MMOL/L (ref 3.5–5.1)
PROT SERPL-MCNC: 7.2 G/DL (ref 6.4–8.2)
RBC # BLD AUTO: 4.08 M/UL (ref 4.2–5.4)
RBC MORPH BLD: NORMAL
SODIUM SERPL-SCNC: 149 MMOL/L (ref 136–145)
TIBC SERPL-MCNC: 347 ΜG/DL (ref 250–450)
VIT B12 SERPL-MCNC: 872 PG/ML (ref 193–986)
WBC # BLD AUTO: 6.27 K/UL (ref 4.5–11)

## 2024-08-22 PROCEDURE — 82728 ASSAY OF FERRITIN: CPT

## 2024-08-22 PROCEDURE — 82746 ASSAY OF FOLIC ACID SERUM: CPT

## 2024-08-22 PROCEDURE — 86481 TB AG RESPONSE T-CELL SUSP: CPT

## 2024-08-22 PROCEDURE — 82306 VITAMIN D 25 HYDROXY: CPT

## 2024-08-22 PROCEDURE — 85025 COMPLETE CBC W/AUTO DIFF WBC: CPT

## 2024-08-22 PROCEDURE — 82607 VITAMIN B-12: CPT

## 2024-08-22 PROCEDURE — 36415 COLL VENOUS BLD VENIPUNCTURE: CPT

## 2024-08-22 PROCEDURE — 80074 ACUTE HEPATITIS PANEL: CPT

## 2024-08-22 PROCEDURE — 80053 COMPREHEN METABOLIC PANEL: CPT

## 2024-08-22 PROCEDURE — 76700 US EXAM ABDOM COMPLETE: CPT | Mod: TC

## 2024-08-22 PROCEDURE — 83550 IRON BINDING TEST: CPT

## 2024-08-23 ENCOUNTER — TELEPHONE (OUTPATIENT)
Dept: GASTROENTEROLOGY | Facility: CLINIC | Age: 59
End: 2024-08-23
Payer: COMMERCIAL

## 2024-08-26 LAB
GAMMA INTERFERON BACKGROUND BLD IA-ACNC: 0.03 [IU]/ML
M TB IFN-G CD4+ BCKGRND COR BLD-ACNC: 0.02 [IU]/ML
MITOGEN IGNF BCKGRD COR BLD-ACNC: 8.34 [IU]/ML
QUANTIFERON-TB GOLD PLUS RESULT: NEGATIVE
TB2 AG MINUS NIL RESULT: 0.02

## 2024-09-12 ENCOUNTER — PATIENT MESSAGE (OUTPATIENT)
Dept: GASTROENTEROLOGY | Facility: CLINIC | Age: 59
End: 2024-09-12
Payer: COMMERCIAL

## 2024-09-13 ENCOUNTER — TELEPHONE (OUTPATIENT)
Dept: GASTROENTEROLOGY | Facility: CLINIC | Age: 59
End: 2024-09-13
Payer: COMMERCIAL

## 2024-09-13 DIAGNOSIS — R74.8 ELEVATED ALKALINE PHOSPHATASE LEVEL: Primary | ICD-10-CM

## 2024-09-13 NOTE — TELEPHONE ENCOUNTER
----- Message from KIMBERLEE Gilmore sent at 9/13/2024  9:00 AM CDT -----  Overall her labs are ok. Her alk phos is more increased this time, but other liver enzymes are normal. I ordered an additional lab to look closer at this. She can have it drawn at any [facility] location that is most convenient for her at anytime. It does not require her to be fasting.

## 2024-09-23 ENCOUNTER — TELEPHONE (OUTPATIENT)
Dept: GASTROENTEROLOGY | Facility: CLINIC | Age: 59
End: 2024-09-23
Payer: COMMERCIAL

## 2024-09-23 DIAGNOSIS — R74.8 ELEVATED ALKALINE PHOSPHATASE LEVEL: Primary | ICD-10-CM

## 2024-09-23 NOTE — TELEPHONE ENCOUNTER
Attempted to contact patient to make her aware of this. Message sent to patient regarding this test result and that labs being rechecked in 6 weeks.

## 2024-09-23 NOTE — TELEPHONE ENCOUNTER
----- Message from KIMBERLEE Gilmore sent at 9/23/2024 11:15 AM CDT -----  We will repeat this lab in 6 weeks (ordered)

## 2024-09-24 ENCOUNTER — HOSPITAL ENCOUNTER (OUTPATIENT)
Dept: RADIOLOGY | Facility: HOSPITAL | Age: 59
Discharge: HOME | End: 2024-09-24
Attending: RADIOLOGY
Payer: COMMERCIAL

## 2024-09-24 DIAGNOSIS — Z12.31 VISIT FOR SCREENING MAMMOGRAM: ICD-10-CM

## 2024-09-24 PROCEDURE — 77067 SCR MAMMO BI INCL CAD: CPT | Mod: 26,,, | Performed by: RADIOLOGY

## 2024-09-24 PROCEDURE — 77063 BREAST TOMOSYNTHESIS BI: CPT | Mod: 26,,, | Performed by: RADIOLOGY

## 2024-09-24 PROCEDURE — 77063 BREAST TOMOSYNTHESIS BI: CPT | Mod: TC

## 2024-09-24 PROCEDURE — 77067 SCR MAMMO BI INCL CAD: CPT | Mod: TC

## 2024-09-25 NOTE — PROGRESS NOTES
Gastroenterology Clinic Note    Patient ID: [id number]   Referring MD: No ref. provider found   Chief Complaint:   Chief Complaint   Patient presents with    Follow-up     No problems       History of Present Illness     Vandana Price is an 59 y.o.  female who is referred for follow-up of ulcerative colitis. Diagnosis was made by colonoscopy. Onset was 2021. Disease has involved rectum. The patient has had no surgery for treatment of their IBD. No reported family history of IBD or colon cancer.      This historical paragraph has been reviewed and updated as necessary from prior clinic notes.     The patient is currently having 2-3 bowel movements per day which are semisolid. The patient currently denies significant abdominal pain or discomfort.  The patient does not have fever and chills The patient does not have night sweats and nocturnal awakening. The patient does not have weight loss. The patient does not have nausea, vomiting, or heartburn.  The patient does not have extraintestinal symptoms of oral ulcers, joint pain or rash.     Last colonoscopy was 12/07/2023    Overall Impression:   2 subcentimeter polyps were removed with cold snare  The terminal ileum, ileocecal valve, ascending colon, transverse colon and sigmoid colon appeared normal.  (grade 2) hemorrhoids    - The colonic mucosa and TI appeared normal with no evidence of active inflammation throughout the exam     Final Diagnosis   A. Cecum polyp, biopsy:  - Tubular adenoma     B. Descending colon polyp, biopsy:  - Hyperplastic polyp         The patient has not been on corticosteroids > or = 10mg prednisone (or equivalent) for 60 or more days.    The patient has been treated with the following medications for IBD: Humira, Mesalamine    Patient's current therapy is: Stelara      Review of Systems   Constitutional:  Negative for weight loss.   Gastrointestinal:  Positive for diarrhea. Negative for abdominal pain, blood in stool, constipation,  heartburn, melena, nausea and vomiting.       Past Medical History      Past Medical History:   Diagnosis Date    Allergy     Bilateral primary osteoarthritis of knee     Chronic pain of both knees 10/14/2021    Eosinophilia     Gangrene of gallbladder in cholecystitis 2021    Hematochezia     History of colonoscopy     Hyperlipidemia     Hypertension     Sleep apnea     Ulcerative colitis        Past Surgical History     Past Surgical History:   Procedure Laterality Date    ARTHROPLASTY, KNEE, TOTAL, USING COMPUTER-ASSISTED NAVIGATION Left 12/15/2022    Procedure: ARTHROPLASTY, KNEE, TOTAL, USING COMPUTER-ASSISTED NAVIGATION;  Surgeon: Jerome Valladares MD;  Location: [facility];  Service: Orthopedics;  Laterality: Left;    ARTHROPLASTY, KNEE, TOTAL, USING COMPUTER-ASSISTED NAVIGATION Right 2023    Procedure: ARTHROPLASTY, KNEE, TOTAL, USING COMPUTER-ASSISTED NAVIGATION;  Surgeon: Jerome Valladares MD;  Location: [facility] OR;  Service: Orthopedics;  Laterality: Right;    Bilateral IA knee injection Bilateral 2021    D Shows    bladder tact       SECTION      CHOLECYSTECTOMY      FRACTURE SURGERY      jaw     INJECTION      back    INJECTION OF ANESTHETIC AGENT AROUND MEDIAL BRANCH NERVES INNERVATING LUMBAR FACET JOINT Bilateral 2023    Procedure: BIlateral L4-5,5-S1 MBB;  Surgeon: Amanda Goetz MD;  Location: [facility];  Service: Pain Management;  Laterality: Bilateral;  LOCAL    INJECTION OF ANESTHETIC AGENT AROUND MEDIAL BRANCH NERVES INNERVATING LUMBAR FACET JOINT Bilateral 2023    Procedure: Bilateral L4-5, 5-S1 medial branch block # 2;  Surgeon: Amanda Goetz MD;  Location: [facility];  Service: Pain Management;  Laterality: Bilateral;    INJECTION OF ANESTHETIC AGENT AROUND NERVE Bilateral 2022    Procedure: GNB   BILATERAL;  Surgeon: Amanda Goetz MD;  Location: [facility];  Service: Pain Management;  Laterality:  Bilateral;  PT HAS NOT HAD VAC   STATES WILL BE TESTED AT [facility] [place]    JOINT REPLACEMENT   both knees done    knee scope Bilateral     KNEE SURGERY      LAPAROSCOPIC CHOLECYSTECTOMY N/A 2021    Procedure: LAPAROSCOPIC CHOLECYSTECTOMY CONVERTED TO OPEN;  Surgeon: Darnell Mcgraw MD;  Location: [facility] OR;  Service: General;  Laterality: N/A;  CONVERTED TO OPEN    RADIOFREQUENCY ABLATION OF LUMBAR MEDIAL BRANCH NERVE AT SINGLE LEVEL Bilateral 02/15/2024    Procedure: Bilateral L4-5,5-S1 MB RFTC;  Surgeon: Amanda Goetz MD;  Location: [facility];  Service: Pain Management;  Laterality: Bilateral;    TUBAL LIGATION         Allergies     Review of patient's allergies indicates:   Allergen Reactions    Opioids - morphine analogues Rash     Development of red rash at site morphine given.        Immunization History     There is no immunization history on file for this patient.    Past Family History      Family History   Problem Relation Name Age of Onset    Hypertension Father Jerome     Heart disease Father Jerome     Alcohol abuse Mother Gabriela     Migraines Daughter      Lung cancer Maternal Aunt         Past Social History      Social History     Socioeconomic History    Marital status:     Number of children: 2   Occupational History    Occupation:  at [employer]   Tobacco Use    Smoking status: Former     Current packs/day: 0.00     Average packs/day: 1 pack/day for 33.0 years (33.0 ttl pk-yrs)     Types: Cigarettes     Start date:      Quit date:      Years since quittin.7     Passive exposure: Never    Smokeless tobacco: Never   Substance and Sexual Activity    Alcohol use: Yes     Alcohol/week: 1.0 standard drink of alcohol     Types: 1 Drinks containing 0.5 oz of alcohol per week     Comment: Once a month    Drug use: Not Currently     Types: Amphetamines, Methamphetamines    Sexual activity: Not Currently     Partners: Male     Birth  control/protection: Abstinence     Social Determinants of Health     Financial Resource Strain: Medium Risk (5/18/2024)    Overall Financial Resource Strain (CARDIA)     Difficulty of Paying Living Expenses: Somewhat hard   Food Insecurity: Food Insecurity Present (5/18/2024)    Hunger Vital Sign     Worried About Running Out of Food in the Last Year: Sometimes true     Ran Out of Food in the Last Year: Sometimes true   Transportation Needs: No Transportation Needs (9/14/2023)    PRAPARE - Transportation     Lack of Transportation (Medical): No     Lack of Transportation (Non-Medical): No   Physical Activity: Insufficiently Active (5/18/2024)    Exercise Vital Sign     Days of Exercise per Week: 5 days     Minutes of Exercise per Session: 20 min   Stress: Stress Concern Present (5/18/2024)    [place] [employer] - Occupational Stress Questionnaire     Feeling of Stress : To some extent   Housing Stability: Low Risk  (9/14/2023)    Housing Stability Vital Sign     Unable to Pay for Housing in the Last Year: No     Number of Places Lived in the Last Year: 1     Unstable Housing in the Last Year: No       Current Medications     Outpatient Medications Marked as Taking for the 8/19/24 encounter (Office Visit) with Bessy Álvarez FNP   Medication Sig Dispense Refill    aspirin (ECOTRIN) 81 MG EC tablet Take 81 mg by mouth once daily.      celecoxib (CELEBREX) 200 MG capsule Take 200 mg by mouth once daily.      celecoxib (CELEBREX) 200 MG capsule Take 1 capsule (200 mg total) by mouth once daily. 90 capsule 1    cyanocobalamin 1,000 mcg/mL injection Inject 1 ml Im every 2 weeks for 8 weeks then inject 1 ml IM monthly to finish out a year. 10 mL 1    gabapentin (NEURONTIN) 300 MG capsule Take 2 capsules (600 mg total) by mouth 2 (two) times daily. 360 capsule 1    hydrOXYzine pamoate (VISTARIL) 25 MG Cap Take 1 capsule (25 mg total) by mouth every 8 (eight) hours as needed (for itching). 30 capsule 2  "   ustekinumab (STELARA) 90 mg/mL Syrg syringe Inject 1 mL (90 mg total) into the skin every 8 weeks. 1 mL 5        I have reviewed the current medications, allergies, vital signs, past medical and surgical history, family medical history, and social history for this encounter and agree with all findings.    OBJECTIVE    Physical Exam    BP (!) 133/91   Pulse 78   Ht 5' 6" (1.676 m)   Wt 110.9 kg (244 lb 9.6 oz)   LMP 06/08/2020 Comment: tubal ligation   BMI 39.48 kg/m²   GEN: Well appearing, cooperative, NAD  NECK: Supple, no LAD  CV: Normal rate  RESP: Unlabored  ABD: ND, no guarding  EXT: No clubbing, cyanosis, or edema  SKIN: Warm and dry  NEURO: AAO x4.     LABS    CBC (with or without Differential):   Lab Results   Component Value Date    WBC 6.27 08/22/2024    HGB 12.4 08/22/2024    HCT 38.3 08/22/2024    MCV 93.9 08/22/2024    MCH 30.4 08/22/2024    MCHC 32.4 08/22/2024    RDW 12.4 08/22/2024     08/22/2024    MPV 10.7 08/22/2024    NEUTOPHILPCT 49.0 (L) 08/22/2024    DIFFTYPE Manual 08/22/2024     BMP/CMP:   Lab Results   Component Value Date     (H) 08/22/2024    K 4.7 08/22/2024     (H) 08/22/2024    CO2 33 (H) 08/22/2024    BUN 21 (H) 08/22/2024    CREATININE 1.07 (H) 08/22/2024    GLU 98 08/22/2024    CALCIUM 8.7 08/22/2024    ALBUMIN 3.7 08/22/2024    AST 15 08/22/2024    ALT 21 08/22/2024    ALKPHOS 242 (H) 08/22/2024        IMAGING  No pertinent imaging.    ASSESSMENT  Vandana Price is a 59 y.o. WF with history of HTN, Hyperlipidemia, and UC who is referred to clinic for follow-up.    1. Generalized abdominal pain    2. Ulcerative rectosigmoiditis with complication           PLAN    - Labs today  - Continue Stelara (induction complete, starts maintenance dose tomorrow)    Immunosuppression: The patient is currently immunosuppressed due to treatment with Humira. The patient denies fever, chills, or other signs or symptoms of infection. The patient reports no medication-related " issues and they has been medication adherent. We will continue to monitor labs per protocol. Benefits of the medication outweigh the risks and this will continually be assessed.      I have reviewed the IBD health maintenance as below.      IBD Health Maintenance:  -Prevnar 13: Recommend   -Pneumovax 23: Recommend 8 weeks after Prevnar 13  -Flu Shot: Recommend annually  -Shingrix: Recommend    -Hepatitis A/Hepatitis B: Check antibody and if not immune will vaccinate  -Annual pap smear: Recommend    -Annual skin exam: Recommend   -Colon cancer screening: Repeat colonoscopy in 5 years (12/2028) for h/o polyps, and will go ahead and start dysplasia screening at that time   -DEXA scan: Recommend  -Tobacco: Avoid  -Should avoid NSAIDs and narcotics, use only Tylenol for pain relief.     There are no Patient Instructions on file for this visit.      Orders Placed This Encounter   Procedures    US Abdomen Complete     Standing Status:   Future     Number of Occurrences:   1     Standing Expiration Date:   8/19/2025     Order Specific Question:   May the Radiologist modify the order per protocol to meet the clinical needs of the patient?     Answer:   Yes     Order Specific Question:   Release to patient     Answer:   Immediate    Folate     Standing Status:   Future     Number of Occurrences:   1     Standing Expiration Date:   10/19/2025    Vitamin B12     Standing Status:   Future     Number of Occurrences:   1     Standing Expiration Date:   10/19/2025    Vitamin D     Standing Status:   Future     Number of Occurrences:   1     Standing Expiration Date:   10/18/2025    Ferritin     Standing Status:   Future     Number of Occurrences:   1     Standing Expiration Date:   10/18/2025    Iron and TIBC     Standing Status:   Future     Number of Occurrences:   1     Standing Expiration Date:   10/18/2025    Comprehensive Metabolic Panel     Standing Status:   Future     Number of Occurrences:   1     Standing Expiration  Date:   10/18/2025    CBC Auto Differential     Standing Status:   Future     Number of Occurrences:   1     Standing Expiration Date:   10/18/2025    Hepatitis Panel, Acute     Standing Status:   Future     Number of Occurrences:   1     Standing Expiration Date:   10/19/2025    Quantiferon Gold TB     Standing Status:   Future     Number of Occurrences:   1     Standing Expiration Date:   10/18/2025         The risks and benefits of my recommendations, as well as other treatment options were discussed with the patient today. All questions were answered.    35 minutes of total time spent on the encounter, which includes face to face time and non-face to face time preparing to see the patient (eg, review of tests), obtaining and/or reviewing separately obtained history, documenting clinical information in the electronic or other health record, Independently interpreting results (not separately reported) and communicating results to the patient/family/caregiver, or care coordination (not separately reported).        Bessy Álvarez, AINSLEYP/ACNP  [facility]

## 2024-10-06 ENCOUNTER — PATIENT MESSAGE (OUTPATIENT)
Dept: GASTROENTEROLOGY | Facility: CLINIC | Age: 59
End: 2024-10-06
Payer: COMMERCIAL

## 2024-10-09 ENCOUNTER — OFFICE VISIT (OUTPATIENT)
Dept: ORTHOPEDICS | Facility: CLINIC | Age: 59
End: 2024-10-09
Payer: COMMERCIAL

## 2024-10-09 ENCOUNTER — HOSPITAL ENCOUNTER (OUTPATIENT)
Dept: RADIOLOGY | Facility: HOSPITAL | Age: 59
Discharge: HOME | End: 2024-10-09
Attending: ORTHOPAEDIC SURGERY
Payer: COMMERCIAL

## 2024-10-09 VITALS
BODY MASS INDEX: 38.57 KG/M2 | OXYGEN SATURATION: 98 % | HEART RATE: 73 BPM | WEIGHT: 240 LBS | SYSTOLIC BLOOD PRESSURE: 151 MMHG | HEIGHT: 66 IN | DIASTOLIC BLOOD PRESSURE: 90 MMHG

## 2024-10-09 DIAGNOSIS — Z96.651 HISTORY OF TOTAL KNEE ARTHROPLASTY, RIGHT: Primary | ICD-10-CM

## 2024-10-09 DIAGNOSIS — M25.562 LEFT KNEE PAIN, UNSPECIFIED CHRONICITY: ICD-10-CM

## 2024-10-09 DIAGNOSIS — Z96.651 HISTORY OF TOTAL KNEE ARTHROPLASTY, RIGHT: ICD-10-CM

## 2024-10-09 PROCEDURE — 73564 X-RAY EXAM KNEE 4 OR MORE: CPT | Mod: TC,LT

## 2024-10-09 PROCEDURE — 3044F HG A1C LEVEL LT 7.0%: CPT | Mod: S$GLB,,, | Performed by: ORTHOPAEDIC SURGERY

## 2024-10-09 PROCEDURE — 3008F BODY MASS INDEX DOCD: CPT | Mod: S$GLB,,, | Performed by: ORTHOPAEDIC SURGERY

## 2024-10-09 PROCEDURE — 3077F SYST BP >= 140 MM HG: CPT | Mod: S$GLB,,, | Performed by: ORTHOPAEDIC SURGERY

## 2024-10-09 PROCEDURE — 3080F DIAST BP >= 90 MM HG: CPT | Mod: S$GLB,,, | Performed by: ORTHOPAEDIC SURGERY

## 2024-10-09 PROCEDURE — 99213 OFFICE O/P EST LOW 20 MIN: CPT | Mod: S$GLB,,, | Performed by: ORTHOPAEDIC SURGERY

## 2024-10-09 PROCEDURE — 99999 PR PBB SHADOW E&M-EST. PATIENT-LVL IV: CPT | Mod: PBBFAC,,, | Performed by: ORTHOPAEDIC SURGERY

## 2024-10-09 PROCEDURE — 73560 X-RAY EXAM OF KNEE 1 OR 2: CPT | Mod: 26,RT,, | Performed by: ORTHOPAEDIC SURGERY

## 2024-10-09 PROCEDURE — 1159F MED LIST DOCD IN RCRD: CPT | Mod: S$GLB,,, | Performed by: ORTHOPAEDIC SURGERY

## 2024-10-09 PROCEDURE — 73560 X-RAY EXAM OF KNEE 1 OR 2: CPT | Mod: TC,RT

## 2024-10-09 NOTE — PROGRESS NOTES
Patient is here for follow-up of her bilateral total knees.  She is doing well.  No loosening.  No instability in the knees.  Let her weightbear as tolerates.  She has good motion.  I will check her on a yearly basis.

## 2024-10-09 NOTE — PROGRESS NOTES
Radiology Interpretation        Patient Name: Vandana Price  Date: 10/9/2024  YOB: 1965  MRN# [id number]        ORDERING DIAGNOSIS:    Encounter Diagnoses   Name Primary?    History of total knee arthroplasty, right Yes    Left knee pain, unspecified chronicity       Two views right knee skeletally mature individual there is normal mineralization total knee arthroplasty in place no loosening no fractures no subluxations impression total knee arthroplasty in place right knee no loosening                 Jerome Valladares MD

## 2024-10-09 NOTE — PROGRESS NOTES
Patient is here follow-up of the right total knee arthroplasty.  She has no loosening.  X-rays show no loosening on the films.  Her right knee is doing well.                                 Radiology Interpretation        Patient Name: Vandana Price  Date: 10/9/2024  YOB: 1965  MRN# [id number]        ORDERING DIAGNOSIS:    Encounter Diagnoses   Name Primary?    History of total knee arthroplasty, right Yes    Left knee pain, unspecified chronicity            Two views AP lateral left knee skeletally mature individual total knee arthroplasty in place no loosening fractures or subluxations impression total knee arthroplasty in place on left no loosening            Jerome Valladares MD

## 2024-10-10 ENCOUNTER — TELEPHONE (OUTPATIENT)
Dept: GASTROENTEROLOGY | Facility: CLINIC | Age: 59
End: 2024-10-10
Payer: COMMERCIAL

## 2024-10-10 DIAGNOSIS — K21.9 GASTROESOPHAGEAL REFLUX DISEASE WITHOUT ESOPHAGITIS: Primary | ICD-10-CM

## 2024-10-10 DIAGNOSIS — R07.9 CHEST PAIN, UNSPECIFIED TYPE: ICD-10-CM

## 2024-10-10 DIAGNOSIS — R74.8 ELEVATED ALKALINE PHOSPHATASE LEVEL: Primary | ICD-10-CM

## 2024-10-10 RX ORDER — PANTOPRAZOLE SODIUM 40 MG/1
40 TABLET, DELAYED RELEASE ORAL DAILY
Qty: 90 TABLET | Refills: 3 | Status: SHIPPED | OUTPATIENT
Start: 2024-10-10 | End: 2025-10-10

## 2024-10-10 NOTE — TELEPHONE ENCOUNTER
Patient called and stated that she received the message that Dr. Palacios recommended that she start  PPI therapy and referral to cardiology. She stated that she was already taking Pepcid twice daily. I explained to her that this was a different type of medication and that the Pepcid was for breakthrough pain. She also wanted to know why she was being referred to cardiology. I explained to her that it was recommended due to her having chest pain that was unrelieved with OTC gas relief medication. She stated that she really did not need to miss work and that she had a stress test a couple years ago that was fine. She also questioned if it was taken into consideration that the stomach pain was a side effect of Stelara. I explained to her that everything was being taken into consideration and we needed to rule out worse case. Also informed her that it was recommended that she stay on her Stelara and be compliant with her medications and that it was her choice if she did not comply with recommendation for cardiology work-up. Patient verbalized good understanding.

## 2024-10-10 NOTE — PROGRESS NOTES
"Patient reached out with complaints of stomach pain and left sided chest pain described as a "burning", not relieved with OTC gas relief medication.  Discussed with Dr. Palacios, we will start trial of PPI and refer to cardiology for evaluation. If symptoms persist and cardiology eval is unrevealing, we will plan for EGD.     Bessy Álvarez, KIMBERLEE/ACNP  [facility]       "

## 2024-10-24 ENCOUNTER — OFFICE VISIT (OUTPATIENT)
Dept: FAMILY MEDICINE | Facility: CLINIC | Age: 59
End: 2024-10-24
Payer: COMMERCIAL

## 2024-10-24 VITALS
RESPIRATION RATE: 18 BRPM | SYSTOLIC BLOOD PRESSURE: 145 MMHG | TEMPERATURE: 99 F | HEIGHT: 66 IN | DIASTOLIC BLOOD PRESSURE: 87 MMHG | BODY MASS INDEX: 40.5 KG/M2 | WEIGHT: 252 LBS | HEART RATE: 75 BPM | OXYGEN SATURATION: 97 %

## 2024-10-24 DIAGNOSIS — L29.9 PRURITIC DISORDER: ICD-10-CM

## 2024-10-24 DIAGNOSIS — J30.1 ALLERGIC RHINITIS DUE TO POLLEN, UNSPECIFIED SEASONALITY: Primary | Chronic | ICD-10-CM

## 2024-10-24 DIAGNOSIS — R74.8 ELEVATED ALKALINE PHOSPHATASE LEVEL: ICD-10-CM

## 2024-10-24 LAB
CERULOPLASMIN SERPL-MCNC: 28.7 MG/DL (ref 20–60)
IGG SERPL-MCNC: 1210 MG/DL (ref 767–1590)
IGM SERPL-MCNC: 50 MG/DL (ref 37–286)

## 2024-10-24 PROCEDURE — 3044F HG A1C LEVEL LT 7.0%: CPT | Mod: ,,, | Performed by: FAMILY MEDICINE

## 2024-10-24 PROCEDURE — 99213 OFFICE O/P EST LOW 20 MIN: CPT | Mod: 25,,, | Performed by: FAMILY MEDICINE

## 2024-10-24 PROCEDURE — 3079F DIAST BP 80-89 MM HG: CPT | Mod: ,,, | Performed by: FAMILY MEDICINE

## 2024-10-24 PROCEDURE — 1159F MED LIST DOCD IN RCRD: CPT | Mod: ,,, | Performed by: FAMILY MEDICINE

## 2024-10-24 PROCEDURE — 82784 ASSAY IGA/IGD/IGG/IGM EACH: CPT | Mod: ,,, | Performed by: CLINICAL MEDICAL LABORATORY

## 2024-10-24 PROCEDURE — 1160F RVW MEDS BY RX/DR IN RCRD: CPT | Mod: ,,, | Performed by: FAMILY MEDICINE

## 2024-10-24 PROCEDURE — 3077F SYST BP >= 140 MM HG: CPT | Mod: ,,, | Performed by: FAMILY MEDICINE

## 2024-10-24 PROCEDURE — 3008F BODY MASS INDEX DOCD: CPT | Mod: ,,, | Performed by: FAMILY MEDICINE

## 2024-10-24 PROCEDURE — 96372 THER/PROPH/DIAG INJ SC/IM: CPT | Mod: ,,, | Performed by: FAMILY MEDICINE

## 2024-10-24 RX ORDER — DEXAMETHASONE SODIUM PHOSPHATE 4 MG/ML
6 INJECTION, SOLUTION INTRA-ARTICULAR; INTRALESIONAL; INTRAMUSCULAR; INTRAVENOUS; SOFT TISSUE
Status: COMPLETED | OUTPATIENT
Start: 2024-10-24 | End: 2024-10-24

## 2024-10-24 RX ORDER — METHYLPREDNISOLONE ACETATE 40 MG/ML
40 INJECTION, SUSPENSION INTRA-ARTICULAR; INTRALESIONAL; INTRAMUSCULAR; SOFT TISSUE
Status: COMPLETED | OUTPATIENT
Start: 2024-10-24 | End: 2024-10-24

## 2024-10-24 RX ADMIN — METHYLPREDNISOLONE ACETATE 40 MG: 40 INJECTION, SUSPENSION INTRA-ARTICULAR; INTRALESIONAL; INTRAMUSCULAR; SOFT TISSUE at 02:10

## 2024-10-24 RX ADMIN — DEXAMETHASONE SODIUM PHOSPHATE 6 MG: 4 INJECTION, SOLUTION INTRA-ARTICULAR; INTRALESIONAL; INTRAMUSCULAR; INTRAVENOUS; SOFT TISSUE at 02:10

## 2024-10-24 NOTE — PROGRESS NOTES
Claude Oneil MD    [address] [place] [address]     PATIENT NAME: Vandana Price  : 1965  DATE: 10/24/24  MRN: [id number]      Billing Provider: Claude Oneil MD  Level of Service: IN OFFICE/OUTPT VISIT, EST, LEVL III, 20-29 MIN  Patient PCP Information       Provider PCP Type    Claude Oneil MD General            Reason for Visit / Chief Complaint: Itching (States she has been itching for 7 years. States she takes pepcid and zyrtec and went to allergy doctor and they just increased her medicine but did not do allergy testing and she wants to be tested. ) and Health Maintenance (Denies vaccines at this time. Denies scheduling pap smear. )       Update PCP  Update Chief Complaint         History of Present Illness / Problem Focused Workflow     Vandana Price presents to the clinic with Itching (States she has been itching for 7 years. States she takes pepcid and zyrtec and went to allergy doctor and they just increased her medicine but did not do allergy testing and she wants to be tested. ) and Health Maintenance (Denies vaccines at this time. Denies scheduling pap smear. )     This all started many years ago, it waxes and wanes. She will have symptoms that start every few months, will be severe at the onset and then decrease in severity over the next few weeks. She has seen Allergy/Immunology and they added Pepcid and increased zyrtec to twice daily, she is thinking an allergy test may help her identify the cause.     HPI    Review of Systems     Review of Systems   Constitutional:  Negative for activity change, appetite change, chills, fatigue and fever.   HENT:  Negative for nasal congestion, ear pain, hearing loss, postnasal drip and sore throat.    Respiratory:  Negative for cough, chest tightness, shortness of breath and wheezing.    Cardiovascular:  Negative for chest pain, palpitations, leg swelling and claudication.   Gastrointestinal:   Negative for abdominal pain, change in bowel habit, constipation, diarrhea, nausea and vomiting.   Genitourinary:  Negative for dysuria.   Musculoskeletal:  Negative for arthralgias, back pain, gait problem and myalgias.   Integumentary:  Negative for rash.   Neurological:  Negative for weakness and headaches.   Psychiatric/Behavioral:  Negative for suicidal ideas. The patient is not nervous/anxious.         Medical / Social / Family History     Past Medical History:   Diagnosis Date    Allergy     Bilateral primary osteoarthritis of knee     Chronic pain of both knees 10/14/2021    Eosinophilia     Gangrene of gallbladder in cholecystitis 2021    Hematochezia     History of colonoscopy     Hyperlipidemia     Sleep apnea     Ulcerative colitis        Past Surgical History:   Procedure Laterality Date    ARTHROPLASTY, KNEE, TOTAL, USING COMPUTER-ASSISTED NAVIGATION Left 12/15/2022    Procedure: ARTHROPLASTY, KNEE, TOTAL, USING COMPUTER-ASSISTED NAVIGATION;  Surgeon: Jerome Valladares MD;  Location: [facility] OR;  Service: Orthopedics;  Laterality: Left;    ARTHROPLASTY, KNEE, TOTAL, USING COMPUTER-ASSISTED NAVIGATION Right 2023    Procedure: ARTHROPLASTY, KNEE, TOTAL, USING COMPUTER-ASSISTED NAVIGATION;  Surgeon: Jerome Valladares MD;  Location: [facility] OR;  Service: Orthopedics;  Laterality: Right;    Bilateral IA knee injection Bilateral 2021    D Shows    bladder tact       SECTION      CHOLECYSTECTOMY      FRACTURE SURGERY      jaw 1972    INJECTION      back    INJECTION OF ANESTHETIC AGENT AROUND MEDIAL BRANCH NERVES INNERVATING LUMBAR FACET JOINT Bilateral 2023    Procedure: BIlateral L4-5,5-S1 MBB;  Surgeon: Amanda Goetz MD;  Location: [facility];  Service: Pain Management;  Laterality: Bilateral;  LOCAL    INJECTION OF ANESTHETIC AGENT AROUND MEDIAL BRANCH NERVES INNERVATING LUMBAR FACET JOINT Bilateral 2023    Procedure: Bilateral L4-5, 5-S1  medial branch block # 2;  Surgeon: Amanda Goetz MD;  Location: [facility];  Service: Pain Management;  Laterality: Bilateral;    INJECTION OF ANESTHETIC AGENT AROUND NERVE Bilateral 06/28/2022    Procedure: GNB   BILATERAL;  Surgeon: Amanda Goetz MD;  Location: [facility];  Service: Pain Management;  Laterality: Bilateral;  PT HAS NOT HAD VAC   STATES WILL BE TESTED AT [facility] IN [place]    JOINT REPLACEMENT  12/22 09/23 both knees done    knee scope Bilateral     KNEE SURGERY      LAPAROSCOPIC CHOLECYSTECTOMY N/A 03/31/2021    Procedure: LAPAROSCOPIC CHOLECYSTECTOMY CONVERTED TO OPEN;  Surgeon: Darnell Mcgraw MD;  Location: [facility] OR;  Service: General;  Laterality: N/A;  CONVERTED TO OPEN    MANDIBLE SURGERY      RADIOFREQUENCY ABLATION OF LUMBAR MEDIAL BRANCH NERVE AT SINGLE LEVEL Bilateral 02/15/2024    Procedure: Bilateral L4-5,5-S1 MB RFTC;  Surgeon: Amanda Goetz MD;  Location: [facility];  Service: Pain Management;  Laterality: Bilateral;    TUBAL LIGATION  2004       Social History  Ms.  reports that she quit smoking about 14 years ago. Her smoking use included cigarettes. She started smoking about 47 years ago. She has a 33 pack-year smoking history. She has never been exposed to tobacco smoke. She has never used smokeless tobacco. She reports that she does not currently use alcohol after a past usage of about 1.0 standard drink of alcohol per week. She reports that she does not currently use drugs.    Family History  Ms.'s family history includes Alcohol abuse in her mother; Heart disease in her father; Hypertension in her father; Lung cancer in her maternal aunt; Migraines in her daughter.    Medications and Allergies     Medications  Outpatient Medications Marked as Taking for the 10/24/24 encounter (Office Visit) with Claude Oneil MD   Medication Sig Dispense Refill    aspirin (ECOTRIN) 81 MG EC tablet Take 81 mg by mouth once daily.      celecoxib  (CELEBREX) 200 MG capsule Take 200 mg by mouth once daily.      cyanocobalamin 1,000 mcg/mL injection Inject 1 ml Im every 2 weeks for 8 weeks then inject 1 ml IM monthly to finish out a year. 10 mL 1    famotidine (PEPCID) 40 MG tablet Take 1 tablet (40 mg total) by mouth 2 (two) times daily. 180 tablet 1    gabapentin (NEURONTIN) 300 MG capsule Take 2 capsules (600 mg total) by mouth 2 (two) times daily. 360 capsule 1    hydrOXYzine pamoate (VISTARIL) 25 MG Cap Take 1 capsule (25 mg total) by mouth every 8 (eight) hours as needed (for itching). 30 capsule 2    pantoprazole (PROTONIX) 40 MG tablet Take 1 tablet (40 mg total) by mouth once daily. 90 tablet 3    ustekinumab (STELARA) 90 mg/mL Syrg syringe Inject 1 mL (90 mg total) into the skin every 8 weeks. 1 mL 5     Current Facility-Administered Medications for the 10/24/24 encounter (Office Visit) with Claude Oneil MD   Medication Dose Route Frequency Provider Last Rate Last Admin    [COMPLETED] dexAMETHasone injection 6 mg  6 mg Intramuscular 1 time in Clinic/HOD Claude Oneil MD   6 mg at 10/24/24 1402    [COMPLETED] methylPREDNISolone acetate injection 40 mg  40 mg Intramuscular 1 time in Clinic/HOD Claude Oneil MD   40 mg at 10/24/24 1402       Allergies  Review of patient's allergies indicates:   Allergen Reactions    Opioids - morphine analogues Rash     Development of red rash at site morphine given.        Physical Examination     Vitals:    10/24/24 1317   BP: (!) 145/87   Pulse: 75   Resp: 18   Temp: 98.7 °F (37.1 °C)     Physical Exam  Constitutional:       Appearance: Normal appearance.   HENT:      Head: Normocephalic and atraumatic.   Eyes:      Extraocular Movements: Extraocular movements intact.   Cardiovascular:      Rate and Rhythm: Normal rate and regular rhythm.   Pulmonary:      Effort: Pulmonary effort is normal.      Breath sounds: Normal breath sounds.   Skin:     General: Skin is warm.   Neurological:      Mental  Status: She is alert and oriented to person, place, and time. Mental status is at baseline.            Assessment and Plan (including Health Maintenance)      Problem List  Smart Sets  Document Outside HM   :    Plan:     I do not know if an allergy test would be helpful or not.     Steroids given today, will see if that resolves or improves her symptoms.     Health Maintenance Due   Topic Date Due    Pneumococcal Vaccines (Age 0-64) (1 of 2 - PCV) Never done    TETANUS VACCINE  Never done    Shingles Vaccine (1 of 2) Never done    LDCT Lung Screen  Never done    Cervical Cancer Screening  11/02/2023    Influenza Vaccine (1) 09/01/2024       Problem List Items Addressed This Visit          ENT    Allergic rhinitis due to pollen - Primary (Chronic)    Relevant Medications    dexAMETHasone injection 6 mg (Completed)    methylPREDNISolone acetate injection 40 mg (Completed)     Other Visit Diagnoses       Pruritic disorder        Relevant Medications    dexAMETHasone injection 6 mg (Completed)    methylPREDNISolone acetate injection 40 mg (Completed)    Elevated alkaline phosphatase level                Health Maintenance Topics with due status: Not Due       Topic Last Completion Date    Colorectal Cancer Screening 12/07/2023    Hemoglobin A1c (Diabetic Prevention Screening) 07/25/2024    Lipid Panel 07/25/2024    Mammogram 09/24/2024    RSV Vaccine (Age 60+ and Pregnant patients) Not Due       Procedures     Future Appointments   Date Time Provider Department Center   1/24/2025  8:00 AM [facility] [place]   2/19/2025  9:00 AM Bessy Álvarez FNP [facility]   7/24/2025  8:30 AM Claude Oneil MD [facility] [place]   10/8/2025  8:30 AM Jerome Valladares MD [facility]   11/4/2025  [facility]        Follow up if symptoms worsen or fail to improve.     Signature:  Claude Oneil MD  [facility],  [place]  [address] [place] [address] [place] [address]  Phone #: [phone]  Fax #: [phone]    Date of encounter: 10/24/24    There are no Patient Instructions on file for this visit.

## 2024-11-05 ENCOUNTER — PATIENT MESSAGE (OUTPATIENT)
Dept: FAMILY MEDICINE | Facility: CLINIC | Age: 59
End: 2024-11-05
Payer: COMMERCIAL

## 2024-11-05 DIAGNOSIS — L29.9 PRURITUS: ICD-10-CM

## 2024-11-05 RX ORDER — HYDROXYZINE PAMOATE 25 MG/1
25 CAPSULE ORAL EVERY 8 HOURS PRN
Qty: 90 CAPSULE | Refills: 1 | Status: SHIPPED | OUTPATIENT
Start: 2024-11-05

## 2024-11-05 NOTE — TELEPHONE ENCOUNTER
"Patient messaged through the portal "So I did stop itching for about 10 days with the steroid shot. So I am going to try taking Hydroxyzine HCL 25mg in the middle of the day between my zyrtec and pepcid. I have about 20 tablets left and was wondering if you could send in another prescription of them for me?"      "

## 2025-01-03 ENCOUNTER — OFFICE VISIT (OUTPATIENT)
Dept: FAMILY MEDICINE | Facility: CLINIC | Age: 60
End: 2025-01-03
Payer: COMMERCIAL

## 2025-01-03 VITALS
WEIGHT: 247.38 LBS | SYSTOLIC BLOOD PRESSURE: 133 MMHG | HEART RATE: 76 BPM | BODY MASS INDEX: 39.76 KG/M2 | OXYGEN SATURATION: 95 % | HEIGHT: 66 IN | TEMPERATURE: 98 F | DIASTOLIC BLOOD PRESSURE: 90 MMHG | RESPIRATION RATE: 20 BRPM

## 2025-01-03 DIAGNOSIS — Z13.31 POSITIVE DEPRESSION SCREENING: Primary | ICD-10-CM

## 2025-01-03 DIAGNOSIS — G56.01 CARPAL TUNNEL SYNDROME ON RIGHT: ICD-10-CM

## 2025-01-03 RX ORDER — FLUOXETINE HYDROCHLORIDE 20 MG/1
20 CAPSULE ORAL DAILY
Qty: 30 CAPSULE | Refills: 5 | Status: SHIPPED | OUTPATIENT
Start: 2025-01-03 | End: 2026-01-03

## 2025-01-03 RX ORDER — CELECOXIB 200 MG/1
200 CAPSULE ORAL 2 TIMES DAILY
Qty: 180 CAPSULE | Refills: 1 | Status: SHIPPED | OUTPATIENT
Start: 2025-01-03

## 2025-01-03 RX ORDER — DEXAMETHASONE SODIUM PHOSPHATE 4 MG/ML
4 INJECTION, SOLUTION INTRA-ARTICULAR; INTRALESIONAL; INTRAMUSCULAR; INTRAVENOUS; SOFT TISSUE
Status: COMPLETED | OUTPATIENT
Start: 2025-01-03 | End: 2025-01-03

## 2025-01-03 RX ADMIN — DEXAMETHASONE SODIUM PHOSPHATE 4 MG: 4 INJECTION, SOLUTION INTRA-ARTICULAR; INTRALESIONAL; INTRAMUSCULAR; INTRAVENOUS; SOFT TISSUE at 09:01

## 2025-01-03 NOTE — PROGRESS NOTES
KIMBERLEE Bradley   [facility]  [address]  [place] [address]     PATIENT NAME: Vandana Price  : 1965  DATE: 1/3/25  MRN: [id number]      Billing Provider: KIMBERLEE Bradley  Level of Service:   Patient PCP Information       Provider PCP Type    Claude Oneil MD General            Reason for Visit / Chief Complaint: Wrist Pain (Right wrist pain and burning ) and Depression   Health Maintenance Due   Topic Date Due    TETANUS VACCINE  Never done    Shingles Vaccine (1 of 2) Never done    Pneumococcal Vaccines (Age 50+) (1 of 2 - PCV) Never done    Cervical Cancer Screening  2023    Influenza Vaccine (1) 2024          Update PCP  Update Chief Complaint         History of Present Illness / Problem Focused Workflow     History of Present Illness    CHIEF COMPLAINT:  Patient presents today with right wrist pain. She states she has been seeing Dr. Oneil, but he would not be back until  and she needed to go ahead be seen.     MUSCULOSKELETAL - WRIST:  She reports right wrist pain ongoing for approximately 3-4 days, requiring maintenance of wrist in straight position while sleeping. She has history of carpal tunnel syndrome in contralateral wrist, for which she previously used a brace but did not pursue further treatment.    MUSCULOSKELETAL - BACK:  She reports lower back pain that is most severe in the morning. Previous interventional treatments included two rounds of injections followed by nerve ablation, which were unsuccessful in providing relief. She states the injections did help, but not the nerve ablation.     MENTAL HEALTH:  She experiences symptoms of depression and anxiety, including hypersomnia when off work, frequent crying, and conflicts with her son. She has no previous history of antidepressant use.    MEDICATIONS:  She currently takes Celebrex 200mg daily for OA and Vistaril 25mg nightly. She states the Vistaril has not been helping.    SURGICAL HISTORY:  She  underwent partial cholecystectomy in the past with remaining gallbladder tissue due to gangrenous gallbladder at time of surgery.    LABS:  Alkaline phosphatase level was elevated. Kidney function tests were normal. She has not heard back from GI on this, I looked to see if there was a message regarding labs, but did not see one. Instructed for pt to call/message their office for further instructions. Pt vu.       ROS:  General: -fever, -chills, -fatigue, -weight gain, -weight loss  Eyes: -vision changes, -redness, -discharge  ENT: -ear pain, -nasal congestion, -sore throat  Cardiovascular: -chest pain, -palpitations, -lower extremity edema  Respiratory: -cough, -shortness of breath  Gastrointestinal: -abdominal pain, -nausea, -vomiting, -diarrhea, -constipation, -blood in stool  Genitourinary: -dysuria, -hematuria, -frequency  Musculoskeletal: +joint pain, -muscle pain, +back pain  Skin: -rash, -lesion  Neurological: -headache, -dizziness, -numbness, -tingling  Psychiatric: +anxiety, +depression, -sleep difficulty          Hemoglobin A1C   Date Value Ref Range Status   07/25/2024 5.3 4.5 - 6.6 % Final     Comment:       Normal:               <5.7%  Pre-Diabetic:       5.7% to 6.4%  Diabetic:             >6.4%  Diabetic Goal:     <7%   11/03/2022 5.7 4.5 - 6.6 % Final     Comment:       Normal:               <5.7%  Pre-Diabetic:       5.7% to 6.4%  Diabetic:             >6.4%  Diabetic Goal:     <7%   09/23/2021 5.8 4.5 - 6.6 % Final     Comment:       Normal:               <5.7%  Pre-Diabetic:       5.7% to 6.4%  Diabetic:             >6.4%  Diabetic Goal:     <7%        CMP  Sodium   Date Value Ref Range Status   08/22/2024 149 (H) 136 - 145 mmol/L Final     Potassium   Date Value Ref Range Status   08/22/2024 4.7 3.5 - 5.1 mmol/L Final     Chloride   Date Value Ref Range Status   08/22/2024 109 (H) 98 - 107 mmol/L Final     CO2   Date Value Ref Range Status   08/22/2024 33 (H) 21 - 32 mmol/L Final     Glucose    Date Value Ref Range Status   08/22/2024 98 74 - 106 mg/dL Final     BUN   Date Value Ref Range Status   08/22/2024 21 (H) 7 - 18 mg/dL Final     Creatinine   Date Value Ref Range Status   08/22/2024 1.07 (H) 0.55 - 1.02 mg/dL Final     Calcium   Date Value Ref Range Status   08/22/2024 8.7 8.5 - 10.1 mg/dL Final     Total Protein   Date Value Ref Range Status   08/22/2024 7.2 6.4 - 8.2 g/dL Final     Albumin   Date Value Ref Range Status   08/22/2024 3.7 3.5 - 5.0 g/dL Final     Bilirubin, Total   Date Value Ref Range Status   08/22/2024 0.4 >0.0 - 1.2 mg/dL Final     Alk Phos   Date Value Ref Range Status   08/22/2024 242 (H) 46 - 118 U/L Final     AST   Date Value Ref Range Status   08/22/2024 15 15 - 37 U/L Final     ALT   Date Value Ref Range Status   08/22/2024 21 13 - 56 U/L Final     Anion Gap   Date Value Ref Range Status   08/22/2024 12 7 - 16 mmol/L Final     eGFR   Date Value Ref Range Status   08/22/2024 60 >=60 mL/min/1.73m2 Final        Lab Results   Component Value Date    WBC 6.27 08/22/2024    RBC 4.08 (L) 08/22/2024    HGB 12.4 08/22/2024    HCT 38.3 08/22/2024    MCV 93.9 08/22/2024    MCH 30.4 08/22/2024    MCHC 32.4 08/22/2024    RDW 12.4 08/22/2024     08/22/2024    MPV 10.7 08/22/2024    LYMPH 26.6 (L) 08/22/2024    LYMPH 1.67 08/22/2024    LYMPH 30 08/22/2024    MONO 5.9 08/22/2024    EOS 1.09 (H) 08/22/2024    BASO 0.07 08/22/2024    EOSINOPHIL 17.4 (H) 08/22/2024    EOSINOPHIL 12 (H) 08/22/2024    BASOPHIL 1.1 (H) 08/22/2024        Lab Results   Component Value Date    CHOL 161 07/25/2024    CHOL 188 11/03/2022    CHOL 172 09/23/2021     Lab Results   Component Value Date    HDL 51 07/25/2024    HDL 61 (H) 11/03/2022    HDL 73 (H) 09/23/2021     Lab Results   Component Value Date    LDLCALC 85 07/25/2024    LDLCALC 106 11/03/2022    LDLCALC 81 09/23/2021     Lab Results   Component Value Date    TRIG 124 07/25/2024    TRIG 105 11/03/2022    TRIG 91 09/23/2021     Lab Results    Component Value Date    CHOLHDL 3.2 2024    CHOLHDL 3.1 2022    CHOLHDL 2.4 2021        Wt Readings from Last 3 Encounters:   25 0844 112.2 kg (247 lb 6.4 oz)   10/24/24 1317 114.3 kg (252 lb)   10/09/24 0819 108.9 kg (240 lb)        BP Readings from Last 3 Encounters:   25 (!) 133/90   10/24/24 (!) 145/87   10/09/24 (!) 151/90        Review of Systems     Review of Systems       Medical / Social / Family History     Past Medical History:   Diagnosis Date    Allergy     Bilateral primary osteoarthritis of knee     Chronic pain of both knees 10/14/2021    Eosinophilia     Gangrene of gallbladder in cholecystitis 2021    Hematochezia     History of colonoscopy     Hyperlipidemia     Sleep apnea     Ulcerative colitis        Past Surgical History:   Procedure Laterality Date    ARTHROPLASTY, KNEE, TOTAL, USING COMPUTER-ASSISTED NAVIGATION Left 12/15/2022    Procedure: ARTHROPLASTY, KNEE, TOTAL, USING COMPUTER-ASSISTED NAVIGATION;  Surgeon: Jerome Valladares MD;  Location: [facility] ORTHO OR;  Service: Orthopedics;  Laterality: Left;    ARTHROPLASTY, KNEE, TOTAL, USING COMPUTER-ASSISTED NAVIGATION Right 2023    Procedure: ARTHROPLASTY, KNEE, TOTAL, USING COMPUTER-ASSISTED NAVIGATION;  Surgeon: Jerome Valladares MD;  Location: [facility] the [facility] ORTHO OR;  Service: Orthopedics;  Laterality: Right;    Bilateral IA knee injection Bilateral 2021    D Shows    bladder tact       SECTION      CHOLECYSTECTOMY      FRACTURE SURGERY      jaw 1972    INJECTION      back    INJECTION OF ANESTHETIC AGENT AROUND MEDIAL BRANCH NERVES INNERVATING LUMBAR FACET JOINT Bilateral 2023    Procedure: BIlateral L4-5,5-S1 MBB;  Surgeon: Amanda Goetz MD;  Location: [facility] the [facility] PAIN MGMT;  Service: Pain Management;  Laterality: Bilateral;  LOCAL    INJECTION OF ANESTHETIC AGENT AROUND MEDIAL BRANCH NERVES INNERVATING LUMBAR FACET JOINT Bilateral 2023    Procedure: Bilateral L4-5, 5-S1  medial branch block # 2;  Surgeon: Amanda Goetz MD;  Location: [facility] PAIN MGMT;  Service: Pain Management;  Laterality: Bilateral;    INJECTION OF ANESTHETIC AGENT AROUND NERVE Bilateral 06/28/2022    Procedure: GNB   BILATERAL;  Surgeon: Amanda Goetz MD;  Location: [facility] PAIN MGMT;  Service: Pain Management;  Laterality: Bilateral;  PT HAS NOT HAD VAC   STATES WILL BE TESTED AT [facility] IN [place]    JOINT REPLACEMENT  12/22 09/23 both knees done    knee scope Bilateral     KNEE SURGERY      LAPAROSCOPIC CHOLECYSTECTOMY N/A 03/31/2021    Procedure: LAPAROSCOPIC CHOLECYSTECTOMY CONVERTED TO OPEN;  Surgeon: Darnell Mcgraw MD;  Location: [facility] OR;  Service: General;  Laterality: N/A;  CONVERTED TO OPEN    MANDIBLE SURGERY      RADIOFREQUENCY ABLATION OF LUMBAR MEDIAL BRANCH NERVE AT SINGLE LEVEL Bilateral 02/15/2024    Procedure: Bilateral L4-5,5-S1 MB RFTC;  Surgeon: Amanda Goetz MD;  [facility]: [facility] PAIN [facility];  Service: Pain Management;  Laterality: Bilateral;    TUBAL LIGATION  2004       Social History  Ms.  reports that she quit smoking about 15 years ago. Her smoking use included cigarettes. She started smoking about 48 years ago. She has a 33 pack-year smoking history. She has never been exposed to tobacco smoke. She has never used smokeless tobacco. She reports that she does not currently use alcohol after a past usage of about 1.0 standard drink of alcohol per week. She reports that she does not currently use drugs.    Family History  Ms.'s family history includes Alcohol abuse in her mother; Heart disease in her father; Hypertension in her father; Lung cancer in her maternal aunt; Migraines in her daughter.    Medications and Allergies     Medications  Outpatient Medications Marked as Taking for the 1/3/25 encounter (Office Visit) with Faby Boyle FNP   Medication Sig Dispense Refill    aspirin (ECOTRIN) 81 MG EC tablet Take 81 mg by mouth once daily.      cyanocobalamin 1,000  "mcg/mL injection Inject 1 ml Im every 2 weeks for 8 weeks then inject 1 ml IM monthly to finish out a year. 10 mL 1    gabapentin (NEURONTIN) 300 MG capsule Take 2 capsules (600 mg total) by mouth 2 (two) times daily. 360 capsule 1    hydrOXYzine pamoate (VISTARIL) 25 MG Cap Take 1 capsule (25 mg total) by mouth every 8 (eight) hours as needed (for itching). 90 capsule 1    pantoprazole (PROTONIX) 40 MG tablet Take 1 tablet (40 mg total) by mouth once daily. 90 tablet 3    ustekinumab (STELARA) 90 mg/mL Syrg syringe Inject 1 mL (90 mg total) into the skin every 8 weeks. 1 mL 5    [DISCONTINUED] celecoxib (CELEBREX) 200 MG capsule Take 200 mg by mouth once daily.       Current Facility-Administered Medications for the 1/3/25 encounter (Office Visit) with Faby Boyle FNP   Medication Dose Route Frequency Provider Last Rate Last Admin    [COMPLETED] dexAMETHasone injection 4 mg  4 mg Intramuscular 1 time in Clinic/HOD Faby Boyle FNP   4 mg at 01/03/25 0942       Allergies  Review of patient's allergies indicates:   Allergen Reactions    Opioids - morphine analogues Rash     Development of red rash at site morphine given.        Physical Examination     Vitals:    01/03/25 0844   BP: (!) 133/90   BP Location: Left arm   Patient Position: Sitting   Pulse: 76   Resp: 20   Temp: 97.8 °F (36.6 °C)   TempSrc: Oral   SpO2: 95%   Weight: 112.2 kg (247 lb 6.4 oz)   Height: 5' 6" (1.676 m)      Physical Exam  Constitutional:       Appearance: Normal appearance. She is obese.   HENT:      Head: Normocephalic.   Eyes:      Extraocular Movements: Extraocular movements intact.   Cardiovascular:      Rate and Rhythm: Normal rate and regular rhythm.      Pulses: Normal pulses.      Heart sounds: Normal heart sounds.   Pulmonary:      Effort: Pulmonary effort is normal.      Breath sounds: Normal breath sounds.   Musculoskeletal:      Right wrist: No swelling, deformity, effusion, lacerations, tenderness, bony tenderness, snuff " box tenderness or crepitus. Normal range of motion. Normal pulse.   Skin:     General: Skin is warm and dry.      Capillary Refill: Capillary refill takes less than 2 seconds.   Neurological:      General: No focal deficit present.      Mental Status: She is alert and oriented to person, place, and time.   Psychiatric:         Mood and Affect: Affect is tearful.         Behavior: Behavior normal.          Assessment and Plan (including Health Maintenance)      Problem List  Smart Sets  Document Outside HM   :    Plan:     There are no Patient Instructions on file for this visit.       Health Maintenance Due   Topic Date Due    TETANUS VACCINE  Never done    Shingles Vaccine (1 of 2) Never done    Pneumococcal Vaccines (Age 50+) (1 of 2 - PCV) Never done    Cervical Cancer Screening  11/02/2023    Influenza Vaccine (1) 09/01/2024       Problem List Items Addressed This Visit    None  Visit Diagnoses       Positive depression screening    -  Primary    I have reviewed the positive depression score which warrants active treatment with psychotherapy and/or medications.    Relevant Medications    FLUoxetine 20 MG capsule    Carpal tunnel syndrome on right        Relevant Medications    dexAMETHasone injection 4 mg (Completed)    celecoxib (CELEBREX) 200 MG capsule          Assessment & Plan    IMPRESSION:  - Assessed right wrist pain, considering carpal tunnel syndrome  - Evaluated liver function, noting elevated alkaline phosphatase  - Considered depression and anxiety, recommending trial of antidepressant  - Evaluated lower back pain, reviewing previous treatments including nerve ablation and injections    DEPRESSION AND ANXIETY:  - Evaluated the patient's symptoms, which include excessive sleeping, constant crying, social withdrawal, and work-related problems, indicative of depression and anxiety.  - Discussed treatment options for depression and anxiety, explaining that finding the right medication can be a  trial-and-error process.  - Prescribed Prozac (fluoxetine) 20 mg daily for a 4-week trial period.  - Educated the patient about antidepressant medication effects and expectations.  - Instructed the patient to contact the office if Prozac is not tolerated or if dosage adjustment is needed.  - Scheduled a follow-up visit in 1 month to assess response to Prozac.  - Assessed the patient's symptoms of anxiety, including constant crying and social withdrawal.  - Noted that the patient is currently taking Vistaril 20 mg for anxiety, but it's not helping with sleep.  - Prescribed Prozac 20 mg daily for anxiety and depression, with instructions to try it for 4 weeks.  - Scheduled a follow-up visit in 1 month to evaluate the effectiveness of the medication.    CARPAL TUNNEL SYNDROME:  - Evaluated the patient's right wrist pain, possibly due to overuse, and difficulty sleeping due to wrist pain.  - Performed physical exam for carpal tunnel syndrome, including tinel/Phalen test.  - Diagnosed suspected carpal tunnel syndrome based on symptoms and exam.  - Discussed conservative treatment options for carpal tunnel syndrome.  - Pt declines PT at this time as she does not want to miss work.  - Instructed the patient to apply compression or wrap to the affected wrist for pain relief.  - Administered a Decadron (dexamethasone) injection for wrist pain relief.  - Suggested considering orthopedic referral for possible injections or surgery if conservative treatments are ineffective.  - Patient to perform wrist exercises as tolerated.    LOWER BACK PAIN:  - Noted the patient's report of major lower back pain, especially in the morning.  - Discussed previous treatments, including nerve burning procedure and injections, which were ineffective.  - Referred the patient to pain management for evaluation of lower back pain.  - Pt is known to Dr. Goetz with [facility] and will get f/u appt with them.   - Instructed the patient to schedule a  follow-up pain management appointment, preferably early morning, avoiding Mondays.  - Increased Celebrex (celecoxib) 200 mg to 2 times daily.  - Administered a steroid injection (Decadron) for pain relief.  .        Positive depression screening  Comments:  I have reviewed the positive depression score which warrants active treatment with psychotherapy and/or medications.  Orders:  -     FLUoxetine 20 MG capsule; Take 1 capsule (20 mg total) by mouth once daily.  Dispense: 30 capsule; Refill: 5    Carpal tunnel syndrome on right  -     dexAMETHasone injection 4 mg  -     celecoxib (CELEBREX) 200 MG capsule; Take 1 capsule (200 mg total) by mouth 2 (two) times daily.  Dispense: 180 capsule; Refill: 1       Health Maintenance Topics with due status: Not Due       Topic Last Completion Date    Colorectal Cancer Screening 12/07/2023    Hemoglobin A1c (Diabetic Prevention Screening) 07/25/2024    Lipid Panel 07/25/2024    Mammogram 09/24/2024    RSV Vaccine (Age 60+ and Pregnant patients) Not Due         Future Appointments   Date Time Provider Department Center   1/24/2025  8:00 AM Claude Oneil MD [facility] [place]   2/5/2025  8:00 AM Faby Boyle FNP [facility] [place]   2/19/2025  9:00 AM Bessy Álvarez FNP [facility]   2/19/2025 10:45 AM Amanda Goetz MD [facility]   7/24/2025  8:30 AM Claude Oneil MD [facility] [place]   10/8/2025  8:30 AM Jerome Valladares MD [facility]   11/4/2025  8:00 AM [facility]        Follow up in about 4 weeks (around 1/31/2025), or if symptoms worsen or fail to improve.    Health Maintenance Due   Topic Date Due    TETANUS VACCINE  Never done    Shingles Vaccine (1 of 2) Never done    Pneumococcal Vaccines (Age 50+) (1 of 2 - PCV) Never done    Cervical Cancer Screening  11/02/2023    Influenza Vaccine (1) 09/01/2024        Signature:  KIMBERLEE Bradley    Date of encounter: 1/3/25  This  note was generated with the assistance of ambient listening technology. Verbal consent was obtained by the patient and accompanying visitor(s) for the recording of patient appointment to facilitate this note. I attest to having reviewed and edited the generated note for accuracy, though some syntax or spelling errors may persist. Please contact the author of this note for any clarification.   I have used clinical judgement based on duration and functional status to consider definite necessity for treatment.

## 2025-01-24 ENCOUNTER — OFFICE VISIT (OUTPATIENT)
Dept: FAMILY MEDICINE | Facility: CLINIC | Age: 60
End: 2025-01-24
Payer: COMMERCIAL

## 2025-01-24 VITALS
WEIGHT: 253.25 LBS | TEMPERATURE: 98 F | HEART RATE: 97 BPM | RESPIRATION RATE: 18 BRPM | SYSTOLIC BLOOD PRESSURE: 136 MMHG | HEIGHT: 66 IN | BODY MASS INDEX: 40.7 KG/M2 | OXYGEN SATURATION: 97 % | DIASTOLIC BLOOD PRESSURE: 87 MMHG

## 2025-01-24 DIAGNOSIS — G56.03 CARPAL TUNNEL SYNDROME, BILATERAL: Chronic | ICD-10-CM

## 2025-01-24 DIAGNOSIS — K51.019 ULCERATIVE PANCOLITIS WITH COMPLICATION: Chronic | ICD-10-CM

## 2025-01-24 DIAGNOSIS — E66.01 CLASS 3 SEVERE OBESITY DUE TO EXCESS CALORIES WITH SERIOUS COMORBIDITY AND BODY MASS INDEX (BMI) OF 40.0 TO 44.9 IN ADULT: Chronic | ICD-10-CM

## 2025-01-24 DIAGNOSIS — G47.61 PLMD (PERIODIC LIMB MOVEMENT DISORDER): Chronic | ICD-10-CM

## 2025-01-24 DIAGNOSIS — Z00.01 ENCOUNTER FOR ANNUAL GENERAL MEDICAL EXAMINATION WITH ABNORMAL FINDINGS IN ADULT: Primary | ICD-10-CM

## 2025-01-24 DIAGNOSIS — Z13.220 SCREENING FOR LIPOID DISORDERS: ICD-10-CM

## 2025-01-24 DIAGNOSIS — F32.4 MAJOR DEPRESSIVE DISORDER WITH SINGLE EPISODE, IN PARTIAL REMISSION: Chronic | ICD-10-CM

## 2025-01-24 DIAGNOSIS — G47.33 OSA ON CPAP: Chronic | ICD-10-CM

## 2025-01-24 DIAGNOSIS — E66.813 CLASS 3 SEVERE OBESITY DUE TO EXCESS CALORIES WITH SERIOUS COMORBIDITY AND BODY MASS INDEX (BMI) OF 40.0 TO 44.9 IN ADULT: Chronic | ICD-10-CM

## 2025-01-24 DIAGNOSIS — I82.412 DEEP VEIN THROMBOSIS (DVT) OF FEMORAL VEIN OF LEFT LOWER EXTREMITY, UNSPECIFIED CHRONICITY: Chronic | ICD-10-CM

## 2025-01-24 DIAGNOSIS — K51.319 ULCERATIVE RECTOSIGMOIDITIS WITH COMPLICATION: Chronic | ICD-10-CM

## 2025-01-24 DIAGNOSIS — Z13.1 SCREENING FOR DIABETES MELLITUS: ICD-10-CM

## 2025-01-24 DIAGNOSIS — K21.9 GASTROESOPHAGEAL REFLUX DISEASE WITHOUT ESOPHAGITIS: Chronic | ICD-10-CM

## 2025-01-24 DIAGNOSIS — M17.0 BILATERAL PRIMARY OSTEOARTHRITIS OF KNEE: Chronic | ICD-10-CM

## 2025-01-24 DIAGNOSIS — E53.8 VITAMIN B12 DEFICIENCY: Chronic | ICD-10-CM

## 2025-01-24 DIAGNOSIS — G25.81 RLS (RESTLESS LEGS SYNDROME): Chronic | ICD-10-CM

## 2025-01-24 PROBLEM — L02.91 ABSCESS: Status: RESOLVED | Noted: 2021-08-09 | Resolved: 2025-01-24

## 2025-01-24 PROBLEM — Z41.9 SURGERY, ELECTIVE: Status: RESOLVED | Noted: 2021-10-26 | Resolved: 2025-01-24

## 2025-01-24 PROBLEM — Z01.818 PRE-OP EVALUATION: Status: RESOLVED | Noted: 2023-09-01 | Resolved: 2025-01-24

## 2025-01-24 PROBLEM — D64.9 ANEMIA: Chronic | Status: ACTIVE | Noted: 2021-09-30

## 2025-01-24 PROBLEM — R73.09 ELEVATED GLUCOSE: Status: RESOLVED | Noted: 2021-09-30 | Resolved: 2025-01-24

## 2025-01-24 PROBLEM — M19.90 ARTHRITIS: Status: RESOLVED | Noted: 2021-10-14 | Resolved: 2025-01-24

## 2025-01-24 PROBLEM — L25.9 CONTACT DERMATITIS: Status: RESOLVED | Noted: 2023-04-13 | Resolved: 2025-01-24

## 2025-01-24 PROBLEM — Z79.899 OTHER LONG TERM (CURRENT) DRUG THERAPY: Chronic | Status: ACTIVE | Noted: 2021-09-30

## 2025-01-24 PROBLEM — E66.812 CLASS 2 SEVERE OBESITY DUE TO EXCESS CALORIES WITH SERIOUS COMORBIDITY AND BODY MASS INDEX (BMI) OF 39.0 TO 39.9 IN ADULT: Chronic | Status: ACTIVE | Noted: 2022-10-05

## 2025-01-24 PROBLEM — R40.0 DAYTIME SLEEPINESS: Chronic | Status: RESOLVED | Noted: 2021-09-30 | Resolved: 2025-01-24

## 2025-01-24 LAB
CHOLEST SERPL-MCNC: 165 MG/DL
CHOLEST/HDLC SERPL: 3.1 {RATIO}
EST. AVERAGE GLUCOSE BLD GHB EST-MCNC: 108 MG/DL
GLUCOSE SERPL-MCNC: 80 MG/DL (ref 70–100)
HBA1C MFR BLD HPLC: 5.4 %
HDLC SERPL-MCNC: 54 MG/DL (ref 35–60)
LDLC SERPL CALC-MCNC: 86 MG/DL
LDLC/HDLC SERPL: 1.6 {RATIO}
NONHDLC SERPL-MCNC: 111 MG/DL
TRIGL SERPL-MCNC: 123 MG/DL (ref 37–140)
VLDLC SERPL-MCNC: 25 MG/DL

## 2025-01-24 PROCEDURE — 83036 HEMOGLOBIN GLYCOSYLATED A1C: CPT | Mod: ,,, | Performed by: CLINICAL MEDICAL LABORATORY

## 2025-01-24 PROCEDURE — 3075F SYST BP GE 130 - 139MM HG: CPT | Mod: ,,, | Performed by: FAMILY MEDICINE

## 2025-01-24 PROCEDURE — 82947 ASSAY GLUCOSE BLOOD QUANT: CPT | Mod: ,,, | Performed by: CLINICAL MEDICAL LABORATORY

## 2025-01-24 PROCEDURE — 1160F RVW MEDS BY RX/DR IN RCRD: CPT | Mod: ,,, | Performed by: FAMILY MEDICINE

## 2025-01-24 PROCEDURE — 1159F MED LIST DOCD IN RCRD: CPT | Mod: ,,, | Performed by: FAMILY MEDICINE

## 2025-01-24 PROCEDURE — 3008F BODY MASS INDEX DOCD: CPT | Mod: ,,, | Performed by: FAMILY MEDICINE

## 2025-01-24 PROCEDURE — 96372 THER/PROPH/DIAG INJ SC/IM: CPT | Mod: ,,, | Performed by: FAMILY MEDICINE

## 2025-01-24 PROCEDURE — 3079F DIAST BP 80-89 MM HG: CPT | Mod: ,,, | Performed by: FAMILY MEDICINE

## 2025-01-24 PROCEDURE — 99396 PREV VISIT EST AGE 40-64: CPT | Mod: 25,,, | Performed by: FAMILY MEDICINE

## 2025-01-24 PROCEDURE — 80061 LIPID PANEL: CPT | Mod: ,,, | Performed by: CLINICAL MEDICAL LABORATORY

## 2025-01-24 RX ORDER — CYANOCOBALAMIN 1000 UG/ML
INJECTION, SOLUTION INTRAMUSCULAR; SUBCUTANEOUS
Qty: 10 ML | Refills: 1 | Status: CANCELLED | OUTPATIENT
Start: 2025-01-24

## 2025-01-24 RX ORDER — KETOROLAC TROMETHAMINE 30 MG/ML
30 INJECTION, SOLUTION INTRAMUSCULAR; INTRAVENOUS
Status: COMPLETED | OUTPATIENT
Start: 2025-01-24 | End: 2025-01-24

## 2025-01-24 RX ORDER — DEXAMETHASONE SODIUM PHOSPHATE 4 MG/ML
4 INJECTION, SOLUTION INTRA-ARTICULAR; INTRALESIONAL; INTRAMUSCULAR; INTRAVENOUS; SOFT TISSUE
Status: COMPLETED | OUTPATIENT
Start: 2025-01-24 | End: 2025-01-24

## 2025-01-24 RX ADMIN — KETOROLAC TROMETHAMINE 30 MG: 30 INJECTION, SOLUTION INTRAMUSCULAR; INTRAVENOUS at 08:01

## 2025-01-24 RX ADMIN — DEXAMETHASONE SODIUM PHOSPHATE 4 MG: 4 INJECTION, SOLUTION INTRA-ARTICULAR; INTRALESIONAL; INTRAMUSCULAR; INTRAVENOUS; SOFT TISSUE at 08:01

## 2025-01-24 NOTE — PROGRESS NOTES
Subjective     Patient ID: Vandana Price is a 59 y.o. female.    Chief Complaint: Healthy You, Wrist Pain (Patient states she went to a clinic in [place] for pain in both wrist, she is still having wrist pain she has scheduled an appointment with Ortho. ), and Depression (Patient was put on Prozac for depression. )    History of carpal tunnel syndrome - bilateral wrist and hand pain, seems to be the whole hand going numb but is unsure     Depression - symptoms developed over the past 6 months, started on prozac about 2 weeks ago and does feel like she is doing some better     HPI  Review of Systems   Constitutional:  Negative for activity change, appetite change, chills, fatigue and fever.   HENT:  Negative for nasal congestion, ear pain, hearing loss, postnasal drip and sore throat.    Respiratory:  Negative for cough, chest tightness, shortness of breath and wheezing.    Cardiovascular:  Negative for chest pain, palpitations, leg swelling and claudication.   Gastrointestinal:  Negative for abdominal pain, change in bowel habit, constipation, diarrhea, nausea and vomiting.   Genitourinary:  Negative for dysuria.   Musculoskeletal:  Negative for arthralgias, back pain, gait problem and myalgias.   Integumentary:  Negative for rash.   Neurological:  Negative for weakness and headaches.   Psychiatric/Behavioral:  Negative for suicidal ideas. The patient is not nervous/anxious.        Tobacco Use: Medium Risk (1/24/2025)    Patient History     Smoking Tobacco Use: Former     Smokeless Tobacco Use: Never     Passive Exposure: Never     Review of patient's allergies indicates:   Allergen Reactions    Opioids - morphine analogues Rash     Development of red rash at site morphine given.      Current Outpatient Medications   Medication Instructions    aspirin (ECOTRIN) 81 mg, Daily    celecoxib (CELEBREX) 200 mg, Oral, 2 times daily    cyanocobalamin 1,000 mcg/mL injection Inject 1 ml Im every 2 weeks for 8 weeks then inject 1 ml  "IM monthly to finish out a year.    famotidine (PEPCID) 40 mg, Oral, 2 times daily    FLUoxetine 20 mg, Oral, Daily    gabapentin (NEURONTIN) 600 mg, Oral, 2 times daily    hydrOXYzine pamoate (VISTARIL) 25 mg, Oral, Every 8 hours PRN    pantoprazole (PROTONIX) 40 mg, Oral, Daily    ustekinumab (STELARA) 90 mg, Subcutaneous, Every 8 weeks     There are no discontinued medications.    Past Medical History:   Diagnosis Date    Allergy     Bilateral primary osteoarthritis of knee     Chronic pain of both knees 10/14/2021    Eosinophilia     Gangrene of gallbladder in cholecystitis 04/01/2021    Hematochezia     History of colonoscopy     Hyperlipidemia     Sleep apnea     Ulcerative colitis      Health Maintenance Topics with due status: Not Due       Topic Last Completion Date    Colorectal Cancer Screening 12/07/2023    Hemoglobin A1c (Diabetic Prevention Screening) 07/25/2024    Lipid Panel 07/25/2024    Mammogram 09/24/2024    RSV Vaccine (Age 60+ and Pregnant patients) Not Due       There is no immunization history on file for this patient.    Objective     Body mass index is 40.88 kg/m².  Wt Readings from Last 3 Encounters:   01/24/25 114.9 kg (253 lb 4 oz)   01/03/25 112.2 kg (247 lb 6.4 oz)   10/24/24 114.3 kg (252 lb)     Ht Readings from Last 3 Encounters:   01/24/25 5' 6" (1.676 m)   01/03/25 5' 6" (1.676 m)   10/24/24 5' 6" (1.676 m)     BP Readings from Last 3 Encounters:   01/24/25 136/87   01/03/25 (!) 133/90   10/24/24 (!) 145/87     Temp Readings from Last 3 Encounters:   01/24/25 98.2 °F (36.8 °C) (Oral)   01/03/25 97.8 °F (36.6 °C) (Oral)   10/24/24 98.7 °F (37.1 °C) (Oral)     Pulse Readings from Last 3 Encounters:   01/24/25 97   01/03/25 76   10/24/24 75     Resp Readings from Last 3 Encounters:   01/24/25 18   01/03/25 20   10/24/24 18     PF Readings from Last 3 Encounters:   No data found for PF       Physical Exam  Constitutional:       Appearance: Normal appearance.   HENT:      Head: " Normocephalic and atraumatic.   Eyes:      Extraocular Movements: Extraocular movements intact.   Cardiovascular:      Rate and Rhythm: Normal rate and regular rhythm.   Pulmonary:      Effort: Pulmonary effort is normal.      Breath sounds: Normal breath sounds.   Skin:     General: Skin is warm.   Neurological:      Mental Status: She is alert and oriented to person, place, and time. Mental status is at baseline.         Assessment and Plan     Problem List Items Addressed This Visit          Neuro    RLS (restless legs syndrome) (Chronic)    Carpal tunnel syndrome, bilateral (Chronic)    Relevant Medications    ketorolac injection 30 mg (Completed)    dexAMETHasone injection 4 mg (Completed)    Other Relevant Orders    HME - OTHER       Psychiatric    Major depressive disorder with single episode, in partial remission (Chronic)       Hematology    Deep vein thrombosis (DVT) of femoral vein of left lower extremity (Chronic)       Endocrine    Class 3 severe obesity due to excess calories with serious comorbidity and body mass index (BMI) of 40.0 to 44.9 in adult (Chronic)    Vitamin B12 deficiency (Chronic)       GI    Ulcerative pancolitis with complication (Chronic)    Gastroesophageal reflux disease without esophagitis (Chronic)    Ulcerative rectosigmoiditis with complication (Chronic)       Orthopedic    Bilateral primary osteoarthritis of knee (Chronic)       Other    PLMD (periodic limb movement disorder) (Chronic)    KYRIE on CPAP (Chronic)     Other Visit Diagnoses       Encounter for annual general medical examination with abnormal findings in adult    -  Primary    Screening for diabetes mellitus        Relevant Orders    Glucose, Fasting    Hemoglobin A1C    Screening for lipoid disorders        Relevant Orders    Lipid Panel              Plan:       Bilateral carpal tunnel - wrist braces provided today, topical voltaren as well    Mood is better, continue prozac     Check blood pressure outside of  clinic             I have reviewed the medications, allergies, and problem list.     Goal Actions:    What type of visit is the patient here for today?: Healthy You  Does the patient consent to enroll in [facility] Healthy?: Yes  Is this a Wellness Follow Up?: No  What is your overall wellness goal? (select at least one): Improve overall health  Choose 3: Lifestyle, Nutrition, Exercise  Lifestyle Actions : Take medications as prescribed  Nurtrition Actions: Avoid adding table salt, drink 8-10 glasses of water per day, Decrease intake of fast food  Exercise Actions: Recommend physical activity 30 minutes per day 3-5 times/week

## 2025-01-24 NOTE — LETTER
January 24, 2025    Vandana Price  [address] [place] [address] [place] [address]             [facility]  Family Medicine  [address] [place] [address]  Phone: [phone]  Fax: [phone]   January 24, 2025     Patient: Vandana Price   YOB: 1965   Date of Visit: 1/24/2025       To Whom it May Concern:    Vandana Price was seen in my clinic on 1/24/2025. She may return to work on 1/24/2025 .    Please excuse her from any work missed.    If you have any questions or concerns, please don't hesitate to call.    Sincerely,     ORLIN Owens Christopher N, MD

## 2025-01-28 ENCOUNTER — PATIENT MESSAGE (OUTPATIENT)
Dept: FAMILY MEDICINE | Facility: CLINIC | Age: 60
End: 2025-01-28
Payer: COMMERCIAL

## 2025-02-11 ENCOUNTER — PATIENT MESSAGE (OUTPATIENT)
Dept: FAMILY MEDICINE | Facility: CLINIC | Age: 60
End: 2025-02-11
Payer: COMMERCIAL

## 2025-02-19 ENCOUNTER — OFFICE VISIT (OUTPATIENT)
Dept: GASTROENTEROLOGY | Facility: CLINIC | Age: 60
End: 2025-02-19
Payer: COMMERCIAL

## 2025-02-19 VITALS
HEART RATE: 78 BPM | OXYGEN SATURATION: 99 % | WEIGHT: 249 LBS | BODY MASS INDEX: 40.02 KG/M2 | SYSTOLIC BLOOD PRESSURE: 141 MMHG | HEIGHT: 66 IN | DIASTOLIC BLOOD PRESSURE: 90 MMHG

## 2025-02-19 DIAGNOSIS — K51.319 ULCERATIVE RECTOSIGMOIDITIS WITH COMPLICATION: Primary | ICD-10-CM

## 2025-02-19 DIAGNOSIS — Z20.828 EXPOSURE TO INFLUENZA: ICD-10-CM

## 2025-02-19 DIAGNOSIS — K21.9 GASTROESOPHAGEAL REFLUX DISEASE WITHOUT ESOPHAGITIS: ICD-10-CM

## 2025-02-19 RX ORDER — OSELTAMIVIR PHOSPHATE 75 MG/1
75 CAPSULE ORAL DAILY
Qty: 7 CAPSULE | Refills: 0 | Status: SHIPPED | OUTPATIENT
Start: 2025-02-19 | End: 2025-02-26

## 2025-02-19 NOTE — PROGRESS NOTES
Gastroenterology Clinic Note    Patient ID: [id number]   Referring MD: No ref. provider found   Chief Complaint:   Chief Complaint   Patient presents with    Follow-up     6 month       History of Present Illness     Vandana Price is an 59 y.o.  female who is referred for follow-up of ulcerative colitis. Diagnosis was made by colonoscopy. Onset was 2021. Disease has involved rectum. The patient has had no surgery for treatment of their IBD. No reported family history of IBD or colon cancer.      This historical paragraph has been reviewed and updated as necessary from prior clinic notes.     Presents today for follow-up.  Reports improvement in her left-sided chest pain after starting Protonix.  She declined Cardiology evaluation as previously recommended.  She denies any new issues or complaints.  She does state she was exposed to influenza yesterday and is requesting Tamiflu.  Doing well overall on Stelara.  The patient is currently having 2-3 bowel movements per day which are semisolid.  The patient currently denies significant abdominal pain or discomfort.  The patient does not have fever and chills.  The patient does not have night sweats and nocturnal awakening. The patient does not have weight loss. The patient does not have nausea, vomiting, or heartburn.  The patient does not have extraintestinal symptoms of oral ulcers, joint pain or rash.     Last colonoscopy was 12/07/2023    Overall Impression:   2 subcentimeter polyps were removed with cold snare  The terminal ileum, ileocecal valve, ascending colon, transverse colon and sigmoid colon appeared normal.  (grade 2) hemorrhoids    - The colonic mucosa and TI appeared normal with no evidence of active inflammation throughout the exam     Final Diagnosis   A. Cecum polyp, biopsy:  - Tubular adenoma     B. Descending colon polyp, biopsy:  - Hyperplastic polyp         The patient has not been on corticosteroids > or = 10mg prednisone (or equivalent) for 60  or more days.    The patient has been treated with the following medications for IBD: Humira, Mesalamine    Patient's current therapy is: Stelara 90 mg every 8 weeks      Review of Systems   Constitutional:  Negative for weight loss.   Gastrointestinal:  Positive for diarrhea. Negative for abdominal pain, blood in stool, constipation, heartburn, melena, nausea and vomiting.       Past Medical History      Past Medical History:   Diagnosis Date    Allergy     Bilateral primary osteoarthritis of knee     Chronic pain of both knees 10/14/2021    Eosinophilia     Gangrene of gallbladder in cholecystitis 2021    Hematochezia     History of colonoscopy     Hyperlipidemia     Sleep apnea     Ulcerative colitis        Past Surgical History     Past Surgical History:   Procedure Laterality Date    ARTHROPLASTY, KNEE, TOTAL, USING COMPUTER-ASSISTED NAVIGATION Left 12/15/2022    Procedure: ARTHROPLASTY, KNEE, TOTAL, USING COMPUTER-ASSISTED NAVIGATION;  Surgeon: Jerome Valladares MD;  Location: [facility] OR;  Service: Orthopedics;  Laterality: Left;    ARTHROPLASTY, KNEE, TOTAL, USING COMPUTER-ASSISTED NAVIGATION Right 2023    Procedure: ARTHROPLASTY, KNEE, TOTAL, USING COMPUTER-ASSISTED NAVIGATION;  Surgeon: Jerome Valladares MD;  Location: [facility] OR;  Service: Orthopedics;  Laterality: Right;    Bilateral IA knee injection Bilateral 2021    D Shows    bladder tact       SECTION      CHOLECYSTECTOMY      FRACTURE SURGERY      jaw     INJECTION      back    INJECTION OF ANESTHETIC AGENT AROUND MEDIAL BRANCH NERVES INNERVATING LUMBAR FACET JOINT Bilateral 2023    Procedure: BIlateral L4-5,5-S1 MBB;  Surgeon: Amanda Goetz MD;  Location[facility];  Service: Pain Management;  Laterality: Bilateral;  LOCAL    INJECTION OF ANESTHETIC AGENT AROUND MEDIAL BRANCH NERVES INNERVATING LUMBAR FACET JOINT Bilateral 2023    Procedure: Bilateral L4-5, 5-S1 medial branch block  # 2;  Surgeon: Amanda Goetz MD;  Location: [facility];  Service: Pain Management;  Laterality: Bilateral;    INJECTION OF ANESTHETIC AGENT AROUND NERVE Bilateral 06/28/2022    Procedure: GNB   BILATERAL;  Surgeon: Amanda Goetz MD;  Location: [facility];  Service: Pain Management;  Laterality: Bilateral;  PT HAS NOT HAD VAC   STATES WILL BE TESTED AT [facility] IN [place]    JOINT REPLACEMENT  12/22 09/23 both knees done    knee scope Bilateral     KNEE SURGERY      LAPAROSCOPIC CHOLECYSTECTOMY N/A 03/31/2021    Procedure: LAPAROSCOPIC CHOLECYSTECTOMY CONVERTED TO OPEN;  Surgeon: Darnell Mcgraw MD;  Location: [facility] OR;  Service: General;  Laterality: N/A;  CONVERTED TO OPEN    MANDIBLE SURGERY      RADIOFREQUENCY ABLATION OF LUMBAR MEDIAL BRANCH NERVE AT SINGLE LEVEL Bilateral 02/15/2024    Procedure: Bilateral L4-5,5-S1 MB RFTC;  Surgeon: Amanda Goetz MD;  Location: [facility];  Service: Pain Management;  Laterality: Bilateral;    TUBAL LIGATION  2004       Allergies     Review of patient's allergies indicates:   Allergen Reactions    Opioids - morphine analogues Rash     Development of red rash at site morphine given.        Immunization History     There is no immunization history on file for this patient.    Past Family History      Family History   Problem Relation Name Age of Onset    Hypertension Father Jerome     Heart disease Father Jerome     Alcohol abuse Mother Gabriela     Migraines Daughter      Lung cancer Maternal Aunt         Past Social History      Social History     Socioeconomic History    Marital status:     Number of children: 2   Occupational History    Occupation:  at [employer]   Tobacco Use    Smoking status: Former     Current packs/day: 0.00     Average packs/day: 1 pack/day for 33.0 years (33.0 ttl pk-yrs)     Types: Cigarettes     Start date: 1977     Quit date: 2010     Years since quitting: 15.1     Passive exposure: Never    Smokeless  tobacco: Never   Substance and Sexual Activity    Alcohol use: Not Currently     Alcohol/week: 1.0 standard drink of alcohol     Types: 1 Drinks containing 0.5 oz of alcohol per week    Drug use: Not Currently    Sexual activity: Not Currently     Partners: Male     Birth control/protection: Abstinence     Social Drivers of Health     Financial Resource Strain: Medium Risk (5/18/2024)    Overall Financial Resource Strain (CARDIA)     Difficulty of Paying Living Expenses: Somewhat hard   Food Insecurity: Food Insecurity Present (5/18/2024)    Hunger Vital Sign     Worried About Running Out of Food in the Last Year: Sometimes true     Ran Out of Food in the Last Year: Sometimes true   Transportation Needs: No Transportation Needs (9/14/2023)    PRAPARE - Transportation     Lack of Transportation (Medical): No     Lack of Transportation (Non-Medical): No   Physical Activity: Insufficiently Active (5/18/2024)    Exercise Vital Sign     Days of Exercise per Week: 5 days     Minutes of Exercise per Session: 20 min   Stress: Stress Concern Present (5/18/2024)    [employer] [place] [employer] - Occupational Stress Questionnaire     Feeling of Stress : To some extent   Housing Stability: Low Risk  (9/14/2023)    Housing Stability Vital Sign     Unable to Pay for Housing in the Last Year: No     Number of Places Lived in the Last Year: 1     Unstable Housing in the Last Year: No       Current Medications     Outpatient Medications Marked as Taking for the 2/19/25 encounter (Office Visit) with Bessy Álvarez FNP   Medication Sig Dispense Refill    aspirin (ECOTRIN) 81 MG EC tablet Take 81 mg by mouth once daily.      celecoxib (CELEBREX) 200 MG capsule Take 1 capsule (200 mg total) by mouth 2 (two) times daily. 180 capsule 1    cyanocobalamin 1,000 mcg/mL injection Inject 1 ml Im every 2 weeks for 8 weeks then inject 1 ml IM monthly to finish out a year. 10 mL 1    FLUoxetine 20 MG capsule Take 1 capsule (20 mg  "total) by mouth once daily. 30 capsule 5    gabapentin (NEURONTIN) 300 MG capsule Take 2 capsules (600 mg total) by mouth 2 (two) times daily. 360 capsule 1    hydrOXYzine pamoate (VISTARIL) 25 MG Cap Take 1 capsule (25 mg total) by mouth every 8 (eight) hours as needed (for itching). 90 capsule 1    pantoprazole (PROTONIX) 40 MG tablet Take 1 tablet (40 mg total) by mouth once daily. 90 tablet 3    ustekinumab (STELARA) 90 mg/mL Syrg syringe Inject 1 mL (90 mg total) into the skin every 8 weeks. 1 mL 5        I have reviewed the current medications, allergies, vital signs, past medical and surgical history, family medical history, and social history for this encounter and agree with all findings.    OBJECTIVE    Physical Exam    BP (!) 141/90   Pulse 78   Ht 5' 6" (1.676 m)   Wt 112.9 kg (249 lb)   LMP 06/08/2020 Comment: tubal ligation   SpO2 99%   BMI 40.19 kg/m²   GEN: Well appearing, cooperative, NAD  NECK: Supple, no LAD  CV: Normal rate  RESP: Unlabored  ABD: ND, no guarding  EXT: No clubbing, cyanosis, or edema  SKIN: Warm and dry  NEURO: AAO x4.     LABS    CBC (with or without Differential):   Lab Results   Component Value Date    WBC 6.27 08/22/2024    HGB 12.4 08/22/2024    HCT 38.3 08/22/2024    MCV 93.9 08/22/2024    MCH 30.4 08/22/2024    MCHC 32.4 08/22/2024    RDW 12.4 08/22/2024     08/22/2024    MPV 10.7 08/22/2024    NEUTOPHILPCT 49.0 (L) 08/22/2024    DIFFTYPE Manual 08/22/2024     BMP/CMP:   Lab Results   Component Value Date     (H) 08/22/2024    K 4.7 08/22/2024     (H) 08/22/2024    CO2 33 (H) 08/22/2024    BUN 21 (H) 08/22/2024    CREATININE 1.07 (H) 08/22/2024    GLU 98 08/22/2024    CALCIUM 8.7 08/22/2024    ALBUMIN 3.7 08/22/2024    AST 15 08/22/2024    ALT 21 08/22/2024    ALKPHOS 242 (H) 08/22/2024        IMAGING  Ultrasound abdomen complete 08/2024  - no evidence of abnormality demonstrated    ASSESSMENT  Vandana Albert is a 59 y.o. WF with history of HTN, " Hyperlipidemia, and UC who is referred to clinic for follow-up.    1. Ulcerative rectosigmoiditis with complication    2. Gastroesophageal reflux disease without esophagitis    3. Exposure to influenza           PLAN    - Labs today  - Continue Stelara 90 mg every 8 weeks  - Tamiflu for prophylaxis after known exposure yesterday  - return to clinic for follow-up in 6 months, sooner as needed    Immunosuppression: The patient is currently immunosuppressed due to treatment with Stelara. The patient denies fever, chills, or other signs or symptoms of infection. The patient reports no medication-related issues and they has been medication adherent. We will continue to monitor labs per protocol. Benefits of the medication outweigh the risks and this will continually be assessed.      I have reviewed the IBD health maintenance as below.      IBD Health Maintenance:  -Prevnar 13: Recommend   -Pneumovax 23: Recommend 8 weeks after Prevnar 13  -Flu Shot: Recommend annually  -Shingrix: Recommend    -Hepatitis A/Hepatitis B: Check antibody and if not immune will vaccinate  -Annual pap smear: Recommend (advised pt to f/u with GYN or let me know if she needs new referral)  -Annual skin exam: Recommend (referral placed)  -Colon cancer screening: Repeat colonoscopy in 5 years (12/2028) for h/o polyps, and will go ahead and start dysplasia screening at that time   -DEXA scan: Recommend  -Tobacco: Avoid  -Should avoid NSAIDs and narcotics, use only Tylenol for pain relief.     There are no Patient Instructions on file for this visit.      Orders Placed This Encounter   Procedures    Folate     Standing Status:   Future     Expected Date:   2/19/2025     Expiration Date:   4/19/2026    Vitamin B12     Standing Status:   Future     Expected Date:   2/19/2025     Expiration Date:   4/19/2026    Vitamin D     Standing Status:   Future     Expected Date:   2/19/2025     Expiration Date:   4/20/2026    Ferritin     Standing Status:    Future     Expected Date:   2/19/2025     Expiration Date:   4/20/2026    Iron and TIBC     Standing Status:   Future     Expected Date:   2/19/2025     Expiration Date:   4/20/2026    Comprehensive Metabolic Panel     Standing Status:   Future     Expected Date:   2/19/2025     Expiration Date:   4/20/2026    CBC Auto Differential     Standing Status:   Future     Expected Date:   2/19/2025     Expiration Date:   4/20/2026    Ambulatory referral/consult to Dermatology     Standing Status:   Future     Expected Date:   2/26/2025     Expiration Date:   3/19/2026     Referral Priority:   Routine     Referral Type:   Consultation     Referral Reason:   Specialty Services Required     Requested Specialty:   Dermatology     Number of Visits Requested:   1         The risks and benefits of my recommendations, as well as other treatment options were discussed with the patient today. All questions were answered.    35 minutes of total time spent on the encounter, which includes face to face time and non-face to face time preparing to see the patient (eg, review of tests), obtaining and/or reviewing separately obtained history, documenting clinical information in the electronic or other health record, Independently interpreting results (not separately reported) and communicating results to the patient/family/caregiver, or care coordination (not separately reported).        KIMBERLEE Gilmore/ELDERP  [facility]

## 2025-02-19 NOTE — LETTER
February 19, 2025      [facility]  [address] [place] [address]  Phone: [phone]       Patient: Vandana Price   YOB: 1965  Date of Visit: 02/19/2025    To Whom It May Concern:    Enrike Price  was at [facility] on 02/19/2025. The patient may return to work/school on 02/20/2025 with no restrictions. If you have any questions or concerns, or if I can be of further assistance, please do not hesitate to contact me.    Sincerely,    Caitie Aparicio LPN

## 2025-02-24 ENCOUNTER — RESULTS FOLLOW-UP (OUTPATIENT)
Dept: GASTROENTEROLOGY | Facility: CLINIC | Age: 60
End: 2025-02-24
Payer: COMMERCIAL

## 2025-02-25 NOTE — TELEPHONE ENCOUNTER
----- Message from KIMBERLEE Gilmore sent at 2/24/2025  3:43 PM CST -----  Labs are ok  ----- Message -----  From: Lab, Background User  Sent: 2/19/2025   4:38 PM CST  To: KIMBERLEE Gilmore

## 2025-02-28 ENCOUNTER — OFFICE VISIT (OUTPATIENT)
Dept: FAMILY MEDICINE | Facility: CLINIC | Age: 60
End: 2025-02-28
Payer: COMMERCIAL

## 2025-02-28 ENCOUNTER — RESULTS FOLLOW-UP (OUTPATIENT)
Dept: FAMILY MEDICINE | Facility: CLINIC | Age: 60
End: 2025-02-28

## 2025-02-28 VITALS
HEART RATE: 61 BPM | TEMPERATURE: 98 F | SYSTOLIC BLOOD PRESSURE: 143 MMHG | BODY MASS INDEX: 40.5 KG/M2 | HEIGHT: 66 IN | RESPIRATION RATE: 18 BRPM | WEIGHT: 252 LBS | OXYGEN SATURATION: 96 % | DIASTOLIC BLOOD PRESSURE: 77 MMHG

## 2025-02-28 DIAGNOSIS — R07.81 RIB PAIN ON RIGHT SIDE: Primary | ICD-10-CM

## 2025-02-28 DIAGNOSIS — Z79.899 OTHER LONG TERM (CURRENT) DRUG THERAPY: Chronic | ICD-10-CM

## 2025-02-28 PROCEDURE — 3078F DIAST BP <80 MM HG: CPT | Mod: ,,, | Performed by: NURSE PRACTITIONER

## 2025-02-28 PROCEDURE — 1159F MED LIST DOCD IN RCRD: CPT | Mod: ,,, | Performed by: NURSE PRACTITIONER

## 2025-02-28 PROCEDURE — 1160F RVW MEDS BY RX/DR IN RCRD: CPT | Mod: ,,, | Performed by: NURSE PRACTITIONER

## 2025-02-28 PROCEDURE — 3077F SYST BP >= 140 MM HG: CPT | Mod: ,,, | Performed by: NURSE PRACTITIONER

## 2025-02-28 PROCEDURE — 99213 OFFICE O/P EST LOW 20 MIN: CPT | Mod: 25,,, | Performed by: NURSE PRACTITIONER

## 2025-02-28 PROCEDURE — 3008F BODY MASS INDEX DOCD: CPT | Mod: ,,, | Performed by: NURSE PRACTITIONER

## 2025-02-28 PROCEDURE — 3044F HG A1C LEVEL LT 7.0%: CPT | Mod: ,,, | Performed by: NURSE PRACTITIONER

## 2025-02-28 PROCEDURE — 96372 THER/PROPH/DIAG INJ SC/IM: CPT | Mod: ,,, | Performed by: NURSE PRACTITIONER

## 2025-02-28 RX ORDER — KETOROLAC TROMETHAMINE 30 MG/ML
60 INJECTION, SOLUTION INTRAMUSCULAR; INTRAVENOUS
Status: COMPLETED | OUTPATIENT
Start: 2025-02-28 | End: 2025-02-28

## 2025-02-28 RX ORDER — GABAPENTIN 300 MG/1
600 CAPSULE ORAL 2 TIMES DAILY
Qty: 360 CAPSULE | Refills: 1 | Status: SHIPPED | OUTPATIENT
Start: 2025-02-28

## 2025-02-28 RX ADMIN — KETOROLAC TROMETHAMINE 60 MG: 30 INJECTION, SOLUTION INTRAMUSCULAR; INTRAVENOUS at 10:02

## 2025-02-28 NOTE — PROGRESS NOTES
Please let pt know her cxr and rib xray were normal. No rib fractures seen. Tell her if does not get better let us know. Thanks!   no

## 2025-03-03 ENCOUNTER — PATIENT MESSAGE (OUTPATIENT)
Dept: GASTROENTEROLOGY | Facility: CLINIC | Age: 60
End: 2025-03-03
Payer: COMMERCIAL

## 2025-03-16 PROBLEM — R07.81 RIB PAIN ON RIGHT SIDE: Status: ACTIVE | Noted: 2025-03-16

## 2025-03-16 NOTE — ASSESSMENT & PLAN NOTE
CXR/rib detail pending. Will inform of results when available.   Toradol 60mg injection given. No adverse reaction noted.   Instructed to perform deep breathing exercises at least every 2 hours.   Instructed to RTC for any new/worsening/persisting ssx.      Instructed on RICE therapy: R-rest (extremity when able), I-ice (alternate 20 minutes at a time) C-compression (may use brace to extremity to help with pain/swelling), E-elevation (prop extremity on pillows to help with pain/swelling)

## 2025-03-16 NOTE — PROGRESS NOTES
KIMBERLEE Bradley   [facility]  [address]  [place] [address]     PATIENT NAME: Vandana Price  : 1965  DATE: 25  MRN: [id number]      Billing Provider: KIMBERLEE Bradley  Level of Service:   Patient PCP Information       Provider PCP Type    Claude Oneil MD General            Reason for Visit / Chief Complaint: Rib Injury (Mrs. Price is a 58 y/o female presenting today with right rib pain. States this past weekend she was having back pain and got someone to try and pop her back. States they grabbed her around ribs/chest and squeezed and since has had pain. Yesterday was doing CPR training and is now worse. States hurts to breathe and move right arm. Would like to have an Xray done today. ) and Health Maintenance (TETANUS VACCINE Never done - denied/Shingles Vaccine(1 of 2) Never done  - denied)   Health Maintenance Due   Topic Date Due    TETANUS VACCINE  Never done    Shingles Vaccine (1 of 2) Never done          Update PCP  Update Chief Complaint         History of Present Illness / Problem Focused Workflow     Vandana Price presents to the clinic as a walk in for right sided rib pain. Mrs. Price is a 58 y/o female presenting today with right rib pain. States this past weekend she was having back pain and got someone to try and pop her back. States they grabbed her around ribs/chest and squeezed and since has had pain. Yesterday was doing CPR training and is now worse. States hurts to breathe and move right arm. Would like to have an Xray done today. She also needs refill on her Gabapentin. She denies any other needs at this time.     Hemoglobin A1C   Date Value Ref Range Status   2025 5.4 <=7.0 % Final     Comment:       Normal:               <5.7%  Pre-Diabetic:       5.7% to 6.4%  Diabetic:             >6.4%  Diabetic Goal:     <7%   2024 5.3 4.5 - 6.6 % Final     Comment:       Normal:               <5.7%  Pre-Diabetic:       5.7% to 6.4%  Diabetic:              >6.4%  Diabetic Goal:     <7%   11/03/2022 5.7 4.5 - 6.6 % Final     Comment:       Normal:               <5.7%  Pre-Diabetic:       5.7% to 6.4%  Diabetic:             >6.4%  Diabetic Goal:     <7%        CMP  Sodium   Date Value Ref Range Status   02/19/2025 141 136 - 145 mmol/L Final     Potassium   Date Value Ref Range Status   02/19/2025 3.8 3.5 - 5.1 mmol/L Final     Chloride   Date Value Ref Range Status   02/19/2025 105 98 - 107 mmol/L Final     CO2   Date Value Ref Range Status   02/19/2025 25 22 - 29 mmol/L Final     Glucose   Date Value Ref Range Status   02/19/2025 92 74 - 100 mg/dL Final     BUN   Date Value Ref Range Status   02/19/2025 20 10 - 20 mg/dL Final     Creatinine   Date Value Ref Range Status   02/19/2025 1.03 (H) 0.55 - 1.02 mg/dL Final     Calcium   Date Value Ref Range Status   02/19/2025 9.5 8.4 - 10.2 mg/dL Final     Total Protein   Date Value Ref Range Status   02/19/2025 7.3 6.4 - 8.3 g/dL Final     Albumin   Date Value Ref Range Status   02/19/2025 4.1 3.5 - 5.0 g/dL Final     Bilirubin, Total   Date Value Ref Range Status   02/19/2025 0.5 <=1.5 mg/dL Final     Alk Phos   Date Value Ref Range Status   02/19/2025 142 40 - 150 U/L Final     AST   Date Value Ref Range Status   02/19/2025 31 5 - 34 U/L Final     ALT   Date Value Ref Range Status   02/19/2025 16 <=55 U/L Final     Anion Gap   Date Value Ref Range Status   02/19/2025 15 7 - 16 mmol/L Final     eGFR   Date Value Ref Range Status   02/19/2025 63 >=60 mL/min/1.73m2 Final     Comment:     Estimated GFR calculated using the CKD-EPI creatinine (2021) equation.        Lab Results   Component Value Date    WBC 5.66 02/19/2025    RBC 4.52 02/19/2025    HGB 13.1 02/19/2025    HCT 41.4 02/19/2025    MCV 91.6 02/19/2025    MCH 29.0 02/19/2025    MCHC 31.6 (L) 02/19/2025    RDW 12.9 02/19/2025     02/19/2025    MPV 11.4 02/19/2025    LYMPH 28.1 02/19/2025    LYMPH 1.59 02/19/2025    MONO 6.0 02/19/2025    EOS 1.06 (H) 02/19/2025     BASO 0.10 02/19/2025    EOSINOPHIL 18.7 (H) 02/19/2025    BASOPHIL 1.8 (H) 02/19/2025        Lab Results   Component Value Date    CHOL 165 01/24/2025    CHOL 161 07/25/2024    CHOL 188 11/03/2022     Lab Results   Component Value Date    HDL 54 01/24/2025    HDL 51 07/25/2024    HDL 61 (H) 11/03/2022     Lab Results   Component Value Date    LDLCALC 86 01/24/2025    LDLCALC 85 07/25/2024    LDLCALC 106 11/03/2022     Lab Results   Component Value Date    TRIG 123 01/24/2025    TRIG 124 07/25/2024    TRIG 105 11/03/2022     Lab Results   Component Value Date    CHOLHDL 3.1 01/24/2025    CHOLHDL 3.2 07/25/2024    CHOLHDL 3.1 11/03/2022        Wt Readings from Last 3 Encounters:   02/28/25 0936 114.3 kg (252 lb)   02/19/25 0846 112.9 kg (249 lb)   01/24/25 0804 114.9 kg (253 lb 4 oz)        BP Readings from Last 3 Encounters:   02/28/25 (!) 143/77   02/19/25 (!) 141/90   01/24/25 136/87        Review of Systems     Review of Systems   Constitutional: Negative.    Respiratory: Negative.     Cardiovascular: Negative.    Gastrointestinal: Negative.    Endocrine: Negative.    Genitourinary: Negative.    Musculoskeletal:  Positive for myalgias.        Right sided rib pain   Integumentary:  Negative.   Allergic/Immunologic: Negative.    Neurological: Negative.    Hematological: Negative.    Psychiatric/Behavioral: Negative.          Medical / Social / Family History     Past Medical History:   Diagnosis Date    Allergy     Bilateral primary osteoarthritis of knee     Chronic pain of both knees 10/14/2021    Eosinophilia     Gangrene of gallbladder in cholecystitis 04/01/2021    Hematochezia     History of colonoscopy     Hyperlipidemia     Sleep apnea     Ulcerative colitis        Past Surgical History:   Procedure Laterality Date    ARTHROPLASTY, KNEE, TOTAL, USING COMPUTER-ASSISTED NAVIGATION Left 12/15/2022    Procedure: ARTHROPLASTY, KNEE, TOTAL, USING COMPUTER-ASSISTED NAVIGATION;  Surgeon: Jerome Valladares MD;   Location: [facility] ORTHO OR;  Service: Orthopedics;  Laterality: Left;    ARTHROPLASTY, KNEE, TOTAL, USING COMPUTER-ASSISTED NAVIGATION Right 2023    Procedure: ARTHROPLASTY, KNEE, TOTAL, USING COMPUTER-ASSISTED NAVIGATION;  Surgeon: Jerome Valladares MD;  Location: [facility] ORTHO OR;  Service: Orthopedics;  Laterality: Right;    Bilateral IA knee injection Bilateral 2021    D Shows    bladder tact       SECTION      CHOLECYSTECTOMY      FRACTURE SURGERY      jaw 1972    INJECTION      back    INJECTION OF ANESTHETIC AGENT AROUND MEDIAL BRANCH NERVES INNERVATING LUMBAR FACET JOINT Bilateral 2023    Procedure: BIlateral L4-5,5-S1 MBB;  Surgeon: Amanda Goetz MD;  Location: [facility] PAIN MGMT;  Service: Pain Management;  Laterality: Bilateral;  LOCAL    INJECTION OF ANESTHETIC AGENT AROUND MEDIAL BRANCH NERVES INNERVATING LUMBAR FACET JOINT Bilateral 2023    Procedure: Bilateral L4-5, 5-S1 medial branch block # 2;  Surgeon: Amanda Goetz MD;  Location: [facility] PAIN MGMT;  Service: Pain Management;  Laterality: Bilateral;    INJECTION OF ANESTHETIC AGENT AROUND NERVE Bilateral 2022    Procedure: GNB   BILATERAL;  Surgeon: Amanda Goetz MD;  Location: [facility] PAIN MGMT;  Service: Pain Management;  Laterality: Bilateral;  PT HAS NOT HAD VAC   STATES WILL BE TESTED AT [facility] IN [place]    JOINT REPLACEMENT   both knees done    knee scope Bilateral     KNEE SURGERY      LAPAROSCOPIC CHOLECYSTECTOMY N/A 2021    Procedure: LAPAROSCOPIC CHOLECYSTECTOMY CONVERTED TO OPEN;  Surgeon: Darnell Mcgraw MD;  Location: [facility] OR;  Service: General;  Laterality: N/A;  CONVERTED TO OPEN    MANDIBLE SURGERY      RADIOFREQUENCY ABLATION OF LUMBAR MEDIAL BRANCH NERVE AT SINGLE LEVEL Bilateral 02/15/2024    Procedure: Bilateral L4-5,5-S1 MB RFTC;  Surgeon: Amanda Goezt MD;  Location: [facility] PAIN MGMT;  Service: Pain Management;  Laterality: Bilateral;     TUBAL LIGATION  2004       Social History  Ms.  reports that she quit smoking about 15 years ago. Her smoking use included cigarettes. She started smoking about 48 years ago. She has a 33 pack-year smoking history. She has never been exposed to tobacco smoke. She has never used smokeless tobacco. She reports that she does not currently use alcohol after a past usage of about 1.0 standard drink of alcohol per week. She reports that she does not currently use drugs.    Family History  Ms.'s family history includes Alcohol abuse in her mother; Heart disease in her father; Hypertension in her father; Lung cancer in her maternal aunt; Migraines in her daughter.    Medications and Allergies     Medications  Outpatient Medications Marked as Taking for the 2/28/25 encounter (Office Visit) with Faby Boyle FNP   Medication Sig Dispense Refill    aspirin (ECOTRIN) 81 MG EC tablet Take 81 mg by mouth once daily.      celecoxib (CELEBREX) 200 MG capsule Take 1 capsule (200 mg total) by mouth 2 (two) times daily. 180 capsule 1    cyanocobalamin 1,000 mcg/mL injection Inject 1 ml Im every 2 weeks for 8 weeks then inject 1 ml IM monthly to finish out a year. 10 mL 1    FLUoxetine 20 MG capsule Take 1 capsule (20 mg total) by mouth once daily. 30 capsule 5    hydrOXYzine pamoate (VISTARIL) 25 MG Cap Take 1 capsule (25 mg total) by mouth every 8 (eight) hours as needed (for itching). 90 capsule 1    pantoprazole (PROTONIX) 40 MG tablet Take 1 tablet (40 mg total) by mouth once daily. 90 tablet 3    ustekinumab (STELARA) 90 mg/mL Syrg syringe Inject 1 mL (90 mg total) into the skin every 8 weeks. 1 mL 5    [DISCONTINUED] gabapentin (NEURONTIN) 300 MG capsule Take 2 capsules (600 mg total) by mouth 2 (two) times daily. 360 capsule 1       Allergies  Review of patient's allergies indicates:   Allergen Reactions    Opioids - morphine analogues Rash     Development of red rash at site morphine given.        Physical Examination  "    Vitals:    02/28/25 0936   BP: (!) 143/77   BP Location: Left arm   Patient Position: Sitting   Pulse: 61   Resp: 18   Temp: 98.1 °F (36.7 °C)   TempSrc: Oral   SpO2: 96%   Weight: 114.3 kg (252 lb)   Height: 5' 6" (1.676 m)      Physical Exam  Constitutional:       Appearance: Normal appearance.   HENT:      Head: Normocephalic.   Eyes:      Extraocular Movements: Extraocular movements intact.   Cardiovascular:      Rate and Rhythm: Normal rate and regular rhythm.      Pulses: Normal pulses.      Heart sounds: Normal heart sounds.   Pulmonary:      Effort: Pulmonary effort is normal.      Breath sounds: Normal breath sounds.   Abdominal:      Palpations: Abdomen is soft.   Musculoskeletal:      Comments: Right sided ribs ttp   Skin:     General: Skin is warm and dry.      Capillary Refill: Capillary refill takes less than 2 seconds.   Neurological:      General: No focal deficit present.      Mental Status: She is alert and oriented to person, place, and time.   Psychiatric:         Mood and Affect: Mood normal.         Behavior: Behavior normal.          Assessment and Plan (including Health Maintenance)      Problem List  Smart Sets  Document Outside HM   :    Plan:     There are no Patient Instructions on file for this visit.       Health Maintenance Due   Topic Date Due    TETANUS VACCINE  Never done    Shingles Vaccine (1 of 2) Never done       Problem List Items Addressed This Visit       Other long term (current) drug therapy (Chronic)    Relevant Medications    gabapentin (NEURONTIN) 300 MG capsule    Rib pain on right side - Primary    Current Assessment & Plan   CXR/rib detail pending. Will inform of results when available.   Toradol 60mg injection given. No adverse reaction noted.   Instructed to perform deep breathing exercises at least every 2 hours.   Instructed to RTC for any new/worsening/persisting ssx.      Instructed on RICE therapy: R-rest (extremity when able), I-ice (alternate 20 minutes " at a time) C-compression (may use brace to extremity to help with pain/swelling), E-elevation (prop extremity on pillows to help with pain/swelling)           Relevant Orders    X-Ray Chest PA And Lateral (Completed)    X-Ray Ribs 2 View Right (Completed)     Rib pain on right side  -     X-Ray Chest PA And Lateral; Future; Expected date: 02/28/2025  -     X-Ray Ribs 2 View Right; Future; Expected date: 02/28/2025  -     ketorolac injection 60 mg    Other long term (current) drug therapy  -     gabapentin (NEURONTIN) 300 MG capsule; Take 2 capsules (600 mg total) by mouth 2 (two) times daily.  Dispense: 360 capsule; Refill: 1       Health Maintenance Topics with due status: Not Due       Topic Last Completion Date    Colorectal Cancer Screening 12/07/2023    Mammogram 09/24/2024    Hemoglobin A1c (Diabetic Prevention Screening) 01/24/2025    Lipid Panel 01/24/2025    RSV Vaccine (Age 60+ and Pregnant patients) Not Due         Future Appointments   Date Time Provider Department Center   3/17/2025 10:30 AM Fidelina Gonzalez MD [facility] DERM [place]   7/24/2025  8:30 AM Claude Oneil MD [facility] [place]   8/19/2025  8:00 AM Bessy Álvarez FNP [facility] GA[facility]   10/8/2025  8:30 AM Jerome Valladares MD [facility]TH[facility]   11/4/2025  8:00 AM [facility] MAMMO1 [facility] MAMMO [facility]   1/23/2026  8:00 AM Claude Oneil MD [facility] [place] [place]        No follow-ups on file.    Health Maintenance Due   Topic Date Due    TETANUS VACCINE  Never done    Shingles Vaccine (1 of 2) Never done        Signature:  KIMBERLEE Bradley    Date of encounter: 2/28/25

## 2025-03-17 ENCOUNTER — OFFICE VISIT (OUTPATIENT)
Dept: DERMATOLOGY | Facility: CLINIC | Age: 60
End: 2025-03-17
Payer: COMMERCIAL

## 2025-03-17 VITALS — HEIGHT: 66 IN | BODY MASS INDEX: 40.5 KG/M2 | WEIGHT: 252 LBS

## 2025-03-17 DIAGNOSIS — L82.0 SEBORRHEIC KERATOSES, INFLAMED: ICD-10-CM

## 2025-03-17 DIAGNOSIS — K51.319 ULCERATIVE RECTOSIGMOIDITIS WITH COMPLICATION: ICD-10-CM

## 2025-03-17 DIAGNOSIS — L85.3 XEROSIS OF SKIN: ICD-10-CM

## 2025-03-17 DIAGNOSIS — L20.84 INTRINSIC ECZEMA: ICD-10-CM

## 2025-03-17 DIAGNOSIS — L82.1 SEBORRHEIC KERATOSES: ICD-10-CM

## 2025-03-17 DIAGNOSIS — L57.8 OTHER SKIN CHANGES DUE TO CHRONIC EXPOSURE TO NONIONIZING RADIATION: ICD-10-CM

## 2025-03-17 DIAGNOSIS — D22.9 BENIGN NEVUS OF SKIN: Primary | ICD-10-CM

## 2025-03-17 PROCEDURE — 3044F HG A1C LEVEL LT 7.0%: CPT | Mod: ,,, | Performed by: DERMATOLOGY

## 2025-03-17 PROCEDURE — 1159F MED LIST DOCD IN RCRD: CPT | Mod: ,,, | Performed by: DERMATOLOGY

## 2025-03-17 PROCEDURE — 99204 OFFICE O/P NEW MOD 45 MIN: CPT | Mod: 25,,, | Performed by: DERMATOLOGY

## 2025-03-17 PROCEDURE — 1160F RVW MEDS BY RX/DR IN RCRD: CPT | Mod: ,,, | Performed by: DERMATOLOGY

## 2025-03-17 PROCEDURE — 3008F BODY MASS INDEX DOCD: CPT | Mod: ,,, | Performed by: DERMATOLOGY

## 2025-03-17 PROCEDURE — 17110 DESTRUCTION B9 LES UP TO 14: CPT | Mod: ,,, | Performed by: DERMATOLOGY

## 2025-03-17 RX ORDER — TRIAMCINOLONE ACETONIDE 1 MG/G
CREAM TOPICAL
Qty: 80 G | Refills: 2 | Status: SHIPPED | OUTPATIENT
Start: 2025-03-17

## 2025-03-17 NOTE — PROGRESS NOTES
[facility]   Fidelina Gonzalez MD    Patient Name: Vandana Price  Patient YOB: 1965   Date of Service: 3/17/25    CC: Full Skin Exam    HPI: Vandana Price is a 59 y.o. female presents today for a full skin exam.  Patient has not been seen by a dermatologist in the past and dermatologic history includes no hx of skin cancer. Patient is currently on Stelara with dr. Stratton for Ulcerative Colitis Patient is concerned today about a lesion located on the right leg.  It has been present for 3 month(s). It has not been treated in the past.        Past Medical History:   Diagnosis Date    Allergy     Bilateral primary osteoarthritis of knee     Chronic pain of both knees 10/14/2021    Eosinophilia     Gangrene of gallbladder in cholecystitis 2021    Hematochezia     History of colonoscopy     Hyperlipidemia     Sleep apnea     Ulcerative colitis      Past Surgical History:   Procedure Laterality Date    ARTHROPLASTY, KNEE, TOTAL, USING COMPUTER-ASSISTED NAVIGATION Left 12/15/2022    Procedure: ARTHROPLASTY, KNEE, TOTAL, USING COMPUTER-ASSISTED NAVIGATION;  Surgeon: Jerome Valladares MD;  Location: [facility] OR;  Service: Orthopedics;  Laterality: Left;    ARTHROPLASTY, KNEE, TOTAL, USING COMPUTER-ASSISTED NAVIGATION Right 2023    Procedure: ARTHROPLASTY, KNEE, TOTAL, USING COMPUTER-ASSISTED NAVIGATION;  Surgeon: Jerome Valladares MD;  Location: [facility] OR;  Service: Orthopedics;  Laterality: Right;    Bilateral IA knee injection Bilateral 2021    D Shows    bladder tact       SECTION      CHOLECYSTECTOMY      FRACTURE SURGERY      jaw 1972    INJECTION      back    INJECTION OF ANESTHETIC AGENT AROUND MEDIAL BRANCH NERVES INNERVATING LUMBAR FACET JOINT Bilateral 2023    Procedure: BIlateral L4-5,5-S1 MBB;  Surgeon: Amanda Goetz MD;  Location: [facility] PAIN [facility];  Service: Pain Management;  Laterality: Bilateral;  LOCAL    INJECTION OF ANESTHETIC AGENT AROUND  MEDIAL BRANCH NERVES INNERVATING LUMBAR FACET JOINT Bilateral 12/12/2023    Procedure: Bilateral L4-5, 5-S1 medial branch block # 2;  Surgeon: Amanda Goetz MD;  Location: [facility] PAIN MGMT;  Service: Pain Management;  Laterality: Bilateral;    INJECTION OF ANESTHETIC AGENT AROUND NERVE Bilateral 06/28/2022    Procedure: GNB   BILATERAL;  Surgeon: Amanda Goetz MD;  Location: [facility] PAIN MGMT;  Service: Pain Management;  Laterality: Bilateral;  PT HAS NOT HAD VAC   STATES WILL BE TESTED AT [facility] IN [place]    JOINT REPLACEMENT  12/22 09/23 both knees done    knee scope Bilateral     KNEE SURGERY      LAPAROSCOPIC CHOLECYSTECTOMY N/A 03/31/2021    Procedure: LAPAROSCOPIC CHOLECYSTECTOMY CONVERTED TO OPEN;  Surgeon: Darnell Mcgraw MD;  Location: [facility] OR;  Service: General;  Laterality: N/A;  CONVERTED TO OPEN    MANDIBLE SURGERY      RADIOFREQUENCY ABLATION OF LUMBAR MEDIAL BRANCH NERVE AT SINGLE LEVEL Bilateral 02/15/2024    Procedure: Bilateral L4-5,5-S1 MB RFTC;  Surgeon: Amanda Goetz MD;  Location: [facility] PAIN MGMT;  Service: Pain Management;  Laterality: Bilateral;    TUBAL LIGATION  2004     Review of patient's allergies indicates:   Allergen Reactions    Opioids - morphine analogues Rash     Development of red rash at site morphine given.      Current Medications[1]    ROS: A focused review of systems was obtained and negative.     Exam: A full skin exam was performed including scalp, hair, face, neck, chest, back, abdomen, right arm, left arm, right hand, left hand, nails, right leg, and left leg.  All areas examined were normal expect as per below in assessment and plan.  General Appearance of the patient is well developed and well nourished.  Orientation: alert and oriented x 3.  Mood and affect: pleasant.    Assessment:   The primary encounter diagnosis was Benign nevus of skin. Diagnoses of Ulcerative rectosigmoiditis with complication, Intrinsic eczema, Seborrheic keratoses,  Other skin changes due to chronic exposure to nonionizing radiation, Xerosis of skin, and Seborrheic keratoses, inflamed were also pertinent to this visit.    Plan:   Medications Ordered This Encounter   Medications    triamcinolone acetonide 0.1% (KENALOG) 0.1 % cream     Sig: Apply to rash on body BID, tapering with improvement. AVOID USING TO FACE AND GROIN     Dispense:  80 g     Refill:  2     Benign Nevus (D22.72)  - Dome shaped regular papule    Plan: Reassurance.    Plan: Counseling.  I counseled the patient regarding the following:  Instructions: Monthly self-skin checks to monitor for any changes in moles are recommended.  Expectations: Benign Nevi are pigmented nests of cells within the skin. No treatment is necessary.  Contact Office if: Any moles change in size, shape or color; itch, burn or bleed.    Seborrheic Keratosis (L82.1)  - Stuck-on, warty, greasy brown papule with pseudo-horn cysts scattered on the trunk and extremities    Plan: Counseling.  I counseled the patient regarding the following:  Skin Care: Seborrheic Keratoses are benign. No treatment is necessary.  Expectations: Seborrheic Keratoses are benign warty growths. Patients get more of them as they age    Plan: Reassurance    Irritated Seborrheic Keratoses (L82.0)  Stuck-on inflamed papules with crust located on the right ear  Associated diagnoses: Pruritus and Cutaneous Inflammation    Plan: Liquid Nitrogen.  A total of 1 lesions were treated with liquid nitrogen, located on the above listed location.  This procedure was medically necessary because the lesions that were treated were: irritated and itchy. The  patient's consent was obtained including but not limited to risks of crusting, scabbing, blistering, scarring, darker  or lighter pigmentary change, recurrence, incomplete removal and infection.    Eczema   - eczematous papules and plaques on a background of Xerosis    Status: Inadequately controlled    Plan: Counseling  I  counseled the patient regarding the following:  Skin care: Patient should bathe using lukewarm water with a mild cleanser and moisturize immediately after. Emollients should be applied at least 2-3 times daily. Avoid scented detergents or fabric softeners. Keep fingernails short. Avoid excessive hand washing.  Expectations: The patient is aware that eczema is chronic in nature and can improve with moisturizers and topical steroids and worsen with stress, scented soaps, detergents, scratching, dry skin, changes in weather and skin infections.  Contact office if: Eczema worsens or fails to improve despite several weeks of treatment; patient develops skin infections (such as: yellow honey colored crusts or cold sores).    I recommended the following:  Moisturizers  - Will send in TAC 0.1 cream     Xerosis  - dry skin    Plan: Counseling  I counseled the patient regarding the following:  Skin Care: Dry skin can be improved by bathing with a moisturizing/unscented soap, and moisturizing after showering.  Expectations: Dry skin is easily managed with moisturizers, but can recur.  Contact Office if: Xerosis fails to improve despite months of treatment.    I recommended the following:  Moisturizers    Other Skin Changes Due to Chronic Exposure of Nonionizing Radiation (L57.8)    Plan: Monitoring.     Plan: Sunscreen Recommendations.  I recommended a broad spectrum sunscreen with a SPF of 30 or higher. I explained that SPF 30 sunscreens block approximately 97 percent of the  sun's harmful rays. Sunscreens should be applied at least 15 minutes prior to expected sun exposure and then every 2 hours after that as long as  sun exposure continues. If swimming or exercising sunscreen should be reapplied every 45 minutes to an hour after getting wet or sweating. One  ounce, or the equivalent of a shot glass full of sunscreen, is adequate to protect the skin not covered by a bathing suit. I also recommended a lip  balm with a  sunscreen as well. Sun protective clothing can be used in lieu of sunscreen but must be worn the entire time you are exposed to the  sun's rays.      Follow up in about 1 year (around 3/17/2026) for Skin check.    Fidelina Gonzalez MD         [1]   Current Outpatient Medications:     aspirin (ECOTRIN) 81 MG EC tablet, Take 81 mg by mouth once daily., Disp: , Rfl:     celecoxib (CELEBREX) 200 MG capsule, Take 1 capsule (200 mg total) by mouth 2 (two) times daily., Disp: 180 capsule, Rfl: 1    cyanocobalamin 1,000 mcg/mL injection, Inject 1 ml Im every 2 weeks for 8 weeks then inject 1 ml IM monthly to finish out a year., Disp: 10 mL, Rfl: 1    famotidine (PEPCID) 40 MG tablet, Take 1 tablet (40 mg total) by mouth 2 (two) times daily., Disp: 180 tablet, Rfl: 1    FLUoxetine 20 MG capsule, Take 1 capsule (20 mg total) by mouth once daily., Disp: 30 capsule, Rfl: 5    gabapentin (NEURONTIN) 300 MG capsule, Take 2 capsules (600 mg total) by mouth 2 (two) times daily., Disp: 360 capsule, Rfl: 1    hydrOXYzine pamoate (VISTARIL) 25 MG Cap, Take 1 capsule (25 mg total) by mouth every 8 (eight) hours as needed (for itching)., Disp: 90 capsule, Rfl: 1    pantoprazole (PROTONIX) 40 MG tablet, Take 1 tablet (40 mg total) by mouth once daily., Disp: 90 tablet, Rfl: 3    triamcinolone acetonide 0.1% (KENALOG) 0.1 % cream, Apply to rash on body BID, tapering with improvement. AVOID USING TO FACE AND GROIN, Disp: 80 g, Rfl: 2    ustekinumab (STELARA) 90 mg/mL Syrg syringe, Inject 1 mL (90 mg total) into the skin every 8 weeks., Disp: 1 mL, Rfl: 5     0

## 2025-04-02 ENCOUNTER — PATIENT MESSAGE (OUTPATIENT)
Dept: GASTROENTEROLOGY | Facility: CLINIC | Age: 60
End: 2025-04-02
Payer: COMMERCIAL

## 2025-04-02 DIAGNOSIS — K51.319 ULCERATIVE RECTOSIGMOIDITIS WITH COMPLICATION: ICD-10-CM

## 2025-04-04 DIAGNOSIS — L29.9 PRURITUS: ICD-10-CM

## 2025-04-04 RX ORDER — HYDROXYZINE PAMOATE 25 MG/1
CAPSULE ORAL
Qty: 90 CAPSULE | Refills: 2 | Status: SHIPPED | OUTPATIENT
Start: 2025-04-04

## 2025-04-13 ENCOUNTER — HOSPITAL ENCOUNTER (EMERGENCY)
Facility: HOSPITAL | Age: 60
Discharge: HOME | End: 2025-04-13
Payer: COMMERCIAL

## 2025-04-13 VITALS
HEART RATE: 71 BPM | HEIGHT: 66 IN | TEMPERATURE: 98 F | SYSTOLIC BLOOD PRESSURE: 132 MMHG | DIASTOLIC BLOOD PRESSURE: 87 MMHG | OXYGEN SATURATION: 98 % | WEIGHT: 249 LBS | RESPIRATION RATE: 18 BRPM | BODY MASS INDEX: 40.02 KG/M2

## 2025-04-13 DIAGNOSIS — H81.13 BENIGN PAROXYSMAL POSITIONAL VERTIGO DUE TO BILATERAL VESTIBULAR DISORDER: Primary | ICD-10-CM

## 2025-04-13 DIAGNOSIS — R42 DIZZINESS: ICD-10-CM

## 2025-04-13 LAB
ALBUMIN SERPL BCP-MCNC: 3.7 G/DL (ref 3.5–5)
ALBUMIN/GLOB SERPL: 1.1 {RATIO}
ALP SERPL-CCNC: 165 U/L (ref 40–150)
ALT SERPL W P-5'-P-CCNC: 14 U/L
ANION GAP SERPL CALCULATED.3IONS-SCNC: 15 MMOL/L (ref 7–16)
AST SERPL W P-5'-P-CCNC: 24 U/L (ref 11–45)
BASOPHILS # BLD AUTO: 0.05 K/UL (ref 0–0.2)
BASOPHILS NFR BLD AUTO: 0.9 % (ref 0–1)
BILIRUB SERPL-MCNC: 0.4 MG/DL
BILIRUB UR QL STRIP: NEGATIVE
BUN SERPL-MCNC: 19 MG/DL (ref 10–20)
BUN/CREAT SERPL: 22 (ref 6–20)
CALCIUM SERPL-MCNC: 8.9 MG/DL (ref 8.4–10.2)
CHLORIDE SERPL-SCNC: 108 MMOL/L (ref 98–107)
CLARITY UR: NORMAL
CO2 SERPL-SCNC: 23 MMOL/L (ref 22–29)
COLOR UR: YELLOW
CREAT SERPL-MCNC: 0.85 MG/DL (ref 0.55–1.02)
DIFFERENTIAL METHOD BLD: ABNORMAL
EGFR (NO RACE VARIABLE) (RUSH/TITUS): 79 ML/MIN/1.73M2
EOSINOPHIL # BLD AUTO: 0.71 K/UL (ref 0–0.5)
EOSINOPHIL NFR BLD AUTO: 13 % (ref 1–4)
EOSINOPHIL NFR BLD MANUAL: 10 % (ref 1–4)
ERYTHROCYTE [DISTWIDTH] IN BLOOD BY AUTOMATED COUNT: 12.9 % (ref 11.5–14.5)
GLOBULIN SER-MCNC: 3.5 G/DL (ref 2–4)
GLUCOSE SERPL-MCNC: 120 MG/DL (ref 74–100)
GLUCOSE UR STRIP-MCNC: NEGATIVE MG/DL
HCT VFR BLD AUTO: 38.3 % (ref 38–47)
HGB BLD-MCNC: 12.2 G/DL (ref 12–16)
KETONES UR STRIP-SCNC: NEGATIVE MG/DL
LEUKOCYTE ESTERASE UR QL STRIP: NEGATIVE
LYMPHOCYTES # BLD AUTO: 1.34 K/UL (ref 1–4.8)
LYMPHOCYTES NFR BLD AUTO: 24.5 % (ref 27–41)
LYMPHOCYTES NFR BLD MANUAL: 22 % (ref 27–41)
MCH RBC QN AUTO: 29 PG (ref 27–31)
MCHC RBC AUTO-ENTMCNC: 31.9 G/DL (ref 32–36)
MCV RBC AUTO: 91 FL (ref 80–96)
MONOCYTES # BLD AUTO: 0.35 K/UL (ref 0–0.8)
MONOCYTES NFR BLD AUTO: 6.4 % (ref 2–6)
MONOCYTES NFR BLD MANUAL: 6 % (ref 2–6)
MPC BLD CALC-MCNC: 11 FL (ref 9.4–12.4)
NEUTROPHILS # BLD AUTO: 3.01 K/UL (ref 1.8–7.7)
NEUTROPHILS NFR BLD AUTO: 55.2 % (ref 53–65)
NEUTS BAND NFR BLD MANUAL: 1 % (ref 1–5)
NEUTS SEG NFR BLD MANUAL: 61 % (ref 50–62)
NITRITE UR QL STRIP: NEGATIVE
NRBC BLD MANUAL-RTO: ABNORMAL %
OHS QRS DURATION: 86 MS
OHS QTC CALCULATION: 437 MS
PH UR STRIP: 6 PH UNITS
PLATELET # BLD AUTO: 210 K/UL (ref 150–400)
PLATELET MORPHOLOGY: NORMAL
POTASSIUM SERPL-SCNC: 4 MMOL/L (ref 3.5–5.1)
PROT SERPL-MCNC: 7.2 G/DL (ref 6.4–8.3)
PROT UR QL STRIP: NEGATIVE
RBC # BLD AUTO: 4.21 M/UL (ref 4.2–5.4)
RBC # UR STRIP: NEGATIVE /UL
RBC MORPH BLD: NORMAL
SODIUM SERPL-SCNC: 142 MMOL/L (ref 136–145)
SP GR UR STRIP: 1.02
UROBILINOGEN UR STRIP-ACNC: 0.2 MG/DL
WBC # BLD AUTO: 5.46 K/UL (ref 4.5–11)

## 2025-04-13 PROCEDURE — 80053 COMPREHEN METABOLIC PANEL: CPT | Performed by: NURSE PRACTITIONER

## 2025-04-13 PROCEDURE — 99284 EMERGENCY DEPT VISIT MOD MDM: CPT | Mod: ,,, | Performed by: NURSE PRACTITIONER

## 2025-04-13 PROCEDURE — 99284 EMERGENCY DEPT VISIT MOD MDM: CPT | Mod: 25

## 2025-04-13 PROCEDURE — 93010 ELECTROCARDIOGRAM REPORT: CPT | Mod: ,,, | Performed by: STUDENT IN AN ORGANIZED HEALTH CARE EDUCATION/TRAINING PROGRAM

## 2025-04-13 PROCEDURE — 93005 ELECTROCARDIOGRAM TRACING: CPT

## 2025-04-13 PROCEDURE — 36415 COLL VENOUS BLD VENIPUNCTURE: CPT | Performed by: NURSE PRACTITIONER

## 2025-04-13 PROCEDURE — 25000003 PHARM REV CODE 250: Performed by: NURSE PRACTITIONER

## 2025-04-13 PROCEDURE — 85025 COMPLETE CBC W/AUTO DIFF WBC: CPT | Performed by: NURSE PRACTITIONER

## 2025-04-13 PROCEDURE — 81003 URINALYSIS AUTO W/O SCOPE: CPT | Performed by: NURSE PRACTITIONER

## 2025-04-13 RX ORDER — MECLIZINE HYDROCHLORIDE 50 MG/1
25 TABLET ORAL 3 TIMES DAILY PRN
Qty: 30 TABLET | Refills: 0 | Status: SHIPPED | OUTPATIENT
Start: 2025-04-13

## 2025-04-13 RX ORDER — MECLIZINE HCL 12.5 MG 12.5 MG/1
25 TABLET ORAL
Status: COMPLETED | OUTPATIENT
Start: 2025-04-13 | End: 2025-04-13

## 2025-04-13 RX ADMIN — MECLIZINE 25 MG: 12.5 TABLET ORAL at 08:04

## 2025-04-13 NOTE — ED PROVIDER NOTES
"Encounter Date: 2025       History     Chief Complaint   Patient presents with    Dizziness     Worse with big movement     60 y/o female with PMHX of KYRIE (no CPAP), HLD and UC arrived to the ED via POV with complaint of dizziness with nausea that has been occurring over the past 3-4 months. Symptoms became worse this morning when she turned over in bed and became nauseated. Denies vomiting. Describes dizziness as "room spinning". Symptoms worse with position changes. No new or changed medications. No recent illness. Has never been seen for these symptoms.     The history is provided by the patient.     Review of patient's allergies indicates:   Allergen Reactions    Opioids - morphine analogues Rash     Development of red rash at site morphine given.      Past Medical History:   Diagnosis Date    Allergy     Bilateral primary osteoarthritis of knee     Chronic pain of both knees 10/14/2021    Eosinophilia     Gangrene of gallbladder in cholecystitis 2021    Hematochezia     History of colonoscopy     Hyperlipidemia     Sleep apnea     Ulcerative colitis      Past Surgical History:   Procedure Laterality Date    ARTHROPLASTY, KNEE, TOTAL, USING COMPUTER-ASSISTED NAVIGATION Left 12/15/2022    Procedure: ARTHROPLASTY, KNEE, TOTAL, USING COMPUTER-ASSISTED NAVIGATION;  Surgeon: Jerome Valladares MD;  Location: [facility];  Service: Orthopedics;  Laterality: Left;    ARTHROPLASTY, KNEE, TOTAL, USING COMPUTER-ASSISTED NAVIGATION Right 2023    Procedure: ARTHROPLASTY, KNEE, TOTAL, USING COMPUTER-ASSISTED NAVIGATION;  Surgeon: Jerome Valladares MD;  Location: [facility];  Service: Orthopedics;  Laterality: Right;    Bilateral IA knee injection Bilateral 2021    D Shows    bladder tact       SECTION      CHOLECYSTECTOMY      FRACTURE SURGERY      jaw 1972    INJECTION      back    INJECTION OF ANESTHETIC AGENT AROUND MEDIAL BRANCH NERVES INNERVATING LUMBAR FACET JOINT Bilateral " 05/18/2023    Procedure: BIlateral L4-5,5-S1 MBB;  Surgeon: Amanda Goetz MD;  Location: [facility];  Service: Pain Management;  Laterality: Bilateral;  LOCAL    INJECTION OF ANESTHETIC AGENT AROUND MEDIAL BRANCH NERVES INNERVATING LUMBAR FACET JOINT Bilateral 12/12/2023    Procedure: Bilateral L4-5, 5-S1 medial branch block # 2;  Surgeon: Amanda Goetz MD;  Location: [facility];  Service: Pain Management;  Laterality: Bilateral;    INJECTION OF ANESTHETIC AGENT AROUND NERVE Bilateral 06/28/2022    Procedure: GNB   BILATERAL;  Surgeon: Amanda Goetz MD;  Location: [facility];  Service: Pain Management;  Laterality: Bilateral;  PT HAS NOT HAD VAC   STATES WILL BE TESTED AT [facility] IN [place]    JOINT REPLACEMENT  12/22 09/23 both knees done    knee scope Bilateral     KNEE SURGERY      LAPAROSCOPIC CHOLECYSTECTOMY N/A 03/31/2021    Procedure: LAPAROSCOPIC CHOLECYSTECTOMY CONVERTED TO OPEN;  Surgeon: Darnell Mcgraw MD;  Location: [facility] OR;  Service: General;  Laterality: N/A;  CONVERTED TO OPEN    MANDIBLE SURGERY      RADIOFREQUENCY ABLATION OF LUMBAR MEDIAL BRANCH NERVE AT SINGLE LEVEL Bilateral 02/15/2024    Procedure: Bilateral L4-5,5-S1 MB RFTC;  Surgeon: Amanda Goetz MD;  Location: [facility];  Service: Pain Management;  Laterality: Bilateral;    TUBAL LIGATION  2004     Family History   Problem Relation Name Age of Onset    Hypertension Father Jerome     Heart disease Father Jerome     Alcohol abuse Mother Gabriela     Migraines Daughter      Lung cancer Maternal Aunt       Social History[1]  Review of Systems   Constitutional:  Negative for activity change, appetite change, fatigue and fever.   Eyes:  Negative for visual disturbance.   Respiratory:  Negative for cough and shortness of breath.    Cardiovascular:  Negative for chest pain, palpitations and leg swelling.   Gastrointestinal:  Positive for nausea. Negative for abdominal pain, constipation, diarrhea  and vomiting.   Genitourinary:  Negative for dysuria, frequency and hematuria.   Musculoskeletal:  Negative for neck pain and neck stiffness.   Neurological:  Positive for dizziness and headaches (chronic). Negative for tremors, seizures, syncope, facial asymmetry, speech difficulty, weakness, light-headedness and numbness.   All other systems reviewed and are negative.      Physical Exam     Initial Vitals [04/13/25 0810]   BP Pulse Resp Temp SpO2   129/83 68 18 98.1 °F (36.7 °C) 95 %      MAP       --         Physical Exam    Nursing note and vitals reviewed.  Constitutional: Vital signs are normal. She appears well-developed and well-nourished. She is not diaphoretic. She is Obese . She is cooperative.  Non-toxic appearance. She does not have a sickly appearance. She does not appear ill. No distress.   HENT:   Head: Normocephalic and atraumatic.   Right Ear: Tympanic membrane, external ear and ear canal normal.   Left Ear: Tympanic membrane, external ear and ear canal normal.   Nose: Nose normal. Mouth/Throat: Uvula is midline, oropharynx is clear and moist and mucous membranes are normal.   Eyes: Conjunctivae, EOM and lids are normal. Pupils are equal, round, and reactive to light.   Neck: Neck supple. Carotid bruit is not present. No JVD present.   Normal range of motion.  Cardiovascular:  Normal rate, regular rhythm, normal heart sounds and intact distal pulses.           No murmur heard.  Pulmonary/Chest: Effort normal and breath sounds normal. No respiratory distress.   Abdominal: Abdomen is soft. There is no abdominal tenderness.   Musculoskeletal:         General: Normal range of motion.      Cervical back: Normal range of motion and neck supple. No rigidity. No spinous process tenderness or muscular tenderness. Normal range of motion.     Neurological: She is alert and oriented to person, place, and time. She has normal strength. No cranial nerve deficit or sensory deficit. Coordination and gait normal.  GCS eye subscore is 4. GCS verbal subscore is 5. GCS motor subscore is 6.   Skin: Skin is warm, dry and intact. Capillary refill takes less than 2 seconds.   Psychiatric: She has a normal mood and affect. Her speech is normal and behavior is normal. Judgment and thought content normal. Cognition and memory are normal.         Medical Screening Exam   See Full Note    ED Course   Procedures  Labs Reviewed   COMPREHENSIVE METABOLIC PANEL - Abnormal       Result Value    Sodium 142      Potassium 4.0      Chloride 108 (*)     CO2 23      Anion Gap 15      Glucose 120 (*)     BUN 19      Creatinine 0.85      BUN/Creatinine Ratio 22 (*)     Calcium 8.9      Total Protein 7.2      Albumin 3.7      Globulin 3.5      A/G Ratio 1.1      Bilirubin, Total 0.4      Alk Phos 165 (*)     ALT 14      AST 24      eGFR 79     CBC WITH DIFFERENTIAL - Abnormal    WBC 5.46      RBC 4.21      Hemoglobin 12.2      Hematocrit 38.3      MCV 91.0      MCH 29.0      MCHC 31.9 (*)     RDW 12.9      Platelet Count 210      MPV 11.0      Neutrophils % 55.2      Lymphocytes % 24.5 (*)     Neutrophils, Abs 3.01      Lymphocytes, Absolute 1.34      Diff Type Manual      Monocytes % 6.4 (*)     Eosinophils % 13.0 (*)     Basophils % 0.9      Monocytes, Absolute 0.35      Eosinophils, Absolute 0.71 (*)     Basophils, Absolute 0.05     MANUAL DIFFERENTIAL - Abnormal    Segmented Neutrophils, Man % 61      Bands, Man % 1      Lymphocytes, Man % 22 (*)     Monocytes, Man % 6      Eosinophils, Man % 10 (*)     nRBC, Manual        Platelet Morphology Normal      RBC Morphology Normal     CBC W/ AUTO DIFFERENTIAL    Narrative:     The following orders were created for panel order CBC auto differential.  Procedure                               Abnormality         Status                     ---------                               -----------         ------                     CBC with Differential[[id number]]       Abnormal            Final result            "    Manual Differential[[id number]]         Abnormal            Final result                 Please view results for these tests on the individual orders.   URINALYSIS, REFLEX TO URINE CULTURE    Color, UA Yellow      Clarity, UA Slightly Cloudy      pH, UA 6.0      Leukocytes, UA Negative      Nitrites, UA Negative      Protein, UA Negative      Glucose, UA Negative      Ketones, UA Negative      Urobilinogen, UA 0.2      Bilirubin, UA Negative      Blood, UA Negative      Specific Gravity, UA 1.025          ECG Results              EKG 12-lead (Final result)        Collection Time Result Time QRS Duration OHS QTC Calculation    04/13/25 08:26:10 04/13/25 09:08:26 86 437                     Final result by Interface, Lab In [facility] (04/13/25 09:08:31)                   Narrative:    Test Reason : R42,    Vent. Rate :  64 BPM     Atrial Rate :  64 BPM     P-R Int : 142 ms          QRS Dur :  86 ms      QT Int : 424 ms       P-R-T Axes : -13 -16  56 degrees    QTcB Int : 437 ms    Normal sinus rhythm  Normal ECG  When compared with ECG of 01-Sep-2023 10:26,  No significant change was found  Confirmed by Justyn Oliveira ([id number]) on 4/13/2025 9:08:22 AM    Referred By: AAAREFERRAL SELF           Confirmed By: Justyn Oliveira                                  Imaging Results    None          Medications   meclizine tablet 25 mg (25 mg Oral Given 4/13/25 0848)     Medical Decision Making  58 y/o female arrived to the ED via POV with complaint of dizziness with nausea that has been occurring over the past 3-4 months. Symptoms became worse this morning when she turned over in bed and became nauseated. Denies vomiting. Describes dizziness as "room spinning". Symptoms worse with position changes. No new or changed medications. No recent illness. Has never been seen for these symptoms.     Problems Addressed:  Benign paroxysmal positional vertigo due to bilateral vestibular disorder:     Details: Antivert 25 mg given in the " ED. RX for Antivert sent into pharmacy. Reviewed discharge instructions with patient. Detailed strict return precautions. Patient agreed to treatment plan and verbalized understanding.   Dizziness:     Details: EKG: wnl. Labs Ok. UA- no infection. Orthostatic vitals negative for orthostatic hypotension.    Amount and/or Complexity of Data Reviewed  Labs: ordered. Decision-making details documented in ED Course.  ECG/medicine tests: ordered. Decision-making details documented in ED Course.     Details: EKG: NSR with HR 64 bpm, QTc at437    Risk  Prescription drug management.                                      Clinical Impression:   Final diagnoses:  [R42] Dizziness  [H81.13] Benign paroxysmal positional vertigo due to bilateral vestibular disorder (Primary)        ED Disposition Condition    Discharge Stable          ED Prescriptions       Medication Sig Dispense Start Date End Date Auth. Provider    meclizine (ANTIVERT) 50 MG tablet Take 0.5 tablets (25 mg total) by mouth 3 (three) times daily as needed for Dizziness or Nausea. 30 tablet 4/13/2025 -- Bharti Oropeza FNP          Follow-up Information       Follow up With Specialties Details Why Contact Info    Claude Oneil MD Family Medicine, Emergency Medicine Call in 1 day schedule appointment to follow up from your ER visit [address] [facility] [place] [address]  [phone]                 [1]   Social History  Tobacco Use    Smoking status: Former     Current packs/day: 0.00     Average packs/day: 1 pack/day for 33.0 years (33.0 ttl pk-yrs)     Types: Cigarettes     Start date: 1977     Quit date: 2010     Years since quitting: 15.2     Passive exposure: Never    Smokeless tobacco: Never   Substance Use Topics    Alcohol use: Not Currently     Alcohol/week: 1.0 standard drink of alcohol     Types: 1 Drinks containing 0.5 oz of alcohol per week    Drug use: Not Currently        Bharti Oropeza FNP  04/13/25  [id number]

## 2025-04-13 NOTE — ED TRIAGE NOTES
PT ARRIVED TO ER WITH C/O DIZZINESS WHEN SHE WOKE UP THIS MORNING. PT STATES THAT IT IS WORSE WITH BIG POSITION CHANGES AND THAT SHE GOT NAUSEATED WHEN SHE TRIED TO BEND OVER.

## 2025-04-13 NOTE — DISCHARGE INSTRUCTIONS
-Take Antivert 25 mg as directed for dizziness  -Rise slowly from lying or sitting position to help decrease risk of dizziness that may lead to falls

## 2025-04-15 ENCOUNTER — OFFICE VISIT (OUTPATIENT)
Dept: FAMILY MEDICINE | Facility: CLINIC | Age: 60
End: 2025-04-15
Payer: COMMERCIAL

## 2025-04-15 VITALS
DIASTOLIC BLOOD PRESSURE: 86 MMHG | HEIGHT: 66 IN | RESPIRATION RATE: 18 BRPM | SYSTOLIC BLOOD PRESSURE: 143 MMHG | HEART RATE: 71 BPM | BODY MASS INDEX: 41.25 KG/M2 | TEMPERATURE: 98 F | WEIGHT: 256.63 LBS | OXYGEN SATURATION: 96 %

## 2025-04-15 DIAGNOSIS — R42 VERTIGO: Primary | ICD-10-CM

## 2025-04-15 DIAGNOSIS — R11.0 NAUSEA: ICD-10-CM

## 2025-04-15 PROCEDURE — 1160F RVW MEDS BY RX/DR IN RCRD: CPT | Mod: ,,, | Performed by: NURSE PRACTITIONER

## 2025-04-15 PROCEDURE — 3008F BODY MASS INDEX DOCD: CPT | Mod: ,,, | Performed by: NURSE PRACTITIONER

## 2025-04-15 PROCEDURE — 99213 OFFICE O/P EST LOW 20 MIN: CPT | Mod: 25,,, | Performed by: NURSE PRACTITIONER

## 2025-04-15 PROCEDURE — 3079F DIAST BP 80-89 MM HG: CPT | Mod: ,,, | Performed by: NURSE PRACTITIONER

## 2025-04-15 PROCEDURE — 96372 THER/PROPH/DIAG INJ SC/IM: CPT | Mod: ,,, | Performed by: NURSE PRACTITIONER

## 2025-04-15 PROCEDURE — 3077F SYST BP >= 140 MM HG: CPT | Mod: ,,, | Performed by: NURSE PRACTITIONER

## 2025-04-15 PROCEDURE — 1159F MED LIST DOCD IN RCRD: CPT | Mod: ,,, | Performed by: NURSE PRACTITIONER

## 2025-04-15 PROCEDURE — 3044F HG A1C LEVEL LT 7.0%: CPT | Mod: ,,, | Performed by: NURSE PRACTITIONER

## 2025-04-15 RX ORDER — METHYLPREDNISOLONE 4 MG/1
TABLET ORAL
Qty: 21 EACH | Refills: 0 | Status: SHIPPED | OUTPATIENT
Start: 2025-04-15 | End: 2025-04-25

## 2025-04-15 RX ORDER — ONDANSETRON 4 MG/1
4 TABLET, ORALLY DISINTEGRATING ORAL EVERY 8 HOURS PRN
Qty: 15 TABLET | Refills: 1 | Status: SHIPPED | OUTPATIENT
Start: 2025-04-15

## 2025-04-15 RX ORDER — DEXAMETHASONE SODIUM PHOSPHATE 4 MG/ML
4 INJECTION, SOLUTION INTRA-ARTICULAR; INTRALESIONAL; INTRAMUSCULAR; INTRAVENOUS; SOFT TISSUE
Status: COMPLETED | OUTPATIENT
Start: 2025-04-15 | End: 2025-04-15

## 2025-04-15 RX ADMIN — DEXAMETHASONE SODIUM PHOSPHATE 4 MG: 4 INJECTION, SOLUTION INTRA-ARTICULAR; INTRALESIONAL; INTRAMUSCULAR; INTRAVENOUS; SOFT TISSUE at 02:04

## 2025-04-15 NOTE — LETTER
April 17, 2025      [facility] [place] [facility] [address] [place] [address]  Phone: [phone]  Fax: [phone]       Patient: Vandana Price   YOB: 1965  Date of Visit: 04/17/2025    To Whom It May Concern:    Enrike Price  was at [facility] on 04/15/25. The patient may return to work/school on 04/21/25 with no restrictions. If you have any questions or concerns, or if I can be of further assistance, please do not hesitate to contact me.    Sincerely,  KIMBERLEE Bradley MA

## 2025-04-15 NOTE — LETTER
April 15, 2025    Vandana Price  [address] [place] [address] [place] [address]             [facility]  Family Medicine  [address] [place] [address] [place] [address]  Phone: [phone]  Fax: [phone]   April 15, 2025     Patient: Vandana Price   YOB: 1965   Date of Visit: 4/15/2025       To Whom it May Concern:    Vandana Price was seen in my clinic on 4/15/2025. She may return to work on 4/16/2025 .    Please excuse her from any classes or work missed.    If you have any questions or concerns, please don't hesitate to call.    Sincerely,         Faby Boyle FNP

## 2025-04-15 NOTE — LETTER
April 17, 2025      [facility] [address] [place] [address]  Phone: [phone]  Fax: [phone]       Patient: Vandana Price   YOB: 1965  Date of Visit: 04/17/2025    To Whom It May Concern:    Enrike Price  was at [facility] on 04/15/25. The patient may return to work/school on 04/17/25 with no restrictions. If you have any questions or concerns, or if I can be of further assistance, please do not hesitate to contact me.    Sincerely,  KIMBERLEE Bradley MA

## 2025-04-21 PROBLEM — R42 VERTIGO: Status: ACTIVE | Noted: 2025-04-21

## 2025-04-21 PROBLEM — R11.0 NAUSEA: Status: ACTIVE | Noted: 2025-04-21

## 2025-04-22 NOTE — PROGRESS NOTES
KIMBERLEE Bradley   [facility]  [address]  [place] [address]     PATIENT NAME: Vandana Price  : 1965  DATE: 4/15/25  MRN: [id number]      Billing Provider: KIMBERLEE Bradley  Level of Service:   Patient PCP Information       Provider PCP Type    Claude Oneil MD General            Reason for Visit / Chief Complaint: Dizziness and Nausea   Health Maintenance Due   Topic Date Due    TETANUS VACCINE  Never done    Shingles Vaccine (1 of 2) Never done          Update PCP  Update Chief Complaint         History of Present Illness / Problem Focused Workflow     History of Present Illness    CHIEF COMPLAINT:  Patient presents today for dizziness and vertigo.    HISTORY OF PRESENT ILLNESS:  She has had dizziness for the past couple months, initially presenting as lightheadedness when opening and closing eyes. Symptoms significantly worsened  morning, with movement exacerbating symptoms and causing nausea. She notes particular difficulty with right-sided movements, which previously were tolerable but now trigger dizziness both when lying down and returning to upright position. She is unable to perform head movements including turning, looking down, and closing eyes. She also experiences tingling sensation radiating from the top of the head. She visited the ER on  where her blood pressure was elevated. Blood work was performed at the hospital. Prescribed meclizine has not provided symptom relief. She attempted vertigo exercises at home, including positioning her head off the bed, but these exercises exacerbate her symptoms, causing significant nausea and dizziness.    PAST MEDICAL HISTORY:  She experienced a previous episode of vertigo approximately 4-5 years ago that was less severe than current symptoms and resolved with positional maneuvers.      ROS:  General: -fever, -chills, -fatigue, -weight gain, -weight loss  Eyes: -vision changes, -redness, -discharge  ENT: -ear pain, -nasal  congestion, -sore throat, +ear discharge  Cardiovascular: -chest pain, -palpitations, -lower extremity edema  Respiratory: -cough, -shortness of breath  Gastrointestinal: -abdominal pain, +nausea, -vomiting, -diarrhea, -constipation, -blood in stool  Genitourinary: -dysuria, -hematuria, -frequency  Musculoskeletal: -joint pain, -muscle pain  Skin: -rash, -lesion  Neurological: -headache, +dizziness, -numbness, +tingling, +lightheadedness, +vertigo  Psychiatric: -anxiety, -depression, -sleep difficulty          Hemoglobin A1C   Date Value Ref Range Status   01/24/2025 5.4 <=7.0 % Final     Comment:       Normal:               <5.7%  Pre-Diabetic:       5.7% to 6.4%  Diabetic:             >6.4%  Diabetic Goal:     <7%   07/25/2024 5.3 4.5 - 6.6 % Final     Comment:       Normal:               <5.7%  Pre-Diabetic:       5.7% to 6.4%  Diabetic:             >6.4%  Diabetic Goal:     <7%   11/03/2022 5.7 4.5 - 6.6 % Final     Comment:       Normal:               <5.7%  Pre-Diabetic:       5.7% to 6.4%  Diabetic:             >6.4%  Diabetic Goal:     <7%        CMP  Sodium   Date Value Ref Range Status   04/13/2025 142 136 - 145 mmol/L Final     Potassium   Date Value Ref Range Status   04/13/2025 4.0 3.5 - 5.1 mmol/L Final     Chloride   Date Value Ref Range Status   04/13/2025 108 (H) 98 - 107 mmol/L Final     CO2   Date Value Ref Range Status   04/13/2025 23 22 - 29 mmol/L Final     Glucose   Date Value Ref Range Status   04/13/2025 120 (H) 74 - 100 mg/dL Final     BUN   Date Value Ref Range Status   04/13/2025 19 10 - 20 mg/dL Final     Creatinine   Date Value Ref Range Status   04/13/2025 0.85 0.55 - 1.02 mg/dL Final     Calcium   Date Value Ref Range Status   04/13/2025 8.9 8.4 - 10.2 mg/dL Final     Total Protein   Date Value Ref Range Status   04/13/2025 7.2 6.4 - 8.3 g/dL Final     Albumin   Date Value Ref Range Status   04/13/2025 3.7 3.5 - 5.0 g/dL Final     Bilirubin, Total   Date Value Ref Range Status    04/13/2025 0.4 <=1.5 mg/dL Final     Alk Phos   Date Value Ref Range Status   04/13/2025 165 (H) 40 - 150 U/L Final     AST   Date Value Ref Range Status   04/13/2025 24 11 - 45 U/L Final     ALT   Date Value Ref Range Status   04/13/2025 14 <=55 U/L Final     Anion Gap   Date Value Ref Range Status   04/13/2025 15 7 - 16 mmol/L Final     eGFR   Date Value Ref Range Status   04/13/2025 79 >=60 mL/min/1.73m2 Final     Comment:     Estimated GFR calculated using the CKD-EPI creatinine (2021) equation.        Lab Results   Component Value Date    WBC 5.46 04/13/2025    RBC 4.21 04/13/2025    HGB 12.2 04/13/2025    HCT 38.3 04/13/2025    MCV 91.0 04/13/2025    MCH 29.0 04/13/2025    MCHC 31.9 (L) 04/13/2025    RDW 12.9 04/13/2025     04/13/2025    MPV 11.0 04/13/2025    LYMPH 24.5 (L) 04/13/2025    LYMPH 1.34 04/13/2025    LYMPH 22 (L) 04/13/2025    MONO 6.4 (H) 04/13/2025    MONO 6 04/13/2025    EOS 0.71 (H) 04/13/2025    BASO 0.05 04/13/2025    EOSINOPHIL 13.0 (H) 04/13/2025    EOSINOPHIL 10 (H) 04/13/2025    BASOPHIL 0.9 04/13/2025        Lab Results   Component Value Date    CHOL 165 01/24/2025    CHOL 161 07/25/2024    CHOL 188 11/03/2022     Lab Results   Component Value Date    HDL 54 01/24/2025    HDL 51 07/25/2024    HDL 61 (H) 11/03/2022     Lab Results   Component Value Date    LDLCALC 86 01/24/2025    LDLCALC 85 07/25/2024    LDLCALC 106 11/03/2022     Lab Results   Component Value Date    TRIG 123 01/24/2025    TRIG 124 07/25/2024    TRIG 105 11/03/2022     Lab Results   Component Value Date    CHOLHDL 3.1 01/24/2025    CHOLHDL 3.2 07/25/2024    CHOLHDL 3.1 11/03/2022        Wt Readings from Last 3 Encounters:   04/15/25 1352 116.4 kg (256 lb 9.6 oz)   04/13/25 0810 112.9 kg (249 lb)   03/17/25 1043 114.3 kg (251 lb 15.8 oz)        BP Readings from Last 3 Encounters:   04/15/25 (!) 143/86   04/13/25 132/87   02/28/25 (!) 143/77        Review of Systems     Review of Systems       Medical / Social /  Family History     Past Medical History:   Diagnosis Date    Allergy     Bilateral primary osteoarthritis of knee     Chronic pain of both knees 10/14/2021    Eosinophilia     Gangrene of gallbladder in cholecystitis 2021    Hematochezia     History of colonoscopy     Hyperlipidemia     Sleep apnea     Ulcerative colitis        Past Surgical History:   Procedure Laterality Date    ARTHROPLASTY, KNEE, TOTAL, USING COMPUTER-ASSISTED NAVIGATION Left 12/15/2022    Procedure: ARTHROPLASTY, KNEE, TOTAL, USING COMPUTER-ASSISTED NAVIGATION;  Surgeon: Jerome Valladares MD;  Location: [facility] OR;  Service: Orthopedics;  Laterality: Left;    ARTHROPLASTY, KNEE, TOTAL, USING COMPUTER-ASSISTED NAVIGATION Right 2023    Procedure: ARTHROPLASTY, KNEE, TOTAL, USING COMPUTER-ASSISTED NAVIGATION;  Surgeon: Jerome Valladares MD;  Location: [facility] OR;  Service: Orthopedics;  Laterality: Right;    Bilateral IA knee injection Bilateral 2021    D Shows    bladder tact       SECTION      CHOLECYSTECTOMY      FRACTURE SURGERY      1972    INJECTION      back    INJECTION OF ANESTHETIC AGENT AROUND MEDIAL BRANCH NERVES INNERVATING LUMBAR FACET JOINT Bilateral 2023    Procedure: BIlateral L4-5,5-S1 MBB;  Surgeon: Amanda Goetz MD;  Location: [facility];  Service: Pain Management;  Laterality: Bilateral;  LOCAL    INJECTION OF ANESTHETIC AGENT AROUND MEDIAL BRANCH NERVES INNERVATING LUMBAR FACET JOINT Bilateral 2023    Procedure: Bilateral L4-5, 5-S1 medial branch block # 2;  Surgeon: Amanda Goetz MD;  Location: [facility];  Service: Pain Management;  Laterality: Bilateral;    INJECTION OF ANESTHETIC AGENT AROUND NERVE Bilateral 2022    Procedure: GNB   BILATERAL;  Surgeon: Amanda Goetz MD;  Location: [facility];  Service: Pain Management;  Laterality: Bilateral;  PT HAS NOT HAD VAC   STATES WILL BE TESTED AT [facility] IN [place]    JOINT  REPLACEMENT  12/22 09/23 both knees done    knee scope Bilateral     KNEE SURGERY      LAPAROSCOPIC CHOLECYSTECTOMY N/A 03/31/2021    Procedure: LAPAROSCOPIC CHOLECYSTECTOMY CONVERTED TO OPEN;  Surgeon: Darnell Mcgraw MD;  [email or web address] OR;  Service: General;  Laterality: N/A;  CONVERTED TO OPEN    MANDIBLE SURGERY      RADIOFREQUENCY ABLATION OF LUMBAR MEDIAL BRANCH NERVE AT SINGLE LEVEL Bilateral 02/15/2024    Procedure: Bilateral L4-5,5-S1 MB RFTC;  Surgeon: Amanda Goetz MD;  Location: [facility];  Service: Pain Management;  Laterality: Bilateral;    TUBAL LIGATION  2004       Social History  Ms.  reports that she quit smoking about 15 years ago. Her smoking use included cigarettes. She started smoking about 48 years ago. She has a 33 pack-year smoking history. She has never been exposed to tobacco smoke. She has never used smokeless tobacco. She reports that she does not currently use alcohol after a past usage of about 1.0 standard drink of alcohol per week. She reports that she does not currently use drugs.    Family History  Ms.'s family history includes Alcohol abuse in her mother; Heart disease in her father; Hypertension in her father; Lung cancer in her maternal aunt; Migraines in her daughter.    Medications and Allergies     Medications  Outpatient Medications Marked as Taking for the 4/15/25 encounter (Office Visit) with Faby Boyle FNP   Medication Sig Dispense Refill    aspirin (ECOTRIN) 81 MG EC tablet Take 81 mg by mouth once daily.      celecoxib (CELEBREX) 200 MG capsule Take 1 capsule (200 mg total) by mouth 2 (two) times daily. 180 capsule 1    cyanocobalamin 1,000 mcg/mL injection Inject 1 ml Im every 2 weeks for 8 weeks then inject 1 ml IM monthly to finish out a year. 10 mL 1    FLUoxetine 20 MG capsule Take 1 capsule (20 mg total) by mouth once daily. 30 capsule 5    gabapentin (NEURONTIN) 300 MG capsule Take 2 capsules (600 mg total) by mouth 2 (two) times daily. 360  "capsule 1    hydrOXYzine pamoate (VISTARIL) 25 MG Cap TAKE ONE Capsule BY MOUTH EVERY 8 HOURS AS NEEDED FOR ITCHING 90 capsule 2    pantoprazole (PROTONIX) 40 MG tablet Take 1 tablet (40 mg total) by mouth once daily. 90 tablet 3    triamcinolone acetonide 0.1% (KENALOG) 0.1 % cream Apply to rash on body BID, tapering with improvement. AVOID USING TO FACE AND GROIN 80 g 2    ustekinumab (STELARA) 90 mg/mL Syrg syringe Inject 1 mL (90 mg total) into the skin every 8 weeks. 1 mL 5       Allergies  Review of patient's allergies indicates:   Allergen Reactions    Opioids - morphine analogues Rash     Development of red rash at site morphine given.        Physical Examination     Vitals:    04/15/25 1352   BP: (!) 143/86   BP Location: Left arm   Patient Position: Sitting   Pulse: 71   Resp: 18   Temp: 98.2 °F (36.8 °C)   TempSrc: Oral   SpO2: 96%   Weight: 116.4 kg (256 lb 9.6 oz)   Height: 5' 6" (1.676 m)      Physical Exam  Constitutional:       Appearance: Normal appearance. She is obese.   HENT:      Head: Normocephalic.      Right Ear: Hearing, ear canal and external ear normal. A middle ear effusion is present.      Left Ear: Hearing, ear canal and external ear normal. A middle ear effusion is present.      Nose: Nose normal.   Eyes:      Extraocular Movements: Extraocular movements intact.   Cardiovascular:      Rate and Rhythm: Normal rate and regular rhythm.      Pulses: Normal pulses.      Heart sounds: Normal heart sounds.   Pulmonary:      Effort: Pulmonary effort is normal.      Breath sounds: Normal breath sounds.   Skin:     General: Skin is warm and dry.      Capillary Refill: Capillary refill takes less than 2 seconds.   Neurological:      General: No focal deficit present.      Mental Status: She is alert and oriented to person, place, and time.   Psychiatric:         Mood and Affect: Mood normal.         Behavior: Behavior normal.          Assessment and Plan (including Health Maintenance)      " Problem List  Smart Sets  Document Outside HM   :    Plan:     There are no Patient Instructions on file for this visit.       Health Maintenance Due   Topic Date Due    TETANUS VACCINE  Never done    Shingles Vaccine (1 of 2) Never done       Problem List Items Addressed This Visit    None  Visit Diagnoses         Vertigo    -  Primary    Relevant Medications    dexAMETHasone injection 4 mg (Completed)    methylPREDNISolone (MEDROL DOSEPACK) 4 mg tablet      Nausea        Relevant Medications    ondansetron (ZOFRAN-ODT) 4 MG TbDL          Assessment & Plan    H81.11 Benign paroxysmal vertigo, right ear  R42 Dizziness and giddiness  H92.10 Otorrhea, unspecified ear  H81.4 Vertigo of central origin    IMPRESSION:  - Assessed symptoms of vertigo, including dizziness, nausea, and difficulty with movement.  - Considered fluid in ears as potential contributing factor to dizziness.  - Discussed possibility of CT but deemed it unnecessary based on clinical presentation.    BENIGN PAROXYSMAL VERTIGO (RIGHT EAR):  - Assessed the condition as likely benign paroxysmal vertigo based on symptoms and presentation.  - Explained the mechanism of vertigo, describing it as misaligned crystals in the inner ear.  - Discussed the purpose of vertigo exercises in realigning the crystals.  - Recommend physical therapy maneuvers for vertigo management.  - Patient to attempt vertigo exercises at home after taking antiemetic medication.  - Recommend exercises and physical therapy maneuvers to help with dizziness.    DIZZINESS AND GIDDINESS:  - Monitored the patient's dizziness, which has been present for a few months and worsened recently, particularly on the right side.  - Monitored the patient's dizziness, which has been present for a few months and worsened recently, particularly with movement, turning, looking down, and shutting eyes.  - Assessed the severity of the dizziness and its impact on the patient's daily activities.  - Prescribed  Zofran for nausea as needed, particularly to help with vertigo exercises.    OTORRHEA (UNSPECIFIED EAR):  - Evaluated the patient's ears and noted fluid presence, potentially causing dizziness.  - Evaluated the patient's ears and noted fluid presence, which could be related to otorrhea.  - Prescribed steroid treatment to help dry up the fluid in the ears.  - Administered steroid injection in office and prescribed Medrol Dosepak (oral steroid taper), with instructions to follow package directions for dosing schedule.  - Informed the patient about the potential side effect of steroids slightly increasing blood pressure.    CENTRAL VERTIGO (RULED OUT):  - Assessed the possibility of central vertigo but determined the condition as more likely to be benign paroxysmal vertigo based on symptoms and presentation.        Vertigo  -     dexAMETHasone injection 4 mg  -     methylPREDNISolone (MEDROL DOSEPACK) 4 mg tablet; use as directed  Dispense: 21 each; Refill: 0    Nausea  -     ondansetron (ZOFRAN-ODT) 4 MG TbDL; Take 1 tablet (4 mg total) by mouth every 8 (eight) hours as needed (nausea).  Dispense: 15 tablet; Refill: 1       Health Maintenance Topics with due status: Not Due       Topic Last Completion Date    Colorectal Cancer Screening 12/07/2023    Mammogram 09/24/2024    Hemoglobin A1c (Diabetic Prevention Screening) 01/24/2025    Lipid Panel 01/24/2025    RSV Vaccine (Age 60+ and Pregnant patients) Not Due         Future Appointments   Date Time Provider Department Center   5/2/2025 11:00 AM Amanda Goetz MD [facility]   7/24/2025  8:30 AM Claude Oneil MD [facility] ROSELINE [place]   8/19/2025  8:00 AM Bessy Álvarez FNP [facility]   10/8/2025  8:30 AM Jerome Valladares MD [facility]THO [facility]   11/4/2025  8:00 AM [facility] MAMMO[facility] M[facility]MO [facility]   1/23/2026  8:00 AM Claude Oneil MD [facility]LINE [place]   3/18/2026  9:00 AM Fidelina Gonzalez MD [facility]         No follow-ups on file.    Health Maintenance Due   Topic Date Due    TETANUS VACCINE  Never done    Shingles Vaccine (1 of 2) Never done        Signature:  KIMBERLEE Bradley    Date of encounter: 4/15/25  This note was generated with the assistance of ambient listening technology. Verbal consent was obtained by the patient and accompanying visitor(s) for the recording of patient appointment to facilitate this note. I attest to having reviewed and edited the generated note for accuracy, though some syntax or spelling errors may persist. Please contact the author of this note for any clarification.

## 2025-04-25 ENCOUNTER — OFFICE VISIT (OUTPATIENT)
Dept: FAMILY MEDICINE | Facility: CLINIC | Age: 60
End: 2025-04-25
Payer: COMMERCIAL

## 2025-04-25 VITALS
BODY MASS INDEX: 40.66 KG/M2 | TEMPERATURE: 99 F | SYSTOLIC BLOOD PRESSURE: 118 MMHG | HEIGHT: 66 IN | OXYGEN SATURATION: 100 % | DIASTOLIC BLOOD PRESSURE: 79 MMHG | WEIGHT: 253 LBS | HEART RATE: 95 BPM | RESPIRATION RATE: 18 BRPM

## 2025-04-25 DIAGNOSIS — H66.93 ACUTE BACTERIAL OTITIS MEDIA, BILATERAL: ICD-10-CM

## 2025-04-25 DIAGNOSIS — J01.00 ACUTE NON-RECURRENT MAXILLARY SINUSITIS: Primary | ICD-10-CM

## 2025-04-25 PROCEDURE — 3078F DIAST BP <80 MM HG: CPT | Mod: ,,, | Performed by: NURSE PRACTITIONER

## 2025-04-25 PROCEDURE — 99213 OFFICE O/P EST LOW 20 MIN: CPT | Mod: 25,,, | Performed by: NURSE PRACTITIONER

## 2025-04-25 PROCEDURE — 96372 THER/PROPH/DIAG INJ SC/IM: CPT | Mod: ,,, | Performed by: NURSE PRACTITIONER

## 2025-04-25 PROCEDURE — 3074F SYST BP LT 130 MM HG: CPT | Mod: ,,, | Performed by: NURSE PRACTITIONER

## 2025-04-25 PROCEDURE — 3044F HG A1C LEVEL LT 7.0%: CPT | Mod: ,,, | Performed by: NURSE PRACTITIONER

## 2025-04-25 PROCEDURE — 3008F BODY MASS INDEX DOCD: CPT | Mod: ,,, | Performed by: NURSE PRACTITIONER

## 2025-04-25 RX ORDER — METHYLPREDNISOLONE ACETATE 40 MG/ML
40 INJECTION, SUSPENSION INTRA-ARTICULAR; INTRALESIONAL; INTRAMUSCULAR; SOFT TISSUE ONCE
Status: COMPLETED | OUTPATIENT
Start: 2025-04-25 | End: 2025-04-25

## 2025-04-25 RX ORDER — CEFTRIAXONE 1 G/1
1 INJECTION, POWDER, FOR SOLUTION INTRAMUSCULAR; INTRAVENOUS
Status: COMPLETED | OUTPATIENT
Start: 2025-04-25 | End: 2025-04-25

## 2025-04-25 RX ORDER — AMOXICILLIN 500 MG/1
500 TABLET, FILM COATED ORAL EVERY 12 HOURS
Qty: 20 TABLET | Refills: 0 | Status: SHIPPED | OUTPATIENT
Start: 2025-04-25 | End: 2025-05-05

## 2025-04-25 RX ADMIN — METHYLPREDNISOLONE ACETATE 40 MG: 40 INJECTION, SUSPENSION INTRA-ARTICULAR; INTRALESIONAL; INTRAMUSCULAR; SOFT TISSUE at 02:04

## 2025-04-25 RX ADMIN — CEFTRIAXONE 1 G: 1 INJECTION, POWDER, FOR SOLUTION INTRAMUSCULAR; INTRAVENOUS at 02:04

## 2025-05-01 ENCOUNTER — OFFICE VISIT (OUTPATIENT)
Dept: PAIN MEDICINE | Facility: CLINIC | Age: 60
End: 2025-05-01
Payer: COMMERCIAL

## 2025-05-01 DIAGNOSIS — M47.817 LUMBOSACRAL SPONDYLOSIS WITHOUT MYELOPATHY: Primary | ICD-10-CM

## 2025-05-01 PROCEDURE — 98005 SYNCH AUDIO-VIDEO EST LOW 20: CPT | Mod: 95,,, | Performed by: PAIN MEDICINE

## 2025-05-01 PROCEDURE — 3044F HG A1C LEVEL LT 7.0%: CPT | Mod: 95,,, | Performed by: PAIN MEDICINE

## 2025-05-01 NOTE — PROGRESS NOTES
The patient location is: Home  The chief complaint leading to consultation is: Chronic lower back pain    Visit type: audiovisual    Face to Face time with patient: 15 minutes  20 minutes of total time spent on the encounter, which includes face to face time and non-face to face time preparing to see the patient (eg, review of tests), Obtaining and/or reviewing separately obtained history, Documenting clinical information in the electronic or other health record, Independently interpreting results (not separately reported) and communicating results to the patient/family/caregiver, or Care coordination (not separately reported).         Each patient to whom he or she provides medical services by telemedicine is:  (1) informed of the relationship between the physician and patient and the respective role of any other health care provider with respect to management of the patient; and (2) notified that he or she may decline to receive medical services by telemedicine and may withdraw from such care at any time.    Notes:  59-year-old female returns for re-evaluation of chronic lower back pain and degenerative changes.  She failed conservative management with physical therapy, gabapentin and Celebrex.  She received lumbar medial branch block injections with good relief but unfortunately the radiofrequency of the medial branches failed to provide any significant long-term improvement.  Her pain is primarily axial without leg involvement.  She was evaluated by Dr. Marks's Nurse Practioner but there was no surgical indication.  She requests a referral to Neurosurgery for re-evaluation.

## 2025-05-05 ENCOUNTER — OFFICE VISIT (OUTPATIENT)
Dept: FAMILY MEDICINE | Facility: CLINIC | Age: 60
End: 2025-05-05
Payer: COMMERCIAL

## 2025-05-05 VITALS
OXYGEN SATURATION: 95 % | SYSTOLIC BLOOD PRESSURE: 126 MMHG | RESPIRATION RATE: 16 BRPM | WEIGHT: 255 LBS | HEART RATE: 75 BPM | TEMPERATURE: 99 F | BODY MASS INDEX: 40.98 KG/M2 | DIASTOLIC BLOOD PRESSURE: 82 MMHG | HEIGHT: 66 IN

## 2025-05-05 DIAGNOSIS — H65.01 NON-RECURRENT ACUTE SEROUS OTITIS MEDIA OF RIGHT EAR: Primary | ICD-10-CM

## 2025-05-05 DIAGNOSIS — R42 VERTIGO: ICD-10-CM

## 2025-05-05 PROCEDURE — 1159F MED LIST DOCD IN RCRD: CPT | Mod: ,,, | Performed by: NURSE PRACTITIONER

## 2025-05-05 PROCEDURE — 3008F BODY MASS INDEX DOCD: CPT | Mod: ,,, | Performed by: NURSE PRACTITIONER

## 2025-05-05 PROCEDURE — 1160F RVW MEDS BY RX/DR IN RCRD: CPT | Mod: ,,, | Performed by: NURSE PRACTITIONER

## 2025-05-05 PROCEDURE — 3074F SYST BP LT 130 MM HG: CPT | Mod: ,,, | Performed by: NURSE PRACTITIONER

## 2025-05-05 PROCEDURE — 99213 OFFICE O/P EST LOW 20 MIN: CPT | Mod: ,,, | Performed by: NURSE PRACTITIONER

## 2025-05-05 PROCEDURE — 3044F HG A1C LEVEL LT 7.0%: CPT | Mod: ,,, | Performed by: NURSE PRACTITIONER

## 2025-05-05 PROCEDURE — 3079F DIAST BP 80-89 MM HG: CPT | Mod: ,,, | Performed by: NURSE PRACTITIONER

## 2025-05-05 NOTE — LETTER
May 5, 2025      [facility] [address] [place] [address]  Phone: [phone]  Fax: [phone]       Patient: Vandana Price   YOB: 1965  Date of Visit: 05/05/2025    To Whom It May Concern:    Enrike Price  was at [facility] on 05/05/2025. The patient may return to work/school on 05/07/25 with no restrictions. If you have any questions or concerns, or if I can be of further assistance, please do not hesitate to contact me.    Sincerely,    KIMBERLEE Bradley MA

## 2025-05-05 NOTE — PROGRESS NOTES
KIMBERLEE Bradley   [facility]  [address]  [place] [address]     PATIENT NAME: Vandana Price  : 1965  DATE: 25  MRN: [id number]      Billing Provider: KIMBERLEE Bradley  Level of Service:   Patient PCP Information       Provider PCP Type    Claude Oneil MD General            Reason for Visit / Chief Complaint: Dizziness (Mrs.Tia Price is a 59 y.o. female who presents to the clinic today with c/o dizziness for 3 -4 weeks. Also, right jaw is hurting. Hears fluid in her right ear. Bad headache.  )   Health Maintenance Due   Topic Date Due    TETANUS VACCINE  Never done    Shingles Vaccine (1 of 2) Never done          Update PCP  Update Chief Complaint         History of Present Illness / Problem Focused Workflow     History of Present Illness    CHIEF COMPLAINT:  Patient presents today with persistent dizziness and ear problems. Pt had to go to ER on  due to BPPV and was given meclizine and exercises to help this. Pt was seen in clinic on 4/15 for ER f/u and stated the meclizine was not helping but the exercises seem to be helping but would get nauseated while doing them. Pt was given zofran, short acting steroid injection and steroid dose pack which she states did help, but then came back  to clinic with more sinus symptoms and ear pain. At this time, she did have erythema noted to both ears and abx was prescribed along with long acting steroid injection. She states she did get better, but then  symptoms came back like it did when she first had to go to ER.     HISTORY OF PRESENT ILLNESS:  She reports severe dizziness that initially required an emergency room visit due to inability to move. She experiences right-sided ear symptoms with fluid sensation, and both ears are noted to be red with fluid present. Previously prescribed antibiotics have not been effective in treating the ear symptoms. She also reports headache localized to the back of her head.      ROS:  General:  -fever, -chills, -fatigue, -weight gain, -weight loss  Eyes: -vision changes, -redness, -discharge  ENT: -ear pain, -nasal congestion, -sore throat  Cardiovascular: -chest pain, -palpitations, -lower extremity edema  Respiratory: -cough, -shortness of breath  Gastrointestinal: -abdominal pain, -nausea, -vomiting, -diarrhea, -constipation, -blood in stool  Genitourinary: -dysuria, -hematuria, -frequency  Musculoskeletal: -joint pain, -muscle pain, +limited movement  Skin: -rash, -lesion  Neurological: +headache, +dizziness, -numbness, -tingling  Psychiatric: -anxiety, -depression, -sleep difficulty  Head: +head pain          Hemoglobin A1C   Date Value Ref Range Status   01/24/2025 5.4 <=7.0 % Final     Comment:       Normal:               <5.7%  Pre-Diabetic:       5.7% to 6.4%  Diabetic:             >6.4%  Diabetic Goal:     <7%   07/25/2024 5.3 4.5 - 6.6 % Final     Comment:       Normal:               <5.7%  Pre-Diabetic:       5.7% to 6.4%  Diabetic:             >6.4%  Diabetic Goal:     <7%   11/03/2022 5.7 4.5 - 6.6 % Final     Comment:       Normal:               <5.7%  Pre-Diabetic:       5.7% to 6.4%  Diabetic:             >6.4%  Diabetic Goal:     <7%        CMP  Sodium   Date Value Ref Range Status   04/13/2025 142 136 - 145 mmol/L Final     Potassium   Date Value Ref Range Status   04/13/2025 4.0 3.5 - 5.1 mmol/L Final     Chloride   Date Value Ref Range Status   04/13/2025 108 (H) 98 - 107 mmol/L Final     CO2   Date Value Ref Range Status   04/13/2025 23 22 - 29 mmol/L Final     Glucose   Date Value Ref Range Status   04/13/2025 120 (H) 74 - 100 mg/dL Final     BUN   Date Value Ref Range Status   04/13/2025 19 10 - 20 mg/dL Final     Creatinine   Date Value Ref Range Status   04/13/2025 0.85 0.55 - 1.02 mg/dL Final     Calcium   Date Value Ref Range Status   04/13/2025 8.9 8.4 - 10.2 mg/dL Final     Total Protein   Date Value Ref Range Status   04/13/2025 7.2 6.4 - 8.3 g/dL Final     Albumin   Date  Value Ref Range Status   04/13/2025 3.7 3.5 - 5.0 g/dL Final     Bilirubin, Total   Date Value Ref Range Status   04/13/2025 0.4 <=1.5 mg/dL Final     Alk Phos   Date Value Ref Range Status   04/13/2025 165 (H) 40 - 150 U/L Final     AST   Date Value Ref Range Status   04/13/2025 24 11 - 45 U/L Final     ALT   Date Value Ref Range Status   04/13/2025 14 <=55 U/L Final     Anion Gap   Date Value Ref Range Status   04/13/2025 15 7 - 16 mmol/L Final     eGFR   Date Value Ref Range Status   04/13/2025 79 >=60 mL/min/1.73m2 Final     Comment:     Estimated GFR calculated using the CKD-EPI creatinine (2021) equation.        Lab Results   Component Value Date    WBC 5.46 04/13/2025    RBC 4.21 04/13/2025    HGB 12.2 04/13/2025    HCT 38.3 04/13/2025    MCV 91.0 04/13/2025    MCH 29.0 04/13/2025    MCHC 31.9 (L) 04/13/2025    RDW 12.9 04/13/2025     04/13/2025    MPV 11.0 04/13/2025    LYMPH 24.5 (L) 04/13/2025    LYMPH 1.34 04/13/2025    LYMPH 22 (L) 04/13/2025    MONO 6.4 (H) 04/13/2025    MONO 6 04/13/2025    EOS 0.71 (H) 04/13/2025    BASO 0.05 04/13/2025    EOSINOPHIL 13.0 (H) 04/13/2025    EOSINOPHIL 10 (H) 04/13/2025    BASOPHIL 0.9 04/13/2025        Lab Results   Component Value Date    CHOL 165 01/24/2025    CHOL 161 07/25/2024    CHOL 188 11/03/2022     Lab Results   Component Value Date    HDL 54 01/24/2025    HDL 51 07/25/2024    HDL 61 (H) 11/03/2022     Lab Results   Component Value Date    LDLCALC 86 01/24/2025    LDLCALC 85 07/25/2024    LDLCALC 106 11/03/2022     Lab Results   Component Value Date    TRIG 123 01/24/2025    TRIG 124 07/25/2024    TRIG 105 11/03/2022     Lab Results   Component Value Date    CHOLHDL 3.1 01/24/2025    CHOLHDL 3.2 07/25/2024    CHOLHDL 3.1 11/03/2022        Wt Readings from Last 3 Encounters:   05/05/25 0911 115.7 kg (255 lb)   04/25/25 1326 114.8 kg (253 lb)   04/15/25 1352 116.4 kg (256 lb 9.6 oz)        BP Readings from Last 3 Encounters:   05/05/25 126/82   04/25/25  118/79   04/15/25 (!) 143/86        Review of Systems     Review of Systems       Medical / Social / Family History     Past Medical History:   Diagnosis Date    Allergy     Bilateral primary osteoarthritis of knee     Chronic pain of both knees 10/14/2021    Eosinophilia     Gangrene of gallbladder in cholecystitis 2021    Hematochezia     History of colonoscopy     Hyperlipidemia     Sleep apnea     Ulcerative colitis        Past Surgical History:   Procedure Laterality Date    ARTHROPLASTY, KNEE, TOTAL, USING COMPUTER-ASSISTED NAVIGATION Left 12/15/2022    Procedure: ARTHROPLASTY, KNEE, TOTAL, USING COMPUTER-ASSISTED NAVIGATION;  Surgeon: Jerome Valladares MD;  Location: [facility] OR;  Service: Orthopedics;  Laterality: Left;    ARTHROPLASTY, KNEE, TOTAL, USING COMPUTER-ASSISTED NAVIGATION Right 2023    Procedure: ARTHROPLASTY, KNEE, TOTAL, USING COMPUTER-ASSISTED NAVIGATION;  Surgeon: Jerome Valladares MD;  Location: [facility] OR;  Service: Orthopedics;  Laterality: Right;    Bilateral IA knee injection Bilateral 2021    D Shows    bladder tact       SECTION      CHOLECYSTECTOMY      FRACTURE SURGERY      jaw     INJECTION      back    INJECTION OF ANESTHETIC AGENT AROUND MEDIAL BRANCH NERVES INNERVATING LUMBAR FACET JOINT Bilateral 2023    Procedure: BIlateral L4-5,5-S1 MBB;  Surgeon: Amanda Goetz MD;  Location: [facility];  Service: Pain Management;  Laterality: Bilateral;  LOCAL    INJECTION OF ANESTHETIC AGENT AROUND MEDIAL BRANCH NERVES INNERVATING LUMBAR FACET JOINT Bilateral 2023    Procedure: Bilateral L4-5, 5-S1 medial branch block # 2;  Surgeon: Amanda Goetz MD;  Location: [facility];  Service: Pain Management;  Laterality: Bilateral;    INJECTION OF ANESTHETIC AGENT AROUND NERVE Bilateral 2022    Procedure: GNB   BILATERAL;  Surgeon: Amanda Goetz MD;  Location: [facility];  Service: Pain Management;   "Laterality: Bilateral;  PT HAS NOT HAD VAC   STATES WILL BE TESTED AT [facility] IN [place]    JOINT REPLACEMENT  12/22 09/23 both knees done    knee scope Bilateral     KNEE SURGERY      LAPAROSCOPIC CHOLECYSTECTOMY N/A 03/31/2021    Procedure: LAPAROSCOPIC CHOLECYSTECTOMY CONVERTED TO OPEN;  Surgeon: Darnell Mcgraw MD;  Location: [facility] OR;  Service: General;  Laterality: N/A;  CONVERTED TO OPEN    MANDIBLE SURGERY      RADIOFREQUENCY ABLATION OF LUMBAR MEDIAL BRANCH NERVE AT SINGLE LEVEL Bilateral 02/15/2024    Procedure: Bilateral L4-5,5-S1 MB RFTC;  Surgeon: Amanda Goetz MD;  [facility];  Service: Pain Management;  Laterality: Bilateral;    TUBAL LIGATION  2004       Social History  Ms.  reports that she quit smoking about 15 years ago. Her smoking use included cigarettes. She started smoking about 48 years ago. She has a 33 pack-year smoking history. She has never been exposed to tobacco smoke. She has never used smokeless tobacco. She reports that she does not currently use alcohol after a past usage of about 1.0 standard drink of alcohol per week. She reports that she does not currently use drugs.    Family History  Ms.'s family history includes Alcohol abuse in her mother; Heart disease in her father; Hypertension in her father; Lung cancer in her maternal aunt; Migraines in her daughter.    Medications and Allergies     Medications  No outpatient medications have been marked as taking for the 5/5/25 encounter (Office Visit) with Faby Boyle FNP.       Allergies  Review of patient's allergies indicates:   Allergen Reactions    Opioids - morphine analogues Rash     Development of red rash at site morphine given.        Physical Examination     Vitals:    05/05/25 0911   BP: 126/82   Pulse: 75   Resp: 16   Temp: 99.4 °F (37.4 °C)   TempSrc: Oral   SpO2: 95%   Weight: 115.7 kg (255 lb)   Height: 5' 6" (1.676 m)      Physical Exam  Constitutional:       Appearance: Normal " appearance. She is obese.   HENT:      Head: Normocephalic.      Right Ear: Hearing, ear canal and external ear normal. A middle ear effusion is present. Tympanic membrane is erythematous.      Left Ear: Hearing, ear canal and external ear normal. A middle ear effusion is present.      Nose: Nose normal.      Mouth/Throat:      Mouth: Mucous membranes are moist.      Pharynx: Oropharynx is clear.   Eyes:      Extraocular Movements: Extraocular movements intact.   Cardiovascular:      Rate and Rhythm: Normal rate and regular rhythm.      Pulses: Normal pulses.      Heart sounds: Normal heart sounds.   Pulmonary:      Effort: Pulmonary effort is normal.      Breath sounds: Normal breath sounds.   Skin:     General: Skin is warm and dry.      Capillary Refill: Capillary refill takes less than 2 seconds.   Neurological:      General: No focal deficit present.      Mental Status: She is alert and oriented to person, place, and time.   Psychiatric:         Mood and Affect: Mood normal.         Behavior: Behavior normal.          Assessment and Plan (including Health Maintenance)      Problem List  Smart Sets  Document Outside HM   :    Plan:     There are no Patient Instructions on file for this visit.       Health Maintenance Due   Topic Date Due    TETANUS VACCINE  Never done    Shingles Vaccine (1 of 2) Never done       Problem List Items Addressed This Visit       Non-recurrent acute serous otitis media of right ear - Primary    Relevant Orders    Ambulatory referral/consult to ENT    Vertigo    Relevant Orders    Ambulatory referral/consult to ENT     Assessment & Plan    R42 Dizziness and giddiness  H92.01 Otalgia, right ear  H92.02 Otalgia, left ear  R51.9 Headache, unspecified    IMPRESSION:  - Persistent symptoms of dizziness, right-sided discomfort, and fluid in the ear with continued redness in both ears despite current antibiotic treatment.  - Ongoing symptoms and ineffective antibiotic therapy indicate need  for ENT evaluation.    DIZZINESS:  - Patient reports ongoing dizziness, similar to the first day when it occurred.  - Evaluated condition today and found it to be poor.  - Discussed seeing an ENT specialist for further evaluation. Pt has appt with Dr. Solano tomorrow at 3:00 and she is aware.     RIGHT EAR PAIN:  - Patient reports hearing fluid in the right ear.  - Examination showed persistent fluid and erythema.  - Determined that prescribed antibiotics are not effective.    LEFT EAR PAIN:  - Observed persistent erythema in the left ear.  - Determined that prescribed antibiotics are not effective.    HEADACHE:  - Patient reports headache at the occipital region.        Non-recurrent acute serous otitis media of right ear  -     Ambulatory referral/consult to ENT; Future; Expected date: 05/12/2025    Vertigo  -     Ambulatory referral/consult to ENT; Future; Expected date: 05/12/2025       Health Maintenance Topics with due status: Not Due       Topic Last Completion Date    Colorectal Cancer Screening 12/07/2023    Mammogram 09/24/2024    Hemoglobin A1c (Diabetic Prevention Screening) 01/24/2025    Lipid Panel 01/24/2025    RSV Vaccine (Age 60+ and Pregnant patients) Not Due         Future Appointments   Date Time Provider Department Center   5/6/2025  3:00 PM Danny Solano MD [facility] ENT [facility]   7/24/2025  8:30 AM Claude Oneil MD [facility] [place]   8/19/2025  8:00 AM Bessy Álvarez FNP [facility]   10/8/2025  8:30 AM Jerome Valladares MD [facility]TH[facility]   1/23/2026  8:00 AM Claude Oneil MD [facility] [place]   3/18/2026  9:00 AM Fidelina Gonzalez MD [facility] DERM [place]        No follow-ups on file.    Health Maintenance Due   Topic Date Due    TETANUS VACCINE  Never done    Shingles Vaccine (1 of 2) Never done        Signature:  KIMBERLEE Bradley    Date of encounter: 5/5/25  This note was generated with the assistance of ambient listening technology. Verbal  consent was obtained by the patient and accompanying visitor(s) for the recording of patient appointment to facilitate this note. I attest to having reviewed and edited the generated note for accuracy, though some syntax or spelling errors may persist. Please contact the author of this note for any clarification.

## 2025-05-06 ENCOUNTER — PATIENT MESSAGE (OUTPATIENT)
Dept: OTOLARYNGOLOGY | Facility: CLINIC | Age: 60
End: 2025-05-06
Payer: COMMERCIAL

## 2025-05-06 ENCOUNTER — OFFICE VISIT (OUTPATIENT)
Dept: OTOLARYNGOLOGY | Facility: CLINIC | Age: 60
End: 2025-05-06
Payer: COMMERCIAL

## 2025-05-06 VITALS — HEIGHT: 66 IN | WEIGHT: 250 LBS | BODY MASS INDEX: 40.18 KG/M2

## 2025-05-06 DIAGNOSIS — R42 VERTIGO: ICD-10-CM

## 2025-05-06 DIAGNOSIS — H65.01 NON-RECURRENT ACUTE SEROUS OTITIS MEDIA OF RIGHT EAR: ICD-10-CM

## 2025-05-06 PROCEDURE — 1159F MED LIST DOCD IN RCRD: CPT | Mod: S$GLB,,, | Performed by: OTOLARYNGOLOGY

## 2025-05-06 PROCEDURE — 3008F BODY MASS INDEX DOCD: CPT | Mod: S$GLB,,, | Performed by: OTOLARYNGOLOGY

## 2025-05-06 PROCEDURE — 99999 PR PBB SHADOW E&M-EST. PATIENT-LVL IV: CPT | Mod: PBBFAC,,, | Performed by: OTOLARYNGOLOGY

## 2025-05-06 PROCEDURE — 99204 OFFICE O/P NEW MOD 45 MIN: CPT | Mod: S$GLB,,, | Performed by: OTOLARYNGOLOGY

## 2025-05-06 PROCEDURE — 3044F HG A1C LEVEL LT 7.0%: CPT | Mod: S$GLB,,, | Performed by: OTOLARYNGOLOGY

## 2025-05-06 PROCEDURE — 1160F RVW MEDS BY RX/DR IN RCRD: CPT | Mod: S$GLB,,, | Performed by: OTOLARYNGOLOGY

## 2025-05-06 RX ORDER — MECLIZINE HYDROCHLORIDE 25 MG/1
25 TABLET ORAL 3 TIMES DAILY PRN
Qty: 90 TABLET | Refills: 0 | Status: SHIPPED | OUTPATIENT
Start: 2025-05-06

## 2025-05-06 NOTE — PROGRESS NOTES
Subjective:       Patient ID: Vandana Price is a 59 y.o. female.    Chief Complaint: Dizziness (Dizziness since 4/13/25, worse when laying down. Pt states she has taken meclizine, 2 IM shots with steroids and 2 rounds of po abx without any relief.), Headache, and Sinusitis (Right sided sinus congestion. )    Dizziness:    Associated symptoms: ear pain and headaches.  Headache   Associated symptoms include dizziness and ear pain.   Sinusitis  Associated symptoms include congestion, ear pain and headaches.     Review of Systems   HENT:  Positive for congestion and ear pain.    Neurological:  Positive for dizziness and headaches.   All other systems reviewed and are negative.      Objective:      Physical Exam  General: NAD  Head: Normocephalic, atraumatic, no facial asymmetry/normal strength,  Ears: Both auricules normal in appearance, w/o deformities tympanic membranes normal external auditory canals normal  Nose: External nose w/o deformities normal turbinates no drainage or inflammation  Oral Cavity: Lips, gums, floor of mouth, tongue hard palate, and buccal mucosa without mass/lesion  Oropharynx: Mucosa pink and moist, soft palate, posterior pharynx and oropharyngeal wall without mass/lesion  Neck: Supple, symmetric, trachea midline, no palpable mass/lesion, no palpable cervical lymphadenopathy  Skin: Warm and dry, no concerning lesions  Respiratory: Respirations even, unlabored    Assessment:       1. Non-recurrent acute serous otitis media of right ear    2. Vertigo        Plan:       MRI head PT for dizziness up meclizine

## 2025-05-08 ENCOUNTER — CLINICAL SUPPORT (OUTPATIENT)
Dept: REHABILITATION | Facility: HOSPITAL | Age: 60
End: 2025-05-08
Payer: COMMERCIAL

## 2025-05-08 DIAGNOSIS — R42 VERTIGO: Primary | ICD-10-CM

## 2025-05-08 PROCEDURE — 97162 PT EVAL MOD COMPLEX 30 MIN: CPT

## 2025-05-08 NOTE — LETTER
May 8, 2025      [facility]  [address] [place] [address] [place] [address]  Phone: [phone]  Fax: [phone]       Patient: Vandana Price   YOB: 1965  Date of Visit: 05/08/2025    To Whom It May Concern:    Enrike Price  was at [facility] on 05/08/2025. She may return to work/school on 5/9/2025 with no restrictions. If you have any questions or concerns, or if I can be of further assistance, please do not hesitate to contact me.    Sincerely,    Aníbal Vera, PT

## 2025-05-14 ENCOUNTER — CLINICAL SUPPORT (OUTPATIENT)
Dept: REHABILITATION | Facility: HOSPITAL | Age: 60
End: 2025-05-14
Payer: COMMERCIAL

## 2025-05-14 ENCOUNTER — OFFICE VISIT (OUTPATIENT)
Dept: FAMILY MEDICINE | Facility: CLINIC | Age: 60
End: 2025-05-14
Payer: COMMERCIAL

## 2025-05-14 ENCOUNTER — HOSPITAL ENCOUNTER (OUTPATIENT)
Dept: RADIOLOGY | Facility: HOSPITAL | Age: 60
Discharge: HOME | End: 2025-05-14
Attending: OTOLARYNGOLOGY
Payer: COMMERCIAL

## 2025-05-14 VITALS
SYSTOLIC BLOOD PRESSURE: 125 MMHG | BODY MASS INDEX: 41.43 KG/M2 | DIASTOLIC BLOOD PRESSURE: 80 MMHG | RESPIRATION RATE: 20 BRPM | WEIGHT: 257.81 LBS | OXYGEN SATURATION: 95 % | HEIGHT: 66 IN | HEART RATE: 74 BPM | TEMPERATURE: 98 F

## 2025-05-14 DIAGNOSIS — R42 VERTIGO: ICD-10-CM

## 2025-05-14 DIAGNOSIS — H65.93 FLUID LEVEL BEHIND TYMPANIC MEMBRANE OF BOTH EARS: ICD-10-CM

## 2025-05-14 DIAGNOSIS — R42 VERTIGO: Primary | ICD-10-CM

## 2025-05-14 DIAGNOSIS — H66.91 RIGHT OTITIS MEDIA, UNSPECIFIED OTITIS MEDIA TYPE: Primary | ICD-10-CM

## 2025-05-14 PROCEDURE — 3079F DIAST BP 80-89 MM HG: CPT | Mod: ,,,

## 2025-05-14 PROCEDURE — 70553 MRI BRAIN STEM W/O & W/DYE: CPT | Mod: TC

## 2025-05-14 PROCEDURE — A9575 INJ GADOTERATE MEGLUMI 0.1ML: HCPCS | Performed by: OTOLARYNGOLOGY

## 2025-05-14 PROCEDURE — 1159F MED LIST DOCD IN RCRD: CPT | Mod: ,,,

## 2025-05-14 PROCEDURE — 99213 OFFICE O/P EST LOW 20 MIN: CPT | Mod: ,,,

## 2025-05-14 PROCEDURE — 25500027 PHARM REV CODE 255 ALT 636 MCARE W HCPCS: Performed by: OTOLARYNGOLOGY

## 2025-05-14 PROCEDURE — 1160F RVW MEDS BY RX/DR IN RCRD: CPT | Mod: ,,,

## 2025-05-14 PROCEDURE — 3008F BODY MASS INDEX DOCD: CPT | Mod: ,,,

## 2025-05-14 PROCEDURE — 3074F SYST BP LT 130 MM HG: CPT | Mod: ,,,

## 2025-05-14 PROCEDURE — 3044F HG A1C LEVEL LT 7.0%: CPT | Mod: ,,,

## 2025-05-14 PROCEDURE — 97112 NEUROMUSCULAR REEDUCATION: CPT

## 2025-05-14 RX ORDER — FLUTICASONE PROPIONATE 50 MCG
1 SPRAY, SUSPENSION (ML) NASAL DAILY
Qty: 11.1 ML | Refills: 0 | Status: SHIPPED | OUTPATIENT
Start: 2025-05-14

## 2025-05-14 RX ORDER — CEFDINIR 300 MG/1
300 CAPSULE ORAL 2 TIMES DAILY
Qty: 20 CAPSULE | Refills: 0 | Status: SHIPPED | OUTPATIENT
Start: 2025-05-14 | End: 2025-05-24

## 2025-05-14 RX ADMIN — GADOTERATE MEGLUMINE 15 ML: 376.9 INJECTION INTRAVENOUS at 10:05

## 2025-05-14 NOTE — PROGRESS NOTES
HPI:   Vandana Price is a pleasant 59 y.o. patient who reports to clinic with complaints of vertigo and has been to the dr and er multiple times since April. She states she has been to physical therapy, done the Epley removers, Antivert, been treated for past OTM, been to ENT. He did order an MRI. She states nothing has helped.  Someone told her to have her ears flushed out and she would like to try that today. I explained she had no wax in her ears or particles to be flushed out and if she did that would help but her ears are clear other than fluid on the TM. She voiced understanding. She states her right ear does hurt a little but other than that her ears feel fine. He denies any shortness of breath or chest pain.                      Past Medical History:   Diagnosis Date    Allergy     Bilateral primary osteoarthritis of knee     Chronic pain of both knees 10/14/2021    Eosinophilia     Gangrene of gallbladder in cholecystitis 2021    Hematochezia     History of colonoscopy     Hyperlipidemia     Sleep apnea     Ulcerative colitis        PAST SURGICAL HISTORY:   Past Surgical History:   Procedure Laterality Date    ARTHROPLASTY, KNEE, TOTAL, USING COMPUTER-ASSISTED NAVIGATION Left 12/15/2022    Procedure: ARTHROPLASTY, KNEE, TOTAL, USING COMPUTER-ASSISTED NAVIGATION;  Surgeon: Jerome Valladares MD;  Location: [facility];  Service: Orthopedics;  Laterality: Left;    ARTHROPLASTY, KNEE, TOTAL, USING COMPUTER-ASSISTED NAVIGATION Right 2023    Procedure: ARTHROPLASTY, KNEE, TOTAL, USING COMPUTER-ASSISTED NAVIGATION;  Surgeon: Jerome Valladares MD;  Location: [facility];  Service: Orthopedics;  Laterality: Right;    Bilateral IA knee injection Bilateral 2021    D Shows    bladder tact       SECTION      CHOLECYSTECTOMY      FRACTURE SURGERY      jaw 1972    INJECTION      back    INJECTION OF ANESTHETIC AGENT AROUND MEDIAL BRANCH NERVES INNERVATING LUMBAR FACET JOINT Bilateral  05/18/2023    Procedure: BIlateral L4-5,5-S1 MBB;  Surgeon: Amanda Goetz MD;  Location: [facility];  Service: Pain Management;  Laterality: Bilateral;  LOCAL    INJECTION OF ANESTHETIC AGENT AROUND MEDIAL BRANCH NERVES INNERVATING LUMBAR FACET JOINT Bilateral 12/12/2023    Procedure: Bilateral L4-5, 5-S1 medial branch block # 2;  Surgeon: Amanda Goetz MD;  Location: [facility];  Service: Pain Management;  Laterality: Bilateral;    INJECTION OF ANESTHETIC AGENT AROUND NERVE Bilateral 06/28/2022    Procedure: GNB   BILATERAL;  Surgeon: Amanda Goetz MD;  Location: [facility];  Service: Pain Management;  Laterality: Bilateral;  PT HAS NOT HAD VAC   STATES WILL BE TESTED AT [facility] IN [place]    JOINT REPLACEMENT  12/22 09/23 both knees done    knee scope Bilateral     KNEE SURGERY      LAPAROSCOPIC CHOLECYSTECTOMY N/A 03/31/2021    Procedure: LAPAROSCOPIC CHOLECYSTECTOMY CONVERTED TO OPEN;  Surgeon: Darnell Mcgraw MD;  Location: [facility] OR;  Service: General;  Laterality: N/A;  CONVERTED TO OPEN    MANDIBLE SURGERY      RADIOFREQUENCY ABLATION OF LUMBAR MEDIAL BRANCH NERVE AT SINGLE LEVEL Bilateral 02/15/2024    Procedure: Bilateral L4-5,5-S1 MB RFTC;  Surgeon: Amanda Goetz MD;  Location: [facility];  Service: Pain Management;  Laterality: Bilateral;    TUBAL LIGATION  2004       MEDICATIONS:  Current Medications[1]    ALLERGIES:   Review of patient's allergies indicates:   Allergen Reactions    Opioids - morphine analogues Rash     Development of red rash at site morphine given.          Review of Systems   Constitutional: Negative.  Negative for activity change, appetite change and fatigue.   HENT: Negative.  Negative for trouble swallowing.    Eyes: Negative.  Negative for pain and visual disturbance.   Respiratory: Negative.  Negative for chest tightness and shortness of breath.    Cardiovascular: Negative.  Negative for chest pain and palpitations.    Gastrointestinal: Negative.  Negative for change in bowel habit.   Endocrine: Negative.    Genitourinary: Negative.  Negative for difficulty urinating and dysuria.   Musculoskeletal: Negative.  Negative for arthralgias and myalgias.   Integumentary:  Negative for rash and wound. Negative.   Allergic/Immunologic: Negative.    Neurological: Negative.  Negative for weakness and headaches.   Hematological: Negative.    Psychiatric/Behavioral: Negative.  The patient is not nervous/anxious.           Physical Exam  Constitutional:       General: She is not in acute distress.     Appearance: Normal appearance. She is well-developed. She is not ill-appearing.   HENT:      Head: Normocephalic and atraumatic.      Right Ear: Tympanic membrane is erythematous.      Left Ear: Tympanic membrane normal.      Ears:      Comments: Bilateral ears have fluid that is clear on TM     Nose: Nose normal.      Mouth/Throat:      Mouth: Mucous membranes are moist.      Pharynx: Oropharynx is clear. No posterior oropharyngeal erythema.   Cardiovascular:      Rate and Rhythm: Normal rate and regular rhythm.      Pulses: Normal pulses.      Heart sounds: Normal heart sounds.   Pulmonary:      Effort: Pulmonary effort is normal. No accessory muscle usage or respiratory distress.      Breath sounds: Normal breath sounds.   Abdominal:      General: Abdomen is flat. Bowel sounds are normal. There is no distension.      Palpations: Abdomen is soft.      Tenderness: There is no abdominal tenderness.   Musculoskeletal:         General: Normal range of motion.      Cervical back: Normal range of motion.   Skin:     General: Skin is warm and dry.      Capillary Refill: Capillary refill takes less than 2 seconds.   Neurological:      Mental Status: She is alert and oriented to person, place, and time. Mental status is at baseline.   Psychiatric:         Mood and Affect: Mood normal.         Speech: Speech normal.         Behavior: Behavior normal.  "Behavior is cooperative.         Thought Content: Thought content normal.          VITAL SIGNS:   /80 (BP Location: Left arm, Patient Position: Sitting)   Pulse 74   Temp 98 °F (36.7 °C) (Oral)   Resp 20   Ht 5' 6" (1.676 m)   Wt 116.9 kg (257 lb 12.8 oz)   LMP 06/08/2020 Comment: tubal ligation   SpO2 95%   BMI 41.61 kg/m²       ASSESSMENT/PLAN  1. Right otitis media, unspecified otitis media type  Assessment & Plan:  Omnicef BID for 10 days.   Flonase daily for fluid on ears  Complete all antibiotics until they are gone, even if you start feeling better finish antibiotics.   Rotate tylenol and Ib profen as needed for pain or fever.   Follow up with PCP or return to clinic if s/s persist or worsen.     Orders:  -     fluticasone propionate (FLONASE) 50 mcg/actuation nasal spray; 1 spray (50 mcg total) by Each Nostril route once daily.  Dispense: 11.1 mL; Refill: 0  -     cefdinir (OMNICEF) 300 MG capsule; Take 1 capsule (300 mg total) by mouth 2 (two) times daily. for 10 days  Dispense: 20 capsule; Refill: 0    2. Fluid level behind tympanic membrane of both ears  Assessment & Plan:  Flonase daily   Follows ENT  Had MRI today per ENT  RTC if needed     Orders:  -     fluticasone propionate (FLONASE) 50 mcg/actuation nasal spray; 1 spray (50 mcg total) by Each Nostril route once daily.  Dispense: 11.1 mL; Refill: 0             There are no Patient Instructions on file for this visit.  No orders of the defined types were placed in this encounter.               [1]   Current Outpatient Medications:     aspirin (ECOTRIN) 81 MG EC tablet, Take 81 mg by mouth once daily., Disp: , Rfl:     celecoxib (CELEBREX) 200 MG capsule, Take 1 capsule (200 mg total) by mouth 2 (two) times daily., Disp: 180 capsule, Rfl: 1    cyanocobalamin 1,000 mcg/mL injection, Inject 1 ml Im every 2 weeks for 8 weeks then inject 1 ml IM monthly to finish out a year., Disp: 10 mL, Rfl: 1    famotidine (PEPCID) 40 MG tablet, Take 1 " tablet (40 mg total) by mouth 2 (two) times daily., Disp: 180 tablet, Rfl: 1    gabapentin (NEURONTIN) 300 MG capsule, Take 2 capsules (600 mg total) by mouth 2 (two) times daily., Disp: 360 capsule, Rfl: 1    ondansetron (ZOFRAN-ODT) 4 MG TbDL, Take 1 tablet (4 mg total) by mouth every 8 (eight) hours as needed (nausea)., Disp: 15 tablet, Rfl: 1    pantoprazole (PROTONIX) 40 MG tablet, Take 1 tablet (40 mg total) by mouth once daily., Disp: 90 tablet, Rfl: 3    triamcinolone acetonide 0.1% (KENALOG) 0.1 % cream, Apply to rash on body BID, tapering with improvement. AVOID USING TO FACE AND GROIN, Disp: 80 g, Rfl: 2    ustekinumab (STELARA) 90 mg/mL Syrg syringe, Inject 1 mL (90 mg total) into the skin every 8 weeks., Disp: 1 mL, Rfl: 5    cefdinir (OMNICEF) 300 MG capsule, Take 1 capsule (300 mg total) by mouth 2 (two) times daily. for 10 days, Disp: 20 capsule, Rfl: 0    fluticasone propionate (FLONASE) 50 mcg/actuation nasal spray, 1 spray (50 mcg total) by Each Nostril route once daily., Disp: 11.1 mL, Rfl: 0    meclizine (ANTIVERT) 50 MG tablet, Take 0.5 tablets (25 mg total) by mouth 3 (three) times daily as needed for Dizziness or Nausea., Disp: 30 tablet, Rfl: 0  No current facility-administered medications for this visit.

## 2025-05-14 NOTE — PROGRESS NOTES
Outpatient Rehab    Physical Therapy Visit    Patient Name: Vandana Price  MRN: [id number]  YOB: 1965  Encounter Date: 5/14/2025    Therapy Diagnosis:   Encounter Diagnosis   Name Primary?    Vertigo Yes     Physician: Danny Solano MD    Physician Orders: Eval and Treat  Medical Diagnosis: Vertigo    Visit # / Visits Authorized:  1 / 4  Insurance Authorization Period: 5/8/2025 to 5/8/2027  Date of Evaluation: 5/8/2025  Plan of Care Certification: 5/13/2025 to 6/6/2025      PT/PTA: PT   Number of PTA visits since last PT visit:0  Time In: 0915   Time Out: 0955  Total Time (in minutes): 40   Total Billable Time (in minutes):  40        Subjective   Patient states she is feeling somewhat better. States she has been performing the Epley's manuever on herself at home..             Treatment:  Balance/Neuromuscular Re-Education  NMR 1: Steamboats Unsupported 4x15 BL GTB  NMR 2: Static Standing on Balance Pad with intermittent tactile cues to maintain balance  NMR 3: Epley's Manuever for right beating nystagmus    Time Entry(in minutes):  Neuromuscular Re-Education Time Entry: 40    Assessment & Plan   Assessment: Patient responded well to treatment while still having some dizziness she reports symptoms are a little better post treatment  Evaluation/Treatment Tolerance: Patient tolerated treatment well    Patient will continue to benefit from skilled outpatient physical therapy to address the deficits listed in the problem list box on initial evaluation, provide pt/family education and to maximize pt's level of independence in the home and community environment.     Patient's spiritual, cultural, and educational needs considered and patient agreeable to plan of care and goals.           Plan: Continue with POC as indicated    Goals:   Active       Long Term Goals       Patient will demonstrate 10 point increase in FOTO assessment at EOC       Start:  05/13/25    Expected End:  06/06/25               Short  Term Goals       Patient will report decreased dizziness when coming from supine to sitting position upon waking in the morning.       Start:  05/13/25    Expected End:  05/23/25            Patient will demonstrate reduced nystagmus with right head turns on Lucina-Hallpike Maneuver       Start:  05/13/25    Expected End:  05/23/25                Aníbal Vera PT

## 2025-05-14 NOTE — PROGRESS NOTES
Outpatient Rehab    Physical Therapy Evaluation    Patient Name: Vandana Price  MRN: [id number]  YOB: 1965  Encounter Date: 5/8/2025    Therapy Diagnosis:   Encounter Diagnosis   Name Primary?    Vertigo Yes     Physician: Danny Solano MD    Physician Orders: Eval and Treat  Medical Diagnosis: Vertigo    Visit # / Visits Authorized:  1 / 1  Insurance Authorization Period: 5/7/2025 to 5/7/2026  Date of Evaluation: 5/8/2025  Plan of Care Certification: 5/8/2025 to 6/6/2025     Time In: 1230   Time Out: 1300  Total Time (in minutes): 30   Total Billable Time (in minutes):  30    Intake Outcome Measure for FOTO Survey    Therapist reviewed FOTO scores for Vandana Price on 5/8/2025.   FOTO report - see Media section or FOTO account episode details.     Intake Score: 39%    Precautions:       Subjective   History of Present Illness  Vandana is a 59 y.o. female who reports to physical therapy with a chief concern of Patient notes intermittent diziness and nausea that have been occuring over the past few months. She has seen a ENT who was unable to provide adequate help. She states she become dizzy with changing positions and expecially when she attempts to turn her head to the right..     The patient reports a medical diagnosis of R42 (ICD-10-CM) - Vertigo. The patient has experienced this issue since 05/08/25.   Diagnostic tests related to this condition: None.             Review of Systems  Patient reports: Dizziness, Headache, Nausea, Sleep Disturbance, Vertigo, and Nystagmus  Patient denies: Neck Pain          Past Medical History/Physical Systems Review:   Vandana Price  has a past medical history of Allergy, Bilateral primary osteoarthritis of knee, Chronic pain of both knees, Eosinophilia, Gangrene of gallbladder in cholecystitis, Hematochezia, History of colonoscopy, Hyperlipidemia, Sleep apnea, and Ulcerative colitis.    Vandana Price  has a past surgical history that includes Laparoscopic cholecystectomy (N/A,  2021); Cholecystectomy;  section; Knee surgery; Tubal ligation (); Fracture surgery; bladder tact; knee scope (Bilateral); Bilateral IA knee injection (Bilateral, 2021); Injection of anesthetic agent around nerve (Bilateral, 2022); arthroplasty, knee, total, using computer-assisted navigation (Left, 12/15/2022); injection; Injection of anesthetic agent around medial branch nerves innervating lumbar facet joint (Bilateral, 2023); arthroplasty, knee, total, using computer-assisted navigation (Right, 2023); Injection of anesthetic agent around medial branch nerves innervating lumbar facet joint (Bilateral, 2023); Radiofrequency ablation of lumbar medial branch nerve at single level (Bilateral, 02/15/2024); Joint replacement (); and Mandible surgery.    Tia has a current medication list which includes the following prescription(s): aspirin, celecoxib, cyanocobalamin, famotidine, gabapentin, meclizine, meclizine, ondansetron, pantoprazole, triamcinolone acetonide 0.1%, and ustekinumab.    Review of patient's allergies indicates:   Allergen Reactions    Opioids - morphine analogues Rash     Development of red rash at site morphine given.         Objective   Ocular Structure and Alignment  Right Ocular Alignment: Intact  Left Ocular Alignment: Intact  Pupillary Symmetry: Symmetric  Head Position: Neutral       Ocular Movement  Nystagmus Laterality: Right eye, Left eye  Nystagmus Pattern: Left beat  Convergence: Intact  Accommodation: Intact  Pursuits: Smooth and accurate  Horizontal Saccades: Intact           Vestibulo-Ocular Reflex (VOR) Tests  Negative Head Thrust Impulse Test: Right and Left       Intact: Dynamic Visual Acuity (DVA) Test and Gaze Stabilization       Nystagmus and Associated Symptom Provocative Tests  Positive: Optokinetic Nystagmus  Negative: Gaze-Evoked Nystagmus and Head Shake Nystagmus  Optokinetic Nystagmus Pattern: Could not  differentiate    Vestibular Positional and Balance Testing  Positional Tests  Positive: Right Cougar-Hallpike Test  Lucina-Hallpike Nystagmus Pattern: Could not differentiate                   Treatment:       Time Entry(in minutes):  PT Evaluation (Low) Time Entry: 30    Assessment & Plan   Assessment  Tia presents with a condition of Low complexity.   Presentation of Symptoms: Stable  Will Comorbidities Impact Care: No       Functional Limitations: Visual impairments, Increased risk of fall  Impairments: Activity intolerance, Abnormal coordination  Personal Factors Affecting Prognosis: Fear/anxiety    Prognosis: Excellent    Plan  From a physical therapy perspective, the patient would benefit from: Skilled Rehab Services    Planned therapy interventions include: Therapeutic exercise, Therapeutic activities, Neuromuscular re-education, Manual therapy, Gait training, and Canalith repositioning.            Visit Frequency: 2 times Per Week for 4 Weeks.       This plan was discussed with Patient.   Discussion participants: Agreed Upon Plan of Care             Patient's spiritual, cultural, and educational needs considered and patient agreeable to plan of care and goals.           Goals:   Active       Long Term Goals       Patient will demonstrate 10 point increase in FOTO assessment at EOC       Start:  05/13/25    Expected End:  06/06/25               Short Term Goals       Patient will report decreased dizziness when coming from supine to sitting position upon waking in the morning.       Start:  05/13/25    Expected End:  05/23/25            Patient will demonstrate reduced nystagmus with right head turns on Cougar-Hallpike Maneuver       Start:  05/13/25    Expected End:  05/23/25                Aníbal Vera PT

## 2025-05-14 NOTE — LETTER
May 14, 2025      [facility] [place] [facility] [address] [place] [address]  Phone: [phone]  Fax: [phone]       Patient: Vandana Price   YOB: 1965  Date of Visit: 05/14/2025    To Whom It May Concern:    Enrike Price  was at [facility] on 05/14/2025. The patient may return to work/school on 5/15/2025 with no restrictions. If you have any questions or concerns, or if I can be of further assistance, please do not hesitate to contact me.    Sincerely,    Adela Vera LPN

## 2025-05-14 NOTE — LETTER
May 14, 2025    Vandana Price  [address] [place] [address] [place] [address]             [facility]  Outpatient Rehab  [address] [address] [place] [address]  Phone: [phone]  Fax: [phone]   May 14, 2025     Patient: Vandana Prcie   YOB: 1965   Date of Visit: 5/14/2025       To Whom it May Concern:    Vandana Price was seen in my clinic on 5/14/2025. She may return to work on 5/14/2025.    Please excuse her from any classes or work missed.    If you have any questions or concerns, please don't hesitate to call.    Sincerely,         Aníbal Vera, PT     
08-Mar-2019 15:12

## 2025-05-14 NOTE — ASSESSMENT & PLAN NOTE
Omnicef BID for 10 days.   Flonase daily for fluid on ears  Complete all antibiotics until they are gone, even if you start feeling better finish antibiotics.   Rotate tylenol and Ib profen as needed for pain or fever.   Follow up with PCP or return to clinic if s/s persist or worsen.

## 2025-05-15 ENCOUNTER — PATIENT MESSAGE (OUTPATIENT)
Dept: PAIN MEDICINE | Facility: CLINIC | Age: 60
End: 2025-05-15
Payer: COMMERCIAL

## 2025-05-15 DIAGNOSIS — K51.319 ULCERATIVE RECTOSIGMOIDITIS WITH COMPLICATION: ICD-10-CM

## 2025-05-15 RX ORDER — USTEKINUMAB 90 MG/ML
90 INJECTION, SOLUTION SUBCUTANEOUS
Qty: 1 ML | Refills: 5 | Status: SHIPPED | OUTPATIENT
Start: 2025-05-15

## 2025-05-16 ENCOUNTER — CLINICAL SUPPORT (OUTPATIENT)
Dept: REHABILITATION | Facility: HOSPITAL | Age: 60
End: 2025-05-16
Payer: COMMERCIAL

## 2025-05-16 DIAGNOSIS — R42 VERTIGO: Primary | ICD-10-CM

## 2025-05-16 PROCEDURE — 97112 NEUROMUSCULAR REEDUCATION: CPT

## 2025-05-16 NOTE — PROGRESS NOTES
Outpatient Rehab    Physical Therapy Discharge    Patient Name: Vandana Price  MRN: [id number]  YOB: 1965  Encounter Date: 5/16/2025    Therapy Diagnosis:   Encounter Diagnosis   Name Primary?    Vertigo Yes     Physician: Danny Solano MD    Physician Orders: Eval and Treat  Medical Diagnosis: Vertigo    Visit # / Visits Authorized:  2 / 4  Insurance Authorization Period: 5/8/2025 to 5/8/2027  Date of Evaluation: 5/8/2025  Plan of Care Certification: 5/13/2025 to 6/6/2025      PT/PTA: PT   Number of PTA visits since last PT visit:0  Time In: 1510   Time Out: 1535  Total Time (in minutes): 25   Total Billable Time (in minutes):      FOTO:  Intake Score: 39%  Survey Score 2: 49%  Survey Score 3:  %    Subjective   Patient states her symptoms are not getting any better and she has been performing Epley's manuever on herself at home.  Pain reported as 0/10.        Treatment:  Balance/Neuromuscular Re-Education  NMR 2: Static Standing on Balance Pad with intermittent tactile cues to maintain balance  NMR 3: Intermittent SLS on level ground  NMR 4: Static Standing on Bosu Ball with ball toss    Time Entry(in minutes):  Neuromuscular Re-Education Time Entry: 25    Assessment & Plan   Assessment: Patient is not progressing with skilled care and will be discharged on this date.       Patient's spiritual, cultural, and educational needs considered and patient agreeable to plan of care and goals.           Plan: Discharge    Goals:   Active       Long Term Goals       Patient will demonstrate 10 point increase in FOTO assessment at EOC       Start:  05/13/25    Expected End:  06/06/25               Short Term Goals       Patient will report decreased dizziness when coming from supine to sitting position upon waking in the morning.       Start:  05/13/25    Expected End:  05/23/25            Patient will demonstrate reduced nystagmus with right head turns on Brooks-Hallpike Maneuver       Start:  05/13/25     Expected End:  05/23/25                Aníbal Vera PT

## 2025-05-16 NOTE — LETTER
May 16, 2025      [facility]  [address] [place] [address] [place] [address]  Phone: [phone]  Fax: [phone]       Patient: Vandana Price   YOB: 1965  Date of Visit: 05/16/2025    To Whom It May Concern:    Enrike Price  was at [facility] on 05/16/2025. She may return to work/school on 5/16/2025 with no restrictions. If you have any questions or concerns, or if I can be of further assistance, please do not hesitate to contact me.    Sincerely,    Aníbal Vera, PT

## 2025-05-18 PROBLEM — H66.93 ACUTE BACTERIAL OTITIS MEDIA, BILATERAL: Status: ACTIVE | Noted: 2025-05-18

## 2025-05-18 PROBLEM — J01.00 ACUTE NON-RECURRENT MAXILLARY SINUSITIS: Status: ACTIVE | Noted: 2025-05-18

## 2025-05-19 ENCOUNTER — TELEPHONE (OUTPATIENT)
Dept: OTOLARYNGOLOGY | Facility: CLINIC | Age: 60
End: 2025-05-19
Payer: COMMERCIAL

## 2025-05-19 DIAGNOSIS — R42 VERTIGO: Primary | ICD-10-CM

## 2025-05-19 NOTE — PROGRESS NOTES
"   KIMBERLEE Bradley   [facility]  [address]  [place] [address]     PATIENT NAME: Vandana Price  : 1965  DATE: 25  MRN: [id number]      Billing Provider: KIMBERLEE Bradley  Level of Service:   Patient PCP Information       Provider PCP Type    Claude Oneil MD General            Reason for Visit / Chief Complaint: Dizziness (X 2 weeks. Meclizine isn't helping. ), Sore Throat (X 10 days), Cough (Mrs.Tia Price is a 59 y.o. female who presents to the clinic today with c/o sore throat, cough with tan colored production, dizziness.  ), and Headache (For several days)   Health Maintenance Due   Topic Date Due    TETANUS VACCINE  Never done    Shingles Vaccine (1 of 2) Never done          Update PCP  Update Chief Complaint         History of Present Illness / Problem Focused Workflow     History of Present Illness    CHIEF COMPLAINT:  Patient presents today for persistent dizziness and sinus symptoms.    DIZZINESS AND VERTIGO:  She reports ongoing dizziness interfering with daily activities, particularly affecting her ability to drive safely in adverse weather conditions, requiring her to pull over. She experiences brief episodes of vertigo lasting approximately 10 seconds with spinning sensation when lying on her left side, occurring consistently with positional changes.    ENT SYMPTOMS:  She reports morning sore throat extending down the side of the throat, accompanied by morning cough with non-green phlegm production. She experiences ear fullness with sensation of fluid movement, described as a "washing" sensation. She also reports daily headaches present upon waking.    MEDICATIONS:  She has been taking Zyrtec for allergies for the past 3-4 weeks. She previously switched to Claritin when Zyrtec became ineffective, but experienced decreased efficacy with Claritin as well, leading to return to Zyrtec.    BACK PAIN:  She reports chronic constant lower back pain. Prior steroid injections were " effective in managing symptoms, while nerve ablation treatment was unsuccessful and resulted in worsening pain.      ROS:  General: -fever, -chills, -fatigue, -weight gain, -weight loss  Eyes: -vision changes, -redness, -discharge  ENT: -ear pain, +nasal congestion, +sore throat, +post nasal drip, +ear pressure  Cardiovascular: -chest pain, -palpitations, -lower extremity edema  Respiratory: -cough, -shortness of breath, +productive cough  Gastrointestinal: -abdominal pain, -nausea, -vomiting, -diarrhea, -constipation, -blood in stool  Genitourinary: -dysuria, -hematuria, -frequency  Musculoskeletal: -joint pain, -muscle pain, +back pain  Skin: -rash, -lesion  Neurological: +headache, +dizziness, -numbness, -tingling  Psychiatric: -anxiety, -depression, -sleep difficulty  Head: +head pain          Hemoglobin A1C   Date Value Ref Range Status   01/24/2025 5.4 <=7.0 % Final     Comment:       Normal:               <5.7%  Pre-Diabetic:       5.7% to 6.4%  Diabetic:             >6.4%  Diabetic Goal:     <7%   07/25/2024 5.3 4.5 - 6.6 % Final     Comment:       Normal:               <5.7%  Pre-Diabetic:       5.7% to 6.4%  Diabetic:             >6.4%  Diabetic Goal:     <7%   11/03/2022 5.7 4.5 - 6.6 % Final     Comment:       Normal:               <5.7%  Pre-Diabetic:       5.7% to 6.4%  Diabetic:             >6.4%  Diabetic Goal:     <7%        CMP  Sodium   Date Value Ref Range Status   04/13/2025 142 136 - 145 mmol/L Final     Potassium   Date Value Ref Range Status   04/13/2025 4.0 3.5 - 5.1 mmol/L Final     Chloride   Date Value Ref Range Status   04/13/2025 108 (H) 98 - 107 mmol/L Final     CO2   Date Value Ref Range Status   04/13/2025 23 22 - 29 mmol/L Final     Glucose   Date Value Ref Range Status   04/13/2025 120 (H) 74 - 100 mg/dL Final     BUN   Date Value Ref Range Status   04/13/2025 19 10 - 20 mg/dL Final     Creatinine   Date Value Ref Range Status   04/13/2025 0.85 0.55 - 1.02 mg/dL Final     Calcium    Date Value Ref Range Status   04/13/2025 8.9 8.4 - 10.2 mg/dL Final     Total Protein   Date Value Ref Range Status   04/13/2025 7.2 6.4 - 8.3 g/dL Final     Albumin   Date Value Ref Range Status   04/13/2025 3.7 3.5 - 5.0 g/dL Final     Bilirubin, Total   Date Value Ref Range Status   04/13/2025 0.4 <=1.5 mg/dL Final     Alk Phos   Date Value Ref Range Status   04/13/2025 165 (H) 40 - 150 U/L Final     AST   Date Value Ref Range Status   04/13/2025 24 11 - 45 U/L Final     ALT   Date Value Ref Range Status   04/13/2025 14 <=55 U/L Final     Anion Gap   Date Value Ref Range Status   04/13/2025 15 7 - 16 mmol/L Final     eGFR   Date Value Ref Range Status   04/13/2025 79 >=60 mL/min/1.73m2 Final     Comment:     Estimated GFR calculated using the CKD-EPI creatinine (2021) equation.        Lab Results   Component Value Date    WBC 5.46 04/13/2025    RBC 4.21 04/13/2025    HGB 12.2 04/13/2025    HCT 38.3 04/13/2025    MCV 91.0 04/13/2025    MCH 29.0 04/13/2025    MCHC 31.9 (L) 04/13/2025    RDW 12.9 04/13/2025     04/13/2025    MPV 11.0 04/13/2025    LYMPH 24.5 (L) 04/13/2025    LYMPH 1.34 04/13/2025    LYMPH 22 (L) 04/13/2025    MONO 6.4 (H) 04/13/2025    MONO 6 04/13/2025    EOS 0.71 (H) 04/13/2025    BASO 0.05 04/13/2025    EOSINOPHIL 13.0 (H) 04/13/2025    EOSINOPHIL 10 (H) 04/13/2025    BASOPHIL 0.9 04/13/2025        Lab Results   Component Value Date    CHOL 165 01/24/2025    CHOL 161 07/25/2024    CHOL 188 11/03/2022     Lab Results   Component Value Date    HDL 54 01/24/2025    HDL 51 07/25/2024    HDL 61 (H) 11/03/2022     Lab Results   Component Value Date    LDLCALC 86 01/24/2025    LDLCALC 85 07/25/2024    LDLCALC 106 11/03/2022     Lab Results   Component Value Date    TRIG 123 01/24/2025    TRIG 124 07/25/2024    TRIG 105 11/03/2022     Lab Results   Component Value Date    CHOLHDL 3.1 01/24/2025    CHOLHDL 3.2 07/25/2024    CHOLHDL 3.1 11/03/2022        Wt Readings from Last 3 Encounters:    25 1503 116.9 kg (257 lb 12.8 oz)   25 1516 113.4 kg (250 lb)   25 0911 115.7 kg (255 lb)        BP Readings from Last 3 Encounters:   25 125/80   25 126/82   25 118/79        Review of Systems     Review of Systems       Medical / Social / Family History     Past Medical History:   Diagnosis Date    Allergy     Bilateral primary osteoarthritis of knee     Chronic pain of both knees 10/14/2021    Eosinophilia     Gangrene of gallbladder in cholecystitis 2021    Hematochezia     History of colonoscopy     Hyperlipidemia     Sleep apnea     Ulcerative colitis        Past Surgical History:   Procedure Laterality Date    ARTHROPLASTY, KNEE, TOTAL, USING COMPUTER-ASSISTED NAVIGATION Left 12/15/2022    Procedure: ARTHROPLASTY, KNEE, TOTAL, USING COMPUTER-ASSISTED NAVIGATION;  Surgeon: Jerome Valladares MD;  Location: [facility] OR;  Service: Orthopedics;  Laterality: Left;    ARTHROPLASTY, KNEE, TOTAL, USING COMPUTER-ASSISTED NAVIGATION Right 2023    Procedure: ARTHROPLASTY, KNEE, TOTAL, USING COMPUTER-ASSISTED NAVIGATION;  Surgeon: Jerome Valladares MD;  Location: [facility] OR;  Service: Orthopedics;  Laterality: Right;    Bilateral IA knee injection Bilateral 2021    D Shows    bladder tact       SECTION      CHOLECYSTECTOMY      FRACTURE SURGERY      jaw     INJECTION      back    INJECTION OF ANESTHETIC AGENT AROUND MEDIAL BRANCH NERVES INNERVATING LUMBAR FACET JOINT Bilateral 2023    Procedure: BIlateral L4-5,5-S1 MBB;  Surgeon: Amanda Goetz MD;  Location: [facility];  Service: Pain Management;  Laterality: Bilateral;  LOCAL    INJECTION OF ANESTHETIC AGENT AROUND MEDIAL BRANCH NERVES INNERVATING LUMBAR FACET JOINT Bilateral 2023    Procedure: Bilateral L4-5, 5-S1 medial branch block # 2;  Surgeon: Amanda Goetz MD;  Location: [facility];  Service: Pain Management;  Laterality: Bilateral;    INJECTION OF  ANESTHETIC AGENT AROUND NERVE Bilateral 06/28/2022    Procedure: GNB   BILATERAL;  Surgeon: Amanda Goetz MD;  Location: [facility];  Service: Pain Management;  Laterality: Bilateral;  PT HAS NOT HAD VAC   STATES WILL BE TESTED AT [facility] IN [place]    JOINT REPLACEMENT  12/22 09/23 both knees done    knee scope Bilateral     KNEE SURGERY      LAPAROSCOPIC CHOLECYSTECTOMY N/A 03/31/2021    Procedure: LAPAROSCOPIC CHOLECYSTECTOMY CONVERTED TO OPEN;  Surgeon: Darnell Mcgraw MD;  Location: [facility] OR;  Service: General;  Laterality: N/A;  CONVERTED TO OPEN    MANDIBLE SURGERY      RADIOFREQUENCY ABLATION OF LUMBAR MEDIAL BRANCH NERVE AT SINGLE LEVEL Bilateral 02/15/2024    Procedure: Bilateral L4-5,5-S1 MB RFTC;  Surgeon: Amanda Goetz MD;  Location: [facility];  Service: Pain Management;  Laterality: Bilateral;    TUBAL LIGATION  2004       Social History  Ms.  reports that she quit smoking about 15 years ago. Her smoking use included cigarettes. She started smoking about 48 years ago. She has a 33 pack-year smoking history. She has never been exposed to tobacco smoke. She has never used smokeless tobacco. She reports that she does not currently use alcohol after a past usage of about 1.0 standard drink of alcohol per week. She reports that she does not currently use drugs.    Family History  Ms.'s family history includes Alcohol abuse in her mother; Heart disease in her father; Hypertension in her father; Lung cancer in her maternal aunt; Migraines in her daughter.    Medications and Allergies     Medications  Outpatient Medications Marked as Taking for the 4/25/25 encounter (Office Visit) with Faby Boyle FNP   Medication Sig Dispense Refill    aspirin (ECOTRIN) 81 MG EC tablet Take 81 mg by mouth once daily.      celecoxib (CELEBREX) 200 MG capsule Take 1 capsule (200 mg total) by mouth 2 (two) times daily. 180 capsule 1    cyanocobalamin 1,000 mcg/mL injection Inject 1 ml Im every 2  "weeks for 8 weeks then inject 1 ml IM monthly to finish out a year. 10 mL 1    famotidine (PEPCID) 40 MG tablet Take 1 tablet (40 mg total) by mouth 2 (two) times daily. 180 tablet 1    gabapentin (NEURONTIN) 300 MG capsule Take 2 capsules (600 mg total) by mouth 2 (two) times daily. 360 capsule 1    meclizine (ANTIVERT) 50 MG tablet Take 0.5 tablets (25 mg total) by mouth 3 (three) times daily as needed for Dizziness or Nausea. 30 tablet 0    ondansetron (ZOFRAN-ODT) 4 MG TbDL Take 1 tablet (4 mg total) by mouth every 8 (eight) hours as needed (nausea). 15 tablet 1    pantoprazole (PROTONIX) 40 MG tablet Take 1 tablet (40 mg total) by mouth once daily. 90 tablet 3    triamcinolone acetonide 0.1% (KENALOG) 0.1 % cream Apply to rash on body BID, tapering with improvement. AVOID USING TO FACE AND GROIN 80 g 2    [DISCONTINUED] ustekinumab (STELARA) 90 mg/mL Syrg syringe Inject 1 mL (90 mg total) into the skin every 8 weeks. 1 mL 5       Allergies  Review of patient's allergies indicates:   Allergen Reactions    Opioids - morphine analogues Rash     Development of red rash at site morphine given.        Physical Examination     Vitals:    04/25/25 1326   BP: 118/79   Pulse: 95   Resp: 18   Temp: 99.1 °F (37.3 °C)   TempSrc: Oral   SpO2: 100%   Weight: 114.8 kg (253 lb)   Height: 5' 6" (1.676 m)      Physical Exam  Constitutional:       Appearance: Normal appearance. She is obese.   HENT:      Head: Normocephalic.      Right Ear: Hearing, ear canal and external ear normal. A middle ear effusion is present. Tympanic membrane is erythematous.      Left Ear: Hearing, ear canal and external ear normal. A middle ear effusion is present. Tympanic membrane is erythematous.      Nose: Congestion present.      Right Turbinates: Swollen.      Left Turbinates: Swollen.      Right Sinus: Maxillary sinus tenderness present.      Left Sinus: Maxillary sinus tenderness present.      Mouth/Throat:      Mouth: Mucous membranes are " moist.      Pharynx: Postnasal drip present.   Eyes:      Extraocular Movements: Extraocular movements intact.   Cardiovascular:      Rate and Rhythm: Normal rate and regular rhythm.      Pulses: Normal pulses.      Heart sounds: Normal heart sounds.   Pulmonary:      Effort: Pulmonary effort is normal.      Breath sounds: Normal breath sounds.   Skin:     General: Skin is warm and dry.      Capillary Refill: Capillary refill takes less than 2 seconds.   Neurological:      General: No focal deficit present.      Mental Status: She is alert and oriented to person, place, and time.   Psychiatric:         Mood and Affect: Mood normal.         Behavior: Behavior normal.          Assessment and Plan (including Health Maintenance)      Problem List  Smart Sets  Document Outside HM   :    Plan:     There are no Patient Instructions on file for this visit.       Health Maintenance Due   Topic Date Due    TETANUS VACCINE  Never done    Shingles Vaccine (1 of 2) Never done       Problem List Items Addressed This Visit       Acute bacterial otitis media, bilateral    Acute non-recurrent maxillary sinusitis - Primary     Assessment & Plan    H81.12 Benign paroxysmal vertigo, left ear  H66.43 Suppurative otitis media, unspecified, bilateral  J01.90 Acute sinusitis, unspecified  M54.50 Low back pain, unspecified  J30.9 Allergic rhinitis, unspecified    IMPRESSION:  - Assessed persistent symptoms including dizziness, sore throat, and cough, likely related to ongoing sinus and ear issues.  - Fluid in ears and redness on eardrums indicate possible ear infection.  - Initiated antibiotic treatment due to duration of symptoms (8-10 days) and clinical presentation.  - Selected Amoxicillin over Z-Anton due to its efficacy for both ear and sinus infections.  - Opted for longer-acting steroid (Medrol) instead of short-acting, given recent steroid treatment.  - Considered ongoing lower back pain, acknowledging potential benefit of steroid  injections planned with pain specialist.    BENIGN PAROXYSMAL VERTIGO:  - Noted ongoing dizziness and 'swimmy headed' feeling reported by the patient.  - Patient experiences spinning sensation when lying on left side and sitting up.  - Observed improvement in the patient's ability to lay on left side, though spinning sensation persists.  - Acknowledged the ongoing dizziness and its impact on the patient's daily activities.  - Instructed the patient to continue with Epley maneuver exercises.  - Suggested antibiotic treatment may help with dizziness.    BILATERAL OTITIS MEDIA:  - Noted fluid sensation in ears reported by the patient.  - Examined ears and found redness on both tympanic membranes with fluid present.  - Diagnosed bilateral otitis media.  - Prescribed Amoxicillin for ear infection.    ACUTE SINUSITIS:  - Noted patient reports of sore throat, productive cough in the morning, nasal congestion, and daily headaches.  - Observed signs of drainage.  - Assessed that headaches could be due to sinus problems.  - Prescribed Amoxicillin for sinus infection.  - Prescribed Medrol (methylprednisolone) for sinus inflammation.    LOW BACK PAIN:  - Noted patient reports of constant lower back pain.  - Evaluated that previous treatments including radiofrequency ablation and stretching exercises have not provided significant relief.  - Acknowledged patient's plan to consult a pain specialist.  - Noted patient's intention to receive corticosteroid injections from pain specialist.    ALLERGIC RHINITIS:  - Noted patient's use of Zyrtec for allergy management.  - Evaluated patient's report of alternating between Zyrtec and Claritin due to decreased effectiveness.  - Acknowledged the need to rotate antihistamine medications periodically.  - Prescribed Medrol (methylprednisolone), a longer-acting corticosteroid, for allergy symptoms.    OTHER TREATMENTS:  - Rocephin injection administered in office.        Acute non-recurrent  maxillary sinusitis  -     cefTRIAXone injection 1 g  -     methylPREDNISolone acetate injection 40 mg    Acute bacterial otitis media, bilateral  -     amoxicillin (AMOXIL) 500 MG Tab; Take 1 tablet (500 mg total) by mouth every 12 (twelve) hours. for 10 days  Dispense: 20 tablet; Refill: 0       Health Maintenance Topics with due status: Not Due       Topic Last Completion Date    Colorectal Cancer Screening 12/07/2023    Mammogram 09/24/2024    Hemoglobin A1c (Diabetic Prevention Screening) 01/24/2025    Lipid Panel 01/24/2025    RSV Vaccine (Age 60+ and Pregnant patients) Not Due         Future Appointments   Date Time Provider Department Center   7/24/2025  8:30 AM Claude Oneil MD [facility] [place]   8/19/2025  8:00 AM Bessy Álvarez FNP [facility]   10/8/2025  8:30 AM Jerome Valladares MD [facility]   1/23/2026  8:00 AM Claude Oneil MD [facility] [place]   3/18/2026  9:00 AM Fidelina Gonzalez MD [facility]        Follow up if symptoms worsen or fail to improve.    Health Maintenance Due   Topic Date Due    TETANUS VACCINE  Never done    Shingles Vaccine (1 of 2) Never done        Signature:  KIMBERLEE Bradley    Date of encounter: 4/25/25  This note was generated with the assistance of ambient listening technology. Verbal consent was obtained by the patient and accompanying visitor(s) for the recording of patient appointment to facilitate this note. I attest to having reviewed and edited the generated note for accuracy, though some syntax or spelling errors may persist. Please contact the author of this note for any clarification.

## 2025-05-19 NOTE — TELEPHONE ENCOUNTER
Copied from CRM #[id number]. Topic: General Inquiry - Test Results  >> May 16, 2025  1:05 PM Cecilia wrote:  Who Called: Vandana Albert    Caller is requesting information on test results.    Name of Test (Lab/Mammo/Etc): MRI   Date of Test: 5/14  Where the test was performed: [facility]   Ordering Provider: Russ    Preferred Method of Contact: Phone Call  Patient's Preferred Phone Number on File: [phone]   Best Call Back Number, if different:  Additional Information:    5/19/25   MRI results called to pt. Dr. Solano wants pt to see a neurologist. Pt states she has a referral in to [facility] for back pain, wants to add this to the referral and see if she can get her back and f/u with this 1.9cm cyst at the same time. Will send the referral to [facility]--phone # [phone]. Pt agreed and voiced understanding.

## 2025-06-09 ENCOUNTER — PATIENT MESSAGE (OUTPATIENT)
Dept: GASTROENTEROLOGY | Facility: CLINIC | Age: 60
End: 2025-06-09
Payer: COMMERCIAL

## 2025-06-11 ENCOUNTER — PATIENT MESSAGE (OUTPATIENT)
Dept: FAMILY MEDICINE | Facility: CLINIC | Age: 60
End: 2025-06-11
Payer: COMMERCIAL

## 2025-06-16 DIAGNOSIS — K21.9 GASTROESOPHAGEAL REFLUX DISEASE WITHOUT ESOPHAGITIS: ICD-10-CM

## 2025-06-16 RX ORDER — PANTOPRAZOLE SODIUM 40 MG/1
40 TABLET, DELAYED RELEASE ORAL DAILY
Qty: 90 TABLET | Refills: 3 | Status: SHIPPED | OUTPATIENT
Start: 2025-06-16 | End: 2026-06-16

## 2025-06-18 ENCOUNTER — PATIENT MESSAGE (OUTPATIENT)
Dept: FAMILY MEDICINE | Facility: CLINIC | Age: 60
End: 2025-06-18
Payer: COMMERCIAL

## 2025-06-20 ENCOUNTER — HOSPITAL ENCOUNTER (OUTPATIENT)
Dept: RADIOLOGY | Facility: HOSPITAL | Age: 60
Discharge: HOME | End: 2025-06-20
Attending: NEUROLOGICAL SURGERY
Payer: COMMERCIAL

## 2025-06-20 DIAGNOSIS — M54.59 OTHER LOW BACK PAIN: ICD-10-CM

## 2025-06-20 PROCEDURE — 72148 MRI LUMBAR SPINE W/O DYE: CPT | Mod: TC

## 2025-06-20 PROCEDURE — 72148 MRI LUMBAR SPINE W/O DYE: CPT | Mod: 26,,, | Performed by: RADIOLOGY

## 2025-06-27 ENCOUNTER — PATIENT MESSAGE (OUTPATIENT)
Dept: FAMILY MEDICINE | Facility: CLINIC | Age: 60
End: 2025-06-27
Payer: COMMERCIAL

## 2025-07-02 ENCOUNTER — PATIENT MESSAGE (OUTPATIENT)
Dept: GASTROENTEROLOGY | Facility: CLINIC | Age: 60
End: 2025-07-02
Payer: COMMERCIAL

## 2025-07-05 DIAGNOSIS — K21.9 GASTROESOPHAGEAL REFLUX DISEASE WITHOUT ESOPHAGITIS: ICD-10-CM

## 2025-07-07 ENCOUNTER — TELEPHONE (OUTPATIENT)
Dept: FAMILY MEDICINE | Facility: CLINIC | Age: 60
End: 2025-07-07
Payer: COMMERCIAL

## 2025-07-07 NOTE — TELEPHONE ENCOUNTER
I called and spoke to [place] Price. I told her the referral has been sent to [facility] in [place], [place] The provider is Akila Blackmon MD. I gave pt the phone to f/u if she desired.   Copied from CRM #[id number]. Topic: Appointments - Appointment Access  >> Jul 7, 2025 12:38 PM Bharti wrote:  Pt said she was supposed to be referred to an ENT and hasn't heard anything. Not to Dr Solano but another one.   Who Called: Vandana Price    Does the patient already have the specialty appointment scheduled?:  If yes, what is the date of that appointment?:  Referral to What Specialty:ENT  Reason for Referral:  Does the patient want the referral with a specific physician?:  If yes, which provider?:   Is the specialist an [facility]?:    Preferred Method of Contact: Phone Call  Patient's Preferred Phone Number on File: [phone]   Best Call Back Number, if different:  Additional Information:

## 2025-07-08 ENCOUNTER — PATIENT MESSAGE (OUTPATIENT)
Dept: GASTROENTEROLOGY | Facility: CLINIC | Age: 60
End: 2025-07-08
Payer: COMMERCIAL

## 2025-07-08 RX ORDER — PANTOPRAZOLE SODIUM 40 MG/1
40 TABLET, DELAYED RELEASE ORAL DAILY
Qty: 90 TABLET | Refills: 3 | Status: SHIPPED | OUTPATIENT
Start: 2025-07-08 | End: 2026-07-08

## 2025-07-14 ENCOUNTER — PATIENT MESSAGE (OUTPATIENT)
Dept: GASTROENTEROLOGY | Facility: CLINIC | Age: 60
End: 2025-07-14
Payer: COMMERCIAL

## 2025-07-14 ENCOUNTER — TELEPHONE (OUTPATIENT)
Dept: GASTROENTEROLOGY | Facility: CLINIC | Age: 60
End: 2025-07-14
Payer: COMMERCIAL

## 2025-07-14 NOTE — TELEPHONE ENCOUNTER
Spoke with Sanjana VILLANUEVA at [facility] to initiate verbal PA for Stelara. Office visit notes sent to [phone] per request that states that the patient is doing well overall on Stelara therapy.

## 2025-08-10 ENCOUNTER — PATIENT MESSAGE (OUTPATIENT)
Dept: GASTROENTEROLOGY | Facility: CLINIC | Age: 60
End: 2025-08-10
Payer: COMMERCIAL

## 2025-08-19 ENCOUNTER — OFFICE VISIT (OUTPATIENT)
Dept: GASTROENTEROLOGY | Facility: CLINIC | Age: 60
End: 2025-08-19
Payer: COMMERCIAL

## 2025-08-19 VITALS
DIASTOLIC BLOOD PRESSURE: 83 MMHG | BODY MASS INDEX: 39.7 KG/M2 | HEART RATE: 79 BPM | OXYGEN SATURATION: 97 % | HEIGHT: 66 IN | SYSTOLIC BLOOD PRESSURE: 124 MMHG | WEIGHT: 247 LBS

## 2025-08-19 DIAGNOSIS — K51.319 ULCERATIVE RECTOSIGMOIDITIS WITH COMPLICATION: Primary | ICD-10-CM

## 2025-08-19 PROCEDURE — 3074F SYST BP LT 130 MM HG: CPT | Mod: S$GLB,,,

## 2025-08-19 PROCEDURE — 99999 PR PBB SHADOW E&M-EST. PATIENT-LVL IV: CPT | Mod: PBBFAC,,,

## 2025-08-19 PROCEDURE — 99214 OFFICE O/P EST MOD 30 MIN: CPT | Mod: S$GLB,,,

## 2025-08-19 PROCEDURE — 1159F MED LIST DOCD IN RCRD: CPT | Mod: S$GLB,,,

## 2025-08-19 PROCEDURE — 3008F BODY MASS INDEX DOCD: CPT | Mod: S$GLB,,,

## 2025-08-19 PROCEDURE — 3044F HG A1C LEVEL LT 7.0%: CPT | Mod: S$GLB,,,

## 2025-08-19 PROCEDURE — 3079F DIAST BP 80-89 MM HG: CPT | Mod: S$GLB,,,

## 2025-08-22 ENCOUNTER — PATIENT MESSAGE (OUTPATIENT)
Dept: GASTROENTEROLOGY | Facility: CLINIC | Age: 60
End: 2025-08-22
Payer: COMMERCIAL

## 2025-08-25 ENCOUNTER — TELEPHONE (OUTPATIENT)
Dept: GASTROENTEROLOGY | Facility: CLINIC | Age: 60
End: 2025-08-25
Payer: COMMERCIAL

## 2025-08-29 PROBLEM — N89.8 VAGINAL ITCHING: Status: ACTIVE | Noted: 2025-08-29

## (undated) DEVICE — TRAY NERVE BLOCK (KC) PMA

## (undated) DEVICE — LAPARSCOPIC L-HOOK WIRE

## (undated) DEVICE — GOWN TOGA SYS PEELWY ZIP 2 XL

## (undated) DEVICE — GLOVE PROTEXIS PI SYN SURG 6.5

## (undated) DEVICE — KIT IRR SUCTION HND PIECE

## (undated) DEVICE — GOWN SURGICAL SMARTGOWN LEVEL 4 / EXTRA LARGE STERILE

## (undated) DEVICE — TROCAR SLEEVE Z-THREAD 5MM X 100MM

## (undated) DEVICE — SUT VICRYL CTD 1 27IN CP

## (undated) DEVICE — APPLICATOR CHLORAPREP HI-LITE TINTED ORANGE 26ML

## (undated) DEVICE — WARMER SCOPE DISP

## (undated) DEVICE — ELECTRODE REM PLYHSV RETURN 9

## (undated) DEVICE — SOL WATER STRL IRRIGATION 250ML

## (undated) DEVICE — SPONGE XRAY DETECT 4X4IN PK/10

## (undated) DEVICE — KIT IV START RUSH

## (undated) DEVICE — PENCIL HANDSWITCHING ROCKER SW

## (undated) DEVICE — GLOVE 7.5 PROTEXIS PI BLUE

## (undated) DEVICE — GLOVE SURGICAL PROTEXIS PI SIZE 8.0

## (undated) DEVICE — TRAY NERVE BLOCK UNIV 10/CA

## (undated) DEVICE — GLOVE 8.5 PROTEXIS PI BLUE

## (undated) DEVICE — CARD UNIV KNEE NAVGTN SW-SCL L

## (undated) DEVICE — KIT TOTAL KNEE RUSH

## (undated) DEVICE — REAMER PATELLA 42MM DISP

## (undated) DEVICE — APPLICATOR CHLORAPREP ORN 26ML

## (undated) DEVICE — GLOVE SURGICAL PROTEXIS PI SIZE 7

## (undated) DEVICE — KIT EVACUATOR 3 SPR  DRN 400CC

## (undated) DEVICE — DRESSING AQUACEL FOAM RECT 6X6

## (undated) DEVICE — PAD CAST SPECIALIST STRL 3

## (undated) DEVICE — SET IV SOL CONTIN-FLOW 10 DROP/ML (PRIMARY)

## (undated) DEVICE — CLIP APPLIER LIGSMAX MULTI 5MM

## (undated) DEVICE — COMPR KNEE STRAIGHT STAY 20IN

## (undated) DEVICE — KIT MULTI-2 COOLED RF 17GA X 100MM

## (undated) DEVICE — GLOVE BIOGEL SKINSENSE PI 7.0

## (undated) DEVICE — TROCAR GELPORT BLUNT BALLOON SYSTEM 10/12X100MM LF

## (undated) DEVICE — HANDLE YANKAUER SUCTION K80 LATEX FREE

## (undated) DEVICE — CDS LAP CHOLE

## (undated) DEVICE — TRAY CATH FOL SIL URIMTR 16FR

## (undated) DEVICE — SOL NACL IRR 3000ML

## (undated) DEVICE — DRAPE INCISE IOBAN 2 23X23IN

## (undated) DEVICE — BATTERY INSTRUMENT

## (undated) DEVICE — SOL NACL IRR 1000ML BTL

## (undated) DEVICE — SKIN STAPLER PMR35

## (undated) DEVICE — SOL CONTINU-FLO SET 2 LAV

## (undated) DEVICE — Device

## (undated) DEVICE — GLOVE SURGICAL PROTEXIS PI SIZE 6.5

## (undated) DEVICE — NDL SPINAL CLR HUB 22GX4 3/4

## (undated) DEVICE — CATH IV 22G X 1 AUTOGUARD

## (undated) DEVICE — SUTURE SILK 3-0 30 BLACK SH CO

## (undated) DEVICE — SUTURE PDS II 0 VIOLET 27 CT2

## (undated) DEVICE — CATH IV JELCO 22GX1 IN

## (undated) DEVICE — DRAIN SURGICAL J/P FLAT HUBLESS 10MM X 20CM

## (undated) DEVICE — SUTURE ETHILON 3-0 18 BLK PS2

## (undated) DEVICE — ENDO POUCH

## (undated) DEVICE — SUTURE VICRYL 4-0 FS2 UNDYED 27 IN

## (undated) DEVICE — KIT COOLED RF 100MM SGL PROBE

## (undated) DEVICE — DRAPE FLUORO C ARM 36X30IN

## (undated) DEVICE — GLOVE SURGICAL PROTEXIS PI BLUE SIZE 7.0

## (undated) DEVICE — OVERLAY MATTRESS WAFFLE

## (undated) DEVICE — DRAPE THREE-QUARTER 53X77IN

## (undated) DEVICE — SPONGE COTTON TRAY 4X4IN

## (undated) DEVICE — IRRIGATOR SUCTION STYKERFLOW II DISP

## (undated) DEVICE — TOURNIQUET SB QC SP 34X4IN

## (undated) DEVICE — SUT 2-0 VICRYL / CT-1

## (undated) DEVICE — SOLIDIFIER BTL W/TREAT 1500CC

## (undated) DEVICE — SPONGE LAP STRL 18X18 5/PK

## (undated) DEVICE — KIT IV START 849

## (undated) DEVICE — MONOPOLAR FOOT SWITCHING LAP CHORD

## (undated) DEVICE — CANISTER SUCTION MEDI-VAC 12L

## (undated) DEVICE — NEEDLE SPINAL 22GX3.5 QUINCHE (KC)

## (undated) DEVICE — GLOVE BIOGEL SKINSENSE PI 6.5

## (undated) DEVICE — CULTURETTE ANAEROBIC COLLECTOR

## (undated) DEVICE — BULB RESERVOIR J/P 100CC

## (undated) DEVICE — TROCAR BLADELESS Z-THREAD 5MM X 100MM

## (undated) DEVICE — GUIDEPIN 3.20MM 4 KANT SQUARE
Type: IMPLANTABLE DEVICE | Site: KNEE | Status: NON-FUNCTIONAL
Removed: 2023-09-14

## (undated) DEVICE — PAD GROUNDING W/CORD(BAYLIS)

## (undated) DEVICE — GLOVE BIOGEL SKINSENSE PI 8.5

## (undated) DEVICE — GLOVE BIOGEL SKINSENSE PI 7.5

## (undated) DEVICE — DRAPE SURGICAL 3/4 SHEET 52IN X 76IN STERILE

## (undated) DEVICE — GLOVE 7.0 PROTEXIS PI BLUE

## (undated) DEVICE — GUIDEPIN 3.20MM 4 KANT SQUARE
Type: IMPLANTABLE DEVICE | Site: KNEE | Status: NON-FUNCTIONAL
Removed: 2022-12-15

## (undated) DEVICE — SYS REVOLUTION CEMENT MIXING

## (undated) DEVICE — STAPLER SKIN WIDE

## (undated) DEVICE — TOWEL SURGICAL BLUE COTTON 16INX26IN STERILE 4/PK

## (undated) DEVICE — CULTURETTE AEROBIC SWAB

## (undated) DEVICE — INSERT CARTRIDGE 5X34CM DISP F/SCISSOR

## (undated) DEVICE — SUTURE PDS 1 MONO VIOL TP-1 48

## (undated) DEVICE — BLADE INSULATED COATED 6IN

## (undated) DEVICE — BLADE PERFORMANCE SAG 21X90MM

## (undated) DEVICE — SPONGE COTTON WOVEN 4X4IN

## (undated) DEVICE — GLOVE BIOGEL SKINSENSE PI 8.0